# Patient Record
Sex: FEMALE | NOT HISPANIC OR LATINO | Employment: OTHER | URBAN - NONMETROPOLITAN AREA
[De-identification: names, ages, dates, MRNs, and addresses within clinical notes are randomized per-mention and may not be internally consistent; named-entity substitution may affect disease eponyms.]

---

## 2018-01-03 ENCOUNTER — APPOINTMENT (OUTPATIENT)
Dept: GENERAL RADIOLOGY | Facility: HOSPITAL | Age: 78
End: 2018-01-03

## 2018-01-03 ENCOUNTER — APPOINTMENT (OUTPATIENT)
Dept: CARDIOLOGY | Facility: HOSPITAL | Age: 78
End: 2018-01-03
Attending: FAMILY MEDICINE

## 2018-01-03 ENCOUNTER — HOSPITAL ENCOUNTER (INPATIENT)
Facility: HOSPITAL | Age: 78
LOS: 24 days | Discharge: NURSING, REHAB OR LONG-TERM CARE | End: 2018-01-27
Attending: INTERNAL MEDICINE | Admitting: FAMILY MEDICINE

## 2018-01-03 ENCOUNTER — APPOINTMENT (OUTPATIENT)
Dept: NUCLEAR MEDICINE | Facility: HOSPITAL | Age: 78
End: 2018-01-03

## 2018-01-03 DIAGNOSIS — Z74.09 IMPAIRED MOBILITY: ICD-10-CM

## 2018-01-03 DIAGNOSIS — Z78.9 IMPAIRED MOBILITY AND ADLS: ICD-10-CM

## 2018-01-03 DIAGNOSIS — Z74.09 IMPAIRED MOBILITY AND ADLS: ICD-10-CM

## 2018-01-03 PROBLEM — N17.9 AKI (ACUTE KIDNEY INJURY) (HCC): Status: ACTIVE | Noted: 2018-01-03

## 2018-01-03 LAB
ALBUMIN SERPL-MCNC: 3.4 G/DL (ref 3.5–5)
ALBUMIN/GLOB SERPL: 1.1 G/DL (ref 1.1–2.5)
ALP SERPL-CCNC: 92 U/L (ref 24–120)
ALT SERPL W P-5'-P-CCNC: 29 U/L (ref 0–54)
ANION GAP SERPL CALCULATED.3IONS-SCNC: 14 MMOL/L (ref 4–13)
ANISOCYTOSIS BLD QL: ABNORMAL
APTT PPP: 46.7 SECONDS (ref 24.1–34.8)
ARTERIAL PATENCY WRIST A: POSITIVE
AST SERPL-CCNC: 19 U/L (ref 7–45)
ATMOSPHERIC PRESS: 756 MMHG
BASE EXCESS BLDA CALC-SCNC: -10.5 MMOL/L (ref 0–2)
BDY SITE: ABNORMAL
BILIRUB SERPL-MCNC: 0.5 MG/DL (ref 0.1–1)
BODY TEMPERATURE: 37 C
BUN BLD-MCNC: 63 MG/DL (ref 5–21)
BUN/CREAT SERPL: 13.2 (ref 7–25)
BURR CELLS BLD QL SMEAR: ABNORMAL
CALCIUM SPEC-SCNC: 10.8 MG/DL (ref 8.4–10.4)
CHLORIDE SERPL-SCNC: 106 MMOL/L (ref 98–110)
CO2 SERPL-SCNC: 17 MMOL/L (ref 24–31)
CREAT BLD-MCNC: 4.76 MG/DL (ref 0.5–1.4)
DEPRECATED RDW RBC AUTO: 49.8 FL (ref 40–54)
ERYTHROCYTE [DISTWIDTH] IN BLOOD BY AUTOMATED COUNT: 15.9 % (ref 12–15)
GFR SERPL CREATININE-BSD FRML MDRD: 11 ML/MIN/1.73
GFR SERPL CREATININE-BSD FRML MDRD: 9 ML/MIN/1.73
GLOBULIN UR ELPH-MCNC: 3.2 GM/DL
GLUCOSE BLD-MCNC: 118 MG/DL (ref 70–100)
GLUCOSE BLDC GLUCOMTR-MCNC: 126 MG/DL (ref 70–130)
HCO3 BLDA-SCNC: 14.5 MMOL/L (ref 20–26)
HCT VFR BLD AUTO: 33.1 % (ref 37–47)
HCT VFR BLD AUTO: 33.2 % (ref 37–47)
HGB BLD-MCNC: 10.8 G/DL (ref 12–16)
HGB BLD-MCNC: 10.9 G/DL (ref 12–16)
HYPOCHROMIA BLD QL: ABNORMAL
INR PPP: 1.32 (ref 0.91–1.09)
LARGE PLATELETS: ABNORMAL
LYMPHOCYTES # BLD MANUAL: 0.73 10*3/MM3 (ref 0.72–4.86)
LYMPHOCYTES NFR BLD MANUAL: 3 % (ref 4–12)
LYMPHOCYTES NFR BLD MANUAL: 5 % (ref 15–45)
Lab: ABNORMAL
MCH RBC QN AUTO: 28.3 PG (ref 28–32)
MCHC RBC AUTO-ENTMCNC: 32.8 G/DL (ref 33–36)
MCV RBC AUTO: 86.2 FL (ref 82–98)
METAMYELOCYTES NFR BLD MANUAL: 1 % (ref 0–0)
MODALITY: ABNORMAL
MONOCYTES # BLD AUTO: 0.44 10*3/MM3 (ref 0.19–1.3)
NEUTROPHILS # BLD AUTO: 13.37 10*3/MM3 (ref 1.87–8.4)
NEUTROPHILS NFR BLD MANUAL: 88 % (ref 39–78)
NEUTS BAND NFR BLD MANUAL: 3 % (ref 0–10)
NEUTS VAC BLD QL SMEAR: ABNORMAL
PCO2 BLDA: 28.9 MM HG (ref 35–45)
PH BLDA: 7.31 PH UNITS (ref 7.35–7.45)
PLATELET # BLD AUTO: 132 10*3/MM3 (ref 130–400)
PMV BLD AUTO: 11.3 FL (ref 6–12)
PO2 BLDA: 67.9 MM HG (ref 83–108)
POIKILOCYTOSIS BLD QL SMEAR: ABNORMAL
POLYCHROMASIA BLD QL SMEAR: ABNORMAL
POTASSIUM BLD-SCNC: 5.7 MMOL/L (ref 3.5–5.3)
PROT SERPL-MCNC: 6.6 G/DL (ref 6.3–8.7)
PROTHROMBIN TIME: 16.8 SECONDS (ref 11.9–14.6)
RBC # BLD AUTO: 3.85 10*6/MM3 (ref 4.2–5.4)
SAO2 % BLDCOA: 92.7 % (ref 94–99)
SCAN SLIDE: NORMAL
SODIUM BLD-SCNC: 137 MMOL/L (ref 135–145)
URATE SERPL-MCNC: 8.4 MG/DL (ref 2.7–7.5)
VENTILATOR MODE: ABNORMAL
WBC NRBC COR # BLD: 14.69 10*3/MM3 (ref 4.8–10.8)

## 2018-01-03 PROCEDURE — 71045 X-RAY EXAM CHEST 1 VIEW: CPT

## 2018-01-03 PROCEDURE — 25010000002 ENOXAPARIN PER 10 MG: Performed by: FAMILY MEDICINE

## 2018-01-03 PROCEDURE — 85014 HEMATOCRIT: CPT | Performed by: FAMILY MEDICINE

## 2018-01-03 PROCEDURE — 36600 WITHDRAWAL OF ARTERIAL BLOOD: CPT

## 2018-01-03 PROCEDURE — 25010000002 PERFLUTREN 6.52 MG/ML SUSPENSION: Performed by: FAMILY MEDICINE

## 2018-01-03 PROCEDURE — 84550 ASSAY OF BLOOD/URIC ACID: CPT | Performed by: FAMILY MEDICINE

## 2018-01-03 PROCEDURE — 85025 COMPLETE CBC W/AUTO DIFF WBC: CPT | Performed by: FAMILY MEDICINE

## 2018-01-03 PROCEDURE — 85018 HEMOGLOBIN: CPT | Performed by: FAMILY MEDICINE

## 2018-01-03 PROCEDURE — 93306 TTE W/DOPPLER COMPLETE: CPT

## 2018-01-03 PROCEDURE — 25010000002 VANCOMYCIN 10 G RECONSTITUTED SOLUTION: Performed by: FAMILY MEDICINE

## 2018-01-03 PROCEDURE — 25010000002 CEFEPIME PER 500 MG: Performed by: FAMILY MEDICINE

## 2018-01-03 PROCEDURE — 0 XENON XE 133: Performed by: FAMILY MEDICINE

## 2018-01-03 PROCEDURE — 94640 AIRWAY INHALATION TREATMENT: CPT

## 2018-01-03 PROCEDURE — 82803 BLOOD GASES ANY COMBINATION: CPT

## 2018-01-03 PROCEDURE — 0 TECHNETIUM ALBUMIN AGGREGATED: Performed by: FAMILY MEDICINE

## 2018-01-03 PROCEDURE — 82962 GLUCOSE BLOOD TEST: CPT

## 2018-01-03 PROCEDURE — 87150 DNA/RNA AMPLIFIED PROBE: CPT | Performed by: FAMILY MEDICINE

## 2018-01-03 PROCEDURE — 93005 ELECTROCARDIOGRAM TRACING: CPT | Performed by: FAMILY MEDICINE

## 2018-01-03 PROCEDURE — 93306 TTE W/DOPPLER COMPLETE: CPT | Performed by: INTERNAL MEDICINE

## 2018-01-03 PROCEDURE — 94799 UNLISTED PULMONARY SVC/PX: CPT

## 2018-01-03 PROCEDURE — A9558 XE133 XENON 10MCI: HCPCS | Performed by: FAMILY MEDICINE

## 2018-01-03 PROCEDURE — 85730 THROMBOPLASTIN TIME PARTIAL: CPT | Performed by: FAMILY MEDICINE

## 2018-01-03 PROCEDURE — 87040 BLOOD CULTURE FOR BACTERIA: CPT | Performed by: FAMILY MEDICINE

## 2018-01-03 PROCEDURE — 85610 PROTHROMBIN TIME: CPT | Performed by: FAMILY MEDICINE

## 2018-01-03 PROCEDURE — 78582 LUNG VENTILAT&PERFUS IMAGING: CPT

## 2018-01-03 PROCEDURE — A9540 TC99M MAA: HCPCS | Performed by: FAMILY MEDICINE

## 2018-01-03 PROCEDURE — 93010 ELECTROCARDIOGRAM REPORT: CPT | Performed by: INTERNAL MEDICINE

## 2018-01-03 PROCEDURE — 87205 SMEAR GRAM STAIN: CPT | Performed by: FAMILY MEDICINE

## 2018-01-03 PROCEDURE — 80053 COMPREHEN METABOLIC PANEL: CPT | Performed by: FAMILY MEDICINE

## 2018-01-03 PROCEDURE — 85007 BL SMEAR W/DIFF WBC COUNT: CPT | Performed by: FAMILY MEDICINE

## 2018-01-03 RX ORDER — PANTOPRAZOLE SODIUM 40 MG/10ML
80 INJECTION, POWDER, LYOPHILIZED, FOR SOLUTION INTRAVENOUS ONCE
Status: COMPLETED | OUTPATIENT
Start: 2018-01-03 | End: 2018-01-03

## 2018-01-03 RX ORDER — ACETAMINOPHEN 325 MG/1
650 TABLET ORAL EVERY 4 HOURS PRN
Status: DISCONTINUED | OUTPATIENT
Start: 2018-01-03 | End: 2018-01-27 | Stop reason: HOSPADM

## 2018-01-03 RX ORDER — SODIUM CHLORIDE 9 MG/ML
50 INJECTION, SOLUTION INTRAVENOUS CONTINUOUS
Status: DISCONTINUED | OUTPATIENT
Start: 2018-01-03 | End: 2018-01-08

## 2018-01-03 RX ORDER — CEFEPIME HYDROCHLORIDE 1 G/1
1 INJECTION, POWDER, FOR SOLUTION INTRAMUSCULAR; INTRAVENOUS EVERY 12 HOURS
Status: DISCONTINUED | OUTPATIENT
Start: 2018-01-03 | End: 2018-01-03 | Stop reason: SDUPTHER

## 2018-01-03 RX ORDER — SODIUM CHLORIDE 0.9 % (FLUSH) 0.9 %
1-10 SYRINGE (ML) INJECTION AS NEEDED
Status: DISCONTINUED | OUTPATIENT
Start: 2018-01-03 | End: 2018-01-27 | Stop reason: HOSPADM

## 2018-01-03 RX ORDER — ACETAMINOPHEN 650 MG/1
650 SUPPOSITORY RECTAL EVERY 4 HOURS PRN
Status: DISCONTINUED | OUTPATIENT
Start: 2018-01-03 | End: 2018-01-27 | Stop reason: HOSPADM

## 2018-01-03 RX ORDER — ONDANSETRON 4 MG/1
4 TABLET, FILM COATED ORAL EVERY 6 HOURS PRN
Status: DISCONTINUED | OUTPATIENT
Start: 2018-01-03 | End: 2018-01-27 | Stop reason: HOSPADM

## 2018-01-03 RX ORDER — NICOTINE POLACRILEX 4 MG
15 LOZENGE BUCCAL
Status: DISCONTINUED | OUTPATIENT
Start: 2018-01-03 | End: 2018-01-27 | Stop reason: HOSPADM

## 2018-01-03 RX ORDER — PANTOPRAZOLE SODIUM 40 MG/10ML
40 INJECTION, POWDER, LYOPHILIZED, FOR SOLUTION INTRAVENOUS
Status: DISCONTINUED | OUTPATIENT
Start: 2018-01-03 | End: 2018-01-03 | Stop reason: SDUPTHER

## 2018-01-03 RX ORDER — IPRATROPIUM BROMIDE AND ALBUTEROL SULFATE 2.5; .5 MG/3ML; MG/3ML
3 SOLUTION RESPIRATORY (INHALATION)
Status: DISCONTINUED | OUTPATIENT
Start: 2018-01-03 | End: 2018-01-22 | Stop reason: SDUPTHER

## 2018-01-03 RX ORDER — ONDANSETRON 4 MG/1
4 TABLET, ORALLY DISINTEGRATING ORAL EVERY 6 HOURS PRN
Status: DISCONTINUED | OUTPATIENT
Start: 2018-01-03 | End: 2018-01-27 | Stop reason: HOSPADM

## 2018-01-03 RX ORDER — HYDROCODONE BITARTRATE AND ACETAMINOPHEN 7.5; 325 MG/1; MG/1
1 TABLET ORAL EVERY 4 HOURS PRN
Status: DISPENSED | OUTPATIENT
Start: 2018-01-03 | End: 2018-01-13

## 2018-01-03 RX ORDER — DEXTROSE MONOHYDRATE 25 G/50ML
25 INJECTION, SOLUTION INTRAVENOUS
Status: DISCONTINUED | OUTPATIENT
Start: 2018-01-03 | End: 2018-01-27 | Stop reason: HOSPADM

## 2018-01-03 RX ORDER — ONDANSETRON 2 MG/ML
4 INJECTION INTRAMUSCULAR; INTRAVENOUS EVERY 6 HOURS PRN
Status: DISCONTINUED | OUTPATIENT
Start: 2018-01-03 | End: 2018-01-27 | Stop reason: HOSPADM

## 2018-01-03 RX ADMIN — CEFEPIME HYDROCHLORIDE 2 G: 2 INJECTION, POWDER, FOR SOLUTION INTRAVENOUS at 20:34

## 2018-01-03 RX ADMIN — ENOXAPARIN SODIUM 140 MG: 150 INJECTION SUBCUTANEOUS at 20:44

## 2018-01-03 RX ADMIN — PERFLUTREN 8.48 MG: 6.52 INJECTION, SUSPENSION INTRAVENOUS at 22:11

## 2018-01-03 RX ADMIN — PANTOPRAZOLE SODIUM 80 MG: 40 INJECTION, POWDER, FOR SOLUTION INTRAVENOUS at 20:38

## 2018-01-03 RX ADMIN — Medication 1 DOSE: at 21:45

## 2018-01-03 RX ADMIN — SODIUM CHLORIDE 100 ML/HR: 9 INJECTION, SOLUTION INTRAVENOUS at 20:28

## 2018-01-03 RX ADMIN — SODIUM CHLORIDE 8 MG/HR: 900 INJECTION INTRAVENOUS at 20:31

## 2018-01-03 RX ADMIN — VANCOMYCIN HYDROCHLORIDE 1500 MG: 100 INJECTION, POWDER, LYOPHILIZED, FOR SOLUTION INTRAVENOUS at 22:30

## 2018-01-03 RX ADMIN — IPRATROPIUM BROMIDE AND ALBUTEROL SULFATE 3 ML: 2.5; .5 SOLUTION RESPIRATORY (INHALATION) at 19:23

## 2018-01-03 RX ADMIN — XENON XE-133 10.3 MILLICURIE: 10 GAS RESPIRATORY (INHALATION) at 21:45

## 2018-01-03 NOTE — H&P
[facility] Medicine Services  HISTORY AND PHYSICAL    Date of Admission: 1/3/2018  Primary Care Physician: Ed Hanson MD    Subjective     Chief Complaint: Worsening renal function    History of Present Illness   The patient is a pleasant 77 year old lady with a history of hypertension, morbid obesity and recent right shoulder surgery.  She had surgery on her Right shoulder at the end of November.  She presented to [facility] on January 1st complaining of nausea, vomiting and diarrhea.  At that time she had mentioned blood in her stool.  She was admitted with IV Hydration.  She eventually became hypotensive and was sent to their ICU and put on Levophed.  She states she was told that she was Septic.  On the records, she appears to have been on IV Levaquin there.      She had decreased pulses in her toes and purple color, so duplex scans were obtained.  A completely occluding DVT was seen within the left common femoral, superficial femoral and popliteal veins.  She has since been anticoagulated with Lovenox.      A V/Q scan was also obtained and was read as high probability for PE.      Her most recent WBC count on 12/31 was 19.5        Review of Systems   Constitutional: Positive for fatigue. Negative for fever.   HENT: Negative for congestion and ear pain.    Eyes: Negative for pain and visual disturbance.   Respiratory: Negative for cough, shortness of breath and wheezing.    Cardiovascular: Negative for chest pain and palpitations.   Gastrointestinal: Negative for diarrhea, nausea and vomiting.   Endocrine: Negative for heat intolerance.   Genitourinary: Positive for decreased urine volume and difficulty urinating. Negative for dysuria and frequency.   Musculoskeletal: Positive for arthralgias. Negative for back pain.   Skin: Negative for rash and wound.   Neurological: Positive for weakness. Negative for dizziness and light-headedness.  "  Psychiatric/Behavioral: Negative for confusion. The patient is not nervous/anxious.    All other systems reviewed and are negative.       Otherwise complete ROS reviewed and negative except as mentioned in the HPI.    Past Medical History:   Past Medical History:   Diagnosis Date   • Anemia    • Arthritis    • Bladder spasms    • GERD (gastroesophageal reflux disease)    • Hypertension      Past Surgical History:  Past Surgical History:   Procedure Laterality Date   • ANKLE SURGERY     • KNEE SURGERY     • SHOULDER SURGERY       Social History:  reports that she has never smoked. She has never used smokeless tobacco. She reports that she does not drink alcohol or use illicit drugs.    Family History: family history is negative for Heart disease and Cancer.       Allergies:  No Known Allergies  Medications:  Prior to Admission medications    Not on File     Objective     Vital Signs: /68  Pulse 78  Temp 97 °F (36.1 °C) (Temporal Artery )   Resp 25  Ht 160 cm (63\")  Wt (!) 140 kg (307 lb 9.6 oz)  SpO2 95%  BMI 54.49 kg/m2  Physical Exam   Constitutional: She is oriented to person, place, and time. She appears well-developed and well-nourished. Nasal cannula in place.   HENT:   Head: Normocephalic and atraumatic.   Right Ear: External ear normal.   Left Ear: External ear normal.   Nose: Nose normal.   Mouth/Throat: Oropharynx is clear and moist.   Eyes: Conjunctivae and EOM are normal.   Neck: Normal range of motion. Neck supple.   Cardiovascular: Normal rate, regular rhythm and normal heart sounds.    Pulmonary/Chest: Effort normal and breath sounds normal. Tachypnea noted.   Abdominal: Soft. Bowel sounds are normal. She exhibits no distension. There is no tenderness.   Genitourinary: Rectal exam shows guaiac positive stool. Rectal exam shows no mass.   Genitourinary Comments: Dried blood around anus    Dark red stools   Musculoskeletal: Normal range of motion.   Neurological: She is alert and oriented " to person, place, and time.   Skin: Skin is warm and dry.   Psychiatric: She has a normal mood and affect. Her speech is normal and behavior is normal. Cognition and memory are normal.         Results Reviewed:  A completely occluding DVT was seen within the left common femoral, superficial femoral and popliteal veins.  She has since been anticoagulated with Lovenox.      A V/Q scan was also obtained and was read as high probability for PE.      Her most recent WBC count on 12/31     Assessment / Plan     1.  PE--Intermediate Risk with PESI Score 105  -She has an unfortunate combination of renal failure and thrombocytopenia as well as reported bloody stools  -Will Anticoagulate with Lovenox, renally dosed per pharmacy  -Will seek GI consult to do all that we can to prevent her from bleeding  -Will seek Vascular input for occlusive DVT and PE   -Stat Echo  -Recheck V/Q  -Consider Cardiology consult in AM if indicated  -EKG  -Cardiac Monitoring    2.  Occlusive Left-lower extremity DVT  -She has an unfortunate combination of renal failure and thrombocytopenia as well as reported bloody stools  -Will Anticoagulate with Lovenox, renally dosed per pharmacy  -Will seek GI consult to do all that we can to prevent her from bleeding  -Will seek Vascular input for occlusive DVT and PE   -Recheck Duplex scans    3:  GI Bleed  -Stools were dark red with a positive hemoccult  -Monitor H&H  -Protonix drip  -GI consult given anticoagulation  -Telemetry  -Transfuse as indicated    4.  ?Sepsis  -Most recent WBC count from [facility] is 19.5   -Patient is tachypneic to 24 here on the cardiac monitor.    -Has been hypotensive and on Levophed  -Hypotension could be related to PE  -Unfortunately, in the manner in which I am obtaining her care, I feel that the safest route is to restart a full Sepsis workup and cover with broad spectrum antibiotics for now.    -Cardiac Monitoring  -Pan cultures    5.  Acute Renal Failure  -Nephrology  Consult  -Renal U/S  -Renal Labs  -Boggs placed    6.  Lactic Acidosis  -2.5 on most recent labs from [facility]  -Recheck    7.  Intractible Nausea and vomiting  -Zofran  -IVF    8.  Dehydration  -IVF  -Monitor UOP  -Boggs placed    9.  GERD  -Protonix drip    10.  HLD  -Statin  -Lipid panel    11.  Bladder Spasms  -Ditropan    12.  HTN  -Hold BP meds given low BP, RUBY  -Monitor BP    13.  Anemia  -Monitor H&H  -Protonix  -GI Consulted    14:  Morbid Obesity  -BMI:  54.4  -Dietician consult    15.  Hyperglycemia  -A1C  -Monitor FSBS  -SSI      Code Status: DNR     I discussed the patients findings and my recommendations with the patient, patient's daughter, RN    Estimated length of stay critically ill    Wilfred Pedersen MD   01/03/18   7:05 PM

## 2018-01-04 ENCOUNTER — APPOINTMENT (OUTPATIENT)
Dept: ULTRASOUND IMAGING | Facility: HOSPITAL | Age: 78
End: 2018-01-04

## 2018-01-04 LAB
ALBUMIN SERPL-MCNC: 3.2 G/DL (ref 3.5–5)
ALBUMIN/GLOB SERPL: 1.1 G/DL (ref 1.1–2.5)
ALP SERPL-CCNC: 87 U/L (ref 24–120)
ALT SERPL W P-5'-P-CCNC: 28 U/L (ref 0–54)
ANION GAP SERPL CALCULATED.3IONS-SCNC: 12 MMOL/L (ref 4–13)
ANION GAP SERPL CALCULATED.3IONS-SCNC: 14 MMOL/L (ref 4–13)
ANISOCYTOSIS BLD QL: ABNORMAL
ARTICHOKE IGE QN: 58 MG/DL (ref 0–99)
AST SERPL-CCNC: 18 U/L (ref 7–45)
BACTERIA UR QL AUTO: ABNORMAL /HPF
BH CV ECHO MEAS - AO MAX PG (FULL): 11.7 MMHG
BH CV ECHO MEAS - AO MAX PG: 20.3 MMHG
BH CV ECHO MEAS - AO MEAN PG (FULL): 11 MMHG
BH CV ECHO MEAS - AO MEAN PG: 16 MMHG
BH CV ECHO MEAS - AO ROOT AREA (BSA CORRECTED): 1.4
BH CV ECHO MEAS - AO ROOT AREA: 8 CM^2
BH CV ECHO MEAS - AO ROOT DIAM: 3.2 CM
BH CV ECHO MEAS - AO V2 MAX: 225 CM/SEC
BH CV ECHO MEAS - AO V2 MEAN: 191 CM/SEC
BH CV ECHO MEAS - AO V2 VTI: 44.8 CM
BH CV ECHO MEAS - AVA(I,A): 2.1 CM^2
BH CV ECHO MEAS - AVA(I,D): 2.1 CM^2
BH CV ECHO MEAS - AVA(V,A): 2.5 CM^2
BH CV ECHO MEAS - AVA(V,D): 2.5 CM^2
BH CV ECHO MEAS - BSA(HAYCOCK): 2.6 M^2
BH CV ECHO MEAS - BSA: 2.3 M^2
BH CV ECHO MEAS - BZI_BMI: 54.4 KILOGRAMS/M^2
BH CV ECHO MEAS - BZI_METRIC_HEIGHT: 160 CM
BH CV ECHO MEAS - BZI_METRIC_WEIGHT: 139.3 KG
BH CV ECHO MEAS - CONTRAST EF 4CH: 68 ML/M^2
BH CV ECHO MEAS - EDV(CUBED): 65.9 ML
BH CV ECHO MEAS - EDV(MOD-SP4): 102 ML
BH CV ECHO MEAS - EDV(TEICH): 71.7 ML
BH CV ECHO MEAS - EF(CUBED): 75.1 %
BH CV ECHO MEAS - EF(MOD-SP4): 68 %
BH CV ECHO MEAS - EF(TEICH): 67.6 %
BH CV ECHO MEAS - ESV(CUBED): 16.4 ML
BH CV ECHO MEAS - ESV(MOD-SP4): 32.6 ML
BH CV ECHO MEAS - ESV(TEICH): 23.2 ML
BH CV ECHO MEAS - FS: 37.1 %
BH CV ECHO MEAS - IVS/LVPW: 1.1
BH CV ECHO MEAS - IVSD: 1.6 CM
BH CV ECHO MEAS - LA DIMENSION: 3.3 CM
BH CV ECHO MEAS - LA/AO: 1
BH CV ECHO MEAS - LV DIASTOLIC VOL/BSA (35-75): 44 ML/M^2
BH CV ECHO MEAS - LV MASS(C)D: 251.1 GRAMS
BH CV ECHO MEAS - LV MASS(C)DI: 108.2 GRAMS/M^2
BH CV ECHO MEAS - LV MAX PG: 8.5 MMHG
BH CV ECHO MEAS - LV MEAN PG: 5 MMHG
BH CV ECHO MEAS - LV SYSTOLIC VOL/BSA (12-30): 14.1 ML/M^2
BH CV ECHO MEAS - LV V1 MAX: 146 CM/SEC
BH CV ECHO MEAS - LV V1 MEAN: 105 CM/SEC
BH CV ECHO MEAS - LV V1 VTI: 25.2 CM
BH CV ECHO MEAS - LVIDD: 4 CM
BH CV ECHO MEAS - LVIDS: 2.5 CM
BH CV ECHO MEAS - LVLD AP4: 7.8 CM
BH CV ECHO MEAS - LVLS AP4: 6 CM
BH CV ECHO MEAS - LVOT AREA (M): 3.8 CM^2
BH CV ECHO MEAS - LVOT AREA: 3.8 CM^2
BH CV ECHO MEAS - LVOT DIAM: 2.2 CM
BH CV ECHO MEAS - LVPWD: 1.5 CM
BH CV ECHO MEAS - MV A MAX VEL: 72.8 CM/SEC
BH CV ECHO MEAS - MV DEC TIME: 0.35 SEC
BH CV ECHO MEAS - MV E MAX VEL: 72.8 CM/SEC
BH CV ECHO MEAS - MV E/A: 1
BH CV ECHO MEAS - RAP SYSTOLE: 10 MMHG
BH CV ECHO MEAS - RVSP: 37.5 MMHG
BH CV ECHO MEAS - SI(AO): 155.3 ML/M^2
BH CV ECHO MEAS - SI(CUBED): 21.4 ML/M^2
BH CV ECHO MEAS - SI(LVOT): 41.3 ML/M^2
BH CV ECHO MEAS - SI(MOD-SP4): 29.9 ML/M^2
BH CV ECHO MEAS - SI(TEICH): 20.9 ML/M^2
BH CV ECHO MEAS - SV(AO): 360.3 ML
BH CV ECHO MEAS - SV(CUBED): 49.6 ML
BH CV ECHO MEAS - SV(LVOT): 95.8 ML
BH CV ECHO MEAS - SV(MOD-SP4): 69.4 ML
BH CV ECHO MEAS - SV(TEICH): 48.5 ML
BH CV ECHO MEAS - TR MAX VEL: 262 CM/SEC
BILIRUB SERPL-MCNC: 0.4 MG/DL (ref 0.1–1)
BILIRUB UR QL STRIP: NEGATIVE
BUN BLD-MCNC: 65 MG/DL (ref 5–21)
BUN BLD-MCNC: 67 MG/DL (ref 5–21)
BUN/CREAT SERPL: 14.4 (ref 7–25)
BUN/CREAT SERPL: 16.1 (ref 7–25)
BURR CELLS BLD QL SMEAR: ABNORMAL
CALCIUM SPEC-SCNC: 10.3 MG/DL (ref 8.4–10.4)
CALCIUM SPEC-SCNC: 10.6 MG/DL (ref 8.4–10.4)
CHLORIDE SERPL-SCNC: 109 MMOL/L (ref 98–110)
CHLORIDE SERPL-SCNC: 109 MMOL/L (ref 98–110)
CHOLEST SERPL-MCNC: 124 MG/DL (ref 130–200)
CLARITY UR: ABNORMAL
CLUMPED PLATELETS: PRESENT
CO2 SERPL-SCNC: 15 MMOL/L (ref 24–31)
CO2 SERPL-SCNC: 19 MMOL/L (ref 24–31)
COARSE GRAN CASTS URNS QL MICRO: ABNORMAL /LPF
COLOR UR: ABNORMAL
CREAT BLD-MCNC: 4.15 MG/DL (ref 0.5–1.4)
CREAT BLD-MCNC: 4.51 MG/DL (ref 0.5–1.4)
CREAT UR-MCNC: 154.1 MG/DL
D-LACTATE SERPL-SCNC: 1.3 MMOL/L (ref 0.5–2)
DEPRECATED RDW RBC AUTO: 49.1 FL (ref 40–54)
E/E' RATIO: 10.5
ERYTHROCYTE [DISTWIDTH] IN BLOOD BY AUTOMATED COUNT: 15.8 % (ref 12–15)
GFR SERPL CREATININE-BSD FRML MDRD: 10 ML/MIN/1.73
GFR SERPL CREATININE-BSD FRML MDRD: 10 ML/MIN/1.73
GFR SERPL CREATININE-BSD FRML MDRD: 12 ML/MIN/1.73
GFR SERPL CREATININE-BSD FRML MDRD: 13 ML/MIN/1.73
GLOBULIN UR ELPH-MCNC: 3 GM/DL
GLUCOSE BLD-MCNC: 127 MG/DL (ref 70–100)
GLUCOSE BLD-MCNC: 145 MG/DL (ref 70–100)
GLUCOSE BLDC GLUCOMTR-MCNC: 118 MG/DL (ref 70–130)
GLUCOSE UR STRIP-MCNC: NEGATIVE MG/DL
HBA1C MFR BLD: 5.8 %
HCT VFR BLD AUTO: 30.6 % (ref 37–47)
HCT VFR BLD AUTO: 32.2 % (ref 37–47)
HCT VFR BLD AUTO: 32.6 % (ref 37–47)
HDLC SERPL-MCNC: 38 MG/DL
HGB BLD-MCNC: 10.4 G/DL (ref 12–16)
HGB BLD-MCNC: 10.6 G/DL (ref 12–16)
HGB BLD-MCNC: 9.9 G/DL (ref 12–16)
HGB UR QL STRIP.AUTO: ABNORMAL
HYALINE CASTS UR QL AUTO: ABNORMAL /LPF
HYPOCHROMIA BLD QL: ABNORMAL
IRON 24H UR-MRATE: 25 MCG/DL (ref 42–180)
IRON SATN MFR SERPL: 14 % (ref 20–45)
KETONES UR QL STRIP: NEGATIVE
LDLC/HDLC SERPL: 1.59 {RATIO}
LEFT ATRIUM VOLUME INDEX: 21.4 ML/M2
LEFT ATRIUM VOLUME: 49.7 CM3
LEUKOCYTE ESTERASE UR QL STRIP.AUTO: ABNORMAL
LV EF 2D ECHO EST: 65 %
LYMPHOCYTES # BLD MANUAL: 0.58 10*3/MM3 (ref 0.72–4.86)
LYMPHOCYTES NFR BLD MANUAL: 2 % (ref 4–12)
LYMPHOCYTES NFR BLD MANUAL: 4 % (ref 15–45)
MAXIMAL PREDICTED HEART RATE: 145 BPM
MCH RBC QN AUTO: 27.8 PG (ref 28–32)
MCHC RBC AUTO-ENTMCNC: 32.5 G/DL (ref 33–36)
MCV RBC AUTO: 85.6 FL (ref 82–98)
METAMYELOCYTES NFR BLD MANUAL: 1 % (ref 0–0)
MONOCYTES # BLD AUTO: 0.29 10*3/MM3 (ref 0.19–1.3)
NEUTROPHILS # BLD AUTO: 13.51 10*3/MM3 (ref 1.87–8.4)
NEUTROPHILS NFR BLD MANUAL: 86 % (ref 39–78)
NEUTS BAND NFR BLD MANUAL: 7 % (ref 0–10)
NEUTS VAC BLD QL SMEAR: ABNORMAL
NITRITE UR QL STRIP: NEGATIVE
OSMOLALITY UR: 524 MOSM/KG (ref 601–850)
PH UR STRIP.AUTO: <=5 [PH] (ref 5–8)
PLATELET # BLD AUTO: 128 10*3/MM3 (ref 130–400)
PMV BLD AUTO: 11.3 FL (ref 6–12)
POIKILOCYTOSIS BLD QL SMEAR: ABNORMAL
POTASSIUM BLD-SCNC: 5 MMOL/L (ref 3.5–5.3)
POTASSIUM BLD-SCNC: 5.6 MMOL/L (ref 3.5–5.3)
PROT SERPL-MCNC: 6.2 G/DL (ref 6.3–8.7)
PROT UR QL STRIP: ABNORMAL
RBC # BLD AUTO: 3.81 10*6/MM3 (ref 4.2–5.4)
RBC # UR: ABNORMAL /HPF
REF LAB TEST METHOD: ABNORMAL
RENAL EPI CELLS #/AREA URNS HPF: ABNORMAL /HPF
SCAN SLIDE: NORMAL
SMALL PLATELETS BLD QL SMEAR: ABNORMAL
SODIUM BLD-SCNC: 138 MMOL/L (ref 135–145)
SODIUM BLD-SCNC: 140 MMOL/L (ref 135–145)
SODIUM UR-SCNC: 23 MMOL/L (ref 30–90)
SP GR UR STRIP: 1.02 (ref 1–1.03)
SQUAMOUS #/AREA URNS HPF: ABNORMAL /HPF
STRESS TARGET HR: 123 BPM
TIBC SERPL-MCNC: 184 MCG/DL (ref 225–420)
TRIGL SERPL-MCNC: 128 MG/DL (ref 0–149)
TSH SERPL DL<=0.05 MIU/L-ACNC: 0.93 MIU/ML (ref 0.47–4.68)
UROBILINOGEN UR QL STRIP: ABNORMAL
UUN 24H UR-MCNC: 578 MG/DL
WBC NRBC COR # BLD: 14.53 10*3/MM3 (ref 4.8–10.8)
WBC UR QL AUTO: ABNORMAL /HPF

## 2018-01-04 PROCEDURE — 85018 HEMOGLOBIN: CPT | Performed by: FAMILY MEDICINE

## 2018-01-04 PROCEDURE — 93970 EXTREMITY STUDY: CPT

## 2018-01-04 PROCEDURE — 25010000002 ENOXAPARIN PER 10 MG: Performed by: FAMILY MEDICINE

## 2018-01-04 PROCEDURE — 83550 IRON BINDING TEST: CPT | Performed by: INTERNAL MEDICINE

## 2018-01-04 PROCEDURE — 83935 ASSAY OF URINE OSMOLALITY: CPT | Performed by: INTERNAL MEDICINE

## 2018-01-04 PROCEDURE — 85014 HEMATOCRIT: CPT | Performed by: FAMILY MEDICINE

## 2018-01-04 PROCEDURE — 76775 US EXAM ABDO BACK WALL LIM: CPT

## 2018-01-04 PROCEDURE — 85007 BL SMEAR W/DIFF WBC COUNT: CPT | Performed by: FAMILY MEDICINE

## 2018-01-04 PROCEDURE — 80053 COMPREHEN METABOLIC PANEL: CPT | Performed by: FAMILY MEDICINE

## 2018-01-04 PROCEDURE — 99222 1ST HOSP IP/OBS MODERATE 55: CPT | Performed by: INTERNAL MEDICINE

## 2018-01-04 PROCEDURE — 25010000002 CEFEPIME PER 500 MG: Performed by: FAMILY MEDICINE

## 2018-01-04 PROCEDURE — 94799 UNLISTED PULMONARY SVC/PX: CPT

## 2018-01-04 PROCEDURE — 84300 ASSAY OF URINE SODIUM: CPT | Performed by: FAMILY MEDICINE

## 2018-01-04 PROCEDURE — 83540 ASSAY OF IRON: CPT | Performed by: INTERNAL MEDICINE

## 2018-01-04 PROCEDURE — 63710000001 INSULIN REGULAR HUMAN PER 5 UNITS: Performed by: FAMILY MEDICINE

## 2018-01-04 PROCEDURE — 84540 ASSAY OF URINE/UREA-N: CPT | Performed by: NURSE PRACTITIONER

## 2018-01-04 PROCEDURE — 99222 1ST HOSP IP/OBS MODERATE 55: CPT | Performed by: NURSE PRACTITIONER

## 2018-01-04 PROCEDURE — 80061 LIPID PANEL: CPT | Performed by: NURSE PRACTITIONER

## 2018-01-04 PROCEDURE — 82962 GLUCOSE BLOOD TEST: CPT

## 2018-01-04 PROCEDURE — 93970 EXTREMITY STUDY: CPT | Performed by: SURGERY

## 2018-01-04 PROCEDURE — 85025 COMPLETE CBC W/AUTO DIFF WBC: CPT | Performed by: FAMILY MEDICINE

## 2018-01-04 PROCEDURE — 81001 URINALYSIS AUTO W/SCOPE: CPT | Performed by: FAMILY MEDICINE

## 2018-01-04 PROCEDURE — 87086 URINE CULTURE/COLONY COUNT: CPT | Performed by: FAMILY MEDICINE

## 2018-01-04 PROCEDURE — 83036 HEMOGLOBIN GLYCOSYLATED A1C: CPT | Performed by: NURSE PRACTITIONER

## 2018-01-04 PROCEDURE — 84443 ASSAY THYROID STIM HORMONE: CPT | Performed by: NURSE PRACTITIONER

## 2018-01-04 PROCEDURE — 83605 ASSAY OF LACTIC ACID: CPT | Performed by: FAMILY MEDICINE

## 2018-01-04 PROCEDURE — 82570 ASSAY OF URINE CREATININE: CPT | Performed by: FAMILY MEDICINE

## 2018-01-04 RX ORDER — DEXTROSE MONOHYDRATE 25 G/50ML
50 INJECTION, SOLUTION INTRAVENOUS ONCE
Status: COMPLETED | OUTPATIENT
Start: 2018-01-04 | End: 2018-01-04

## 2018-01-04 RX ORDER — SODIUM POLYSTYRENE SULFONATE 15 G/60ML
30 SUSPENSION ORAL; RECTAL ONCE
Status: COMPLETED | OUTPATIENT
Start: 2018-01-04 | End: 2018-01-04

## 2018-01-04 RX ORDER — DEXTROSE MONOHYDRATE 25 G/50ML
50 INJECTION, SOLUTION INTRAVENOUS ONCE
Status: DISCONTINUED | OUTPATIENT
Start: 2018-01-04 | End: 2018-01-04

## 2018-01-04 RX ADMIN — IPRATROPIUM BROMIDE AND ALBUTEROL SULFATE 3 ML: 2.5; .5 SOLUTION RESPIRATORY (INHALATION) at 01:23

## 2018-01-04 RX ADMIN — SODIUM CHLORIDE 1000 ML: 9 INJECTION, SOLUTION INTRAVENOUS at 08:59

## 2018-01-04 RX ADMIN — SODIUM BICARBONATE 150 ML/HR: 84 INJECTION, SOLUTION INTRAVENOUS at 09:17

## 2018-01-04 RX ADMIN — SODIUM CHLORIDE 8 MG/HR: 900 INJECTION INTRAVENOUS at 01:33

## 2018-01-04 RX ADMIN — SODIUM BICARBONATE 150 ML/HR: 84 INJECTION, SOLUTION INTRAVENOUS at 17:09

## 2018-01-04 RX ADMIN — DEXTROSE MONOHYDRATE 50 ML: 25 INJECTION, SOLUTION INTRAVENOUS at 09:50

## 2018-01-04 RX ADMIN — SODIUM POLYSTYRENE SULFONATE 30 G: 15 SUSPENSION ORAL; RECTAL at 09:22

## 2018-01-04 RX ADMIN — INSULIN HUMAN 10 UNITS: 100 INJECTION, SOLUTION PARENTERAL at 09:28

## 2018-01-04 RX ADMIN — IPRATROPIUM BROMIDE AND ALBUTEROL SULFATE 3 ML: 2.5; .5 SOLUTION RESPIRATORY (INHALATION) at 06:55

## 2018-01-04 RX ADMIN — SODIUM CHLORIDE 8 MG/HR: 900 INJECTION INTRAVENOUS at 11:18

## 2018-01-04 RX ADMIN — SODIUM CHLORIDE 8 MG/HR: 900 INJECTION INTRAVENOUS at 07:35

## 2018-01-04 RX ADMIN — IPRATROPIUM BROMIDE AND ALBUTEROL SULFATE 3 ML: 2.5; .5 SOLUTION RESPIRATORY (INHALATION) at 19:08

## 2018-01-04 RX ADMIN — IPRATROPIUM BROMIDE AND ALBUTEROL SULFATE 3 ML: 2.5; .5 SOLUTION RESPIRATORY (INHALATION) at 12:25

## 2018-01-04 RX ADMIN — CEFEPIME HYDROCHLORIDE 2 G: 2 INJECTION, POWDER, FOR SOLUTION INTRAVENOUS at 20:48

## 2018-01-04 RX ADMIN — SODIUM CHLORIDE 8 MG/HR: 900 INJECTION INTRAVENOUS at 16:31

## 2018-01-04 RX ADMIN — ENOXAPARIN SODIUM 140 MG: 150 INJECTION SUBCUTANEOUS at 20:55

## 2018-01-04 RX ADMIN — SODIUM CHLORIDE 8 MG/HR: 900 INJECTION INTRAVENOUS at 22:43

## 2018-01-04 NOTE — PROGRESS NOTES
[facility] Medicine Services  INPATIENT PROGRESS NOTE    Length of Stay: 1  Date of Admission: 1/3/2018  Primary Care Physician: Ed Hanson MD    Subjective   Chief Complaint: SOA  HPI   Doing ok.  Still SOA.  Still mildly hyperkalemic.  Renal function slightly better.    V/Q was read as low risk here.    Awaiting renal U/S and Duplex scans.  Awaiting input from GI, Vascular, renal.    WBC's stable.    Afebrile  Hypotensive overnight.          Review of Systems   Constitutional: Positive for activity change, appetite change and fatigue. Negative for fever.   HENT: Negative for congestion and ear pain.    Eyes: Negative for pain and visual disturbance.   Respiratory: Positive for shortness of breath. Negative for cough and wheezing.    Cardiovascular: Negative for chest pain and palpitations.   Gastrointestinal: Positive for anal bleeding and blood in stool. Negative for diarrhea, nausea and vomiting.   Endocrine: Negative for heat intolerance.   Genitourinary: Negative for dysuria and frequency.   Musculoskeletal: Negative for arthralgias and back pain.   Skin: Negative for rash and wound.   Neurological: Positive for weakness. Negative for dizziness and light-headedness.   Psychiatric/Behavioral: Negative for confusion. The patient is not nervous/anxious.    All other systems reviewed and are negative.     All pertinent negatives and positives are as above. All other systems have been reviewed and are negative unless otherwise stated.     Objective    Temp:  [97 °F (36.1 °C)-98.5 °F (36.9 °C)] 98.4 °F (36.9 °C)  Heart Rate:  [69-96] 85  Resp:  [18-25] 18  BP: ()/(52-76) 107/65  Physical Exam   Constitutional: She is oriented to person, place, and time. She appears well-developed and well-nourished.   HENT:   Head: Normocephalic and atraumatic.   Right Ear: External ear normal.   Left Ear: External ear normal.   Nose: Nose normal.   Mouth/Throat: Oropharynx is clear  and moist.   Eyes: Conjunctivae and EOM are normal.   Neck: Normal range of motion. Neck supple.   Cardiovascular: Normal rate, regular rhythm and normal heart sounds.    Pulses:       Carotid pulses are 2+ on the right side, and 2+ on the left side.       Radial pulses are 2+ on the right side, and 2+ on the left side.        Popliteal pulses are 2+ on the right side, and 2+ on the left side.        Dorsalis pedis pulses are 2+ on the right side, and 1+ on the left side.        Posterior tibial pulses are 2+ on the right side, and 1+ on the left side.   BLE edema, Left toes slightly dusky   Pulmonary/Chest: Effort normal and breath sounds normal.   Abdominal: Soft. Bowel sounds are normal. She exhibits no distension. There is no tenderness.   Musculoskeletal: Normal range of motion.   Neurological: She is alert and oriented to person, place, and time.   Skin: Skin is warm and dry.   Psychiatric: She has a normal mood and affect. Her speech is normal and behavior is normal. Cognition and memory are normal.         Results Review:  I have reviewed the labs, radiology results, and diagnostic studies.    Laboratory Data:     Results from last 7 days  Lab Units 01/04/18  0738 01/04/18  0201 01/03/18  2308 01/03/18  1858   WBC 10*3/mm3  --  14.53*  --  14.69*   HEMOGLOBIN g/dL 10.4* 10.6* 10.8* 10.9*   HEMATOCRIT % 32.2* 32.6* 33.1* 33.2*   PLATELETS 10*3/mm3  --  128*  --  132          Results from last 7 days  Lab Units 01/04/18  0201 01/03/18  1858   SODIUM mmol/L 138 137   POTASSIUM mmol/L 5.6* 5.7*   CHLORIDE mmol/L 109 106   CO2 mmol/L 15.0* 17.0*   BUN mg/dL 65* 63*   CREATININE mg/dL 4.51* 4.76*   CALCIUM mg/dL 10.6* 10.8*   BILIRUBIN mg/dL 0.4 0.5   ALK PHOS U/L 87 92   ALT (SGPT) U/L 28 29   AST (SGOT) U/L 18 19   GLUCOSE mg/dL 127* 118*       Culture Data:   Blood Culture   Date Value Ref Range Status   01/03/2018 No growth at less than 24 hours  Preliminary   01/03/2018 No growth at less than 24 hours   Preliminary       Radiology Data:   Imaging Results (last 24 hours)     Procedure Component Value Units Date/Time    XR Chest 1 View [[id number]] Collected:  01/03/18 1945     Updated:  01/03/18 1950    Narrative:       XR CHEST 1 VW- 1/3/2018 7:29 PM CST     HISTORY: Possible Pneumonia, CHF       COMPARISON: None.     FINDINGS:      There is elevation of the right hemidiaphragm with volume loss in the  right lung base which appears chronic. Minimal streaky atelectasis in  the left lung base. No focal lung infiltrate is identified. Overall  normal heart size with normal appearance of the pulmonary vasculature.  Right shoulder arthroplasty. No acute bony abnormality. Advanced  degenerative change at the left shoulder.       Impression:       1. Elevation of the right hemidiaphragm with volume loss in the right  lung base, likely chronic. Minimal streaky atelectasis in the left lung  base.  2. No focal lung infiltrate identified. No evidence of decompensated  heart failure.        This report was finalized on 01/03/2018 19:46 by Dr Alfonso Hercules, .    NM Lung Ventilation Perfusion [[id number]] Collected:  01/03/18 2151     Updated:  01/03/18 2158    Narrative:       NM LUNG VENTILATION PERFUSION- 1/3/2018 9:01 PM CST     HISTORY: Suspicion for PE on recent outside VQ scan       COMPARISON: Chest x-ray dated 01/03/2018      RADIOPHARMACEUTICAL: 10.2 mCi of Xenon-133 for the ventilation study and  5.5 mCi Tc 99m MAA for the perfusion study.      TECHNIQUE: Following inhalation of the aerosolized radiopharmaceutical,  the ventilation study was performed with images of the thorax being  obtained in posterior projection. Thereafter, the perfusion study was  performed following the injection of radiolabeled MAA particles with  images of the thorax obtained in 6 projections.      FINDINGS:       There is elevation of the right hemidiaphragm on chest x-ray which  accounts for the area of third opinion on ventilation and  perfusion  images in the right lung base. Otherwise homogeneous distribution of  radiotracer on the perfusion scan.       Impression:       1. Findings are most commonly associated with low probability for acute  pulmonary embolism. Elevation of the hemidiaphragm accounts for the  relative perfusion deficit in the right lung base. Otherwise, perfusion  is homogeneous.     This report was finalized on 01/03/2018 21:54 by Dr Alfonso Hercules, .          I have reviewed the patient current medications.     Assessment/Plan      1.  Occlusive Left-lower extremity DVT  -She has an unfortunate combination of renal failure and thrombocytopenia as well as reported bloody stools  -Will Anticoagulate with Lovenox, renally dosed per pharmacy  -Will seek GI consult to do all that we can to prevent her from bleeding  -Will seek Vascular input for occlusive DVT and PE   -Recheck Duplex scans     2:  GI Bleed  -Stools were dark red with a positive hemoccult  -Monitor H&H  -Protonix drip  -GI consult given anticoagulation  -Telemetry  -Transfuse as indicated     3.  ?Sepsis  -Most recent WBC count from [facility] is 19.5   -Patient is tachypneic to 24 here on the cardiac monitor.    -Has been hypotensive and on Levophed  -Hypotension could be related to PE  -Unfortunately, in the manner in which I am obtaining her care, I feel that the safest route is to restart a full Sepsis workup and cover with broad spectrum antibiotics for now.    -Cardiac Monitoring  -Pan cultures     3.  Acute Renal Failure  -Nephrology Consult  -Renal U/S  -Renal Labs  -Boggs placed     4.  Lactic Acidosis  -2.5 on most recent labs from [facility]  -Recheck     5.  Intractible Nausea and vomiting  -Zofran  -IVF     6.  Dehydration  -IVF  -Monitor UOP  -Boggs placed     7.  GERD  -Protonix drip     8.  HLD  -Statin  -Lipid panel     9.  Bladder Spasms  -Ditropan     10.  HTN  -Hold BP meds given low BP, RUBY  -Monitor BP     11.  Anemia  -Monitor H&H  -Protonix  -GI  Consulted     12:  Morbid Obesity  -BMI:  54.4  -Dietician consult     13.  Hyperglycemia  -A1C negative  -D/C finger sticks/A1C    14.  Hyperkalemia  -Insulin/Dextrose  -Kayexalate  -Cardiac monitoring  -Renal consulted        Discharge Planning:Continue in critical care.    Wilfred Pedersen MD   01/04/18   8:33 AM

## 2018-01-04 NOTE — PROGRESS NOTES
"Pharmacy Dosing Service  Anticoagulant  Enoxaparin    Assessment/Action/Plan:  Pharmacy to dose enoxaparin for active DVT/PE.  Patient also with RUBY, thrombocytopenia, and possible GI bleed (pt reported bloody stools - on protonix gtt and GI consulted).  Will initiate enoxaparin 140 mg (1mg/kg) subq every 24 hours.  Pharmacy will continue to monitor and adjust dose accordingly.     Subjective:  Tricia Hernandez is a 77 y.o. female on Enoxaparin 140 mg SQ every 24 hours for indication of DVT/PE.    Objective:  [Ht: 160 cm (63\"); Wt: (!) 140 kg (307 lb 9.6 oz); BMI: Body mass index is 54.49 kg/(m^2).]  Estimated Creatinine Clearance: 14.1 mL/min (by C-G formula based on Cr of 4.76). No results found for: INR, PROTIME   Lab Results   Component Value Date    HGB 10.9 (L) 01/03/2018      Lab Results   Component Value Date     01/03/2018       [facility]  01/03/18 8:35 PM     "

## 2018-01-04 NOTE — CONSULTS
Tricia Hernandez  [id number]  [id number]  C001/1  Wilfred Pedersen MD  1/3/2018      HPI: Tricia Hernandez is a 77 y.o. female who has a history of hypertension, morbid obesity and recent right shoulder surgery.  She had surgery on her Right shoulder at the end of November.  She presented to [facility] on [facility] 1[facility] complaining of nausea, vomiting and diarrhea.  At that time she had mentioned blood in her stool.  She was admitted with IV Hydration.  She eventually became hypotensive and was sent to their ICU and put on Levophed.  She states she was told that she was Septic.  On the records, she appears to have been on IV Levaquin there.  She had decreased pulses in her toes and purple color, so duplex scans were obtained.  A completely occluding DVT was seen within the left common femoral, superficial femoral and popliteal veins.  She has since been anticoagulated with Lovenox.  A V/Q scan was also obtained and was read as high probability for PE.  She did have repeat testing her and V/Q scan showed low probability for PE.  She also had repeat venous duplex showing bilateral lower extremity DVT.  She is on Lovenox with renal dosing.  She does have acute kidney injury likely due to ATN and is being followed by nephrology.  She had reports of dark stools and heme-positive stool as well, and is following with GI.       Past Medical History:   Diagnosis Date   • Anemia    • Arthritis    • Bladder spasms    • GERD (gastroesophageal reflux disease)    • Hypertension        Past Surgical History:   Procedure Laterality Date   • ANKLE SURGERY     • KNEE SURGERY     • SHOULDER SURGERY         Family History   Problem Relation Age of Onset   • Heart disease Neg Hx    • Cancer Neg Hx        Social History     Social History   • Marital status:      Spouse name: N/A   • Number of children: N/A   • Years of education: N/A     Occupational History   • Not on file.     Social History Main Topics  "  • Smoking status: Never Smoker   • Smokeless tobacco: Never Used   • Alcohol use No   • Drug use: No   • Sexual activity: Not on file     Other Topics Concern   • Not on file     Social History Narrative    Her daughter is her POA       No Known Allergies    Hospital Medications (active)       Dose Frequency Start End    acetaminophen (TYLENOL) suppository 650 mg 650 mg Every 4 Hours PRN 1/3/2018     Sig - Route: Insert 1 suppository into the rectum Every 4 (Four) Hours As Needed for Fever (temperature greater than 101.5 F or headache). - Rectal    Linked Group 1:  \"Or\" Linked Group Details        acetaminophen (TYLENOL) tablet 650 mg 650 mg Every 4 Hours PRN 1/3/2018     Sig - Route: Take 2 tablets by mouth Every 4 (Four) Hours As Needed for Headache or Fever (fever greater than 101.5 F). - Oral    Linked Group 1:  \"Or\" Linked Group Details        cefepime (MAXIPIME) 2 g/100 mL 0.9% NS (mbp) 2 g Once 1/3/2018 1/3/2018    Sig - Route: Infuse 100 mL into a venous catheter 1 (One) Time. - Intravenous    cefepime (MAXIPIME) 2 g/100 mL 0.9% NS (mbp) 2 g Every 24 Hours 1/4/2018 1/13/2018    Sig - Route: Infuse 100 mL into a venous catheter Daily. - Intravenous    dextrose (D50W) solution 25 g 25 g Every 15 Minutes PRN 1/3/2018     Sig - Route: Infuse 50 mL into a venous catheter Every 15 (Fifteen) Minutes As Needed for Low Blood Sugar (Blood Sugar Less Than 70, Patient Has IV Access - Unresponsive, NPO or Unable To Safely Swallow). - Intravenous    dextrose (D50W) solution 50 mL 50 mL Once 1/4/2018 1/4/2018    Sig - Route: Infuse 50 mL into a venous catheter 1 (One) Time. - Intravenous    dextrose (GLUTOSE) oral gel 15 g 15 g Every 15 Minutes PRN 1/3/2018     Sig - Route: Take 15 g by mouth Every 15 (Fifteen) Minutes As Needed for Low Blood Sugar (Blood Sugar Less Than 70, Patient Alert, Is Not NPO & Can Safely Swallow). - Oral    enoxaparin (LOVENOX) injection 140 mg 1 mg/kg × 140 kg Nightly 1/3/2018     Sig - Route: " "Inject 0.93 mL under the skin Every Night. - Subcutaneous    glucagon (human recombinant) (GLUCAGEN DIAGNOSTIC) injection 1 mg 1 mg Every 15 Minutes PRN 1/3/2018     Sig - Route: Inject 1 mg under the skin Every 15 (Fifteen) Minutes As Needed (Blood Glucose Less Than 70 - Patient Without IV Access - Unresponsive, NPO or Unable To Safely Swallow). - Subcutaneous    HYDROcodone-acetaminophen (NORCO) 7.5-325 MG per tablet 1 tablet 1 tablet Every 4 Hours PRN 1/3/2018 1/13/2018    Sig - Route: Take 1 tablet by mouth Every 4 (Four) Hours As Needed for Moderate Pain . - Oral    insulin regular (humuLIN R,novoLIN R) injection 10 Units 10 Units Once 1/4/2018 1/4/2018    Sig - Route: Infuse 10 Units into a venous catheter 1 (One) Time. - Intravenous    Linked Group 2:  \"And\" Linked Group Details        ipratropium-albuterol (DUO-NEB) nebulizer solution 3 mL 3 mL Every 6 Hours - RT 1/3/2018     Sig - Route: Take 3 mL by nebulization Every 6 (Six) Hours. - Nebulization    ondansetron (ZOFRAN) injection 4 mg 4 mg Every 6 Hours PRN 1/3/2018     Sig - Route: Infuse 2 mL into a venous catheter Every 6 (Six) Hours As Needed for Nausea or Vomiting. - Intravenous    Linked Group 3:  \"Or\" Linked Group Details        ondansetron (ZOFRAN) tablet 4 mg 4 mg Every 6 Hours PRN 1/3/2018     Sig - Route: Take 1 tablet by mouth Every 6 (Six) Hours As Needed for Nausea or Vomiting. - Oral    Linked Group 3:  \"Or\" Linked Group Details        ondansetron ODT (ZOFRAN-ODT) disintegrating tablet 4 mg 4 mg Every 6 Hours PRN 1/3/2018     Sig - Route: Take 1 tablet by mouth Every 6 (Six) Hours As Needed for Nausea or Vomiting. - Oral    Linked Group 3:  \"Or\" Linked Group Details        pantoprazole (PROTONIX) 40 mg/100 mL (0.4 mg/mL) in 0.9% NS IVPB 8 mg/hr Continuous 1/3/2018 1/6/2018    Sig - Route: Infuse 8 mg/hr into a venous catheter Continuous. - Intravenous    Linked Group 4:  \"And\" Linked Group Details        pantoprazole (PROTONIX) injection 80 " "mg 80 mg Once 1/3/2018 1/3/2018    Sig - Route: Infuse 20 mL into a venous catheter 1 (One) Time. - Intravenous    Linked Group 4:  \"And\" Linked Group Details        perflutren (DEFINITY) lipid microspheres injection 8.476 mg 1.3 mL Once 1/3/2018 1/3/2018    Sig - Route: Infuse 1.3 mL into a venous catheter 1 (One) Time. - Intravenous    Pharmacy to Dose enoxaparin (LOVENOX)  Continuous PRN 1/3/2018     Sig - Route: Continuous As Needed for Consult (Acute renal failure, Thrombocytopenia). - Does not apply    sodium bicarbonate 8.4 % 150 mEq in dextrose (D5W) 5 % 1,000 mL infusion (greater than 75 mEq) 150 mL/hr Continuous 1/4/2018     Sig - Route: Infuse 150 mL/hr into a venous catheter Continuous. - Intravenous    sodium chloride 0.9 % bolus 1,000 mL 1,000 mL Once 1/4/2018 1/4/2018    Sig - Route: Infuse 1,000 mL into a venous catheter 1 (One) Time. - Intravenous    sodium chloride 0.9 % flush 1-10 mL 1-10 mL As Needed 1/3/2018     Sig - Route: Infuse 1-10 mL into a venous catheter As Needed for Line Care. - Intravenous    sodium chloride 0.9 % infusion 100 mL/hr Continuous 1/3/2018     Sig - Route: Infuse 100 mL/hr into a venous catheter Continuous. - Intravenous    sodium polystyrene (KAYEXALATE) 15 GM/60ML suspension 30 g 30 g Once 1/4/2018 1/4/2018    Sig - Route: Insert 120 mL into the rectum 1 (One) Time. - Rectal    technetium albumin aggregated (MAA) solution 1 dose 1 dose Once in Imaging 1/3/2018 1/3/2018    Sig - Route: Infuse 1 dose into a venous catheter Once. - Intravenous    vancomycin 1500 mg/500 mL 0.9% NS IVPB ([facility]) 1,500 mg Every 48 Hours 1/3/2018 1/13/2018    Sig - Route: Infuse 500 mL into a venous catheter Every Other Day. - Intravenous    xenon xe 133 gas 10 mCi 10.3 millicurie 10.3 millicurie Once in Imaging 1/3/2018 1/3/2018    Sig - Route: Inhale 10.3 millicuries Once. - Inhalation    cefepime (MAXIPIME) intramuscular injection 1 g (Discontinued) 1 g Every 12 Hours 1/3/2018 1/3/2018    Sig " "- Route: Inject 1 g into the shoulder, thigh, or buttocks Every 12 (Twelve) Hours. - Intramuscular    Reason for Discontinue: Reorder    dextrose (D50W) solution 50 mL (Discontinued) 50 mL Once 1/4/2018 1/4/2018    Sig - Route: Infuse 50 mL into a venous catheter 1 (One) Time. - Intravenous    Linked Group 2:  \"And\" Linked Group Details        enoxaparin (LOVENOX) injection 140 mg (Discontinued) 1 mg/kg × 140 kg Once 1/3/2018 1/3/2018    Sig - Route: Inject 0.93 mL under the skin 1 (One) Time. - Subcutaneous    Reason for Discontinue: Reorder    insulin lispro (humaLOG) injection 2-7 Units (Discontinued) 2-7 Units 4 Times Daily With Meals & Nightly 1/3/2018 1/4/2018    Sig - Route: Inject 2-7 Units under the skin 4 (Four) Times a Day With Meals & at Bedtime. - Subcutaneous    pantoprazole (PROTONIX) injection 40 mg (Discontinued) 40 mg Every Early Morning 1/3/2018 1/3/2018    Sig - Route: Infuse 10 mL into a venous catheter Every Morning. - Intravenous    Reason for Discontinue: Duplicate order    Pharmacy to dose vancomycin (Discontinued)  Continuous PRN 1/3/2018 1/3/2018    Sig - Route: Continuous As Needed for Consult. - Does not apply    Reason for Discontinue: Reorder          Review of Systems   Constitutional: Positive for activity change, appetite change and fatigue.   HENT: Negative.    Eyes: Negative.    Respiratory: Positive for shortness of breath.    Cardiovascular: Positive for leg swelling (bilateral).   Gastrointestinal: Negative.    Endocrine: Negative.    Genitourinary: Positive for decreased urine volume.   Musculoskeletal: Negative.    Skin: Negative.    Allergic/Immunologic: Negative.    Neurological: Positive for weakness.   Hematological: Negative.    Psychiatric/Behavioral: Negative.      Vitals:    01/04/18 1605   BP: 104/60   Pulse: 70   Resp:    Temp: 98.2 °F (36.8 °C)   SpO2:      Last 2 weights    01/03/18  1803   Weight: (!) 140 kg (307 lb 9.6 oz)         Physical Exam   Constitutional: " She is oriented to person, place, and time. She appears well-developed and well-nourished. No distress.   Morbidly obese   HENT:   Head: Normocephalic and atraumatic.   Mouth/Throat: Oropharynx is clear and moist.   Eyes: Pupils are equal, round, and reactive to light. No scleral icterus.   Neck: Normal range of motion. Neck supple. No JVD present. Carotid bruit is not present. No thyromegaly present.   Cardiovascular: Normal rate, regular rhythm, S2 normal, normal heart sounds, intact distal pulses and normal pulses.  Exam reveals no gallop and no friction rub.    No murmur heard.  Pulmonary/Chest: Effort normal and breath sounds normal.   Abdominal: Soft. Normal aorta and bowel sounds are normal. There is no hepatosplenomegaly.   Morbidly obese   Musculoskeletal: Normal range of motion.   Neurological: She is alert and oriented to person, place, and time. No cranial nerve deficit.   Skin: Skin is warm and dry. She is not diaphoretic.   Psychiatric: She has a normal mood and affect. Her behavior is normal. Judgment and thought content normal.   Nursing note and vitals reviewed.      Laboratory Data:    Results from last 7 days  Lab Units 01/04/18  1515 01/04/18  0738 01/04/18  0201  01/03/18  1858   WBC 10*3/mm3  --   --  14.53*  --  14.69*   HEMOGLOBIN g/dL 9.9* 10.4* 10.6*  < > 10.9*   HEMATOCRIT % 30.6* 32.2* 32.6*  < > 33.2*   PLATELETS 10*3/mm3  --   --  128*  --  132   < > = values in this interval not displayed.      Results from last 7 days  Lab Units 01/04/18  1149 01/04/18  0201 01/03/18  1858   SODIUM mmol/L 140 138 137   POTASSIUM mmol/L 5.0 5.6* 5.7*   CHLORIDE mmol/L 109 109 106   CO2 mmol/L 19.0* 15.0* 17.0*   BUN mg/dL 67* 65* 63*   CREATININE mg/dL 4.15* 4.51* 4.76*   CALCIUM mg/dL 10.3 10.6* 10.8*   BILIRUBIN mg/dL  --  0.4 0.5   ALK PHOS U/L  --  87 92   ALT (SGPT) U/L  --  28 29   AST (SGOT) U/L  --  18 19   GLUCOSE mg/dL 145* 127* 118*       Results from last 7 days  Lab Units 01/03/18  6466    INR  1.32*   APTT seconds 46.7*         Diagnostic Data:  Imaging Results (last 24 hours)     Procedure Component Value Units Date/Time    XR Chest 1 View [[id number]] Collected:  01/03/18 1945     Updated:  01/03/18 1950    Narrative:       XR CHEST 1 VW- 1/3/2018 7:29 PM CST     HISTORY: Possible Pneumonia, CHF       COMPARISON: None.     FINDINGS:      There is elevation of the right hemidiaphragm with volume loss in the  right lung base which appears chronic. Minimal streaky atelectasis in  the left lung base. No focal lung infiltrate is identified. Overall  normal heart size with normal appearance of the pulmonary vasculature.  Right shoulder arthroplasty. No acute bony abnormality. Advanced  degenerative change at the left shoulder.       Impression:       1. Elevation of the right hemidiaphragm with volume loss in the right  lung base, likely chronic. Minimal streaky atelectasis in the left lung  base.  2. No focal lung infiltrate identified. No evidence of decompensated  heart failure.        This report was finalized on 01/03/2018 19:46 by Dr Alfonso Hercules, .    NM Lung Ventilation Perfusion [[id number]] Collected:  01/03/18 2151     Updated:  01/03/18 2158    Narrative:       NM LUNG VENTILATION PERFUSION- 1/3/2018 9:01 PM CST     HISTORY: Suspicion for PE on recent outside VQ scan       COMPARISON: Chest x-ray dated 01/03/2018      RADIOPHARMACEUTICAL: 10.2 mCi of Xenon-133 for the ventilation study and  5.5 mCi Tc 99m MAA for the perfusion study.      TECHNIQUE: Following inhalation of the aerosolized radiopharmaceutical,  the ventilation study was performed with images of the thorax being  obtained in posterior projection. Thereafter, the perfusion study was  performed following the injection of radiolabeled MAA particles with  images of the thorax obtained in 6 projections.      FINDINGS:       There is elevation of the right hemidiaphragm on chest x-ray which  accounts for the area of third opinion on  ventilation and perfusion  images in the right lung base. Otherwise homogeneous distribution of  radiotracer on the perfusion scan.       Impression:       1. Findings are most commonly associated with low probability for acute  pulmonary embolism. Elevation of the hemidiaphragm accounts for the  relative perfusion deficit in the right lung base. Otherwise, perfusion  is homogeneous.     This report was finalized on 01/03/2018 21:54 by Dr Alfonso Hercules, .    US Renal Bilateral [[id number]] Collected:  01/04/18 1148     Updated:  01/04/18 1152    Narrative:       EXAMINATION: US RENAL BILATERAL-  1/4/2018 12:48 PM EST     HISTORY: Acute kidney injury     COMPARISON:None     TECHNIQUE:Bilateral renal ultrasound was performed.     FINDINGS:     The right kidney measures 10.1 cm in dbcm-oc-ymeg length. The left  kidney measures 10.7 cm in oqma-xq-lttn length.     Normal echogenicity of the renal cortex is observed. There is no  pelvocaliectasis to indicate hydronephrosis or obstruction. There are no  perinephric abnormalities.     A 2.6 cm cyst observed in the upper pole of the right kidney.     Urinary bladder is incompletely distended without bladder wall  abnormality.     Aorta and inferior vena cava are imaged incompletely appear  unremarkable.       Impression:       1. Right nephrogenic cyst.  2. Otherwise negative bilateral renal ultrasound.  This report was finalized on 01/04/2018 11:49 by Dr. Prasanth Goff MD.    US Venous Doppler Lower Extremity Bilateral (duplex) [[id number]] Updated:  01/04/18 1203          Impression:  Active Problems:    RUBY (acute kidney injury)  BLE DVT  GI Bleed  Nausea and vomiting resolved    Plan: After thoroughly evaluating Tricia Hernandez, I believe the best course of action is to remain conservative from a vascular standpoint.  She will need anticoagulation, and is currently on Lovenox with renal dosing.  Nephrology is following for RUBY, creatinine slightly improved today.   Gastroenterology is following regarding dark stools with heme-positive stool.  She is currently on a protonix drip and GI recommends close observation.  If she is unable to tolerate anticoagulation or develops active bleeding, she would need an IVC filter.  This was discussed in full with complete understanding.  Thank you for allowing me to participate in the care of your patient.  Please do not hesitate to call with any questions or concerns.     BRITNEY Estrella

## 2018-01-04 NOTE — PLAN OF CARE
Problem: Patient Care Overview (Adult)  Goal: Plan of Care Review  Outcome: Ongoing (interventions implemented as appropriate)   01/04/18 1442   Coping/Psychosocial Response Interventions   Plan Of Care Reviewed With patient;daughter   Patient Care Overview   Progress improving   Outcome Evaluation   Outcome Summary/Follow up Plan Small improvements made today as far as UOP, average 100 ml/hr now. Bicarb gtt added. Vascular study showed DVT in right Femoral as well. Dr. Mckinney spoke with patient in regards to filter placement. All VSS and no pressure support needed today.

## 2018-01-04 NOTE — PROGRESS NOTES
Continued Stay Note   Girish     Patient Name: Tricia Hernandez  MRN: [id number]  Today's Date: 1/4/2018    Admit Date: 1/3/2018          Discharge Plan       01/04/18 6713    Case Management/Social Work Plan    Plan Have attempted to see patient a few times today.  Patient has been occupied with bedside testing or sleeping.  Will attempt to see patient at another time.              Discharge Codes     None            CATRACHO Anne

## 2018-01-04 NOTE — PLAN OF CARE
Problem: Patient Care Overview (Adult)  Goal: Plan of Care Review  Outcome: Ongoing (interventions implemented as appropriate)   01/04/18 0625   Coping/Psychosocial Response Interventions   Plan Of Care Reviewed With patient   Patient Care Overview   Progress improving   Outcome Evaluation   Outcome Summary/Follow up Plan Pt able to sleep between care. Urine output inadequate.      Goal: Adult Individualization and Mutuality  Outcome: Ongoing (interventions implemented as appropriate)    Goal: Discharge Needs Assessment  Outcome: Ongoing (interventions implemented as appropriate)      Problem: Fall Risk (Adult)  Goal: Identify Related Risk Factors and Signs and Symptoms  Outcome: Ongoing (interventions implemented as appropriate)    Goal: Absence of Falls  Outcome: Ongoing (interventions implemented as appropriate)      Problem: Fluid Volume Excess (Adult,Obstetrics,Pediatric)  Goal: Identify Related Risk Factors and Signs and Symptoms  Outcome: Ongoing (interventions implemented as appropriate)    Goal: Stable Weight  Outcome: Ongoing (interventions implemented as appropriate)    Goal: Balanced Intake/Output  Outcome: Ongoing (interventions implemented as appropriate)      Problem: VTE, DVT and PE (Adult)  Goal: Signs and Symptoms of Listed Potential Problems Will be Absent or Manageable (VTE, DVT and PE)  Outcome: Ongoing (interventions implemented as appropriate)

## 2018-01-04 NOTE — PROGRESS NOTES
Pharmacy Dosing Service  Antimicrobials  Cefepime    Assessment/Action/Plan:  Orders received for Cefepime 1gm IM every 12 hours x 10 days for Sepsis.  Based on indication and renal function (RUBY),  will adjust cefepime to 2 gm IVPB every 24 hours.  Pharmacy will continue to monitor renal function and adjust dose accordingly.      Subjective:  Tricia Hernandez is a 77 y.o. female currently being treated for Sepsis.    Objective:  CrCl cannot be calculated (No order found.). No results found for: CREATININE  No results found for: WBC  Temp Readings from Last 1 Encounters:   01/03/18 97 °F (36.1 °C) (Temporal Artery )       Culture Results:  Microbiology Results (last 10 days)       ** No results found for the last 240 hours. **          Current Antimicrobials:   Anti-Infectives       Ordered     Dose/Rate Route Frequency Start Stop      01/03/18 1933  cefepime (MAXIPIME) 2 g/100 mL 0.9% NS (mbp)     Ordering Provider:  Wilfred Pedersen MD    2 g  over 4 Hours Intravenous Every 24 Hours 01/04/18 2000 01/13/18 1959 01/03/18 1933  cefepime (MAXIPIME) 2 g/100 mL 0.9% NS (mbp)     Ordering Provider:  Wilfred Pedersen MD    2 g  200 mL/hr over 30 Minutes Intravenous Once 01/03/18 2000 01/03/18 1922  Pharmacy to dose vancomycin     Ordering Provider:  Wilfred Pedersen MD     Does not apply Continuous PRN 01/03/18 1921 01/13/18 1920            Ann Isaac, [facility]  01/03/18 7:34 PM

## 2018-01-04 NOTE — CONSULTS
"[facility]  Initial Inpatient Consult Note    Referring Provider: Jim Zuñiga DO    Reason for Consultation: GI bleed,    Subjective     History of present illness:      77-year-old female transferred from [facility], admitted with PE/intermediate risk, occlusive left lower extremity DVT, GI bleed, questionable sepsis, acute renal failure, lactic acidosis, intractable nausea vomiting, dehydration, anemia.  Currently patient is lethargic and I am had as questioned several times to get answers from her.  Evidently she was having nausea vomiting and diarrhea prior to proceeding to call with any hospital on January 1.  She states that this came on suddenly.  Denies sick contacts.  Denies vomiting diarrhea lasted a few days per patient history.  After several episodes of diarrhea and then she started seeing red blood in her stool.  States it was \"a lot of blood\".  Prior she did feel dizzy.  No syncopal episodes.  Last bowel movement was yesterday and states it was formed and brown.  Last episode of bleeding she states was \"a few days ago.  She has no further vomiting.  No sign of active GI bleeding here at [facility].  She does have a completely occluded DVT left common femoral, superficial femoral and popliteal veins.  VQ scan@[facility] showed high probability for PE.  We will recheck VQ scan here low probability for acute PE.  She is currently on Lovenox.  Hemoglobin this morning is 10.4.  Yesterday hemoglobin 10.9.  Admission hemoglobin to [facility] on December 31 was 13.  Currently BUN is 65 and creatinine is 4.5.  Potassium is 5.6 and she is to get Kayexalate.  She denies any abdominal pain.  States that she never had abdominal pain.  No rectal pain.  No report of hematemesis.  She currently is on a Protonix drip.  No history of colonoscopy or upper endoscopy in the past.  No family history of colon cancer colon polyps.    She told me she had a regular brown bowel movement " yesterday.  She normally has no difficulty moving her bowels and goes daily.  She never had bright red blood per rectum previously.  She is not the best historian about the acute events that led her to the hospital she states she passed some red blood denies any black melanotic stools.  But that is resolved she states.  Past Medical History:  Past Medical History:   Diagnosis Date   • Anemia    • Arthritis    • Bladder spasms    • GERD (gastroesophageal reflux disease)    • Hypertension        Past Surgical History:  Past Surgical History:   Procedure Laterality Date   • ANKLE SURGERY     • KNEE SURGERY     • SHOULDER SURGERY          Social History:   Social History   Substance Use Topics   • Smoking status: Never Smoker   • Smokeless tobacco: Never Used   • Alcohol use No        Family History:  Family History   Problem Relation Age of Onset   • Heart disease Neg Hx    • Cancer Neg Hx        Home Meds:  No prescriptions prior to admission.       Current Meds:  Current Facility-Administered Medications   Medication Dose Route Frequency Provider Last Rate Last Dose   • acetaminophen (TYLENOL) tablet 650 mg  650 mg Oral Q4H PRN BRITNEY Schroeder        Or   • acetaminophen (TYLENOL) suppository 650 mg  650 mg Rectal Q4H PRN BRITNEY Schroeder       • cefepime (MAXIPIME) 2 g/100 mL 0.9% NS (mbp)  2 g Intravenous Q24H Wilfred Pedersen MD       • dextrose (D50W) solution 25 g  25 g Intravenous Q15 Min PRN Wilfred Pedersen MD       • dextrose (GLUTOSE) oral gel 15 g  15 g Oral Q15 Min PRN Wilfred Pedersen MD       • enoxaparin (LOVENOX) injection 140 mg  1 mg/kg Subcutaneous Nightly Wilfred Pedersen MD   140 mg at 01/03/18 2044   • glucagon (human recombinant) (GLUCAGEN DIAGNOSTIC) injection 1 mg  1 mg Subcutaneous Q15 Min PRN Wilfred Pedersen MD       • HYDROcodone-acetaminophen (NORCO) 7.5-325 MG per tablet 1 tablet  1 tablet Oral Q4H PRN BRITNEY Schroeder       •  ipratropium-albuterol (DUO-NEB) nebulizer solution 3 mL  3 mL Nebulization Q6H - RT BRITNEY Schroeder   3 mL at 01/04/18 1225   • ondansetron (ZOFRAN) tablet 4 mg  4 mg Oral Q6H PRN BRITNEY Schroeder        Or   • ondansetron ODT (ZOFRAN-ODT) disintegrating tablet 4 mg  4 mg Oral Q6H PRN BRITNEY Schroeder        Or   • ondansetron (ZOFRAN) injection 4 mg  4 mg Intravenous Q6H PRN BRITNEY Schroeder       • pantoprazole (PROTONIX) 40 mg/100 mL (0.4 mg/mL) in 0.9% NS IVPB  8 mg/hr Intravenous Continuous Wilfred Pedersen MD 20 mL/hr at 01/04/18 1118 8 mg/hr at 01/04/18 1118   • Pharmacy to Dose enoxaparin (LOVENOX)   Does not apply Continuous PRN Wilfred Pedersen MD       • sodium bicarbonate 8.4 % 150 mEq in dextrose (D5W) 5 % 1,000 mL infusion (greater than 75 mEq)  150 mL/hr Intravenous Continuous Teddy Santiago  mL/hr at 01/04/18 0917 150 mL/hr at 01/04/18 0917   • sodium chloride 0.9 % flush 1-10 mL  1-10 mL Intravenous PRN BRITNEY Schroeder       • sodium chloride 0.9 % infusion  100 mL/hr Intravenous Continuous BRITNEY Schroeder 100 mL/hr at 01/03/18 2028 100 mL/hr at 01/03/18 2028   • vancomycin 1500 mg/500 mL 0.9% NS IVPB ([facility])  1,500 mg Intravenous Q48H Wilfred Pedersen MD   1,500 mg at 01/03/18 2230       Allergies:  No Known Allergies    Review of Systems   Review of Systems   Constitutional: Positive for fatigue. Negative for appetite change, chills, fever and unexpected weight change.   HENT: Negative for sore throat and trouble swallowing.    Respiratory: Positive for shortness of breath. Negative for cough, chest tightness and wheezing.    Cardiovascular: Negative for chest pain and palpitations.   Gastrointestinal: Positive for blood in stool, diarrhea, nausea and vomiting. Negative for abdominal distention, abdominal pain, constipation and rectal pain.        As mentioned in hpi   Genitourinary: Negative for difficulty urinating and hematuria.    Musculoskeletal: Negative for arthralgias and back pain.   Skin: Negative for color change and rash.   Neurological: Positive for dizziness and weakness. Negative for syncope, light-headedness and headaches.   Psychiatric/Behavioral: Negative for confusion. The patient is not nervous/anxious.         Objective     Vital Signs  Temp:  [97 °F (36.1 °C)-98.7 °F (37.1 °C)] 98.7 °F (37.1 °C)  Heart Rate:  [69-96] 81  Resp:  [18-25] 18  BP: ()/(52-76) 110/62    Physical Exam:   Physical Exam   Constitutional: She is oriented to person, place, and time. She appears well-developed and well-nourished. No distress.   HENT:   Head: Normocephalic and atraumatic.   Eyes: EOM are normal. No scleral icterus.   Neck: Neck supple. No JVD present.   Cardiovascular: Normal rate, regular rhythm and normal heart sounds.    Pulmonary/Chest: Effort normal and breath sounds normal. No stridor.   Abdominal: Soft. Bowel sounds are normal. She exhibits no distension and no mass. There is no tenderness. There is no rebound and no guarding.   Musculoskeletal: She exhibits no edema.   Neurological: She is alert and oriented to person, place, and time.   Skin: Skin is warm and dry. No rash noted.   Psychiatric: She has a normal mood and affect. Her behavior is normal.   Vitals reviewed.      Results Review:   I reviewed the patient's new clinical results.  I reviewed the patient's new imaging results and agree with the interpretation.  I reviewed the patient's other test results and agree with the interpretation    Lab Results (most recent)     Procedure Component Value Units Date/Time    Blood Gas, Arterial [[id number]]  (Abnormal) Collected:  01/03/18 1855    Specimen:  Arterial Blood Updated:  01/03/18 1908     Site Right Radial     Jasper's Test Positive     pH, Arterial 7.310 (L) pH units      pCO2, Arterial 28.9 (L) mm Hg      pO2, Arterial 67.9 (L) mm Hg      HCO3, Arterial 14.5 (L) mmol/L      Base Excess, Arterial -10.5 (L) mmol/L       O2 Saturation, Arterial 92.7 (L) %      Temperature 37.0 C      Barometric Pressure for Blood Gas 756 mmHg      Modality Room Air     Ventilator Mode NA     Collected by [id number]    Comprehensive Metabolic Panel [[id number]]  (Abnormal) Collected:  01/03/18 1858    Specimen:  Blood Updated:  01/03/18 1950     Glucose 118 (H) mg/dL      BUN 63 (H) mg/dL      Creatinine 4.76 (H) mg/dL      Sodium 137 mmol/L      Potassium 5.7 (H) mmol/L      Chloride 106 mmol/L      CO2 17.0 (L) mmol/L      Calcium 10.8 (H) mg/dL      Total Protein 6.6 g/dL      Albumin 3.40 (L) g/dL      ALT (SGPT) 29 U/L      AST (SGOT) 19 U/L      Alkaline Phosphatase 92 U/L      Total Bilirubin 0.5 mg/dL      eGFR Non African Amer 9 (L) mL/min/1.73      eGFR  African Amer 11 (L) mL/min/1.73      Globulin 3.2 gm/dL      A/G Ratio 1.1 g/dL      BUN/Creatinine Ratio 13.2     Anion Gap 14.0 (H) mmol/L     Narrative:       The MDRD GFR formula is only valid for adults with stable renal function between ages 18 and 70.    Uric Acid [[id number]]  (Abnormal) Collected:  01/03/18 1858    Specimen:  Blood Updated:  01/03/18 1954     Uric Acid 8.4 (H) mg/dL     CBC & Differential [[id number]] Collected:  01/03/18 1858    Specimen:  Blood Updated:  01/03/18 2008    Narrative:       The following orders were created for panel order CBC & Differential.  Procedure                               Abnormality         Status                     ---------                               -----------         ------                     Manual Differential[[id number]]          Abnormal            Final result               Scan Slide[[id number]]                                       Final result               CBC Auto Differential[[id number]]        Abnormal            Final result                 Please view results for these tests on the individual orders.    CBC Auto Differential [[id number]]  (Abnormal) Collected:  01/03/18 1858    Specimen:  Blood Updated:  01/03/18 2008      WBC 14.69 (H) 10*3/mm3      RBC 3.85 (L) 10*6/mm3      Hemoglobin 10.9 (L) g/dL      Hematocrit 33.2 (L) %      MCV 86.2 fL      MCH 28.3 pg      MCHC 32.8 (L) g/dL      RDW 15.9 (H) %      RDW-SD 49.8 fl      MPV 11.3 fL      Platelets 132 10*3/mm3     Narrative:       The previously reported component NRBC is no longer being reported.    Scan Slide [[id number]] Collected:  01/03/18 1858    Specimen:  Blood Updated:  01/03/18 2008     Scan Slide --      See Manual Differential Results       Manual Differential [[id number]]  (Abnormal) Collected:  01/03/18 1858    Specimen:  Blood Updated:  01/03/18 2008     Neutrophil % 88.0 (H) %      Lymphocyte % 5.0 (L) %      Monocyte % 3.0 (L) %      Bands %  3.0 %      Metamyelocyte % 1.0 (H) %      Neutrophils Absolute 13.37 (H) 10*3/mm3      Lymphocytes Absolute 0.73 10*3/mm3      Monocytes Absolute 0.44 10*3/mm3      Anisocytosis Slight/1+     Rebecca Cells Slight/1+     Hypochromia Slight/1+     Poikilocytes Slight/1+     Polychromasia Slight/1+     Vacuolated Neutrophils Slight/1+     Large Platelets Slight/1+    POC Glucose Once [[id number]]  (Normal) Collected:  01/03/18 2049    Specimen:  Blood Updated:  01/03/18 2110     Glucose 126 mg/dL       [facility] [id number] [id number]       Hemoglobin & Hematocrit, Blood [[id number]]  (Abnormal) Collected:  01/03/18 2308    Specimen:  Blood Updated:  01/03/18 2349     Hemoglobin 10.8 (L) g/dL      Hematocrit 33.1 (L) %     aPTT [[id number]]  (Abnormal) Collected:  01/03/18 2308    Specimen:  Blood Updated:  01/03/18 2358     PTT 46.7 (H) seconds     Protime-INR [[id number]]  (Abnormal) Collected:  01/03/18 2308    Specimen:  Blood Updated:  01/03/18 2358     Protime 16.8 (H) Seconds      INR 1.32 (H)    Comprehensive Metabolic Panel [[id number]]  (Abnormal) Collected:  01/04/18 0201    Specimen:  Blood Updated:  01/04/18 0304     Glucose 127 (H) mg/dL      BUN 65 (H) mg/dL      Creatinine 4.51 (H) mg/dL       Sodium 138 mmol/L      Potassium 5.6 (H) mmol/L      Chloride 109 mmol/L      CO2 15.0 (L) mmol/L      Calcium 10.6 (H) mg/dL      Total Protein 6.2 (L) g/dL      Albumin 3.20 (L) g/dL      ALT (SGPT) 28 U/L      AST (SGOT) 18 U/L      Alkaline Phosphatase 87 U/L      Total Bilirubin 0.4 mg/dL      eGFR Non African Amer 10 (L) mL/min/1.73      eGFR  African Amer 12 (L) mL/min/1.73      Globulin 3.0 gm/dL      A/G Ratio 1.1 g/dL      BUN/Creatinine Ratio 14.4     Anion Gap 14.0 (H) mmol/L     Narrative:       The MDRD GFR formula is only valid for adults with stable renal function between ages 18 and 70.    Lipid Panel [[id number]]  (Abnormal) Collected:  01/04/18 0201    Specimen:  Blood Updated:  01/04/18 0315     Total Cholesterol 124 (L) mg/dL      Triglycerides 128 mg/dL      HDL Cholesterol 38 (L) mg/dL      LDL Cholesterol  58 mg/dL      LDL/HDL Ratio 1.59    TSH [[id number]]  (Normal) Collected:  01/04/18 0201    Specimen:  Blood Updated:  01/04/18 0334     TSH 0.926 mIU/mL     CBC & Differential [[id number]] Collected:  01/04/18 0201    Specimen:  Blood Updated:  01/04/18 0437    Narrative:       The following orders were created for panel order CBC & Differential.  Procedure                               Abnormality         Status                     ---------                               -----------         ------                     Manual Differential[[id number]]          Abnormal            Final result               Scan Slide[[id number]]                                       Final result               CBC Auto Differential[[id number]]        Abnormal            Final result                 Please view results for these tests on the individual orders.    CBC Auto Differential [[id number]]  (Abnormal) Collected:  01/04/18 0201    Specimen:  Blood Updated:  01/04/18 0437     WBC 14.53 (H) 10*3/mm3      RBC 3.81 (L) 10*6/mm3      Hemoglobin 10.6 (L) g/dL      Hematocrit 32.6 (L) %      MCV 85.6 fL      MCH 27.8  (L) pg      MCHC 32.5 (L) g/dL      RDW 15.8 (H) %      RDW-SD 49.1 fl      MPV 11.3 fL      Platelets 128 (L) 10*3/mm3     Scan Slide [[id number]] Collected:  01/04/18 0201    Specimen:  Blood Updated:  01/04/18 0437     Scan Slide --      See Manual Differential Results       Manual Differential [[id number]]  (Abnormal) Collected:  01/04/18 0201    Specimen:  Blood Updated:  01/04/18 0437     Neutrophil % 86.0 (H) %      Lymphocyte % 4.0 (L) %      Monocyte % 2.0 (L) %      Bands %  7.0 %      Metamyelocyte % 1.0 (H) %      Neutrophils Absolute 13.51 (H) 10*3/mm3      Lymphocytes Absolute 0.58 (L) 10*3/mm3      Monocytes Absolute 0.29 10*3/mm3      Anisocytosis Slight/1+     Rebecca Cells Slight/1+     Hypochromia Slight/1+     Poikilocytes Slight/1+     Vacuolated Neutrophils Slight/1+     Platelet Estimate Decreased     Clumped Platelets Present    Urine Culture - Urine, Urine, Clean Catch [[id number]] Collected:  01/04/18 0715    Specimen:  Urine from Urine, Clean Catch Updated:  01/04/18 0720    Sodium, Urine, Random - Urine, Clean Catch [[id number]] Collected:  01/04/18 0715    Specimen:  Urine from Urine, Clean Catch Updated:  01/04/18 0720    Creatinine, Urine, Random - Urine, Clean Catch [[id number]] Collected:  01/04/18 0715    Specimen:  Urine from Urine, Clean Catch Updated:  01/04/18 0720    Blood Culture With HAILE - Blood, [[id number]]  (Normal) Collected:  01/03/18 1859    Specimen:  Blood from Arm, Right Updated:  01/04/18 0746     Blood Culture No growth at less than 24 hours    Blood Culture With HAILE - Blood, [[id number]]  (Normal) Collected:  01/03/18 1900    Specimen:  Blood from Arm, Left Updated:  01/04/18 0746     Blood Culture No growth at less than 24 hours    Hemoglobin A1c [[id number]] Collected:  01/04/18 0201    Specimen:  Blood Updated:  01/04/18 0756     Hemoglobin A1C 5.8 %     Narrative:       Less than 6.0           Non-Diabetic Range  6.0-7.0                 ADA Therapeutic Target  Greater  than 7.0        Action Suggested    Urinalysis With / Culture If Indicated - Urine, Clean Catch [[id number]]  (Abnormal) Collected:  01/04/18 0715    Specimen:  Urine from Urine, Clean Catch Updated:  01/04/18 0825     Color, UA [place] (A)     Appearance, UA Turbid (A)     pH, UA <=5.0     Specific Gravity, UA 1.022     Glucose, UA Negative     Ketones, UA Negative     Bilirubin, UA Negative     Blood, UA Large (3+) (A)     Protein, UA 30 mg/dL (1+) (A)     Leuk Esterase, UA Small (1+) (A)     Nitrite, UA Negative     Urobilinogen, UA 0.2 E.U./dL    Narrative:       Dipstick results may be inaccurate due to color interference.    Urinalysis, Microscopic Only - Urine, Clean Catch [[id number]]  (Abnormal) Collected:  01/04/18 0715    Specimen:  Urine from Urine, Clean Catch Updated:  01/04/18 0825     RBC, UA Too Numerous to Count (A) /HPF      WBC, UA 3-5 (A) /HPF      Bacteria, UA 1+ (A) /HPF      Squamous Epithelial Cells, UA 0-2 /HPF      Renal Epithelial Cells, UA 0-2 /HPF      Hyaline Casts, UA 0-2 /LPF      Coarse Granular Casts, UA 0-2 /LPF      Methodology Manual Light Microscopy    Hemoglobin & Hematocrit, Blood [[id number]]  (Abnormal) Collected:  01/04/18 0738    Specimen:  Blood Updated:  01/04/18 0832     Hemoglobin 10.4 (L) g/dL      Hematocrit 32.2 (L) %     Lactic Acid, Plasma [[id number]]  (Normal) Collected:  01/04/18 0738    Specimen:  Blood Updated:  01/04/18 0839     Lactate 1.3 mmol/L     Urea Nitrogen, Urine - Urine, Clean Catch [[id number]] Collected:  01/04/18 0715    Specimen:  Urine from Urine, Clean Catch Updated:  01/04/18 0909    Osmolality, Urine - Urine, Clean Catch [[id number]] Collected:  01/04/18 0715    Specimen:  Urine from Urine, Clean Catch Updated:  01/04/18 0909          Radiology:  Imaging Results (last 24 hours)     Procedure Component Value Units Date/Time    XR Chest 1 View [[id number]] Collected:  01/03/18 1945     Updated:  01/03/18 1950    Narrative:       XR CHEST 1 VW-  1/3/2018 7:29 PM CST     HISTORY: Possible Pneumonia, CHF       COMPARISON: None.     FINDINGS:      There is elevation of the right hemidiaphragm with volume loss in the  right lung base which appears chronic. Minimal streaky atelectasis in  the left lung base. No focal lung infiltrate is identified. Overall  normal heart size with normal appearance of the pulmonary vasculature.  Right shoulder arthroplasty. No acute bony abnormality. Advanced  degenerative change at the left shoulder.       Impression:       1. Elevation of the right hemidiaphragm with volume loss in the right  lung base, likely chronic. Minimal streaky atelectasis in the left lung  base.  2. No focal lung infiltrate identified. No evidence of decompensated  heart failure.        This report was finalized on 01/03/2018 19:46 by Dr Alfonso Hercules, .    NM Lung Ventilation Perfusion [[id number]] Collected:  01/03/18 2151     Updated:  01/03/18 2158    Narrative:       NM LUNG VENTILATION PERFUSION- 1/3/2018 9:01 PM CST     HISTORY: Suspicion for PE on recent outside VQ scan       COMPARISON: Chest x-ray dated 01/03/2018      RADIOPHARMACEUTICAL: 10.2 mCi of Xenon-133 for the ventilation study and  5.5 mCi Tc 99m MAA for the perfusion study.      TECHNIQUE: Following inhalation of the aerosolized radiopharmaceutical,  the ventilation study was performed with images of the thorax being  obtained in posterior projection. Thereafter, the perfusion study was  performed following the injection of radiolabeled MAA particles with  images of the thorax obtained in 6 projections.      FINDINGS:       There is elevation of the right hemidiaphragm on chest x-ray which  accounts for the area of third opinion on ventilation and perfusion  images in the right lung base. Otherwise homogeneous distribution of  radiotracer on the perfusion scan.       Impression:       1. Findings are most commonly associated with low probability for acute  pulmonary embolism.  Elevation of the hemidiaphragm accounts for the  relative perfusion deficit in the right lung base. Otherwise, perfusion  is homogeneous.     This report was finalized on 01/03/2018 21:54 by Dr Alfonso Hercules, .    US Renal Bilateral [[id number]] Collected:  01/04/18 1148     Updated:  01/04/18 1152    Narrative:       EXAMINATION: US RENAL BILATERAL-  1/4/2018 12:48 PM EST     HISTORY: Acute kidney injury     COMPARISON:None     TECHNIQUE:Bilateral renal ultrasound was performed.     FINDINGS:     The right kidney measures 10.1 cm in ylju-tk-nbyb length. The left  kidney measures 10.7 cm in iybc-tj-eaqj length.     Normal echogenicity of the renal cortex is observed. There is no  pelvocaliectasis to indicate hydronephrosis or obstruction. There are no  perinephric abnormalities.     A 2.6 cm cyst observed in the upper pole of the right kidney.     Urinary bladder is incompletely distended without bladder wall  abnormality.     Aorta and inferior vena cava are imaged incompletely appear  unremarkable.       Impression:       1. Right nephrogenic cyst.  2. Otherwise negative bilateral renal ultrasound.  This report was finalized on 01/04/2018 11:49 by Dr. Prasanth Goff MD.    US Venous Doppler Lower Extremity Bilateral (duplex) [[id number]] Updated:  01/04/18 1203            Assessment/Plan     Active Problems:    RUBY (acute kidney injury)      1.  GI bleed   2.  Heme-positive stool  3.  Nausea vomiting and diarrhea that has resolved  4.  PE/DVT  5.  Coagulopathy secondary to Lovenox  6.  Acute nausea and vomiting diarrhea    It is difficult to determine how much blood she did pass with her acute nausea vomiting diarrhea.  There are no signs of significant bleeding currently.  She reports normal bowel movements yesterday.  The rectum is consistent with a lower GI source.    Currently, the benefits of colonoscopic evaluation are outweighed by the associated risks especially since she is not having signs of active  bleeding.  Recommend we observe her closely.  Continue treatment for her bilateral DVTs and other underlying medical issues including her renal failure.  We did discuss pursuing a colonoscopy eventually to evaluate the bright red blood per rectum (dif dx discussed).  The timing of this will be determined by her clinical course and medical condition.  We will follow along with you.      I discussed the patients findings and my recommendations with patient          EMR Dragon/transcription disclaimer:  Much of this encounter note is electronic transcription/translation of spoken language to printed text.  The electronic translation of spoken language may be erroneous, or at times, nonsensical words or phrases may be inadvertently transcribed.  Although I have reviewed the note for such errors, some may still exist.    Erin TAN 9:49 AM    Kerwin River MD  01/04/18  1:12 PM

## 2018-01-04 NOTE — CONSULTS
Nephrology ([facility]) Consult Note      Patient:  Tricia Hernandez  YOB: 1940  Date of Service: 1/4/2018  MRN: [id number]   Acct: [id number]   Primary Care Physician: Ed Hanson MD  Advance Directive: Conditional Code  Admit Date: 1/3/2018       Hospital Day: 1  Referring Provider: Jim Zuñiga DO      Patient personally seen and examined.  Complete chart including Consults, Notes, Operative Reports, Labs, Cardiology, and Radiology studies reviewed as able.        Subjective:  Tricia Hernandez is a 77 y.o. female  whom we were consulted for acute kidney injury. No prior known renal history.  History of essential hypertension, morbid obesity, and recent right shoulder surgery.  Admitted as transfer from [facility].  Determined to be septic with [place] so she was transferred to [facility] for higher level of care.  Has been on Levophed drip.  Has an occluding left lower extremity DVT.  Also reportedly had bloody stools.  Urine output has been oliguric.    Allergies:  Review of patient's allergies indicates no known allergies.    Home Meds:  No prescriptions prior to admission.       Medicines:  Current Facility-Administered Medications   Medication Dose Route Frequency Provider Last Rate Last Dose   • acetaminophen (TYLENOL) tablet 650 mg  650 mg Oral Q4H PRN BRITNEY Schroeder        Or   • acetaminophen (TYLENOL) suppository 650 mg  650 mg Rectal Q4H PRN BRITNEY Schroeder       • cefepime (MAXIPIME) 2 g/100 mL 0.9% NS (mbp)  2 g Intravenous Q24H Wilfred Pedersen MD       • dextrose (D50W) solution 25 g  25 g Intravenous Q15 Min PRN Wilfred Pedersen MD       • dextrose (GLUTOSE) oral gel 15 g  15 g Oral Q15 Min PRN Wilfred Pedersen MD       • enoxaparin (LOVENOX) injection 140 mg  1 mg/kg Subcutaneous Nightly Wilfred Pedersen MD   140 mg at 01/03/18 2044   • glucagon (human recombinant) (GLUCAGEN DIAGNOSTIC) injection 1 mg   1 mg Subcutaneous Q15 Min PRN Wilfred Pedersen MD       • HYDROcodone-acetaminophen (NORCO) 7.5-325 MG per tablet 1 tablet  1 tablet Oral Q4H PRN BRITNEY Schroeder       • ipratropium-albuterol (DUO-NEB) nebulizer solution 3 mL  3 mL Nebulization Q6H - RT BRITNEY Schroeder   3 mL at 01/04/18 0655   • ondansetron (ZOFRAN) tablet 4 mg  4 mg Oral Q6H PRN BRITNEY Schroeder        Or   • ondansetron ODT (ZOFRAN-ODT) disintegrating tablet 4 mg  4 mg Oral Q6H PRN BRITNEY Schroeder        Or   • ondansetron (ZOFRAN) injection 4 mg  4 mg Intravenous Q6H PRN BRITNEY Schroeder       • pantoprazole (PROTONIX) 40 mg/100 mL (0.4 mg/mL) in 0.9% NS IVPB  8 mg/hr Intravenous Continuous Wilfred Pedersen MD 20 mL/hr at 01/04/18 0735 8 mg/hr at 01/04/18 0735   • Pharmacy to Dose enoxaparin (LOVENOX)   Does not apply Continuous PRN Wilfred Pedersen MD       • sodium bicarbonate 8.4 % 150 mEq in dextrose (D5W) 5 % 1,000 mL infusion (greater than 75 mEq)  150 mL/hr Intravenous Continuous Teddy Santiago MD       • sodium chloride 0.9 % bolus 1,000 mL  1,000 mL Intravenous Once Teddy Santiago MD 1,000 mL/hr at 01/04/18 0859 1,000 mL at 01/04/18 0859   • sodium chloride 0.9 % flush 1-10 mL  1-10 mL Intravenous PRN BRITNEY Schroeder       • sodium chloride 0.9 % infusion  100 mL/hr Intravenous Continuous BRITNEY Schroeder 100 mL/hr at 01/03/18 2028 100 mL/hr at 01/03/18 2028   • vancomycin 1500 mg/500 mL 0.9% NS IVPB ([facility])  1,500 mg Intravenous Q48H Wilfred Pedersen MD   1,500 mg at 01/03/18 2230       Past Medical History:  Past Medical History:   Diagnosis Date   • Anemia    • Arthritis    • Bladder spasms    • GERD (gastroesophageal reflux disease)    • Hypertension        Past Surgical History:  Past Surgical History:   Procedure Laterality Date   • ANKLE SURGERY     • KNEE SURGERY     • SHOULDER SURGERY         Family History  Family History   Problem Relation Age of Onset   • Heart  "disease Neg Hx    • Cancer Neg Hx        Social History  Social History     Social History   • Marital status:      Spouse name: N/A   • Number of children: N/A   • Years of education: N/A     Occupational History   • Not on file.     Social History Main Topics   • Smoking status: Never Smoker   • Smokeless tobacco: Never Used   • Alcohol use No   • Drug use: No   • Sexual activity: Not on file     Other Topics Concern   • Not on file     Social History Narrative    Her daughter is her POA         Review of Systems:  History obtained from chart review and the patient  General ROS: Patient complains of fatigue and No fever or chills  Respiratory ROS: positive for - shortness of breath  Cardiovascular ROS: positive for - dyspnea on exertion  Gastrointestinal ROS: positive for - blood in stools  Genito-Urinary ROS: No dysuria or hematuria  Neurological ROS: no headache or dizziness  14 point ROS reviewed with the patient and negative except as noted above and in the HPI unless unable to obtain.    Objective:  /65  Pulse 85  Temp 98.4 °F (36.9 °C) (Oral)   Resp 18  Ht 160 cm (63\")  Wt (!) 140 kg (307 lb 9.6 oz)  SpO2 97%  BMI 54.49 kg/m2    Intake/Output Summary (Last 24 hours) at 01/04/18 0901  Last data filed at 01/04/18 0733   Gross per 24 hour   Intake              561 ml   Output              325 ml   Net              236 ml     General: awake/alert    Neck: supple, no JVD  Chest:  clear to auscultation bilaterally without respiratory distress  CVS: regular rate and rhythm  Abdominal: soft, nontender, normal bowel sounds  Extremities: no cyanosis or edema  Skin: left toes dusky, slightly cool   Neuro: no focal motor deficits      Labs:    Results from last 7 days  Lab Units 01/04/18  0738 01/04/18  0201 01/03/18  2308 01/03/18  1858   WBC 10*3/mm3  --  14.53*  --  14.69*   HEMOGLOBIN g/dL 10.4* 10.6* 10.8* 10.9*   HEMATOCRIT % 32.2* 32.6* 33.1* 33.2*   PLATELETS 10*3/mm3  --  128*  --  132 "           Results from last 7 days  Lab Units 01/04/18  0201 01/03/18  1858   SODIUM mmol/L 138 137   POTASSIUM mmol/L 5.6* 5.7*   CHLORIDE mmol/L 109 106   CO2 mmol/L 15.0* 17.0*   BUN mg/dL 65* 63*   CREATININE mg/dL 4.51* 4.76*   CALCIUM mg/dL 10.6* 10.8*   BILIRUBIN mg/dL 0.4 0.5   ALK PHOS U/L 87 92   ALT (SGPT) U/L 28 29   AST (SGOT) U/L 18 19   GLUCOSE mg/dL 127* 118*       Radiology:   Imaging Results (last 72 hours)     Procedure Component Value Units Date/Time    XR Chest 1 View [[id number]] Collected:  01/03/18 1945     Updated:  01/03/18 1950    Narrative:       XR CHEST 1 VW- 1/3/2018 7:29 PM CST     HISTORY: Possible Pneumonia, CHF       COMPARISON: None.     FINDINGS:      There is elevation of the right hemidiaphragm with volume loss in the  right lung base which appears chronic. Minimal streaky atelectasis in  the left lung base. No focal lung infiltrate is identified. Overall  normal heart size with normal appearance of the pulmonary vasculature.  Right shoulder arthroplasty. No acute bony abnormality. Advanced  degenerative change at the left shoulder.       Impression:       1. Elevation of the right hemidiaphragm with volume loss in the right  lung base, likely chronic. Minimal streaky atelectasis in the left lung  base.  2. No focal lung infiltrate identified. No evidence of decompensated  heart failure.        This report was finalized on 01/03/2018 19:46 by Dr Alfonso Hercules, .    NM Lung Ventilation Perfusion [[id number]] Collected:  01/03/18 2151     Updated:  01/03/18 2158    Narrative:       NM LUNG VENTILATION PERFUSION- 1/3/2018 9:01 PM CST     HISTORY: Suspicion for PE on recent outside VQ scan       COMPARISON: Chest x-ray dated 01/03/2018      RADIOPHARMACEUTICAL: 10.2 mCi of Xenon-133 for the ventilation study and  5.5 mCi Tc 99m MAA for the perfusion study.      TECHNIQUE: Following inhalation of the aerosolized radiopharmaceutical,  the ventilation study was performed with images of  the thorax being  obtained in posterior projection. Thereafter, the perfusion study was  performed following the injection of radiolabeled MAA particles with  images of the thorax obtained in 6 projections.      FINDINGS:       There is elevation of the right hemidiaphragm on chest x-ray which  accounts for the area of third opinion on ventilation and perfusion  images in the right lung base. Otherwise homogeneous distribution of  radiotracer on the perfusion scan.       Impression:       1. Findings are most commonly associated with low probability for acute  pulmonary embolism. Elevation of the hemidiaphragm accounts for the  relative perfusion deficit in the right lung base. Otherwise, perfusion  is homogeneous.     This report was finalized on 01/03/2018 21:54 by Dr Alfonso Hercules, .          Culture:  Blood Culture   Date Value Ref Range Status   01/03/2018 No growth at less than 24 hours  Preliminary   01/03/2018 No growth at less than 24 hours  Preliminary         Assessment   1.  Acute kidney injury--likely acute tubular necrosis due to intravascular volume depletion   2.  Hyperkalemia  3.  Anemia  4.  Left lower extremity DVT   5.  GI bleed  6.  Sepsis  7.  Metabolic acidosis    Plan:  1.  Workup ongoing; urine studies and renal ultrasound pending  2.  Continue IV volume replacement   3.  Wean levophed as able  4.  Discussed dialysis with patient; she agrees to proceed with RRT if needed.  5.  Further assessment and plan pending Dr Santiago's evaluation of patient      Thank you for the consult, we appreciate the opportunity to provide care to your patients.  Feel free to contact me if I can be of any further assistance.      Kyle Reyna, APRN  1/4/2018  9:01 AM

## 2018-01-04 NOTE — PROGRESS NOTES
"Pharmacy Dosing Service  Pharmacokinetics  Vancomycin Initial Evaluation    Assessment/Action/Plan:  Initiated Vancomycin 1500 mg IVPB every 48 hours. Vancomycin levels will be evaluated when pharmacokinetically appropriate.  Pharmacy will monitor renal function closely in presence of RUBY and adjust dose accordingly.     Subjective:  Tricia Hernandez is a 77 y.o. female with a Vancomycin \"Pharmacy to Dose\" consult for the treatment of Sepsis .    Objective:  Ht: 160 cm (63\"); Wt: (!) 140 kg (307 lb 9.6 oz)  Estimated Creatinine Clearance: 14.1 mL/min (by C-G formula based on Cr of 4.76).   Lab Results   Component Value Date    CREATININE 4.76 (H) 01/03/2018      Lab Results   Component Value Date    WBC 14.69 (H) 01/03/2018      Baseline culture results:  Microbiology Results (last 10 days)       ** No results found for the last 240 hours. **            Ann Isaac [facility]  01/03/18 8:22 PM    "

## 2018-01-05 LAB
ALBUMIN SERPL-MCNC: 2.8 G/DL (ref 3.5–5)
ALBUMIN/GLOB SERPL: 1 G/DL (ref 1.1–2.5)
ALP SERPL-CCNC: 69 U/L (ref 24–120)
ALT SERPL W P-5'-P-CCNC: 24 U/L (ref 0–54)
ANION GAP SERPL CALCULATED.3IONS-SCNC: 8 MMOL/L (ref 4–13)
AST SERPL-CCNC: 15 U/L (ref 7–45)
BILIRUB SERPL-MCNC: 0.3 MG/DL (ref 0.1–1)
BUN BLD-MCNC: 65 MG/DL (ref 5–21)
BUN/CREAT SERPL: 20.2 (ref 7–25)
CALCIUM SPEC-SCNC: 10.2 MG/DL (ref 8.4–10.4)
CHLORIDE SERPL-SCNC: 106 MMOL/L (ref 98–110)
CO2 SERPL-SCNC: 26 MMOL/L (ref 24–31)
CREAT BLD-MCNC: 3.21 MG/DL (ref 0.5–1.4)
DEPRECATED RDW RBC AUTO: 49.3 FL (ref 40–54)
ERYTHROCYTE [DISTWIDTH] IN BLOOD BY AUTOMATED COUNT: 15.9 % (ref 12–15)
GFR SERPL CREATININE-BSD FRML MDRD: 14 ML/MIN/1.73
GFR SERPL CREATININE-BSD FRML MDRD: 17 ML/MIN/1.73
GLOBULIN UR ELPH-MCNC: 2.8 GM/DL
GLUCOSE BLD-MCNC: 150 MG/DL (ref 70–100)
HCT VFR BLD AUTO: 26.8 % (ref 37–47)
HCT VFR BLD AUTO: 28.2 % (ref 37–47)
HCT VFR BLD AUTO: 28.2 % (ref 37–47)
HCT VFR BLD AUTO: 29.1 % (ref 37–47)
HGB BLD-MCNC: 8.9 G/DL (ref 12–16)
HGB BLD-MCNC: 9.6 G/DL (ref 12–16)
LYMPHOCYTES # BLD MANUAL: 1.03 10*3/MM3 (ref 0.72–4.86)
LYMPHOCYTES NFR BLD MANUAL: 5 % (ref 4–12)
LYMPHOCYTES NFR BLD MANUAL: 9 % (ref 15–45)
MCH RBC QN AUTO: 28.7 PG (ref 28–32)
MCHC RBC AUTO-ENTMCNC: 34 G/DL (ref 33–36)
MCV RBC AUTO: 84.4 FL (ref 82–98)
MONOCYTES # BLD AUTO: 0.57 10*3/MM3 (ref 0.19–1.3)
NEUTROPHILS # BLD AUTO: 9.46 10*3/MM3 (ref 1.87–8.4)
NEUTROPHILS NFR BLD MANUAL: 73 % (ref 39–78)
NEUTS BAND NFR BLD MANUAL: 10 % (ref 0–10)
PLAT MORPH BLD: NORMAL
PLATELET # BLD AUTO: 133 10*3/MM3 (ref 130–400)
PMV BLD AUTO: 11.2 FL (ref 6–12)
POTASSIUM BLD-SCNC: 4.6 MMOL/L (ref 3.5–5.3)
PROT SERPL-MCNC: 5.6 G/DL (ref 6.3–8.7)
RBC # BLD AUTO: 3.34 10*6/MM3 (ref 4.2–5.4)
RBC MORPH BLD: NORMAL
SCAN SLIDE: NORMAL
SODIUM BLD-SCNC: 140 MMOL/L (ref 135–145)
VARIANT LYMPHS NFR BLD MANUAL: 3 % (ref 0–5)
WBC MORPH BLD: NORMAL
WBC NRBC COR # BLD: 11.4 10*3/MM3 (ref 4.8–10.8)

## 2018-01-05 PROCEDURE — 85018 HEMOGLOBIN: CPT | Performed by: FAMILY MEDICINE

## 2018-01-05 PROCEDURE — 85007 BL SMEAR W/DIFF WBC COUNT: CPT | Performed by: FAMILY MEDICINE

## 2018-01-05 PROCEDURE — 94799 UNLISTED PULMONARY SVC/PX: CPT

## 2018-01-05 PROCEDURE — 99232 SBSQ HOSP IP/OBS MODERATE 35: CPT | Performed by: INTERNAL MEDICINE

## 2018-01-05 PROCEDURE — 25010000002 VANCOMYCIN 10 G RECONSTITUTED SOLUTION: Performed by: FAMILY MEDICINE

## 2018-01-05 PROCEDURE — 85014 HEMATOCRIT: CPT | Performed by: FAMILY MEDICINE

## 2018-01-05 PROCEDURE — 25010000002 ENOXAPARIN PER 10 MG: Performed by: FAMILY MEDICINE

## 2018-01-05 PROCEDURE — 80053 COMPREHEN METABOLIC PANEL: CPT | Performed by: FAMILY MEDICINE

## 2018-01-05 PROCEDURE — 25010000002 CEFEPIME PER 500 MG: Performed by: FAMILY MEDICINE

## 2018-01-05 PROCEDURE — 85025 COMPLETE CBC W/AUTO DIFF WBC: CPT | Performed by: FAMILY MEDICINE

## 2018-01-05 RX ADMIN — SODIUM BICARBONATE 150 ML/HR: 84 INJECTION, SOLUTION INTRAVENOUS at 00:55

## 2018-01-05 RX ADMIN — SODIUM BICARBONATE 150 ML/HR: 84 INJECTION, SOLUTION INTRAVENOUS at 09:26

## 2018-01-05 RX ADMIN — IPRATROPIUM BROMIDE AND ALBUTEROL SULFATE 3 ML: 2.5; .5 SOLUTION RESPIRATORY (INHALATION) at 18:37

## 2018-01-05 RX ADMIN — SODIUM CHLORIDE 1650 ML: 9 INJECTION, SOLUTION INTRAVENOUS at 10:46

## 2018-01-05 RX ADMIN — CEFEPIME HYDROCHLORIDE 2 G: 2 INJECTION, POWDER, FOR SOLUTION INTRAVENOUS at 20:36

## 2018-01-05 RX ADMIN — SODIUM CHLORIDE 8 MG/HR: 900 INJECTION INTRAVENOUS at 22:40

## 2018-01-05 RX ADMIN — IPRATROPIUM BROMIDE AND ALBUTEROL SULFATE 3 ML: 2.5; .5 SOLUTION RESPIRATORY (INHALATION) at 01:50

## 2018-01-05 RX ADMIN — SODIUM BICARBONATE 150 ML/HR: 84 INJECTION, SOLUTION INTRAVENOUS at 17:45

## 2018-01-05 RX ADMIN — SODIUM CHLORIDE 8 MG/HR: 900 INJECTION INTRAVENOUS at 17:45

## 2018-01-05 RX ADMIN — ENOXAPARIN SODIUM 140 MG: 150 INJECTION SUBCUTANEOUS at 20:36

## 2018-01-05 RX ADMIN — SODIUM CHLORIDE 8 MG/HR: 900 INJECTION INTRAVENOUS at 13:01

## 2018-01-05 RX ADMIN — VANCOMYCIN HYDROCHLORIDE 750 MG: 100 INJECTION, POWDER, LYOPHILIZED, FOR SOLUTION INTRAVENOUS at 14:18

## 2018-01-05 RX ADMIN — Medication 10 ML: at 20:36

## 2018-01-05 RX ADMIN — IPRATROPIUM BROMIDE AND ALBUTEROL SULFATE 3 ML: 2.5; .5 SOLUTION RESPIRATORY (INHALATION) at 07:59

## 2018-01-05 RX ADMIN — IPRATROPIUM BROMIDE AND ALBUTEROL SULFATE 3 ML: 2.5; .5 SOLUTION RESPIRATORY (INHALATION) at 12:16

## 2018-01-05 NOTE — PLAN OF CARE
Problem: Patient Care Overview (Adult)  Goal: Plan of Care Review  Outcome: Ongoing (interventions implemented as appropriate)   01/05/18 1617   Coping/Psychosocial Response Interventions   Plan Of Care Reviewed With patient   Patient Care Overview   Progress improving   Outcome Evaluation   Outcome Summary/Follow up Plan cardiac,renal diet, oral intake avg 75% of past 3 meals. Encourage oral intake. Cont to follow.

## 2018-01-05 NOTE — PROGRESS NOTES
[facility]  INPATIENT PROGRESS NOTE    Length of Stay: 2  Date of Admission: 1/3/2018  Primary Care Physician: Ed Hanson MD    Subjective   Chief Complaint: SOA  HPI   Doing ok.  Feels better.  Hgb stable.    SOA improving.  Renal function better.  Blood cultures show Gram positive Cocci.    Hypotensive this AM        Review of Systems   Constitutional: Positive for activity change and fatigue. Negative for fever.   HENT: Negative for congestion and ear pain.    Eyes: Negative for pain and visual disturbance.   Respiratory: Positive for cough and shortness of breath. Negative for wheezing.    Cardiovascular: Negative for chest pain and palpitations.   Gastrointestinal: Negative for diarrhea, nausea and vomiting.   Endocrine: Negative for heat intolerance.   Genitourinary: Negative for dysuria and frequency.   Musculoskeletal: Negative for arthralgias and back pain.   Skin: Negative for rash and wound.   Neurological: Positive for weakness. Negative for dizziness and light-headedness.   Psychiatric/Behavioral: Negative for confusion. The patient is not nervous/anxious.    All other systems reviewed and are negative.       All pertinent negatives and positives are as above. All other systems have been reviewed and are negative unless otherwise stated.     Objective    Temp:  [98.2 °F (36.8 °C)-98.7 °F (37.1 °C)] 98.7 °F (37.1 °C)  Heart Rate:  [70-87] 78  Resp:  [18-22] 18  BP: ()/(57-87) 88/57  Physical Exam   Constitutional: She is oriented to person, place, and time. She appears well-developed and well-nourished. Nasal cannula in place.   HENT:   Head: Normocephalic and atraumatic.   Right Ear: External ear normal.   Left Ear: External ear normal.   Nose: Nose normal.   Mouth/Throat: Oropharynx is clear and moist.   Eyes: Conjunctivae and EOM are normal.   Neck: Normal range of motion. Neck supple.   Cardiovascular: Regular rhythm and normal heart  sounds.  Tachycardia present.    Pulses:       Carotid pulses are 2+ on the right side, and 2+ on the left side.       Radial pulses are 2+ on the right side, and 2+ on the left side.        Femoral pulses are 2+ on the left side.       Popliteal pulses are 2+ on the right side, and 2+ on the left side.        Dorsalis pedis pulses are 2+ on the right side, and 2+ on the left side.        Posterior tibial pulses are 2+ on the right side, and 2+ on the left side.   Pulmonary/Chest: Effort normal and breath sounds normal.   Abdominal: Soft. Bowel sounds are normal. She exhibits no distension. There is no tenderness.   Musculoskeletal: Normal range of motion.   Neurological: She is alert and oriented to person, place, and time.   Skin: Skin is warm and dry.   Color better in toes   Psychiatric: She has a normal mood and affect. Her speech is normal and behavior is normal. Cognition and memory are normal.           Results Review:  I have reviewed the labs, radiology results, and diagnostic studies.    Laboratory Data:     Results from last 7 days  Lab Units 01/05/18  0816 01/05/18  0003 01/04/18  1515  01/04/18  0201  01/03/18  1858   WBC 10*3/mm3  --  11.40*  --   --  14.53*  --  14.69*   HEMOGLOBIN g/dL 9.6* 9.6*  9.6* 9.9*  < > 10.6*  < > 10.9*   HEMATOCRIT % 29.1* 28.2*  28.2* 30.6*  < > 32.6*  < > 33.2*   PLATELETS 10*3/mm3  --  133  --   --  128*  --  132   < > = values in this interval not displayed.       Results from last 7 days  Lab Units 01/05/18  0003 01/04/18  1149 01/04/18  0201 01/03/18  1858   SODIUM mmol/L 140 140 138 137   POTASSIUM mmol/L 4.6 5.0 5.6* 5.7*   CHLORIDE mmol/L 106 109 109 106   CO2 mmol/L 26.0 19.0* 15.0* 17.0*   BUN mg/dL 65* 67* 65* 63*   CREATININE mg/dL 3.21* 4.15* 4.51* 4.76*   CALCIUM mg/dL 10.2 10.3 10.6* 10.8*   BILIRUBIN mg/dL 0.3  --  0.4 0.5   ALK PHOS U/L 69  --  87 92   ALT (SGPT) U/L 24  --  28 29   AST (SGOT) U/L 15  --  18 19   GLUCOSE mg/dL 150* 145* 127* 118*        Culture Data:   Blood Culture   Date Value Ref Range Status   01/03/2018 No growth at 24 hours  Preliminary   01/03/2018 Abnormal Stain (A)  Preliminary   01/03/2018 Gram Positive Cocci (A)  Preliminary     Urine Culture   Date Value Ref Range Status   01/04/2018 No growth at 24 hours  Preliminary       Radiology Data:   Imaging Results (last 24 hours)     Procedure Component Value Units Date/Time    US Renal Bilateral [[id number]] Collected:  01/04/18 1148     Updated:  01/04/18 1152    Narrative:       EXAMINATION: US RENAL BILATERAL-  1/4/2018 12:48 PM EST     HISTORY: Acute kidney injury     COMPARISON:None     TECHNIQUE:Bilateral renal ultrasound was performed.     FINDINGS:     The right kidney measures 10.1 cm in xbso-tt-rxvo length. The left  kidney measures 10.7 cm in ysdm-ws-wugx length.     Normal echogenicity of the renal cortex is observed. There is no  pelvocaliectasis to indicate hydronephrosis or obstruction. There are no  perinephric abnormalities.     A 2.6 cm cyst observed in the upper pole of the right kidney.     Urinary bladder is incompletely distended without bladder wall  abnormality.     Aorta and inferior vena cava are imaged incompletely appear  unremarkable.       Impression:       1. Right nephrogenic cyst.  2. Otherwise negative bilateral renal ultrasound.  This report was finalized on 01/04/2018 11:49 by Dr. Prasanth Goff MD.    US Venous Doppler Lower Extremity Bilateral (duplex) [[id number]] Collected:  01/04/18 1715     Updated:  01/04/18 1721    Narrative:       History: Swelling       Impression:       Impression:  1. There is evidence of deep venous thrombosis in both lower  extremities.  2. There is evidence of superficial thrombus in the proximal greater  saphenous vein in the right lower extremity.     Comments: Bilateral lower extremity venous duplex exam was performed  using color Doppler flow, Doppler waveform analysis, and grayscale  imaging, with and without  compression. There is evidence of deep venous  thrombosis in the common femoral and proximal superficial femoral veins  in the right lower extremity. The rest of the lower extremity was not  visualized due to body habitus. There is also evidence of deep venous  thrombosis in the common femoral, proximal superficial femoral, and  popliteal veins in the left lower extremity. The rest of the left lower  extremity was not visualized due to body habitus. There is evidence of  superficial thrombus in the proximal greater saphenous vein in the right  lower extremity.        This report was finalized on 01/04/2018 17:18 by Dr. Reynold Mckinney MD.          I have reviewed the patient current medications.     Assessment/Plan       1.  Occlusive Left-lower extremity DVT  -She has an unfortunate combination of renal failure and thrombocytopenia as well as reported bloody stools  -Will Anticoagulate with Lovenox, renally dosed per pharmacy  -Will seek GI consult to do all that we can to prevent her from bleeding  -Will seek Vascular input for occlusive DVT and PE   -Recheck Duplex scans      2:  GI Bleed  -Stools were dark red with a positive hemoccult  -Monitor H&H, stable  -Protonix drip  -GI consulted, following  -Telemetry  -Transfuse as indicated      3.  SevereSepsis  -30 cc/kg IDBW bolus given  -Leukocytosis improving  -Patient is tachypneic to 24 here on the cardiac monitor.    -Has been hypotensive and on Levophed  -Hypotension could be related to PE  -Unfortunately, in the manner in which I am obtaining her care, I feel that the safest route is to restart a full Sepsis workup and cover with broad spectrum antibiotics for now.    -Cardiac Monitoring  -Blood cultures show staph species, awaiting Identification/Sensitivities      3.  Acute Renal Failure  -Nephrology Consult  -Renal U/S  -Renal Labs  -Boggs placed      4.  Lactic Acidosis  -2.5 on most recent labs from [facility]  -Recheck      5.  Intractible Nausea and  vomiting  -Zofran  -IVF      6.  Dehydration  -IVF  -Monitor UOP      7.  GERD  -Protonix drip      8.  HLD  -Statin  -Lipid panel      9.  Bladder Spasms  -Ditropan      10.  HTN  -Hold BP meds given low BP, RUBY  -Monitor BP      11.  Anemia  -Monitor H&H  -Protonix  -GI Consulted      12:  Morbid Obesity  -BMI:  54.4  -Dietician consult      13.  Hyperglycemia  -A1C negative  -D/C finger sticks/A1C     14.  Hyperkalemia  -Insulin/Dextrose  -Kayexalate  -Cardiac monitoring  -Renal consulted            Discharge Planning: I expect the patient to be discharged to     Wilfred Pedersen MD   01/05/18   10:39 AM

## 2018-01-05 NOTE — PROGRESS NOTES
Nephrology ([facility]) Progress Note      Patient:  Tricia Hernandez  YOB: 1940  Date of Service: 1/5/2018  MRN: [id number]   Acct: [id number]   Primary Care Physician: Ed Hanson MD  Advance Directive: Conditional Code  Admit Date: 1/3/2018       Hospital Day: 2  Referring Provider: Jim Zuñiga DO      Patient personally seen and examined.  Complete chart including Consults, Notes, Operative Reports, Labs, Cardiology, and Radiology studies reviewed as able.        Subjective:  Tricia Hernandez is a 77 y.o. female  whom we were consulted for acute kidney injury. No prior known renal history.  History of essential hypertension, morbid obesity, and recent right shoulder surgery.  Admitted as transfer from [facility].  Determined to be septic and with RUBY so she was transferred to [facility] for higher level of care.  Had been on Levophed drip but now weaned off.  Has bilateral lower extremity DVT with occluding left lower extremity DVT.  Also reportedly had bloody stools.      Today is feeling better Urine output nonoliguric.    Allergies:  Review of patient's allergies indicates no known allergies.    Home Meds:  No prescriptions prior to admission.       Medicines:  Current Facility-Administered Medications   Medication Dose Route Frequency Provider Last Rate Last Dose   • acetaminophen (TYLENOL) tablet 650 mg  650 mg Oral Q4H PRN BRITNEY Schroeder        Or   • acetaminophen (TYLENOL) suppository 650 mg  650 mg Rectal Q4H PRN BRITNEY Schroeder       • cefepime (MAXIPIME) 2 g/100 mL 0.9% NS (mbp)  2 g Intravenous Q24H Wilfred Pedersen MD   Stopped at 01/05/18 0000   • dextrose (D50W) solution 25 g  25 g Intravenous Q15 Min PRN Wilfred Pedersen MD       • dextrose (GLUTOSE) oral gel 15 g  15 g Oral Q15 Min PRN Wilfred Pedersen MD       • enoxaparin (LOVENOX) injection 140 mg  1 mg/kg Subcutaneous Nightly Wilfred Pedersen  MD   140 mg at 01/04/18 2055   • glucagon (human recombinant) (GLUCAGEN DIAGNOSTIC) injection 1 mg  1 mg Subcutaneous Q15 Min PRN Wilfred Pedersen MD       • HYDROcodone-acetaminophen (NORCO) 7.5-325 MG per tablet 1 tablet  1 tablet Oral Q4H PRN BRITNEY Schroeder       • ipratropium-albuterol (DUO-NEB) nebulizer solution 3 mL  3 mL Nebulization Q6H - RT BRITNEY Schroeder   3 mL at 01/05/18 0759   • ondansetron (ZOFRAN) tablet 4 mg  4 mg Oral Q6H PRN BRITNEY Schroeder        Or   • ondansetron ODT (ZOFRAN-ODT) disintegrating tablet 4 mg  4 mg Oral Q6H PRN BRITNEY Schroeder        Or   • ondansetron (ZOFRAN) injection 4 mg  4 mg Intravenous Q6H PRN BRITNEY Schroeder       • pantoprazole (PROTONIX) 40 mg/100 mL (0.4 mg/mL) in 0.9% NS IVPB  8 mg/hr Intravenous Continuous Wilfred Pedersen MD 20 mL/hr at 01/04/18 2243 8 mg/hr at 01/04/18 2243   • Pharmacy to Dose enoxaparin (LOVENOX)   Does not apply Continuous PRN Wilfred Pedersen MD       • sodium bicarbonate 8.4 % 150 mEq in dextrose (D5W) 5 % 1,000 mL infusion (greater than 75 mEq)  150 mL/hr Intravenous Continuous Teddy Santiago  mL/hr at 01/05/18 0926 150 mL/hr at 01/05/18 0926   • sodium chloride 0.9 % flush 1-10 mL  1-10 mL Intravenous PRN BRITNEY Schroeder       • sodium chloride 0.9 % infusion  100 mL/hr Intravenous Continuous BRITNEY Schroeder 100 mL/hr at 01/03/18 2028 100 mL/hr at 01/03/18 2028   • vancomycin 1500 mg/500 mL 0.9% NS IVPB ([facility])  1,500 mg Intravenous Q48H Wilfred Pedersen MD   1,500 mg at 01/03/18 2230       Past Medical History:  Past Medical History:   Diagnosis Date   • Anemia    • Arthritis    • Bladder spasms    • GERD (gastroesophageal reflux disease)    • Hypertension        Past Surgical History:  Past Surgical History:   Procedure Laterality Date   • ANKLE SURGERY     • KNEE SURGERY     • SHOULDER SURGERY         Family History  Family History   Problem Relation Age of Onset   •  "Heart disease Neg Hx    • Cancer Neg Hx        Social History  Social History     Social History   • Marital status:      Spouse name: N/A   • Number of children: N/A   • Years of education: N/A     Occupational History   • Not on file.     Social History Main Topics   • Smoking status: Never Smoker   • Smokeless tobacco: Never Used   • Alcohol use No   • Drug use: No   • Sexual activity: Not on file     Other Topics Concern   • Not on file     Social History Narrative    Her daughter is her POA         Review of Systems:  History obtained from chart review and the patient  General ROS: No fever or chills  Respiratory ROS: positive for - shortness of breath  Cardiovascular ROS: positive for - dyspnea on exertion  Gastrointestinal ROS: positive for - blood in stools  No abdominal pain or melena  Genito-Urinary ROS: No dysuria or hematuria  Neurological ROS: no headache or dizziness    Objective:  BP (!) 88/57  Pulse 78  Temp 98.7 °F (37.1 °C) (Axillary)   Resp 18  Ht 160 cm (63\")  Wt (!) 140 kg (307 lb 9.6 oz)  SpO2 100%  BMI 54.49 kg/m2    Intake/Output Summary (Last 24 hours) at 01/05/18 1017  Last data filed at 01/05/18 0800   Gross per 24 hour   Intake             5187 ml   Output             1765 ml   Net             3422 ml     General: awake/alert    Neck: supple, no JVD  Chest:  clear to auscultation bilaterally without respiratory distress  CVS: regular rate and rhythm  Abdominal: soft, nontender, normal bowel sounds  Extremities: no cyanosis or edema  Skin: warm and dry without rash   Neuro: no focal motor deficits    Labs:    Results from last 7 days  Lab Units 01/05/18  0816 01/05/18  0003 01/04/18  1515  01/04/18  0201  01/03/18  1858   WBC 10*3/mm3  --  11.40*  --   --  14.53*  --  14.69*   HEMOGLOBIN g/dL 9.6* 9.6*  9.6* 9.9*  < > 10.6*  < > 10.9*   HEMATOCRIT % 29.1* 28.2*  28.2* 30.6*  < > 32.6*  < > 33.2*   PLATELETS 10*3/mm3  --  133  --   --  128*  --  132   < > = values in this " interval not displayed.        Results from last 7 days  Lab Units 01/05/18  0003 01/04/18  1149 01/04/18  0201 01/03/18  1858   SODIUM mmol/L 140 140 138 137   POTASSIUM mmol/L 4.6 5.0 5.6* 5.7*   CHLORIDE mmol/L 106 109 109 106   CO2 mmol/L 26.0 19.0* 15.0* 17.0*   BUN mg/dL 65* 67* 65* 63*   CREATININE mg/dL 3.21* 4.15* 4.51* 4.76*   CALCIUM mg/dL 10.2 10.3 10.6* 10.8*   BILIRUBIN mg/dL 0.3  --  0.4 0.5   ALK PHOS U/L 69  --  87 92   ALT (SGPT) U/L 24  --  28 29   AST (SGOT) U/L 15  --  18 19   GLUCOSE mg/dL 150* 145* 127* 118*       Radiology:   Imaging Results (last 72 hours)     Procedure Component Value Units Date/Time    XR Chest 1 View [[id number]] Collected:  01/03/18 1945     Updated:  01/03/18 1950    Narrative:       XR CHEST 1 VW- 1/3/2018 7:29 PM CST     HISTORY: Possible Pneumonia, CHF       COMPARISON: None.     FINDINGS:      There is elevation of the right hemidiaphragm with volume loss in the  right lung base which appears chronic. Minimal streaky atelectasis in  the left lung base. No focal lung infiltrate is identified. Overall  normal heart size with normal appearance of the pulmonary vasculature.  Right shoulder arthroplasty. No acute bony abnormality. Advanced  degenerative change at the left shoulder.       Impression:       1. Elevation of the right hemidiaphragm with volume loss in the right  lung base, likely chronic. Minimal streaky atelectasis in the left lung  base.  2. No focal lung infiltrate identified. No evidence of decompensated  heart failure.        This report was finalized on 01/03/2018 19:46 by Dr Alfonso Hercules, .    NM Lung Ventilation Perfusion [[id number]] Collected:  01/03/18 2151     Updated:  01/03/18 2158    Narrative:       NM LUNG VENTILATION PERFUSION- 1/3/2018 9:01 PM CST     HISTORY: Suspicion for PE on recent outside VQ scan       COMPARISON: Chest x-ray dated 01/03/2018      RADIOPHARMACEUTICAL: 10.2 mCi of Xenon-133 for the ventilation study and  5.5 mCi Tc 99m  MAA for the perfusion study.      TECHNIQUE: Following inhalation of the aerosolized radiopharmaceutical,  the ventilation study was performed with images of the thorax being  obtained in posterior projection. Thereafter, the perfusion study was  performed following the injection of radiolabeled MAA particles with  images of the thorax obtained in 6 projections.      FINDINGS:       There is elevation of the right hemidiaphragm on chest x-ray which  accounts for the area of third opinion on ventilation and perfusion  images in the right lung base. Otherwise homogeneous distribution of  radiotracer on the perfusion scan.       Impression:       1. Findings are most commonly associated with low probability for acute  pulmonary embolism. Elevation of the hemidiaphragm accounts for the  relative perfusion deficit in the right lung base. Otherwise, perfusion  is homogeneous.     This report was finalized on 01/03/2018 21:54 by Dr Alfonso Hercules, .    US Renal Bilateral [[id number]] Collected:  01/04/18 1148     Updated:  01/04/18 1152    Narrative:       EXAMINATION: US RENAL BILATERAL-  1/4/2018 12:48 PM EST     HISTORY: Acute kidney injury     COMPARISON:None     TECHNIQUE:Bilateral renal ultrasound was performed.     FINDINGS:     The right kidney measures 10.1 cm in thal-zv-qxrx length. The left  kidney measures 10.7 cm in khyq-nw-yeda length.     Normal echogenicity of the renal cortex is observed. There is no  pelvocaliectasis to indicate hydronephrosis or obstruction. There are no  perinephric abnormalities.     A 2.6 cm cyst observed in the upper pole of the right kidney.     Urinary bladder is incompletely distended without bladder wall  abnormality.     Aorta and inferior vena cava are imaged incompletely appear  unremarkable.       Impression:       1. Right nephrogenic cyst.  2. Otherwise negative bilateral renal ultrasound.  This report was finalized on 01/04/2018 11:49 by Dr. Prasanth Goff MD.    US Venous  Doppler Lower Extremity Bilateral (duplex) [[id number]] Collected:  01/04/18 1715     Updated:  01/04/18 1721    Narrative:       History: Swelling       Impression:       Impression:  1. There is evidence of deep venous thrombosis in both lower  extremities.  2. There is evidence of superficial thrombus in the proximal greater  saphenous vein in the right lower extremity.     Comments: Bilateral lower extremity venous duplex exam was performed  using color Doppler flow, Doppler waveform analysis, and grayscale  imaging, with and without compression. There is evidence of deep venous  thrombosis in the common femoral and proximal superficial femoral veins  in the right lower extremity. The rest of the lower extremity was not  visualized due to body habitus. There is also evidence of deep venous  thrombosis in the common femoral, proximal superficial femoral, and  popliteal veins in the left lower extremity. The rest of the left lower  extremity was not visualized due to body habitus. There is evidence of  superficial thrombus in the proximal greater saphenous vein in the right  lower extremity.        This report was finalized on 01/04/2018 17:18 by Dr. Reynold Mckinney MD.          Culture:  Blood Culture   Date Value Ref Range Status   01/03/2018 No growth at 24 hours  Preliminary   01/03/2018 Abnormal Stain (A)  Preliminary   01/03/2018 Gram Positive Cocci (A)  Preliminary     Urine Culture   Date Value Ref Range Status   01/04/2018 No growth at 24 hours  Preliminary         Assessment   1.  Acute kidney injury secondary to acute tubular necrosis--improving  2.  Metabolic acidosis  3.  Sepsis/hypotensive--improving  4.  Bilateral lower extremity DVT  5.  Anemia     Plan:  1.  Continue IV fluids  2.  Monitor labs for continuing renal recovery      Kyle Reyna, APRN  1/5/2018  10:17 AM

## 2018-01-05 NOTE — PROGRESS NOTES
Discharge Planning Assessment  [facility]     Patient Name: Tricia Hernandez  MRN: [id number]  Today's Date: 1/5/2018    Admit Date: 1/3/2018          Discharge Needs Assessment       01/05/18 1401    Living Environment    Lives With child(juan miguel), adult    Living Arrangements house    Transportation Available car;family or friend will provide    Living Environment    Provides Primary Care For no one    Quality Of Family Relationships supportive    Able to Return to Prior Living Arrangements yes    Discharge Needs Assessment    Concerns To Be Addressed care coordination/care conferences;discharge planning concerns    Readmission Within The Last 30 Days no previous admission in last 30 days    Outpatient/Agency/Support Group Needs homecare agency (specify level of care)    Community Agency Name(S) --   [facility]  [phone]    Equipment Currently Used at Home walker, gigi;walker, rolling;walker, standard;wheelchair    Current Discharge Risk chronically ill    Discharge Planning Comments Spoke to patient regarding discharge plan/needs.  Patient states she resides at home with her daughter.  Patient also states she receives HH but couldn't remember which company.  KRYSTIAN contacted patient's daughter who advised patient's HH is with [facility]  [phone].  SW contacted [facility] and informed them of patient's admission.  They will need to be notified of patient's discharge.  Patient will need to be ambulating to return home at discharge.  Will follow to determine plan/needs.            Discharge Plan     None        Discharge Placement     No information found                Demographic Summary     None            Functional Status     None            Psychosocial     None            Abuse/Neglect     None            Legal     None            Substance Abuse     None            Patient Forms     None          CATRACHO Anne

## 2018-01-05 NOTE — PROGRESS NOTES
"Pharmacy Dosing Service  Pharmacokinetics  Vancomycin Follow-up Evaluation    Assessment/Action/Plan:  SCr = 3.21 (down from 4.15).  Vancomycin adjusted to 750mg iv q24h. Vancomycin trough ordered before dose due on 1/6 at 1430.  Pharmacy will continue to monitor renal function and adjust dose accordingly.     Subjective:  Tricia Hernandez is a 77 y.o. female currently on Vancomycin for the treatment of sepsis, day 3 of therapy.    Objective:  Ht: 160 cm (63\"); Wt: (!) 140 kg (307 lb 9.6 oz)  Estimated Creatinine Clearance: 20.9 mL/min (by C-G formula based on Cr of 3.21).   Lab Results   Component Value Date    CREATININE 3.21 (H) 01/05/2018    CREATININE 4.15 (H) 01/04/2018    CREATININE 4.51 (H) 01/04/2018      Lab Results   Component Value Date    WBC 11.40 (H) 01/05/2018    WBC 14.53 (H) 01/04/2018    WBC 14.69 (H) 01/03/2018       No results found for: VANCOPEAK, VANCOTROUGH, VANCORANDOM    Culture Results:  Microbiology Results (last 10 days)       Procedure Component Value - Date/Time      Urine Culture - Urine, Urine, Clean Catch [[id number]]  (Normal) Collected:  01/04/18 0715    Lab Status:  Preliminary result Specimen:  Urine from Urine, Clean Catch Updated:  01/05/18 0611     Urine Culture No growth at 24 hours    Blood Culture With HAIEL - Blood, [[id number]]  (Normal) Collected:  01/03/18 1900    Lab Status:  Preliminary result Specimen:  Blood from Arm, Left Updated:  01/04/18 1946     Blood Culture No growth at 24 hours    Blood Culture With HAILE - Blood, [[id number]]  (Abnormal) Collected:  01/03/18 1859    Lab Status:  Preliminary result Specimen:  Blood from Arm, Right Updated:  01/05/18 0133     Blood Culture Abnormal Stain (A)      Gram Positive Cocci (A)     Isolated from Anaerobic Bottle     Gram Stain Result Gram positive cocci    Blood Culture ID, PCR - Blood, [[id number]]  (Abnormal) Collected:  01/03/18 1859    Lab Status:  Final result Specimen:  Blood from Arm, Right Updated:  01/05/18 0348 "     BCID, PCR Staphylococcus species, not aureus. mecA (methicillin resistance gene) detected. Identification by BCID PCR. (C)            Delvin Livingston, [facility]   01/05/18 1:50 PM

## 2018-01-05 NOTE — PROGRESS NOTES
[facility]  Inpatient Progress Note  Today's date:  01/05/18    Tricia Hernandez  1940       Reason for Follow Up:  Heme positive stool and anemia    Subjective:   She denies any nausea vomiting or abdominal pain.  She has not been told there is been any further bleeding in the stools.  Her appetite is good.  She is on a regular diet.  She is not short of breath at the present time however she has largely been isolated to the bed since admission.    No Known Allergies    Current Facility-Administered Medications:   •  acetaminophen (TYLENOL) tablet 650 mg, 650 mg, Oral, Q4H PRN **OR** acetaminophen (TYLENOL) suppository 650 mg, 650 mg, Rectal, Q4H PRN, BRITNEY Schroeder  •  cefepime (MAXIPIME) 2 g/100 mL 0.9% NS (mbp), 2 g, Intravenous, Q24H, Wilfred Pedersen MD, Stopped at 01/05/18 0000  •  dextrose (D50W) solution 25 g, 25 g, Intravenous, Q15 Min PRN, Wilfred Pedersen MD  •  dextrose (GLUTOSE) oral gel 15 g, 15 g, Oral, Q15 Min PRN, Wilfred Pedersen MD  •  enoxaparin (LOVENOX) injection 140 mg, 1 mg/kg, Subcutaneous, Nightly, Wilfred Pedersen MD, 140 mg at 01/04/18 2055  •  glucagon (human recombinant) (GLUCAGEN DIAGNOSTIC) injection 1 mg, 1 mg, Subcutaneous, Q15 Min PRN, Wilfred Pedersen MD  •  HYDROcodone-acetaminophen (NORCO) 7.5-325 MG per tablet 1 tablet, 1 tablet, Oral, Q4H PRN, BRITNEY Schroeder  •  ipratropium-albuterol (DUO-NEB) nebulizer solution 3 mL, 3 mL, Nebulization, Q6H - RT, BRITNEY Schroeder, 3 mL at 01/05/18 1216  •  ondansetron (ZOFRAN) tablet 4 mg, 4 mg, Oral, Q6H PRN **OR** ondansetron ODT (ZOFRAN-ODT) disintegrating tablet 4 mg, 4 mg, Oral, Q6H PRN **OR** ondansetron (ZOFRAN) injection 4 mg, 4 mg, Intravenous, Q6H PRN, Loyda Fried, BRITNEY  •  [COMPLETED] pantoprazole (PROTONIX) injection 80 mg, 80 mg, Intravenous, Once, 80 mg at 01/03/18 2038 **AND** pantoprazole (PROTONIX) 40 mg/100 mL (0.4 mg/mL) in 0.9% NS IVPB, 8  mg/hr, Intravenous, Continuous, Wilfred Pedersen MD, Last Rate: 20 mL/hr at 01/05/18 1301, 8 mg/hr at 01/05/18 1301  •  Pharmacy to Dose enoxaparin (LOVENOX), , Does not apply, Continuous PRN, Wilfred Pedersen MD  •  sodium bicarbonate 8.4 % 150 mEq in dextrose (D5W) 5 % 1,000 mL infusion (greater than 75 mEq), 150 mL/hr, Intravenous, Continuous, Teddy Santiago MD, Last Rate: 150 mL/hr at 01/05/18 0926, 150 mL/hr at 01/05/18 0926  •  sodium chloride 0.9 % flush 1-10 mL, 1-10 mL, Intravenous, PRN, BRITNEY Schroeder  •  sodium chloride 0.9 % infusion, 100 mL/hr, Intravenous, Continuous, BRITNEY Schroeder, Last Rate: 100 mL/hr at 01/03/18 2028, 100 mL/hr at 01/03/18 2028  •  vancomycin 750 mg/250 mL 0.9% NS IVPB ([facility]), 750 mg, Intravenous, Q24H, Wilfred Pedersen MD, 750 mg at 01/05/18 1418    Review of Systems:   Review of Systems   Constitutional: Negative for fever.   Respiratory: Negative for cough and shortness of breath.    Cardiovascular: Negative for chest pain.   Genitourinary: Negative for dysuria.   Skin: Negative for rash.   Neurological: Negative for seizures.        Vital Signs:  Temp:  [98.2 °F (36.8 °C)-98.8 °F (37.1 °C)] 98.6 °F (37 °C)  Heart Rate:  [70-90] 80  Resp:  [16-22] 16  BP: ()/(48-98) 98/51  Body mass index is 54.49 kg/(m^2).     Intake/Output Summary (Last 24 hours) at 01/05/18 1453  Last data filed at 01/05/18 1301   Gross per 24 hour   Intake             6743 ml   Output             1625 ml   Net             5118 ml     I/O this shift:  In: 3387 [P.O.:600; I.V.:1000; IV Piggyback:1787]  Out: 550 [Urine:550]  Physical Exam:  Physical Exam   Constitutional: She is oriented to person, place, and time. She appears well-developed.   HENT:   O2 per nasal cannula   Eyes: No scleral icterus.   Cardiovascular: Normal rate and regular rhythm.    Pulmonary/Chest: Breath sounds normal.   Abdominal: Soft. Bowel sounds are normal. She exhibits no distension and no mass.  There is no tenderness. There is no rebound and no guarding.   Musculoskeletal: Normal range of motion.   Neurological: She is alert and oriented to person, place, and time.   Skin: No rash noted.   Psychiatric: She has a normal mood and affect. Her behavior is normal.   Vitals reviewed.       Results Review:   I have reviewed all of the patient's current test results      Results from last 7 days  Lab Units 01/05/18  0816 01/05/18  0003 01/04/18  1515  01/04/18  0201  01/03/18  1858   WBC 10*3/mm3  --  11.40*  --   --  14.53*  --  14.69*   HEMOGLOBIN g/dL 9.6* 9.6*  9.6* 9.9*  < > 10.6*  < > 10.9*   HEMATOCRIT % 29.1* 28.2*  28.2* 30.6*  < > 32.6*  < > 33.2*   PLATELETS 10*3/mm3  --  133  --   --  128*  --  132   < > = values in this interval not displayed.      Results from last 7 days  Lab Units 01/05/18  0003 01/04/18  1149 01/04/18  0201 01/03/18  1858   SODIUM mmol/L 140 140 138 137   POTASSIUM mmol/L 4.6 5.0 5.6* 5.7*   CHLORIDE mmol/L 106 109 109 106   CO2 mmol/L 26.0 19.0* 15.0* 17.0*   BUN mg/dL 65* 67* 65* 63*   CREATININE mg/dL 3.21* 4.15* 4.51* 4.76*   CALCIUM mg/dL 10.2 10.3 10.6* 10.8*   BILIRUBIN mg/dL 0.3  --  0.4 0.5   ALK PHOS U/L 69  --  87 92   ALT (SGPT) U/L 24  --  28 29   AST (SGOT) U/L 15  --  18 19   GLUCOSE mg/dL 150* 145* 127* 118*         Results from last 7 days  Lab Units 01/03/18  2308   INR  1.32*       No results found for: LIPASE    Radiology Review:  Imaging Results (last 24 hours)     Procedure Component Value Units Date/Time    US Venous Doppler Lower Extremity Bilateral (duplex) [[id number]] Collected:  01/04/18 1715     Updated:  01/04/18 1721    Narrative:       History: Swelling       Impression:       Impression:  1. There is evidence of deep venous thrombosis in both lower  extremities.  2. There is evidence of superficial thrombus in the proximal greater  saphenous vein in the right lower extremity.     Comments: Bilateral lower extremity venous duplex exam was  performed  using color Doppler flow, Doppler waveform analysis, and grayscale  imaging, with and without compression. There is evidence of deep venous  thrombosis in the common femoral and proximal superficial femoral veins  in the right lower extremity. The rest of the lower extremity was not  visualized due to body habitus. There is also evidence of deep venous  thrombosis in the common femoral, proximal superficial femoral, and  popliteal veins in the left lower extremity. The rest of the left lower  extremity was not visualized due to body habitus. There is evidence of  superficial thrombus in the proximal greater saphenous vein in the right  lower extremity.        This report was finalized on 01/04/2018 17:18 by Dr. Reynold Mckinney MD.          Impression/Plan:  Patient Active Problem List   Diagnosis Code   • RUBY (acute kidney injury) N17.9     Heme positive stool  She has had no prior GI evaluation.  Currently, the benefits of colonoscopic evaluation are outweighed by the associated risks especially since she is not having signs of active bleeding.  She will need to have GI eval at some point.  Timing will be determined by her clinical progress.  Clearly we prefer for this to be done off of anticoagulation and she has fresh DVT which complicates issues.  This could perhaps be done as an outpatient down the road when the coagulation can be more safely held.  Continue Protonix drip in the interim.    Anticoagulated state  Patient is now on Lovenox.  She has recent DVT.        Vanessa Britton MD  [facility]  01/05/18  2:53 PM    Much of this encounter note is an electronic transcription/translation of spoken language to printed text. The electronic translation of spoken language may permit erroneous, or at times, nonsensical words or phrases to be inadvertently transcribed; although I have reviewed the note for such errors, some may still exist.

## 2018-01-05 NOTE — PLAN OF CARE
Problem: Fall Risk (Adult)  Goal: Identify Related Risk Factors and Signs and Symptoms  Outcome: Ongoing (interventions implemented as appropriate)    Goal: Absence of Falls  Outcome: Ongoing (interventions implemented as appropriate)      Problem: Fluid Volume Excess (Adult,Obstetrics,Pediatric)  Goal: Identify Related Risk Factors and Signs and Symptoms  Outcome: Ongoing (interventions implemented as appropriate)    Goal: Stable Weight  Outcome: Ongoing (interventions implemented as appropriate)    Goal: Balanced Intake/Output  Outcome: Ongoing (interventions implemented as appropriate)      Problem: VTE, DVT and PE (Adult)  Goal: Signs and Symptoms of Listed Potential Problems Will be Absent or Manageable (VTE, DVT and PE)  Outcome: Ongoing (interventions implemented as appropriate)

## 2018-01-06 ENCOUNTER — APPOINTMENT (OUTPATIENT)
Dept: GENERAL RADIOLOGY | Facility: HOSPITAL | Age: 78
End: 2018-01-06

## 2018-01-06 LAB
ALBUMIN SERPL-MCNC: 2.6 G/DL (ref 3.5–5)
ALBUMIN/GLOB SERPL: 1 G/DL (ref 1.1–2.5)
ALP SERPL-CCNC: 61 U/L (ref 24–120)
ALT SERPL W P-5'-P-CCNC: 27 U/L (ref 0–54)
ANION GAP SERPL CALCULATED.3IONS-SCNC: 5 MMOL/L (ref 4–13)
ANISOCYTOSIS BLD QL: NORMAL
AST SERPL-CCNC: 20 U/L (ref 7–45)
BACTERIA BLD CULT: ABNORMAL
BACTERIA SPEC AEROBE CULT: NORMAL
BASOPHILS # BLD AUTO: 0.02 10*3/MM3 (ref 0–0.2)
BASOPHILS NFR BLD AUTO: 0.2 % (ref 0–2)
BILIRUB SERPL-MCNC: 0.4 MG/DL (ref 0.1–1)
BUN BLD-MCNC: 51 MG/DL (ref 5–21)
BUN/CREAT SERPL: 29.5 (ref 7–25)
CALCIUM SPEC-SCNC: 9.4 MG/DL (ref 8.4–10.4)
CHLORIDE SERPL-SCNC: 100 MMOL/L (ref 98–110)
CO2 SERPL-SCNC: 35 MMOL/L (ref 24–31)
CREAT BLD-MCNC: 1.73 MG/DL (ref 0.5–1.4)
DEPRECATED RDW RBC AUTO: 49.7 FL (ref 40–54)
EOSINOPHIL # BLD AUTO: 0.16 10*3/MM3 (ref 0–0.7)
EOSINOPHIL NFR BLD AUTO: 1.8 % (ref 0–4)
ERYTHROCYTE [DISTWIDTH] IN BLOOD BY AUTOMATED COUNT: 15.9 % (ref 12–15)
GFR SERPL CREATININE-BSD FRML MDRD: 29 ML/MIN/1.73
GFR SERPL CREATININE-BSD FRML MDRD: 35 ML/MIN/1.73
GLOBULIN UR ELPH-MCNC: 2.6 GM/DL
GLUCOSE BLD-MCNC: 112 MG/DL (ref 70–100)
HCT VFR BLD AUTO: 26.6 % (ref 37–47)
HCT VFR BLD AUTO: 27 % (ref 37–47)
HCT VFR BLD AUTO: 28.2 % (ref 37–47)
HGB BLD-MCNC: 8.5 G/DL (ref 12–16)
HGB BLD-MCNC: 8.8 G/DL (ref 12–16)
HGB BLD-MCNC: 8.9 G/DL (ref 12–16)
HGB BLD-MCNC: 8.9 G/DL (ref 12–16)
HGB BLD-MCNC: 9 G/DL (ref 12–16)
IMM GRANULOCYTES # BLD: 0.31 10*3/MM3 (ref 0–0.03)
IMM GRANULOCYTES NFR BLD: 3.4 % (ref 0–5)
LYMPHOCYTES # BLD AUTO: 1.1 10*3/MM3 (ref 0.72–4.86)
LYMPHOCYTES NFR BLD AUTO: 12.2 % (ref 15–45)
MCH RBC QN AUTO: 28.5 PG (ref 28–32)
MCHC RBC AUTO-ENTMCNC: 33.1 G/DL (ref 33–36)
MCV RBC AUTO: 86.1 FL (ref 82–98)
MONOCYTES # BLD AUTO: 1.09 10*3/MM3 (ref 0.19–1.3)
MONOCYTES NFR BLD AUTO: 12.1 % (ref 4–12)
NEUTROPHILS # BLD AUTO: 6.31 10*3/MM3 (ref 1.87–8.4)
NEUTROPHILS NFR BLD AUTO: 70.3 % (ref 39–78)
NRBC BLD MANUAL-RTO: 0.2 /100 WBC (ref 0–0)
PLAT MORPH BLD: NORMAL
PLATELET # BLD AUTO: 118 10*3/MM3 (ref 130–400)
PMV BLD AUTO: 11.5 FL (ref 6–12)
POTASSIUM BLD-SCNC: 4 MMOL/L (ref 3.5–5.3)
PROT SERPL-MCNC: 5.2 G/DL (ref 6.3–8.7)
RBC # BLD AUTO: 3.09 10*6/MM3 (ref 4.2–5.4)
SODIUM BLD-SCNC: 140 MMOL/L (ref 135–145)
VANCOMYCIN TROUGH SERPL-MCNC: 8.53 MCG/ML (ref 10–20)
WBC MORPH BLD: NORMAL
WBC NRBC COR # BLD: 8.99 10*3/MM3 (ref 4.8–10.8)

## 2018-01-06 PROCEDURE — 25010000002 VANCOMYCIN PER 500 MG: Performed by: FAMILY MEDICINE

## 2018-01-06 PROCEDURE — 94799 UNLISTED PULMONARY SVC/PX: CPT

## 2018-01-06 PROCEDURE — 71045 X-RAY EXAM CHEST 1 VIEW: CPT

## 2018-01-06 PROCEDURE — 25010000002 CEFEPIME PER 500 MG: Performed by: FAMILY MEDICINE

## 2018-01-06 PROCEDURE — 25010000002 IRON SUCROSE PER 1 MG: Performed by: INTERNAL MEDICINE

## 2018-01-06 PROCEDURE — 99232 SBSQ HOSP IP/OBS MODERATE 35: CPT | Performed by: INTERNAL MEDICINE

## 2018-01-06 PROCEDURE — 85025 COMPLETE CBC W/AUTO DIFF WBC: CPT | Performed by: FAMILY MEDICINE

## 2018-01-06 PROCEDURE — 80202 ASSAY OF VANCOMYCIN: CPT | Performed by: FAMILY MEDICINE

## 2018-01-06 PROCEDURE — C1751 CATH, INF, PER/CENT/MIDLINE: HCPCS

## 2018-01-06 PROCEDURE — 85018 HEMOGLOBIN: CPT | Performed by: FAMILY MEDICINE

## 2018-01-06 PROCEDURE — 25010000002 ENOXAPARIN PER 10 MG: Performed by: FAMILY MEDICINE

## 2018-01-06 PROCEDURE — 80053 COMPREHEN METABOLIC PANEL: CPT | Performed by: FAMILY MEDICINE

## 2018-01-06 PROCEDURE — 85007 BL SMEAR W/DIFF WBC COUNT: CPT | Performed by: FAMILY MEDICINE

## 2018-01-06 PROCEDURE — 85014 HEMATOCRIT: CPT | Performed by: FAMILY MEDICINE

## 2018-01-06 RX ORDER — LIDOCAINE HYDROCHLORIDE 10 MG/ML
1 INJECTION, SOLUTION INFILTRATION; PERINEURAL ONCE
Status: COMPLETED | OUTPATIENT
Start: 2018-01-06 | End: 2018-01-06

## 2018-01-06 RX ORDER — PANTOPRAZOLE SODIUM 40 MG/10ML
40 INJECTION, POWDER, LYOPHILIZED, FOR SOLUTION INTRAVENOUS EVERY 12 HOURS SCHEDULED
Status: DISCONTINUED | OUTPATIENT
Start: 2018-01-06 | End: 2018-01-06

## 2018-01-06 RX ORDER — PANTOPRAZOLE SODIUM 40 MG/1
40 TABLET, DELAYED RELEASE ORAL
Status: DISCONTINUED | OUTPATIENT
Start: 2018-01-07 | End: 2018-01-27 | Stop reason: HOSPADM

## 2018-01-06 RX ADMIN — CEFEPIME HYDROCHLORIDE 2 G: 2 INJECTION, POWDER, FOR SOLUTION INTRAVENOUS at 23:25

## 2018-01-06 RX ADMIN — LIDOCAINE HYDROCHLORIDE 1 ML: 10 INJECTION, SOLUTION EPIDURAL; INFILTRATION; INTRACAUDAL; PERINEURAL at 10:01

## 2018-01-06 RX ADMIN — IPRATROPIUM BROMIDE AND ALBUTEROL SULFATE 3 ML: 2.5; .5 SOLUTION RESPIRATORY (INHALATION) at 12:04

## 2018-01-06 RX ADMIN — VANCOMYCIN HYDROCHLORIDE 1750 MG: 1 INJECTION, POWDER, LYOPHILIZED, FOR SOLUTION INTRAVENOUS at 15:32

## 2018-01-06 RX ADMIN — SODIUM BICARBONATE 150 ML/HR: 84 INJECTION, SOLUTION INTRAVENOUS at 07:26

## 2018-01-06 RX ADMIN — IRON SUCROSE 200 MG: 20 INJECTION, SOLUTION INTRAVENOUS at 14:28

## 2018-01-06 RX ADMIN — IPRATROPIUM BROMIDE AND ALBUTEROL SULFATE 3 ML: 2.5; .5 SOLUTION RESPIRATORY (INHALATION) at 19:17

## 2018-01-06 RX ADMIN — SODIUM CHLORIDE 100 ML/HR: 9 INJECTION, SOLUTION INTRAVENOUS at 14:40

## 2018-01-06 RX ADMIN — IPRATROPIUM BROMIDE AND ALBUTEROL SULFATE 3 ML: 2.5; .5 SOLUTION RESPIRATORY (INHALATION) at 06:09

## 2018-01-06 RX ADMIN — SODIUM BICARBONATE 150 ML/HR: 84 INJECTION, SOLUTION INTRAVENOUS at 00:37

## 2018-01-06 RX ADMIN — CEFEPIME HYDROCHLORIDE 2 G: 2 INJECTION, POWDER, FOR SOLUTION INTRAVENOUS at 14:28

## 2018-01-06 RX ADMIN — SODIUM CHLORIDE 8 MG/HR: 900 INJECTION INTRAVENOUS at 04:04

## 2018-01-06 RX ADMIN — SODIUM CHLORIDE 8 MG/HR: 9 INJECTION, SOLUTION INTRAVENOUS at 09:04

## 2018-01-06 RX ADMIN — IPRATROPIUM BROMIDE AND ALBUTEROL SULFATE 3 ML: 2.5; .5 SOLUTION RESPIRATORY (INHALATION) at 00:55

## 2018-01-06 RX ADMIN — ENOXAPARIN SODIUM 130 MG: 150 INJECTION SUBCUTANEOUS at 20:59

## 2018-01-06 NOTE — PROGRESS NOTES
[facility]  INPATIENT PROGRESS NOTE    Length of Stay: 3  Date of Admission: 1/3/2018  Primary Care Physician: Ed Hanson MD    Subjective   Chief Complaint: Weakness  HPI   Doing good.  Still on 2L NC.  No chest pain.  V/Q read as low risk for PE.  Will recheck CXR and eval for fluid overload/pna.    Hgb down about 2 points over the last 2 days, however she is on IVF.  She has not had any large bleeding episodes.  GI recommends continuing PPI drip.  No CP, SOA, Light-headed or dizziness.      BP better, WBC's trending down.  Cultures show Gram positive Cocci.  Only growing in 1/4 bottles, not Aureus.  Most likely contaminant.  Possibly Staph Epi from collection time.    Given this, will hold Vanc today, continue Cefepime for now, will continue changing to something else in AM        Review of Systems   All pertinent negatives and positives are as above. All other systems have been reviewed and are negative unless otherwise stated.     Objective    Temp:  [98 °F (36.7 °C)-98.6 °F (37 °C)] 98 °F (36.7 °C)  Heart Rate:  [72-90] 77  Resp:  [15-21] 18  BP: ()/(43-98) 94/55  Physical Exam        Results Review:  I have reviewed the labs, radiology results, and diagnostic studies.    Laboratory Data:     Results from last 7 days  Lab Units 01/06/18  0447 01/05/18  2342 01/05/18  1544  01/05/18  0003  01/04/18  0201   WBC 10*3/mm3 8.99  --   --   --  11.40*  --  14.53*   HEMOGLOBIN g/dL 8.8* 8.9* 8.9*  < > 9.6*  9.6*  < > 10.6*   HEMATOCRIT % 26.6* 26.6* 26.8*  < > 28.2*  28.2*  < > 32.6*   PLATELETS 10*3/mm3 118*  --   --   --  133  --  128*   < > = values in this interval not displayed.       Results from last 7 days  Lab Units 01/06/18  0604 01/05/18  0003 01/04/18  1149 01/04/18  0201   SODIUM mmol/L 140 140 140 138   POTASSIUM mmol/L 4.0 4.6 5.0 5.6*   CHLORIDE mmol/L 100 106 109 109   CO2 mmol/L 35.0* 26.0 19.0* 15.0*   BUN mg/dL 51* 65* 67* 65*    CREATININE mg/dL 1.73* 3.21* 4.15* 4.51*   CALCIUM mg/dL 9.4 10.2 10.3 10.6*   BILIRUBIN mg/dL 0.4 0.3  --  0.4   ALK PHOS U/L 61 69  --  87   ALT (SGPT) U/L 27 24  --  28   AST (SGOT) U/L 20 15  --  18   GLUCOSE mg/dL 112* 150* 145* 127*       Culture Data:   Blood Culture   Date Value Ref Range Status   01/03/2018 No growth at 2 days  Preliminary   01/03/2018 Abnormal Stain (A)  Preliminary   01/03/2018 Gram Positive Cocci (A)  Preliminary     Urine Culture   Date Value Ref Range Status   01/04/2018 No growth at 2 days  Final       Radiology Data:   Imaging Results (last 24 hours)     ** No results found for the last 24 hours. **          I have reviewed the patient current medications.     Assessment/Plan   1.  Occlusive Left-lower extremity DVT  -She has an unfortunate combination of renal failure and thrombocytopenia as well as reported bloody stools  -Will Anticoagulate with Lovenox, renally dosed per pharmacy  -Appreciate GI input  -Appreciate Vascular input   -Hgb stable      2:  GI Bleed  -Stools were dark red with a positive hemoccult  -Monitor H&H, stable  -Protonix drip  -GI consulted, following  -Telemetry  -Transfuse as indicated      3.  Sepsis  -30 cc/kg IDBW bolus given  -Leukocytosis improving  -respiratory status better overall    -off pressors  -Cardiac Monitoring  -Cultures show coag negative staph in 1/4 bottles, likely contaminant, hold Vanc      3.  Acute Renal Failure  -Nephrology Consulted, following  -Renal U/S no major abnormalities  -Boggs D/C'd  -Creatinine much better      4.  Lactic Acidosis  -Resolved  -Monitor      5.  Intractible Nausea and vomiting  -Zofran  -IVF      6.  Dehydration  -IVF  -Monitor UOP      7.  GERD  -Protonix drip      8.  HLD  -Statin  -Lipid panel      9.  Bladder Spasms  -Ditropan      10.  HTN  -Hold BP meds given low BP, RUBY  -Monitor BP      11.  Anemia  -Monitor H&H  -Protonix  -GI Consulted      12:  Morbid Obesity  -BMI:  54.4  -Dietician  consult      13.  Hyperglycemia  -A1C negative  -D/C finger sticks/A1C      14.  Hyperkalemia  -Insulin/Dextrose  -Kayexalate  -Cardiac monitoring  -Renal consulted    15.  Superficial right sided thrombus  -Anticoagulated  -Vascular following         Discharge Planning:   Transition to floor  Start PT/OT    Wilfred Pedersen MD   01/06/18   8:25 AM

## 2018-01-06 NOTE — PROGRESS NOTES
Nephrology ([facility]) Progress Note      Patient:  Tricia Hernandez  YOB: 1940  Date of Service: 1/6/2018  MRN: [id number]   Acct: [id number]   Primary Care Physician: Ed Hanson MD  Advance Directive: Conditional Code  Admit Date: 1/3/2018       Hospital Day: 3  Referring Provider: Jim Zuñiga DO      Patient personally seen and examined.  Complete chart including Consults, Notes, Operative Reports, Labs, Cardiology, and Radiology studies reviewed as able.        Subjective:  Tricia Hernandez is a 77 y.o. female  whom we were consulted for acute kidney injury.  Patient has no previous history of chronic disease.  She was accepted as a transfer from [facility] as she presented there with dizziness and hypotension.  She was found to have acute kidney injury.  Hospital course remarkable for admitted to [facility] CCU.  She was treated with IV fluid boluses followed by maintenance IV fluid and has significant improvement of renal function.    Today she is feeling much better and has excellent urine output.    Allergies:  Review of patient's allergies indicates no known allergies.    Home Meds:  No prescriptions prior to admission.       Medicines:  Current Facility-Administered Medications   Medication Dose Route Frequency Provider Last Rate Last Dose   • acetaminophen (TYLENOL) tablet 650 mg  650 mg Oral Q4H PRN BRITNEY Schroeder        Or   • acetaminophen (TYLENOL) suppository 650 mg  650 mg Rectal Q4H PRN BRITNEY Schroeder       • cefepime (MAXIPIME) 2 g/100 mL 0.9% NS (mbp)  2 g Intravenous Q24H Wilfred Pedersen MD   Stopped at 01/06/18 0038   • dextrose (D50W) solution 25 g  25 g Intravenous Q15 Min PRN Wilfred Pedersen MD       • dextrose (GLUTOSE) oral gel 15 g  15 g Oral Q15 Min PRN Wilfred Pedersen MD       • enoxaparin (LOVENOX) injection 140 mg  1 mg/kg Subcutaneous Nightly Wilfred Pedersen MD   140 mg at  01/05/18 2036   • glucagon (human recombinant) (GLUCAGEN DIAGNOSTIC) injection 1 mg  1 mg Subcutaneous Q15 Min PRN Wilfred Pedersen MD       • HYDROcodone-acetaminophen (NORCO) 7.5-325 MG per tablet 1 tablet  1 tablet Oral Q4H PRN BRITNEY Schroeder       • ipratropium-albuterol (DUO-NEB) nebulizer solution 3 mL  3 mL Nebulization Q6H - RT BRITNEY Schroeder   3 mL at 01/06/18 0609   • ondansetron (ZOFRAN) tablet 4 mg  4 mg Oral Q6H PRN BRITNEY Schroeder        Or   • ondansetron ODT (ZOFRAN-ODT) disintegrating tablet 4 mg  4 mg Oral Q6H PRN BRITNEY Schroeder        Or   • ondansetron (ZOFRAN) injection 4 mg  4 mg Intravenous Q6H PRN BRITNEY Schroeder       • pantoprazole (PROTONIX) 40 mg in sodium chloride 0.9 % 100 mL (0.4 mg/mL) infusion  8 mg/hr Intravenous Continuous Wilfred Pedersen MD 20 mL/hr at 01/06/18 0904 8 mg/hr at 01/06/18 0904   • Pharmacy to Dose enoxaparin (LOVENOX)   Does not apply Continuous PRN Wilfred Pedersen MD       • sodium bicarbonate 8.4 % 150 mEq in dextrose (D5W) 5 % 1,000 mL infusion (greater than 75 mEq)  150 mL/hr Intravenous Continuous Teddy Santaigo  mL/hr at 01/06/18 0726 150 mL/hr at 01/06/18 0726   • sodium chloride 0.9 % flush 1-10 mL  1-10 mL Intravenous PRN BRITNEY Schroeder   10 mL at 01/05/18 2036   • sodium chloride 0.9 % infusion  100 mL/hr Intravenous Continuous BRITNEY Schroeder 100 mL/hr at 01/03/18 2028 100 mL/hr at 01/03/18 2028   • vancomycin 750 mg/250 mL 0.9% NS IVPB ([facility])  750 mg Intravenous Q24H Wilfred Pedersen MD   Stopped at 01/05/18 1500       Past Medical History:  Past Medical History:   Diagnosis Date   • Anemia    • Arthritis    • Bladder spasms    • GERD (gastroesophageal reflux disease)    • Hypertension        Past Surgical History:  Past Surgical History:   Procedure Laterality Date   • ANKLE SURGERY     • KNEE SURGERY     • SHOULDER SURGERY         Family History  Family History   Problem  "Relation Age of Onset   • Heart disease Neg Hx    • Cancer Neg Hx        Social History  Social History     Social History   • Marital status:      Spouse name: N/A   • Number of children: N/A   • Years of education: N/A     Occupational History   • Not on file.     Social History Main Topics   • Smoking status: Never Smoker   • Smokeless tobacco: Never Used   • Alcohol use No   • Drug use: No   • Sexual activity: Not on file     Other Topics Concern   • Not on file     Social History Narrative    Her daughter is her POA         Review of Systems:  History obtained from chart review and the patient  General ROS: No fever or chills  Respiratory ROS: positive for - shortness of breath  Cardiovascular ROS: positive for - dyspnea on exertion  Gastrointestinal ROS: positive for - blood in stools  No abdominal pain or melena  Genito-Urinary ROS: No dysuria or hematuria  Neurological ROS: no headache or dizziness    Objective:  /56  Pulse 74  Temp 98.7 °F (37.1 °C) (Temporal Artery )   Resp 16  Ht 160 cm (63\")  Wt 125 kg (274 lb 9.6 oz)  SpO2 92%  BMI 48.64 kg/m2    Intake/Output Summary (Last 24 hours) at 01/06/18 1121  Last data filed at 01/06/18 0800   Gross per 24 hour   Intake             6224 ml   Output             1575 ml   Net             4649 ml     General: awake/alert    Neck: supple, no JVD  Chest:  clear to auscultation bilaterally without respiratory distress  CVS: regular rate and rhythm  Abdominal: soft, nontender, normal bowel sounds  Extremities: no cyanosis or edema  Skin: warm and dry without rash   Neuro: no focal motor deficits    Labs:    Results from last 7 days  Lab Units 01/06/18  0721 01/06/18  0447 01/05/18  2342  01/05/18  0003  01/04/18  0201   WBC 10*3/mm3  --  8.99  --   --  11.40*  --  14.53*   HEMOGLOBIN g/dL 8.9* 8.8* 8.9*  < > 9.6*  9.6*  < > 10.6*   HEMATOCRIT % 27.0* 26.6* 26.6*  < > 28.2*  28.2*  < > 32.6*   PLATELETS 10*3/mm3  --  118*  --   --  133  --  128* "   < > = values in this interval not displayed.        Results from last 7 days  Lab Units 01/06/18  0604 01/05/18  0003 01/04/18  1149 01/04/18  0201   SODIUM mmol/L 140 140 140 138   POTASSIUM mmol/L 4.0 4.6 5.0 5.6*   CHLORIDE mmol/L 100 106 109 109   CO2 mmol/L 35.0* 26.0 19.0* 15.0*   BUN mg/dL 51* 65* 67* 65*   CREATININE mg/dL 1.73* 3.21* 4.15* 4.51*   CALCIUM mg/dL 9.4 10.2 10.3 10.6*   BILIRUBIN mg/dL 0.4 0.3  --  0.4   ALK PHOS U/L 61 69  --  87   ALT (SGPT) U/L 27 24  --  28   AST (SGOT) U/L 20 15  --  18   GLUCOSE mg/dL 112* 150* 145* 127*       Radiology:   Imaging Results (last 72 hours)     Procedure Component Value Units Date/Time    XR Chest 1 View [[id number]] Collected:  01/03/18 1945     Updated:  01/03/18 1950    Narrative:       XR CHEST 1 VW- 1/3/2018 7:29 PM CST     HISTORY: Possible Pneumonia, CHF       COMPARISON: None.     FINDINGS:      There is elevation of the right hemidiaphragm with volume loss in the  right lung base which appears chronic. Minimal streaky atelectasis in  the left lung base. No focal lung infiltrate is identified. Overall  normal heart size with normal appearance of the pulmonary vasculature.  Right shoulder arthroplasty. No acute bony abnormality. Advanced  degenerative change at the left shoulder.       Impression:       1. Elevation of the right hemidiaphragm with volume loss in the right  lung base, likely chronic. Minimal streaky atelectasis in the left lung  base.  2. No focal lung infiltrate identified. No evidence of decompensated  heart failure.        This report was finalized on 01/03/2018 19:46 by Dr Alfonso Hercules, .    NM Lung Ventilation Perfusion [[id number]] Collected:  01/03/18 2151     Updated:  01/03/18 2158    Narrative:       NM LUNG VENTILATION PERFUSION- 1/3/2018 9:01 PM CST     HISTORY: Suspicion for PE on recent outside VQ scan       COMPARISON: Chest x-ray dated 01/03/2018      RADIOPHARMACEUTICAL: 10.2 mCi of Xenon-133 for the ventilation study  and  5.5 mCi Tc 99m MAA for the perfusion study.      TECHNIQUE: Following inhalation of the aerosolized radiopharmaceutical,  the ventilation study was performed with images of the thorax being  obtained in posterior projection. Thereafter, the perfusion study was  performed following the injection of radiolabeled MAA particles with  images of the thorax obtained in 6 projections.      FINDINGS:       There is elevation of the right hemidiaphragm on chest x-ray which  accounts for the area of third opinion on ventilation and perfusion  images in the right lung base. Otherwise homogeneous distribution of  radiotracer on the perfusion scan.       Impression:       1. Findings are most commonly associated with low probability for acute  pulmonary embolism. Elevation of the hemidiaphragm accounts for the  relative perfusion deficit in the right lung base. Otherwise, perfusion  is homogeneous.     This report was finalized on 01/03/2018 21:54 by Dr Alfonso Hercules, .    US Renal Bilateral [[id number]] Collected:  01/04/18 1148     Updated:  01/04/18 1152    Narrative:       EXAMINATION: US RENAL BILATERAL-  1/4/2018 12:48 PM EST     HISTORY: Acute kidney injury     COMPARISON:None     TECHNIQUE:Bilateral renal ultrasound was performed.     FINDINGS:     The right kidney measures 10.1 cm in [id number] length. The left  kidney measures 10.7 cm in rafj-qh-[id number] length.     Normal echogenicity of the renal cortex is observed. There is no  pelvocaliectasis to indicate hydronephrosis or obstruction. There are no  perinephric abnormalities.     A 2.6 cm cyst observed in the upper pole of the right kidney.     Urinary bladder is incompletely distended without bladder wall  abnormality.     Aorta and inferior vena cava are imaged incompletely appear  unremarkable.       Impression:       1. Right nephrogenic cyst.  2. Otherwise negative bilateral renal ultrasound.  This report was finalized on 01/04/2018 11:49 by Dr. Prasanth Goff,  MD.    US Venous Doppler Lower Extremity Bilateral (duplex) [[id number]] Collected:  01/04/18 1715     Updated:  01/04/18 1721    Narrative:       History: Swelling       Impression:       Impression:  1. There is evidence of deep venous thrombosis in both lower  extremities.  2. There is evidence of superficial thrombus in the proximal greater  saphenous vein in the right lower extremity.     Comments: Bilateral lower extremity venous duplex exam was performed  using color Doppler flow, Doppler waveform analysis, and grayscale  imaging, with and without compression. There is evidence of deep venous  thrombosis in the common femoral and proximal superficial femoral veins  in the right lower extremity. The rest of the lower extremity was not  visualized due to body habitus. There is also evidence of deep venous  thrombosis in the common femoral, proximal superficial femoral, and  popliteal veins in the left lower extremity. The rest of the left lower  extremity was not visualized due to body habitus. There is evidence of  superficial thrombus in the proximal greater saphenous vein in the right  lower extremity.        This report was finalized on 01/04/2018 17:18 by Dr. Reynold Mckinney MD.          Culture:  Blood Culture   Date Value Ref Range Status   01/03/2018 No growth at 24 hours  Preliminary   01/03/2018 Abnormal Stain (A)  Preliminary   01/03/2018 Gram Positive Cocci (A)  Preliminary     Urine Culture   Date Value Ref Range Status   01/04/2018 No growth at 24 hours  Preliminary         Assessment   1.  Acute kidney injury secondary to acute tubular necrosis--improving  2.  Metabolic acidosis  3.  Sepsis/hypotensive--improving  4.  Bilateral lower extremity DVT  5.  Anemia   6.  Morbid abdominal obesity.    Plan:  1.  Change IV fluid.  2.  Patient is okay to transfer to regular floor.  3.  Baseline iron studies.      Teddy Santiago MD  1/6/2018  11:21 AM

## 2018-01-06 NOTE — PROGRESS NOTES
"Pharmacy Dosing Service  Automatic Renal Adjustment  Cefepime    Assessment/Action/Plan:  Based on prescribing information provided by the drug , Cefepime 2 gm IV every 24 hours, has been changed to Cefepime 2 gm IV every 12 hours. Pharmacy will continue to monitor daily and make further adjustment(s) accordingly.     Subjective:  Tricia Hernandez is a 77 y.o. female     Additional Factors Considered:  • Patient disposition per documentation  • Disease state or condition being treated    Objective:  Ht: 160 cm (63\"); Wt: 125 kg (274 lb 9.6 oz)  Estimated Creatinine Clearance: 36.1 mL/min (by C-G formula based on Cr of 1.73).   Lab Results   Component Value Date    CREATININE 1.73 (H) 01/06/2018    CREATININE 3.21 (H) 01/05/2018    CREATININE 4.15 (H) 01/04/2018       YADIEL Kelsey, Pharm.D.    01/06/18 11:36 AM    "

## 2018-01-06 NOTE — PROGRESS NOTES
[facility] Gastroenterology  Inpatient Progress Note  Today's date:  01/06/18    Tricia Hernandez  1940       Reason for Follow Up:  heme positive stool, anemia    Subjective:   There is been no melena or hematochezia.  She is on a regular diet.  She denies any nausea or vomiting.  There is no abdominal pain.  She is on Lovenox for bilateral DVT.  There are plans to transfer to the regular nursing floor.    No Known Allergies    Current Facility-Administered Medications:   •  acetaminophen (TYLENOL) tablet 650 mg, 650 mg, Oral, Q4H PRN **OR** acetaminophen (TYLENOL) suppository 650 mg, 650 mg, Rectal, Q4H PRN, BRITNEY Schroeder  •  cefepime (MAXIPIME) 2 g/100 mL 0.9% NS (mbp), 2 g, Intravenous, Q12H, Wilfred Pedersen MD  •  dextrose (D50W) solution 25 g, 25 g, Intravenous, Q15 Min PRN, Wilfred Pedersen MD  •  dextrose (GLUTOSE) oral gel 15 g, 15 g, Oral, Q15 Min PRN, Wilfred Pedersen MD  •  enoxaparin (LOVENOX) injection 130 mg, 1 mg/kg, Subcutaneous, Nightly, Wilfred Pedersen MD  •  glucagon (human recombinant) (GLUCAGEN DIAGNOSTIC) injection 1 mg, 1 mg, Subcutaneous, Q15 Min PRN, Wilfred Pedersen MD  •  HYDROcodone-acetaminophen (NORCO) 7.5-325 MG per tablet 1 tablet, 1 tablet, Oral, Q4H PRN, BRITNEY Schroeder  •  ipratropium-albuterol (DUO-NEB) nebulizer solution 3 mL, 3 mL, Nebulization, Q6H - RT, BRITNEY Schroeder, 3 mL at 01/06/18 1204  •  iron sucrose (VENOFER) 200 mg in sodium chloride 0.9 % 100 mL IVPB, 200 mg, Intravenous, Q24H, Teddy Santiago MD  •  ondansetron (ZOFRAN) tablet 4 mg, 4 mg, Oral, Q6H PRN **OR** ondansetron ODT (ZOFRAN-ODT) disintegrating tablet 4 mg, 4 mg, Oral, Q6H PRN **OR** ondansetron (ZOFRAN) injection 4 mg, 4 mg, Intravenous, Q6H PRN, Loyda Fried, APRN  •  [START ON 1/7/2018] pantoprazole (PROTONIX) EC tablet 40 mg, 40 mg, Oral, Q AM, Vanessa Britton MD  •  Pharmacy to Dose enoxaparin (LOVENOX), , Does not apply, Continuous PRN,  Wilfred Pedersen MD  •  sodium chloride 0.9 % flush 1-10 mL, 1-10 mL, Intravenous, PRN, BRITNEY Schroeder, 10 mL at 01/05/18 2036  •  sodium chloride 0.9 % infusion, 100 mL/hr, Intravenous, Continuous, BRITNEY Schroeder, Last Rate: 100 mL/hr at 01/03/18 2028, 100 mL/hr at 01/03/18 2028  •  vancomycin (VANCOCIN) 1,750 mg in sodium chloride 0.9 % 500 mL IVPB, 1,750 mg, Intravenous, Q24H, Wilfred Pedersen MD    Review of Systems:   Review of Systems   Constitutional: Negative for fever.   Respiratory: Negative for cough and shortness of breath.    Cardiovascular: Negative for chest pain.   Genitourinary: Negative for dysuria.   Skin: Negative for rash.   Neurological: Negative for seizures.        Vital Signs:  Temp:  [98 °F (36.7 °C)-98.7 °F (37.1 °C)] 98.7 °F (37.1 °C)  Heart Rate:  [71-90] 73  Resp:  [15-20] 18  BP: ()/(43-79) 105/56  Body mass index is 48.64 kg/(m^2).     Intake/Output Summary (Last 24 hours) at 01/06/18 1242  Last data filed at 01/06/18 0800   Gross per 24 hour   Intake             6224 ml   Output             1575 ml   Net             4649 ml     I/O this shift:  In: 250 [P.O.:250]  Out: 250 [Urine:250]  Physical Exam:  Physical Exam   Constitutional: She is oriented to person, place, and time. She appears well-developed.   Eyes: No scleral icterus.   Cardiovascular: Normal rate and regular rhythm.    Pulmonary/Chest: Breath sounds normal.   Abdominal: Soft. Bowel sounds are normal. She exhibits no distension and no mass. There is no tenderness. There is no rebound and no guarding.   Musculoskeletal: Normal range of motion.   Neurological: She is alert and oriented to person, place, and time.   Skin: No rash noted.   Psychiatric: She has a normal mood and affect. Her behavior is normal.   Vitals reviewed.       Results Review:   I have reviewed all of the patient's current test results      Results from last 7 days  Lab Units 01/06/18  0721 01/06/18  0447 01/05/18  2967   01/05/18  0003  01/04/18  0201   WBC 10*3/mm3  --  8.99  --   --  11.40*  --  14.53*   HEMOGLOBIN g/dL 8.9* 8.8* 8.9*  < > 9.6*  9.6*  < > 10.6*   HEMATOCRIT % 27.0* 26.6* 26.6*  < > 28.2*  28.2*  < > 32.6*   PLATELETS 10*3/mm3  --  118*  --   --  133  --  128*   < > = values in this interval not displayed.      Results from last 7 days  Lab Units 01/06/18  0604 01/05/18  0003 01/04/18  1149 01/04/18  0201   SODIUM mmol/L 140 140 140 138   POTASSIUM mmol/L 4.0 4.6 5.0 5.6*   CHLORIDE mmol/L 100 106 109 109   CO2 mmol/L 35.0* 26.0 19.0* 15.0*   BUN mg/dL 51* 65* 67* 65*   CREATININE mg/dL 1.73* 3.21* 4.15* 4.51*   CALCIUM mg/dL 9.4 10.2 10.3 10.6*   BILIRUBIN mg/dL 0.4 0.3  --  0.4   ALK PHOS U/L 61 69  --  87   ALT (SGPT) U/L 27 24  --  28   AST (SGOT) U/L 20 15  --  18   GLUCOSE mg/dL 112* 150* 145* 127*         Results from last 7 days  Lab Units 01/03/18  2308   INR  1.32*       No results found for: LIPASE    Radiology Review:  Imaging Results (last 24 hours)     Procedure Component Value Units Date/Time    XR Chest 1 View [[id number]] Collected:  01/06/18 1031     Updated:  01/06/18 1035    Narrative:       EXAMINATION: XR CHEST 1 VW- 1/6/2018 11:31 AM EST     HISTORY: Shortness of breath     COMPARISON: 01/03/2018     FINDINGS:  Low lung volumes result in vascular crowding and interstitial  prominence. There is elevation of the right hemidiaphragm. Atelectasis  is seen at the left lung base. The aorta is tortuous with  atherosclerotic calcifications. The heart appears normal in size.       Impression:       Stable exam, with left basilar atelectasis.  This report was finalized on 01/06/2018 10:32 by Dr. Jose Solis MD.          Impression/Plan:  Patient Active Problem List   Diagnosis Code   • RUBY (acute kidney injury) N17.9     Heme positive stool  She has had no prior GI evaluation.  Currently, the benefits of colonoscopic evaluation are outweighed by the associated risks especially since she is not  having signs of active bleeding.  She will need to have GI eval at some point.  Timing will be determined by her clinical progress.  Clearly we prefer for this to be done off of anticoagulation and she has fresh DVT which complicates issues.  This could perhaps be done as an outpatient down the road when the coagulation can be more safely held.  Her hemoglobin is stable.  Will discontinue Protonix drip and change to oral Protonix.  She is on a regular diet.  Agree with transfer to [facility].  GI service is signing off.  She will need to be referred back to GI as an outpatient when able to proceed with diagnostic testing i.e., when able to hold anticoagulants safely.     Anticoagulated state  Patient is now on Lovenox.  She has recent DVT.       Vanessa Britton MD  [facility]  01/06/18  12:42 PM    Much of this encounter note is an electronic transcription/translation of spoken language to printed text. The electronic translation of spoken language may permit erroneous, or at times, nonsensical words or phrases to be inadvertently transcribed; although I have reviewed the note for such errors, some may still exist.

## 2018-01-06 NOTE — PLAN OF CARE
Problem: Patient Care Overview (Adult)  Goal: Plan of Care Review  Outcome: Ongoing (interventions implemented as appropriate)   01/06/18 0531   Coping/Psychosocial Response Interventions   Plan Of Care Reviewed With patient   Patient Care Overview   Progress improving   Outcome Evaluation   Outcome Summary/Follow up Plan taking po fluids well, no BM since 1/3/18, Weight increased from yesterday significantly, waiting on am lab results, BP slightly low at times when asleep       Problem: Fall Risk (Adult)  Goal: Identify Related Risk Factors and Signs and Symptoms  Outcome: Ongoing (interventions implemented as appropriate)    Goal: Absence of Falls  Outcome: Ongoing (interventions implemented as appropriate)      Problem: Fluid Volume Excess (Adult,Obstetrics,Pediatric)  Goal: Identify Related Risk Factors and Signs and Symptoms  Outcome: Ongoing (interventions implemented as appropriate)    Goal: Stable Weight  Outcome: Ongoing (interventions implemented as appropriate)    Goal: Balanced Intake/Output  Outcome: Ongoing (interventions implemented as appropriate)      Problem: VTE, DVT and PE (Adult)  Goal: Signs and Symptoms of Listed Potential Problems Will be Absent or Manageable (VTE, DVT and PE)  Outcome: Ongoing (interventions implemented as appropriate)

## 2018-01-06 NOTE — CONSULTS
14 cm double lumen power Midline catheter placed in pt right basilic vein. Pt tolerated procedure well. Line flushes and draws well. Sterile dressing applied.

## 2018-01-06 NOTE — PROGRESS NOTES
"Pharmacy Dosing Service  Pharmacokinetics  Vancomycin Follow-up Evaluation    Assessment/Action/Plan:  Renal function continues to improve. Vancomycin dose adjusted to 1750mg IV q24 based on population pharmacokinetic parameters.  Pharmacy will continue to monitor renal function and adjust dose accordingly.     Subjective:  Tricia Hernandez is a 77 y.o. female currently on Vancomycin 750 mg IV every 24 hours for the treatment of sepsis, day 4 of therapy.    Objective:  Ht: 160 cm (63\"); Wt: 125 kg (274 lb 9.6 oz)  Estimated Creatinine Clearance: 36.1 mL/min (by C-G formula based on Cr of 1.73).   Lab Results   Component Value Date    CREATININE 1.73 (H) 01/06/2018    CREATININE 3.21 (H) 01/05/2018    CREATININE 4.15 (H) 01/04/2018      Lab Results   Component Value Date    WBC 8.99 01/06/2018    WBC 11.40 (H) 01/05/2018    WBC 14.53 (H) 01/04/2018          Culture Results:  Microbiology Results (last 10 days)       Procedure Component Value - Date/Time      Urine Culture - Urine, Urine, Clean Catch [[id number]]  (Normal) Collected:  01/04/18 0715    Lab Status:  Final result Specimen:  Urine from Urine, Clean Catch Updated:  01/06/18 0618     Urine Culture No growth at 2 days    Blood Culture With HAILE - Blood, [[id number]]  (Normal) Collected:  01/03/18 1900    Lab Status:  Preliminary result Specimen:  Blood from Arm, Left Updated:  01/05/18 1946     Blood Culture No growth at 2 days    Blood Culture With HAILE - Blood, [[id number]]  (Abnormal) Collected:  01/03/18 1859    Lab Status:  Preliminary result Specimen:  Blood from Arm, Right Updated:  01/06/18 1011     Blood Culture Abnormal Stain (A)      Staphylococcus, coagulase negative (A)      Probable contaminant          Isolated from Anaerobic Bottle     Gram Stain Result Gram positive cocci    Narrative:       No growth aerobic bottle.    Blood Culture ID, PCR - Blood, [[id number]]  (Abnormal) Collected:  01/03/18 1859    Lab Status:  Edited Result - FINAL " Specimen:  Blood from Arm, Right Updated:  01/06/18 0838     BCID, PCR Staphylococcus spp, not aureus. Identification by BCID PCR. (C)      Corrected result. Previous result was Staphylococcus species, not aureus. mecA (methicillin resistance gene) detected. Identification by BCID PCR. on 1/5/2018 at 0348 CST             YADIEL Kelsey Pharm.D.    01/06/18 11:41 AM

## 2018-01-07 LAB
ALBUMIN SERPL-MCNC: 2.7 G/DL (ref 3.5–5)
ALBUMIN/GLOB SERPL: 1 G/DL (ref 1.1–2.5)
ALP SERPL-CCNC: 65 U/L (ref 24–120)
ALT SERPL W P-5'-P-CCNC: 33 U/L (ref 0–54)
ANION GAP SERPL CALCULATED.3IONS-SCNC: 4 MMOL/L (ref 4–13)
AST SERPL-CCNC: 27 U/L (ref 7–45)
BASOPHILS # BLD AUTO: 0.03 10*3/MM3 (ref 0–0.2)
BASOPHILS NFR BLD AUTO: 0.4 % (ref 0–2)
BILIRUB SERPL-MCNC: 0.3 MG/DL (ref 0.1–1)
BUN BLD-MCNC: 42 MG/DL (ref 5–21)
BUN/CREAT SERPL: 35.3 (ref 7–25)
CALCIUM SPEC-SCNC: 9.6 MG/DL (ref 8.4–10.4)
CHLORIDE SERPL-SCNC: 100 MMOL/L (ref 98–110)
CO2 SERPL-SCNC: 36 MMOL/L (ref 24–31)
CREAT BLD-MCNC: 1.19 MG/DL (ref 0.5–1.4)
DEPRECATED RDW RBC AUTO: 49.8 FL (ref 40–54)
EOSINOPHIL # BLD AUTO: 0.36 10*3/MM3 (ref 0–0.7)
EOSINOPHIL NFR BLD AUTO: 4.5 % (ref 0–4)
ERYTHROCYTE [DISTWIDTH] IN BLOOD BY AUTOMATED COUNT: 15.7 % (ref 12–15)
GFR SERPL CREATININE-BSD FRML MDRD: 44 ML/MIN/1.73
GFR SERPL CREATININE-BSD FRML MDRD: 54 ML/MIN/1.73
GLOBULIN UR ELPH-MCNC: 2.7 GM/DL
GLUCOSE BLD-MCNC: 99 MG/DL (ref 70–100)
HCT VFR BLD AUTO: 28.1 % (ref 37–47)
HCT VFR BLD AUTO: 28.3 % (ref 37–47)
HCT VFR BLD AUTO: 29 % (ref 37–47)
HGB BLD-MCNC: 8.7 G/DL (ref 12–16)
HGB BLD-MCNC: 9 G/DL (ref 12–16)
HGB BLD-MCNC: 9.2 G/DL (ref 12–16)
HYPOCHROMIA BLD QL: NORMAL
IMM GRANULOCYTES # BLD: 0.7 10*3/MM3 (ref 0–0.03)
IMM GRANULOCYTES NFR BLD: 8.8 % (ref 0–5)
LYMPHOCYTES # BLD AUTO: 1.22 10*3/MM3 (ref 0.72–4.86)
LYMPHOCYTES NFR BLD AUTO: 15.4 % (ref 15–45)
MCH RBC QN AUTO: 27 PG (ref 28–32)
MCHC RBC AUTO-ENTMCNC: 31 G/DL (ref 33–36)
MCV RBC AUTO: 87.3 FL (ref 82–98)
MONOCYTES # BLD AUTO: 0.98 10*3/MM3 (ref 0.19–1.3)
MONOCYTES NFR BLD AUTO: 12.4 % (ref 4–12)
NEUTROPHILS # BLD AUTO: 4.64 10*3/MM3 (ref 1.87–8.4)
NEUTROPHILS NFR BLD AUTO: 58.5 % (ref 39–78)
NRBC BLD MANUAL-RTO: 0 /100 WBC (ref 0–0)
PLAT MORPH BLD: NORMAL
PLATELET # BLD AUTO: 147 10*3/MM3 (ref 130–400)
PMV BLD AUTO: 9.9 FL (ref 6–12)
POTASSIUM BLD-SCNC: 3.9 MMOL/L (ref 3.5–5.3)
PROT SERPL-MCNC: 5.4 G/DL (ref 6.3–8.7)
RBC # BLD AUTO: 3.22 10*6/MM3 (ref 4.2–5.4)
SODIUM BLD-SCNC: 140 MMOL/L (ref 135–145)
WBC MORPH BLD: NORMAL
WBC NRBC COR # BLD: 7.93 10*3/MM3 (ref 4.8–10.8)

## 2018-01-07 PROCEDURE — 25010000002 ENOXAPARIN PER 10 MG: Performed by: FAMILY MEDICINE

## 2018-01-07 PROCEDURE — 80053 COMPREHEN METABOLIC PANEL: CPT | Performed by: FAMILY MEDICINE

## 2018-01-07 PROCEDURE — 85025 COMPLETE CBC W/AUTO DIFF WBC: CPT | Performed by: FAMILY MEDICINE

## 2018-01-07 PROCEDURE — 25010000002 VANCOMYCIN PER 500 MG: Performed by: FAMILY MEDICINE

## 2018-01-07 PROCEDURE — 25010000002 IRON SUCROSE PER 1 MG: Performed by: INTERNAL MEDICINE

## 2018-01-07 PROCEDURE — 94760 N-INVAS EAR/PLS OXIMETRY 1: CPT

## 2018-01-07 PROCEDURE — 85014 HEMATOCRIT: CPT | Performed by: FAMILY MEDICINE

## 2018-01-07 PROCEDURE — 85018 HEMOGLOBIN: CPT | Performed by: FAMILY MEDICINE

## 2018-01-07 PROCEDURE — 85007 BL SMEAR W/DIFF WBC COUNT: CPT | Performed by: FAMILY MEDICINE

## 2018-01-07 PROCEDURE — 94799 UNLISTED PULMONARY SVC/PX: CPT

## 2018-01-07 RX ORDER — SIMVASTATIN 40 MG
40 TABLET ORAL NIGHTLY
Status: ON HOLD | COMMUNITY
End: 2018-01-11

## 2018-01-07 RX ORDER — OXYBUTYNIN CHLORIDE 5 MG/1
5 TABLET ORAL 2 TIMES DAILY
COMMUNITY
End: 2018-01-27 | Stop reason: HOSPADM

## 2018-01-07 RX ORDER — PANTOPRAZOLE SODIUM 40 MG/1
40 TABLET, DELAYED RELEASE ORAL DAILY
COMMUNITY

## 2018-01-07 RX ORDER — CITALOPRAM 20 MG/1
20 TABLET ORAL DAILY
COMMUNITY

## 2018-01-07 RX ORDER — LISINOPRIL AND HYDROCHLOROTHIAZIDE 25; 20 MG/1; MG/1
1 TABLET ORAL DAILY
COMMUNITY
End: 2018-01-27 | Stop reason: HOSPADM

## 2018-01-07 RX ORDER — CEFDINIR 300 MG/1
300 CAPSULE ORAL EVERY 12 HOURS SCHEDULED
Status: COMPLETED | OUTPATIENT
Start: 2018-01-07 | End: 2018-01-11

## 2018-01-07 RX ADMIN — IRON SUCROSE 200 MG: 20 INJECTION, SOLUTION INTRAVENOUS at 11:18

## 2018-01-07 RX ADMIN — SODIUM CHLORIDE 100 ML/HR: 9 INJECTION, SOLUTION INTRAVENOUS at 13:59

## 2018-01-07 RX ADMIN — SODIUM CHLORIDE 100 ML/HR: 9 INJECTION, SOLUTION INTRAVENOUS at 23:46

## 2018-01-07 RX ADMIN — CEFDINIR 300 MG: 300 CAPSULE ORAL at 20:14

## 2018-01-07 RX ADMIN — SODIUM CHLORIDE 100 ML/HR: 9 INJECTION, SOLUTION INTRAVENOUS at 03:37

## 2018-01-07 RX ADMIN — CEFDINIR 300 MG: 300 CAPSULE ORAL at 13:05

## 2018-01-07 RX ADMIN — IPRATROPIUM BROMIDE AND ALBUTEROL SULFATE 3 ML: 2.5; .5 SOLUTION RESPIRATORY (INHALATION) at 07:11

## 2018-01-07 RX ADMIN — VANCOMYCIN HYDROCHLORIDE 1750 MG: 1 INJECTION, POWDER, LYOPHILIZED, FOR SOLUTION INTRAVENOUS at 13:05

## 2018-01-07 RX ADMIN — IPRATROPIUM BROMIDE AND ALBUTEROL SULFATE 3 ML: 2.5; .5 SOLUTION RESPIRATORY (INHALATION) at 13:20

## 2018-01-07 RX ADMIN — IPRATROPIUM BROMIDE AND ALBUTEROL SULFATE 3 ML: 2.5; .5 SOLUTION RESPIRATORY (INHALATION) at 01:03

## 2018-01-07 RX ADMIN — PANTOPRAZOLE SODIUM 40 MG: 40 TABLET, DELAYED RELEASE ORAL at 05:40

## 2018-01-07 RX ADMIN — ENOXAPARIN SODIUM 130 MG: 150 INJECTION SUBCUTANEOUS at 16:53

## 2018-01-07 RX ADMIN — IPRATROPIUM BROMIDE AND ALBUTEROL SULFATE 3 ML: 2.5; .5 SOLUTION RESPIRATORY (INHALATION) at 23:53

## 2018-01-07 RX ADMIN — IPRATROPIUM BROMIDE AND ALBUTEROL SULFATE 3 ML: 2.5; .5 SOLUTION RESPIRATORY (INHALATION) at 18:50

## 2018-01-07 NOTE — PLAN OF CARE
Problem: Patient Care Overview (Adult)  Goal: Plan of Care Review  Outcome: Ongoing (interventions implemented as appropriate)   01/07/18 0123   Coping/Psychosocial Response Interventions   Plan Of Care Reviewed With patient   Patient Care Overview   Progress no change   Outcome Evaluation   Outcome Summary/Follow up Plan incontinent of large urine. purewik cath placed to monitor strict i and o. pt confused to time, place, situation.      Goal: Adult Individualization and Mutuality  Outcome: Ongoing (interventions implemented as appropriate)   01/07/18 0123   Individualization   Patient Specific Preferences pt confused   Patient Specific Goals pt confused   Patient Specific Interventions pt confused   Mutuality/Individual Preferences   What Anxieties, Fears or Concerns Do You Have About Your Health or Care? pt confused   What Questions Do You Have About Your Health or Care? pt confused   What Information Would Help Us Give You More Personalized Care? pt confused     Goal: Discharge Needs Assessment  Outcome: Ongoing (interventions implemented as appropriate)   01/05/18 1401   Discharge Needs Assessment   Concerns To Be Addressed care coordination/care conferences;discharge planning concerns   Readmission Within The Last 30 Days no previous admission in last 30 days   Community Agency Name(S) ([facility]  [phone])   Current Discharge Risk chronically ill   Discharge Planning Comments Spoke to patient regarding discharge plan/needs. Patient states she resides at home with her daughter. Patient also states she receives HH but couldn't remember which company. KRYSTIAN contacted patient's daughter who advised patient's HH is with [facility]  [phone]. KRYSTIAN contacted [facility]  and informed them of patient's admission. They will need to be notified of patient's discharge. Patient will need to be ambulating to return home at discharge. Will follow to determine plan/needs.   Current Health   Outpatient/Agency/Support  Group Needs homecare agency (specify level of care)   Living Environment   Transportation Available car;family or friend will provide   Self-Care   Equipment Currently Used at Home walker, gigi;walker, rolling;walker, standard;wheelchair       Problem: Fall Risk (Adult)  Goal: Identify Related Risk Factors and Signs and Symptoms  Outcome: Outcome(s) achieved Date Met: 01/07/18 01/06/18 1756   Fall Risk   Fall Risk: Related Risk Factors age-related changes;gait/mobility problems   Fall Risk: Signs and Symptoms presence of risk factors     Goal: Absence of Falls  Outcome: Ongoing (interventions implemented as appropriate)   01/07/18 0123   Fall Risk (Adult)   Absence of Falls making progress toward outcome       Problem: Fluid Volume Excess (Adult,Obstetrics,Pediatric)  Goal: Identify Related Risk Factors and Signs and Symptoms  Outcome: Ongoing (interventions implemented as appropriate)   01/06/18 1756   Fluid Volume Excess   Fluid Volume Excess: Related Risk Factors disease process;medication effects;venous return impaired   Signs and Symptoms (Fluid Volume Excess) edema     Goal: Stable Weight  Outcome: Ongoing (interventions implemented as appropriate)   01/07/18 0123   Fluid Volume Excess (Adult,Obstetrics,Pediatric)   Stable Weight making progress toward outcome     Goal: Balanced Intake/Output  Outcome: Ongoing (interventions implemented as appropriate)   01/07/18 0123   Fluid Volume Excess (Adult,Obstetrics,Pediatric)   Balanced Intake/Output making progress toward outcome       Problem: VTE, DVT and PE (Adult)  Goal: Signs and Symptoms of Listed Potential Problems Will be Absent or Manageable (VTE, DVT and PE)  Outcome: Ongoing (interventions implemented as appropriate)   01/07/18 0123   VTE, DVT and PE   Problems Assessed (VTE, DVT, PE) all   Problems Present (VTE, DVT, PE) deep vein thrombosis

## 2018-01-07 NOTE — PROGRESS NOTES
"Pharmacy Dosing Service  Anticoagulant  Enoxaparin    Assessment/Action/Plan:  Renal function improved since initial dosing. Lovenox dose adjusted to 1mg/kg (130mg) sc q12. H/H low but stable with no active bleeding.      Subjective:  Tricia Hernandez is a 77 y.o. female on Enoxaparin 1mg/kg  130mg SQ every 24 hours for indication of DVT.  Objective:  [Ht: 160 cm (63\"); Wt: 125 kg (274 lb 9.6 oz); BMI: Body mass index is 48.64 kg/(m^2).]  Estimated Creatinine Clearance: 52.5 mL/min (by C-G formula based on Cr of 1.19).   Lab Results   Component Value Date    INR 1.32 (H) 01/03/2018    PROTIME 16.8 (H) 01/03/2018      Lab Results   Component Value Date    HGB 8.7 (L) 01/07/2018    HGB 8.5 (L) 01/06/2018    HGB 9.0 (L) 01/06/2018      Lab Results   Component Value Date     01/07/2018     (L) 01/06/2018     01/05/2018       Thanks for the consult,  YADIEL Kelsey, Pharm.D.    01/07/18 3:47 PM     "

## 2018-01-07 NOTE — PLAN OF CARE
Problem: Patient Care Overview (Adult)  Goal: Plan of Care Review  Outcome: Ongoing (interventions implemented as appropriate)   01/07/18 1530   Coping/Psychosocial Response Interventions   Plan Of Care Reviewed With patient   Patient Care Overview   Progress no change   Outcome Evaluation   Outcome Summary/Follow up Plan denies pain this shift. reposlitioned every 2 hours. purewik in place, still voids around.      Goal: Adult Individualization and Mutuality   01/07/18 0123   Individualization   Patient Specific Preferences pt confused   Patient Specific Goals pt confused   Patient Specific Interventions pt confused   Mutuality/Individual Preferences   What Anxieties, Fears or Concerns Do You Have About Your Health or Care? pt confused   What Questions Do You Have About Your Health or Care? pt confused   What Information Would Help Us Give You More Personalized Care? pt confused     Goal: Discharge Needs Assessment  Outcome: Ongoing (interventions implemented as appropriate)   01/07/18 1530   Discharge Needs Assessment   Discharge Disposition still a patient       Problem: Fall Risk (Adult)  Goal: Absence of Falls  Outcome: Ongoing (interventions implemented as appropriate)   01/07/18 1530   Fall Risk (Adult)   Absence of Falls making progress toward outcome       Problem: Fluid Volume Excess (Adult,Obstetrics,Pediatric)  Goal: Identify Related Risk Factors and Signs and Symptoms  Outcome: Ongoing (interventions implemented as appropriate)   01/07/18 1530   Fluid Volume Excess   Fluid Volume Excess: Related Risk Factors medication effects;venous return impaired   Signs and Symptoms (Fluid Volume Excess) edema     Goal: Stable Weight  Outcome: Ongoing (interventions implemented as appropriate)   01/07/18 1530   Fluid Volume Excess (Adult,Obstetrics,Pediatric)   Stable Weight making progress toward outcome     Goal: Balanced Intake/Output  Outcome: Ongoing (interventions implemented as appropriate)   01/07/18 1530    Fluid Volume Excess (Adult,Obstetrics,Pediatric)   Balanced Intake/Output making progress toward outcome       Problem: VTE, DVT and PE (Adult)  Goal: Signs and Symptoms of Listed Potential Problems Will be Absent or Manageable (VTE, DVT and PE)  Outcome: Ongoing (interventions implemented as appropriate)   01/07/18 1530   VTE, DVT and PE   Problems Assessed (VTE, DVT, PE) all   Problems Present (VTE, DVT, PE) deep vein thrombosis       Problem: Skin Integrity Impairment, Risk/Actual (Adult)  Goal: Identify Related Risk Factors and Signs and Symptoms  Outcome: Ongoing (interventions implemented as appropriate)    Goal: Skin Integrity/Wound Healing  Outcome: Ongoing (interventions implemented as appropriate)   01/07/18 1530   Skin Integrity Impairment, Risk/Actual (Adult)   Skin Integrity/Wound Healing making progress toward outcome

## 2018-01-07 NOTE — PLAN OF CARE
Problem: Patient Care Overview (Adult)  Goal: Plan of Care Review  Outcome: Ongoing (interventions implemented as appropriate)    Goal: Adult Individualization and Mutuality  Outcome: Ongoing (interventions implemented as appropriate)    Goal: Discharge Needs Assessment  Outcome: Ongoing (interventions implemented as appropriate)      Problem: Fall Risk (Adult)  Goal: Identify Related Risk Factors and Signs and Symptoms  Outcome: Ongoing (interventions implemented as appropriate)  Patient very weak continue to monitor  Goal: Absence of Falls  Outcome: Ongoing (interventions implemented as appropriate)      Problem: Fluid Volume Excess (Adult,Obstetrics,Pediatric)  Goal: Identify Related Risk Factors and Signs and Symptoms  Outcome: Ongoing (interventions implemented as appropriate)  Monitor edema and output   01/06/18 1756   Fluid Volume Excess   Fluid Volume Excess: Related Risk Factors disease process;medication effects;venous return impaired   Signs and Symptoms (Fluid Volume Excess) edema     Goal: Stable Weight  Outcome: Ongoing (interventions implemented as appropriate)    Goal: Balanced Intake/Output  Outcome: Ongoing (interventions implemented as appropriate)      Problem: VTE, DVT and PE (Adult)  Goal: Signs and Symptoms of Listed Potential Problems Will be Absent or Manageable (VTE, DVT and PE)  Outcome: Ongoing (interventions implemented as appropriate)  Continue to monitor edema ans pulses   01/06/18 1756   VTE, DVT and PE   Problems Assessed (VTE, DVT, PE) all   Problems Present (VTE, DVT, PE) deep vein thrombosis

## 2018-01-07 NOTE — PROGRESS NOTES
[facility]  INPATIENT PROGRESS NOTE    Length of Stay: 4  Date of Admission: 1/3/2018  Primary Care Physician: Ed Hanson MD    Subjective   Chief Complaint: Weakness  HPI   Doing well.      Now tolerating room air.  CXR didn't show much pathology or reason for her to be SOA.  Does have some atelectasis.      Hgb stable, no active bleeding.    Pulses and toe color good.  Will start PT/OT.    Creatinine now WNL.          Review of Systems   Constitutional: Positive for fatigue. Negative for fever.   HENT: Negative for congestion and ear pain.    Eyes: Negative for pain and visual disturbance.   Respiratory: Negative for cough, shortness of breath and wheezing.    Cardiovascular: Negative for chest pain and palpitations.   Gastrointestinal: Negative for diarrhea, nausea and vomiting.   Endocrine: Negative for heat intolerance.   Genitourinary: Negative for dysuria and frequency.   Musculoskeletal: Negative for arthralgias and back pain.   Skin: Negative for rash and wound.   Neurological: Positive for weakness. Negative for dizziness and light-headedness.   Psychiatric/Behavioral: Negative for confusion. The patient is not nervous/anxious.    All other systems reviewed and are negative.     All pertinent negatives and positives are as above. All other systems have been reviewed and are negative unless otherwise stated.     Objective    Temp:  [98.1 °F (36.7 °C)-98.4 °F (36.9 °C)] 98.2 °F (36.8 °C)  Heart Rate:  [64-93] 64  Resp:  [18-20] 18  BP: (103-126)/(45-60) 126/55  Physical Exam   Constitutional: She is oriented to person, place, and time. She appears well-developed and well-nourished.   HENT:   Head: Normocephalic and atraumatic.   Right Ear: External ear normal.   Left Ear: External ear normal.   Nose: Nose normal.   Mouth/Throat: Oropharynx is clear and moist.   Eyes: Conjunctivae and EOM are normal.   Neck: Normal range of motion. Neck supple.    Cardiovascular: Normal rate, regular rhythm and normal heart sounds.    Pulmonary/Chest: Effort normal. She has decreased breath sounds.   Abdominal: Soft. Bowel sounds are normal. She exhibits no distension. There is no tenderness.   Musculoskeletal: Normal range of motion.   Neurological: She is alert and oriented to person, place, and time.   Skin: Skin is warm and dry.   Psychiatric: She has a normal mood and affect. Her speech is normal and behavior is normal. Cognition and memory are normal.           Results Review:  I have reviewed the labs, radiology results, and diagnostic studies.    Laboratory Data:     Results from last 7 days  Lab Units 01/07/18  0658 01/06/18  2259 01/06/18  1511  01/06/18  0447  01/05/18  0003   WBC 10*3/mm3 7.93  --   --   --  8.99  --  11.40*   HEMOGLOBIN g/dL 8.7* 8.5* 9.0*  < > 8.8*  < > 9.6*  9.6*   HEMATOCRIT % 28.1* 26.6* 28.2*  < > 26.6*  < > 28.2*  28.2*   PLATELETS 10*3/mm3 147  --   --   --  118*  --  133   < > = values in this interval not displayed.       Results from last 7 days  Lab Units 01/07/18  0658 01/06/18  0604 01/05/18  0003   SODIUM mmol/L 140 140 140   POTASSIUM mmol/L 3.9 4.0 4.6   CHLORIDE mmol/L 100 100 106   CO2 mmol/L 36.0* 35.0* 26.0   BUN mg/dL 42* 51* 65*   CREATININE mg/dL 1.19 1.73* 3.21*   CALCIUM mg/dL 9.6 9.4 10.2   BILIRUBIN mg/dL 0.3 0.4 0.3   ALK PHOS U/L 65 61 69   ALT (SGPT) U/L 33 27 24   AST (SGOT) U/L 27 20 15   GLUCOSE mg/dL 99 112* 150*       Culture Data:   Blood Culture   Date Value Ref Range Status   01/03/2018 No growth at 3 days  Preliminary   01/03/2018 Abnormal Stain (A)  Preliminary   01/03/2018 Staphylococcus, coagulase negative (A)  Preliminary     Comment:     Probable contaminant       Urine Culture   Date Value Ref Range Status   01/04/2018 No growth at 2 days  Final       Radiology Data:   Imaging Results (last 24 hours)     ** No results found for the last 24 hours. **          I have reviewed the patient current  medications.     Assessment/Plan     Assessment/Plan   1.  Occlusive Left-lower extremity DVT  -She has an unfortunate combination of renal failure and thrombocytopenia as well as reported bloody stools  -Will Anticoagulate with Lovenox, renally dosed per pharmacy  -Appreciate GI input  -Appreciate Vascular input   -Hgb stable      2:  GI Bleed  -Stools were dark red with a positive hemoccult  -Monitor H&H, stable  -Protonix drip  -GI consulted, following, appreciate their input  -Telemetry  -Transfuse as indicated      3.  Sepsis  -Cultures negative.  Not sure urinary source vs PNA.  Will change to PO Omnicef to complete therapy.     -off pressors  -Cardiac Monitoring  -Cultures show coag negative staph in 1/4 bottles, likely contaminant, hold Vanc  -Change Cefepime to Omnicef      3.  Acute Renal Failure  -Nephrology Consulted, following.  Appreciate their input.  -Renal U/S no major abnormalities  -Boggs D/C'd  -Creatinine  Now WNL.        4.  Lactic Acidosis  -Resolved  -Monitor      5.  Intractible Nausea and vomiting  -Zofran  -IVF      6.  Dehydration  -IVF  -Monitor UOP      7.  GERD  -Protonix drip      8.  HLD  -Statin  -Lipid panel      9.  Bladder Spasms  -Ditropan      10.  HTN  -Hold BP meds given low BP, RUBY  -Monitor BP      11.  Anemia  -Monitor H&H  -Protonix  -GI Consulted      12:  Morbid Obesity  -BMI:  54.4  -Dietician consult      13.  Hyperglycemia  -A1C negative  -D/C finger sticks/A1C      14.  Hyperkalemia  -Insulin/Dextrose  -Kayexalate  -Cardiac monitoring  -Renal consulted     15.  Superficial right sided thrombus  -Anticoagulated  -Vascular following        Discharge Planning: I expect the patient to be discharged to home vs rehab  Start PT/OT  Case mgmt consult  Wilfred Pedersen MD   01/07/18   11:27 AM

## 2018-01-07 NOTE — PROGRESS NOTES
Nephrology ([facility]) Progress Note      Patient:  Tricia Hernandez  YOB: 1940  Date of Service: 1/7/2018  MRN: [id number]   Acct: [id number]   Primary Care Physician: Ed Hanson MD  Advance Directive: Conditional Code  Admit Date: 1/3/2018       Hospital Day: 4  Referring Provider: Jim Zuñiga DO      Patient personally seen and examined.  Complete chart including Consults, Notes, Operative Reports, Labs, Cardiology, and Radiology studies reviewed as able.        Subjective:  Tricia Hernandez is a 77 y.o. female  whom we were consulted for acute kidney injury.  Patient has no previous history of chronic disease.  She was accepted as a transfer from [facility] as she presented there with dizziness and hypotension.  She was found to have acute kidney injury.  Hospital course remarkable for admitted to [facility] CCU.  She was treated with IV fluid boluses followed by maintenance IV fluid and has significant improvement of renal function.  Patient was moved from CCU to regular room.    Today she is feeling much better and has excellent urine output.    Allergies:  Review of patient's allergies indicates no known allergies.    Home Meds:  No prescriptions prior to admission.       Medicines:  Current Facility-Administered Medications   Medication Dose Route Frequency Provider Last Rate Last Dose   • acetaminophen (TYLENOL) tablet 650 mg  650 mg Oral Q4H PRN BRITNEY Schroeder        Or   • acetaminophen (TYLENOL) suppository 650 mg  650 mg Rectal Q4H PRN BRITNEY Schroeder       • cefdinir (OMNICEF) capsule 300 mg  300 mg Oral Q12H Wilfred Pedersen MD       • dextrose (D50W) solution 25 g  25 g Intravenous Q15 Min PRN Wilfred Pedersen MD       • dextrose (GLUTOSE) oral gel 15 g  15 g Oral Q15 Min PRN Wilfred Pedersen MD       • enoxaparin (LOVENOX) injection 130 mg  1 mg/kg Subcutaneous Nightly Wilfred Pedersen MD   130 mg  at 01/06/18 2059   • glucagon (human recombinant) (GLUCAGEN DIAGNOSTIC) injection 1 mg  1 mg Subcutaneous Q15 Min PRN Wilfred Pedersen MD       • HYDROcodone-acetaminophen (NORCO) 7.5-325 MG per tablet 1 tablet  1 tablet Oral Q4H PRN BRITNEY Schroeder       • ipratropium-albuterol (DUO-NEB) nebulizer solution 3 mL  3 mL Nebulization Q6H - RT BRITNEY Schroeder   3 mL at 01/07/18 0711   • iron sucrose (VENOFER) 200 mg in sodium chloride 0.9 % 100 mL IVPB  200 mg Intravenous Q24H Teddy Santiago MD   Stopped at 01/07/18 1213   • ondansetron (ZOFRAN) tablet 4 mg  4 mg Oral Q6H PRN BRITNEY Schroeder        Or   • ondansetron ODT (ZOFRAN-ODT) disintegrating tablet 4 mg  4 mg Oral Q6H PRN BRITNEY Schroeder        Or   • ondansetron (ZOFRAN) injection 4 mg  4 mg Intravenous Q6H PRN BRITNEY Schroeder       • pantoprazole (PROTONIX) EC tablet 40 mg  40 mg Oral Q AM Vanessa Britton MD   40 mg at 01/07/18 0540   • Pharmacy to Dose enoxaparin (LOVENOX)   Does not apply Continuous PRN Wilfred Pedersen MD       • sodium chloride 0.9 % flush 1-10 mL  1-10 mL Intravenous PRN BRITNEY Schroeder   10 mL at 01/05/18 2036   • sodium chloride 0.9 % infusion  100 mL/hr Intravenous Continuous BRITNEY Schroeder 100 mL/hr at 01/07/18 0337 100 mL/hr at 01/07/18 0337   • vancomycin (VANCOCIN) 1,750 mg in sodium chloride 0.9 % 500 mL IVPB  1,750 mg Intravenous Q24H Wilfred Pedersen MD   1,750 mg at 01/06/18 1532       Past Medical History:  Past Medical History:   Diagnosis Date   • Anemia    • Arthritis    • Bladder spasms    • GERD (gastroesophageal reflux disease)    • Hypertension        Past Surgical History:  Past Surgical History:   Procedure Laterality Date   • ANKLE SURGERY     • KNEE SURGERY     • SHOULDER SURGERY         Family History  Family History   Problem Relation Age of Onset   • Heart disease Neg Hx    • Cancer Neg Hx        Social History  Social History     Social History   •  "Marital status:      Spouse name: N/A   • Number of children: N/A   • Years of education: N/A     Occupational History   • Not on file.     Social History Main Topics   • Smoking status: Never Smoker   • Smokeless tobacco: Never Used   • Alcohol use No   • Drug use: No   • Sexual activity: Not on file     Other Topics Concern   • Not on file     Social History Narrative    Her daughter is her POA         Review of Systems:  History obtained from chart review and the patient  General ROS: No fever or chills  Respiratory ROS: positive for - shortness of breath  Cardiovascular ROS: positive for - dyspnea on exertion  Gastrointestinal ROS: No dominant pain, nausea and vomiting  Genito-Urinary ROS: No dysuria or hematuria  Neurological ROS: no headache or dizziness    Objective:  /84 (BP Location: Left arm, Patient Position: Lying)  Pulse 74  Temp 98.1 °F (36.7 °C) (Oral)   Resp 18  Ht 160 cm (63\")  Wt 125 kg (274 lb 9.6 oz)  SpO2 95%  BMI 48.64 kg/m2    Intake/Output Summary (Last 24 hours) at 01/07/18 1225  Last data filed at 01/07/18 1130   Gross per 24 hour   Intake             1739 ml   Output              200 ml   Net             1539 ml     General: awake/alert    Neck: supple, no JVD  Chest:  clear to auscultation bilaterally without respiratory distress  CVS: regular rate and rhythm  Abdominal: soft, nontender, normal bowel sounds  Extremities: no cyanosis or edema  Skin: warm and dry without rash   Neuro: no focal motor deficits    Labs:    Results from last 7 days  Lab Units 01/07/18  0658 01/06/18  2259 01/06/18  1511  01/06/18  0447  01/05/18  0003   WBC 10*3/mm3 7.93  --   --   --  8.99  --  11.40*   HEMOGLOBIN g/dL 8.7* 8.5* 9.0*  < > 8.8*  < > 9.6*  9.6*   HEMATOCRIT % 28.1* 26.6* 28.2*  < > 26.6*  < > 28.2*  28.2*   PLATELETS 10*3/mm3 147  --   --   --  118*  --  133   < > = values in this interval not displayed.        Results from last 7 days  Lab Units 01/07/18  0658 " 01/06/18  0604 01/05/18  0003   SODIUM mmol/L 140 140 140   POTASSIUM mmol/L 3.9 4.0 4.6   CHLORIDE mmol/L 100 100 106   CO2 mmol/L 36.0* 35.0* 26.0   BUN mg/dL 42* 51* 65*   CREATININE mg/dL 1.19 1.73* 3.21*   CALCIUM mg/dL 9.6 9.4 10.2   BILIRUBIN mg/dL 0.3 0.4 0.3   ALK PHOS U/L 65 61 69   ALT (SGPT) U/L 33 27 24   AST (SGOT) U/L 27 20 15   GLUCOSE mg/dL 99 112* 150*       Radiology:   Imaging Results (last 72 hours)     Procedure Component Value Units Date/Time    XR Chest 1 View [[id number]] Collected:  01/03/18 1945     Updated:  01/03/18 1950    Narrative:       XR CHEST 1 VW- 1/3/2018 7:29 PM CST     HISTORY: Possible Pneumonia, CHF       COMPARISON: None.     FINDINGS:      There is elevation of the right hemidiaphragm with volume loss in the  right lung base which appears chronic. Minimal streaky atelectasis in  the left lung base. No focal lung infiltrate is identified. Overall  normal heart size with normal appearance of the pulmonary vasculature.  Right shoulder arthroplasty. No acute bony abnormality. Advanced  degenerative change at the left shoulder.       Impression:       1. Elevation of the right hemidiaphragm with volume loss in the right  lung base, likely chronic. Minimal streaky atelectasis in the left lung  base.  2. No focal lung infiltrate identified. No evidence of decompensated  heart failure.        This report was finalized on 01/03/2018 19:46 by Dr Alfonso Hercules, .    NM Lung Ventilation Perfusion [[id number]] Collected:  01/03/18 2151     Updated:  01/03/18 2158    Narrative:       NM LUNG VENTILATION PERFUSION- 1/3/2018 9:01 PM CST     HISTORY: Suspicion for PE on recent outside VQ scan       COMPARISON: Chest x-ray dated 01/03/2018      RADIOPHARMACEUTICAL: 10.2 mCi of Xenon-133 for the ventilation study and  5.5 mCi Tc 99m MAA for the perfusion study.      TECHNIQUE: Following inhalation of the aerosolized radiopharmaceutical,  the ventilation study was performed with images of the  thorax being  obtained in posterior projection. Thereafter, the perfusion study was  performed following the injection of radiolabeled MAA particles with  images of the thorax obtained in 6 projections.      FINDINGS:       There is elevation of the right hemidiaphragm on chest x-ray which  accounts for the area of third opinion on ventilation and perfusion  images in the right lung base. Otherwise homogeneous distribution of  radiotracer on the perfusion scan.       Impression:       1. Findings are most commonly associated with low probability for acute  pulmonary embolism. Elevation of the hemidiaphragm accounts for the  relative perfusion deficit in the right lung base. Otherwise, perfusion  is homogeneous.     This report was finalized on 01/03/2018 21:54 by Dr Alfonso Hercules, .    US Renal Bilateral [[id number]] Collected:  01/04/18 1148     Updated:  01/04/18 1152    Narrative:       EXAMINATION: US RENAL BILATERAL-  1/4/2018 12:48 PM EST     HISTORY: Acute kidney injury     COMPARISON:None     TECHNIQUE:Bilateral renal ultrasound was performed.     FINDINGS:     The right kidney measures 10.1 cm in rfrn-ah-fmuk length. The left  kidney measures 10.7 cm in twwi-qd-vsky length.     Normal echogenicity of the renal cortex is observed. There is no  pelvocaliectasis to indicate hydronephrosis or obstruction. There are no  perinephric abnormalities.     A 2.6 cm cyst observed in the upper pole of the right kidney.     Urinary bladder is incompletely distended without bladder wall  abnormality.     Aorta and inferior vena cava are imaged incompletely appear  unremarkable.       Impression:       1. Right nephrogenic cyst.  2. Otherwise negative bilateral renal ultrasound.  This report was finalized on 01/04/2018 11:49 by Dr. Prasanth Goff MD.    US Venous Doppler Lower Extremity Bilateral (duplex) [[id number]] Collected:  01/04/18 1715     Updated:  01/04/18 1721    Narrative:       History: Swelling       Impression:        Impression:  1. There is evidence of deep venous thrombosis in both lower  extremities.  2. There is evidence of superficial thrombus in the proximal greater  saphenous vein in the right lower extremity.     Comments: Bilateral lower extremity venous duplex exam was performed  using color Doppler flow, Doppler waveform analysis, and grayscale  imaging, with and without compression. There is evidence of deep venous  thrombosis in the common femoral and proximal superficial femoral veins  in the right lower extremity. The rest of the lower extremity was not  visualized due to body habitus. There is also evidence of deep venous  thrombosis in the common femoral, proximal superficial femoral, and  popliteal veins in the left lower extremity. The rest of the left lower  extremity was not visualized due to body habitus. There is evidence of  superficial thrombus in the proximal greater saphenous vein in the right  lower extremity.        This report was finalized on 01/04/2018 17:18 by Dr. Reynold Mckinney MD.          Culture:  Blood Culture   Date Value Ref Range Status   01/03/2018 No growth at 24 hours  Preliminary   01/03/2018 Abnormal Stain (A)  Preliminary   01/03/2018 Gram Positive Cocci (A)  Preliminary     Urine Culture   Date Value Ref Range Status   01/04/2018 No growth at 24 hours  Preliminary         Assessment   1.  Acute kidney injury secondary to acute tubular necrosis--improving  2.  Metabolic acidosis  3.  Sepsis/hypotensive--improving  4.  Bilateral lower extremity DVT  5.  Anemia   6.  Morbid abdominal obesity.    Plan:  1.  Decrease IV fluid.  2.  Intravenous Venofer for correction of severe iron deficiency.        Teddy Santiago MD  1/7/2018  12:25 PM

## 2018-01-08 LAB
ALBUMIN SERPL-MCNC: 2.7 G/DL (ref 3.5–5)
ALBUMIN/GLOB SERPL: 1 G/DL (ref 1.1–2.5)
ALP SERPL-CCNC: 67 U/L (ref 24–120)
ALT SERPL W P-5'-P-CCNC: 33 U/L (ref 0–54)
ANION GAP SERPL CALCULATED.3IONS-SCNC: 7 MMOL/L (ref 4–13)
AST SERPL-CCNC: 26 U/L (ref 7–45)
BACTERIA SPEC AEROBE CULT: NORMAL
BILIRUB SERPL-MCNC: 0.4 MG/DL (ref 0.1–1)
BUN BLD-MCNC: 31 MG/DL (ref 5–21)
BUN/CREAT SERPL: 35.6 (ref 7–25)
CALCIUM SPEC-SCNC: 9.9 MG/DL (ref 8.4–10.4)
CHLORIDE SERPL-SCNC: 102 MMOL/L (ref 98–110)
CO2 SERPL-SCNC: 31 MMOL/L (ref 24–31)
CREAT BLD-MCNC: 0.87 MG/DL (ref 0.5–1.4)
DEPRECATED RDW RBC AUTO: 51 FL (ref 40–54)
EOSINOPHIL # BLD MANUAL: 0.31 10*3/MM3 (ref 0–0.7)
EOSINOPHIL NFR BLD MANUAL: 3 % (ref 0–4)
ERYTHROCYTE [DISTWIDTH] IN BLOOD BY AUTOMATED COUNT: 15.8 % (ref 12–15)
GFR SERPL CREATININE-BSD FRML MDRD: 63 ML/MIN/1.73
GFR SERPL CREATININE-BSD FRML MDRD: 77 ML/MIN/1.73
GLOBULIN UR ELPH-MCNC: 2.8 GM/DL
GLUCOSE BLD-MCNC: 110 MG/DL (ref 70–100)
HCT VFR BLD AUTO: 29.3 % (ref 37–47)
HCT VFR BLD AUTO: 29.7 % (ref 37–47)
HGB BLD-MCNC: 9.2 G/DL (ref 12–16)
HGB BLD-MCNC: 9.5 G/DL (ref 12–16)
LYMPHOCYTES # BLD MANUAL: 0.84 10*3/MM3 (ref 0.72–4.86)
LYMPHOCYTES NFR BLD MANUAL: 18 % (ref 4–12)
LYMPHOCYTES NFR BLD MANUAL: 8 % (ref 15–45)
MCH RBC QN AUTO: 27.9 PG (ref 28–32)
MCHC RBC AUTO-ENTMCNC: 31.4 G/DL (ref 33–36)
MCV RBC AUTO: 88.8 FL (ref 82–98)
MONOCYTES # BLD AUTO: 1.89 10*3/MM3 (ref 0.19–1.3)
MYELOCYTES NFR BLD MANUAL: 1 % (ref 0–0)
NEUTROPHILS # BLD AUTO: 7.24 10*3/MM3 (ref 1.87–8.4)
NEUTROPHILS NFR BLD MANUAL: 53 % (ref 39–78)
NEUTS BAND NFR BLD MANUAL: 16 % (ref 0–10)
PLATELET # BLD AUTO: 187 10*3/MM3 (ref 130–400)
PMV BLD AUTO: 9.8 FL (ref 6–12)
POTASSIUM BLD-SCNC: 3.9 MMOL/L (ref 3.5–5.3)
PROT SERPL-MCNC: 5.5 G/DL (ref 6.3–8.7)
RBC # BLD AUTO: 3.3 10*6/MM3 (ref 4.2–5.4)
RBC MORPH BLD: NORMAL
SCAN SLIDE: NORMAL
SMALL PLATELETS BLD QL SMEAR: ADEQUATE
SODIUM BLD-SCNC: 140 MMOL/L (ref 135–145)
VARIANT LYMPHS NFR BLD MANUAL: 1 % (ref 0–5)
WBC MORPH BLD: NORMAL
WBC NRBC COR # BLD: 10.49 10*3/MM3 (ref 4.8–10.8)

## 2018-01-08 PROCEDURE — 25010000002 IRON SUCROSE PER 1 MG: Performed by: INTERNAL MEDICINE

## 2018-01-08 PROCEDURE — 80053 COMPREHEN METABOLIC PANEL: CPT | Performed by: FAMILY MEDICINE

## 2018-01-08 PROCEDURE — G8987 SELF CARE CURRENT STATUS: HCPCS | Performed by: OCCUPATIONAL THERAPIST

## 2018-01-08 PROCEDURE — 94799 UNLISTED PULMONARY SVC/PX: CPT

## 2018-01-08 PROCEDURE — 25010000002 ENOXAPARIN PER 10 MG: Performed by: FAMILY MEDICINE

## 2018-01-08 PROCEDURE — 85025 COMPLETE CBC W/AUTO DIFF WBC: CPT | Performed by: FAMILY MEDICINE

## 2018-01-08 PROCEDURE — 85018 HEMOGLOBIN: CPT | Performed by: FAMILY MEDICINE

## 2018-01-08 PROCEDURE — 97535 SELF CARE MNGMENT TRAINING: CPT | Performed by: OCCUPATIONAL THERAPIST

## 2018-01-08 PROCEDURE — 85014 HEMATOCRIT: CPT | Performed by: FAMILY MEDICINE

## 2018-01-08 PROCEDURE — 25010000002 FUROSEMIDE PER 20 MG: Performed by: INTERNAL MEDICINE

## 2018-01-08 PROCEDURE — 97166 OT EVAL MOD COMPLEX 45 MIN: CPT | Performed by: OCCUPATIONAL THERAPIST

## 2018-01-08 PROCEDURE — 85007 BL SMEAR W/DIFF WBC COUNT: CPT | Performed by: FAMILY MEDICINE

## 2018-01-08 PROCEDURE — G8988 SELF CARE GOAL STATUS: HCPCS | Performed by: OCCUPATIONAL THERAPIST

## 2018-01-08 RX ORDER — FUROSEMIDE 10 MG/ML
40 INJECTION INTRAMUSCULAR; INTRAVENOUS ONCE
Status: COMPLETED | OUTPATIENT
Start: 2018-01-08 | End: 2018-01-08

## 2018-01-08 RX ADMIN — CEFDINIR 300 MG: 300 CAPSULE ORAL at 21:27

## 2018-01-08 RX ADMIN — CEFDINIR 300 MG: 300 CAPSULE ORAL at 08:53

## 2018-01-08 RX ADMIN — IPRATROPIUM BROMIDE AND ALBUTEROL SULFATE 3 ML: 2.5; .5 SOLUTION RESPIRATORY (INHALATION) at 11:11

## 2018-01-08 RX ADMIN — SODIUM CHLORIDE 100 ML/HR: 9 INJECTION, SOLUTION INTRAVENOUS at 09:23

## 2018-01-08 RX ADMIN — FUROSEMIDE 40 MG: 10 INJECTION, SOLUTION INTRAMUSCULAR; INTRAVENOUS at 17:03

## 2018-01-08 RX ADMIN — IRON SUCROSE 200 MG: 20 INJECTION, SOLUTION INTRAVENOUS at 11:55

## 2018-01-08 RX ADMIN — ENOXAPARIN SODIUM 150 MG: 150 INJECTION SUBCUTANEOUS at 17:03

## 2018-01-08 RX ADMIN — ENOXAPARIN SODIUM 130 MG: 150 INJECTION SUBCUTANEOUS at 05:16

## 2018-01-08 RX ADMIN — PANTOPRAZOLE SODIUM 40 MG: 40 TABLET, DELAYED RELEASE ORAL at 05:16

## 2018-01-08 RX ADMIN — IPRATROPIUM BROMIDE AND ALBUTEROL SULFATE 3 ML: 2.5; .5 SOLUTION RESPIRATORY (INHALATION) at 06:49

## 2018-01-08 RX ADMIN — HYDROCODONE BITARTRATE AND ACETAMINOPHEN 1 TABLET: 7.5; 325 TABLET ORAL at 23:50

## 2018-01-08 RX ADMIN — IPRATROPIUM BROMIDE AND ALBUTEROL SULFATE 3 ML: 2.5; .5 SOLUTION RESPIRATORY (INHALATION) at 20:23

## 2018-01-08 NOTE — THERAPY EVALUATION
Acute Care - Occupational Therapy Initial Evaluation  [facility]     Patient Name: Tricia Hernandez  : 1940  MRN: [id number]  Today's Date: 2018  Onset of Illness/Injury or Date of Surgery Date: 18  Date of Referral to OT: 18  Referring Physician: Dr. Pedersen    Admit Date: 1/3/2018       ICD-10-CM ICD-9-CM   1. Impaired mobility and ADLs Z74.09 799.89     Patient Active Problem List   Diagnosis   • RUBY (acute kidney injury)     Past Medical History:   Diagnosis Date   • Anemia    • Arthritis    • Bladder spasms    • GERD (gastroesophageal reflux disease)    • Hypertension      Past Surgical History:   Procedure Laterality Date   • ANKLE SURGERY     • KNEE SURGERY     • SHOULDER SURGERY            OT ASSESSMENT FLOWSHEET (last 72 hours)      OT Evaluation       18 0948 18 0800 18 1401          Rehab Evaluation    Document Type evaluation   see MAR  -MM        Subjective Information agree to therapy;complains of;weakness;fatigue;pain  -MM        Symptoms Noted Comment Spoke with RN and pt about noted recent shoulder sx. Per pt, R total shoulder in November with HH therapy currently.  -MM        General Information    Patient Profile Review yes  -MM        Onset of Illness/Injury or Date of Surgery Date 18  -MM        Referring Physician Dr. Pedersen  -MM        General Observations fowlers, finishing breakfast  -MM        Pertinent History Of Current Problem Pt admitted with nausea and vomitting. Recent R shoulder sx end of November. DVT LLE 1/3. Dx: PE, DVT, GI bleed, sepsis, acute renal failure.  -MM        Precautions/Limitations fall precautions  -MM        Prior Level of Function independent:;all household mobility;community mobility;transfer;bed mobility;ADL's   per pt  -MM        Equipment Currently Used at Home walker, gigi;walker, rolling;walker, standard;wheelchair  -MM  walker, gigi;walker, rolling;walker, standard;wheelchair  -CE      Plans/Goals Discussed With  patient;agreed upon  -MM        Risks Reviewed patient:;LOB;increased discomfort;nausea/vomiting;dizziness;change in vital signs;increased drainage;lines disloged  -MM        Benefits Reviewed patient:;improve function;increase independence;increase strength;increase balance;decrease risk of DVT;decrease pain;improve skin integrity;increase knowledge  -MM        Barriers to Rehab medically complex;previous functional deficit;physical barrier;cognitive status  -MM        Living Environment    Lives With child(juan miguel), adult  -MM  child(juan miguel), adult  -CE      Living Arrangements house  -MM  house  -CE      Home Accessibility bed and bath on same level;tub/shower is not walk in  -MM        Transportation Available car;family or friend will provide  -MM  car;family or friend will provide  -CE      Clinical Impression    Date of Referral to OT 01/07/18  -MM        OT Diagnosis impaired mobility and adls  -MM        Impairments Found (describe specific impairments) arousal, attention, and cognition;ergonomics and body mechanics;gait, locomotion, and balance  -MM        Patient/Family Goals Statement return home  -MM        Criteria for Skilled Therapeutic Interventions Met yes;treatment indicated  -        Rehab Potential good, to achieve stated therapy goals  -MM        Therapy Frequency 3-5 times/wk  -MM        Predicted Duration of Therapy Intervention (days/wks) until d/c  -MM        Anticipated Equipment Needs At Discharge bathing equipment;dressing equipment  -MM        Anticipated Discharge Disposition skilled nursing facility  -MM        Functional Level Prior    Ambulation  3-->assistive equipment and person  -CF       Transferring  3-->assistive equipment and person  -CF       Toileting  3-->assistive equipment and person  -CF       Bathing  2-->assistive person  -CF       Dressing  2-->assistive person  -CF       Eating  0-->independent  -CF       Communication  0-->understands/communicates without difficulty   -CF       Swallowing  0-->swallows foods/liquids without difficulty  -CF       Prior Functional Level Comment  na  -CF       Pain Assessment    Pain Assessment Rodriguez-Akhtar FACES  -MM        Rodriguez-Akhtar FACES Pain Rating 4  -MM        Pain Score --  -MM        Pain Type Chronic pain  -MM        Pain Location Back  -MM        Pain Descriptors Aching  -MM        Pain Frequency Constant/continuous  -MM        Pain Intervention(s) Repositioned  -MM        Response to Interventions tolerated  -MM        Vision Assessment/Intervention    Visual Impairment WFL  -MM        Cognitive Assessment/Intervention    Current Cognitive/Communication Assessment functional  -MM        Orientation Status oriented to;person;place  -MM        Follows Commands/Answers Questions 100% of the time;able to follow multi-step instructions  -MM        Personal Safety decreased awareness, need for assist;decreased awareness, need for safety;decreased insight to deficits  -MM        Personal Safety Interventions fall prevention program maintained;gait belt;muscle strengthening facilitated;nonskid shoes/slippers when out of bed;supervised activity  -MM        ROM (Range of Motion)    General ROM Detail BUE AROM WFL, BLE AROM 50% impaired  -MM        MMT (Manual Muscle Testing)    General MMT Assessment Detail BUE 3+/5, BLE 2+/5  -MM        Bed Mobility, Assessment/Treatment    Bed Mobility, Assistive Device draw sheet;bed rails  -MM        Bed Mobility, Roll Left, [place] moderate assist (50% patient effort);verbal cues required;2 person assist required  -MM        Bed Mobility, Roll Right, [place] moderate assist (50% patient effort);verbal cues required;2 person assist required  -MM        Bed Mobility, Scoot/Bridge, [place] maximum assist (25% patient effort);2 person assist required;verbal cues required  -MM        Bed Mobility, Safety Issues decreased use of arms for pushing/pulling;decreased use of legs for bridging/pushing  -MM         Bed Mobility, Impairments ROM decreased;strength decreased;impaired balance  -MM        Bed Mobility, Comment attempted to sit EOB max x1 HOB elevated, unable to complete tasks  -MM        Transfer Assessment/Treatment    Transfer, Comment deferred at this time  -MM        Lower Body Dressing Assessment/Training    LB Dressing Assess/Train, Clothing Type donning:;slipper socks  -MM        LB Dressing Assess/Train, Position sitting;edge of bed  -MM        LB Dressing Assess/Train, [place] maximum assist (25% patient effort);verbal cues required  -MM        LB Dressing Assess/Train, Impairments impaired balance;strength decreased;ROM decreased;decreased flexibility  -MM        Toileting Assessment/Training    Toileting Assess/Train, Position sitting;edge of bed  -MM        Toileting Assess/Train, Indepen Level maximum assist (25% patient effort);dependent (less than 25% patient effort);verbal cues required  -MM        Toileting Assess/Train, Impairments impaired balance;strength decreased;ROM decreased;decreased flexibility  -MM        General Therapy Interventions    Planned Therapy Interventions activity intolerance;adaptive equipment training;ADL retraining;IADL retraining;balance training;bed mobility training;energy conservation;home exercise program;strengthening;transfer training  -MM        Positioning and Restraints    Pre-Treatment Position in bed  -MM        Post Treatment Position bed  -MM        In Bed notified nsg;fowlers;call light within reach;encouraged to call for assist;with family/caregiver;side rails up x2   pure wic catheter  -MM          User Key  (r) = Recorded By, (t) = Taken By, (c) = Cosigned By    Initials Name Effective Dates    CF Kathleen Isaac RN 08/02/16 -     CATRACHO Thomas 09/15/16 -     MM Tom Tovar OTR/LALY 10/21/16 -            Occupational Therapy Education     Title: PT OT SLP Therapies (Active)     Topic: Occupational Therapy (Active)     Point: ADL  training (Active)    Description: Instruct learner(s) on proper safety adaptation and remediation techniques during self care or transfers.   Instruct in proper use of assistive devices.    Learning Progress Summary    Learner Readiness Method Response Comment Documented by Status   Patient Acceptance E NR OT role, benefits and POC  01/08/18 1205 Active               Point: Precautions (Active)    Description: Instruct learner(s) on prescribed precautions during self-care and functional transfers.    Learning Progress Summary    Learner Readiness Method Response Comment Documented by Status   Patient Acceptance E NR OT role, benefits and POC MM 01/08/18 1205 Active                      User Key     Initials Effective Dates Name Provider Type Discipline     10/21/16 -  Tom Tovar, OTR/L Occupational Therapist OT                  OT Recommendation and Plan  Anticipated Equipment Needs At Discharge: bathing equipment, dressing equipment  Anticipated Discharge Disposition: skilled nursing facility  Planned Therapy Interventions: activity intolerance, adaptive equipment training, ADL retraining, IADL retraining, balance training, bed mobility training, energy conservation, home exercise program, strengthening, transfer training  Therapy Frequency: 3-5 times/wk  Plan of Care Review  Plan Of Care Reviewed With: patient  Progress: progress toward functional goals as expected  Outcome Summary/Follow up Plan: OT eval completed. Pt reports chronic back pain, otto ca 4/10. Pt was mod x2 for rolling R<>L. Pt was max to armand slippers socks. Pt was max/dependent to completed toilet hygiene. pt attempted to sit EOB with MAx x1 and HOB elevated but unable to complete tasks d/t weaknes. Skilled OT recommended to address adls, functional mobility, education and activity tolerance. Recommended d/c SNF.          OT Goals       01/08/18 1206          Bed Mobility OT LTG    Bed Mobility OT LTG, Date Established 01/08/18  -MM       Bed Mobility OT LTG, Time to Achieve by discharge  -MM      Bed Mobility OT LTG, Activity Type all bed mobility  -MM      Bed Mobility OT LTG, [place] Level conditional independence  -MM      Bed Mobility OT LTG, Assist Device bed rails  -MM      Static Sitting Balance OT LTG    Static Sitting Balance OT LTG, Date Established 01/08/18  -MM      Static Sitting Balance OT LTG, Time to Achieve by discharge  -MM      Static Sitting Balance OT LTG, [place] Level conditional independence  -MM      Static Sitting Balance OT LTG, Assist Device UE Support  -MM      Grooming OT LTG    Grooming Goal OT LTG, Date Established 01/08/18  -MM      Grooming Goal OT LTG, Time to Achieve by discharge  -MM      Grooming Goal OT LTG, [place] Level conditional independence;set up required  -MM      Grooming Goal OT LTG, Position sitting, edge of bed  -MM        User Key  (r) = Recorded By, (t) = Taken By, (c) = Cosigned By    Initials Name Provider Type    REJI Tovar OTR/L Occupational Therapist                Outcome Measures       01/08/18 1200          How much help from another is currently needed...    Putting on and taking off regular lower body clothing? 2  -MM      Bathing (including washing, rinsing, and drying) 2  -MM      Toileting (which includes using toilet bed pan or urinal) 1  -MM      Putting on and taking off regular upper body clothing 2  -MM      Taking care of personal grooming (such as brushing teeth) 3  -MM      Eating meals 3  -MM      Score 13  -MM      Functional Assessment    Outcome Measure Options AM-PAC 6 Clicks Daily Activity (OT)  -MM        User Key  (r) = Recorded By, (t) = Taken By, (c) = Cosigned By    Initials Name Provider Type    REJI Tovar OTR/L Occupational Therapist          Time Calculation:   OT Start Time: 0948  OT Stop Time: 1057  OT Time Calculation (min): 69 min    Therapy Charges for Today     Code Description Service Date Service Provider Modifiers  Qty    91626002280 HC OT SELFCARE CURRENT 1/8/2018 Tom Tovar OTR/L GO, CL 1    74381752517 HC OT SELFCARE PROJECTED 1/8/2018 Tom Tovar OTR/L GO, CK 1    79166641672 HC OT EVAL MOD COMPLEXITY 4 1/8/2018 Tom Tovar OTR/L GO 1    77479461614 HC OT SELF CARE/MGMT/TRAIN EA 15 MIN 1/8/2018 Tom Tovar OTR/L GO 1          OT G-codes  OT Professional Judgement Used?: Yes  OT Functional Scales Options: AM-PAC 6 Clicks Daily Activity (OT)  Score: 13  Functional Limitation: Self care  Self Care Current Status (): At least 60 percent but less than 80 percent impaired, limited or restricted  Self Care Goal Status (): At least 40 percent but less than 60 percent impaired, limited or restricted    Tom Tovar OTR/LALY  1/8/2018

## 2018-01-08 NOTE — PLAN OF CARE
Problem: Patient Care Overview (Adult)  Goal: Plan of Care Review  Outcome: Ongoing (interventions implemented as appropriate)   01/07/18 1530 01/08/18 0320   Coping/Psychosocial Response Interventions   Plan Of Care Reviewed With --  patient   Patient Care Overview   Progress --  no change   Outcome Evaluation   Outcome Summary/Follow up Plan denies pain this shift. reposlitioned every 2 hours. purewik in place, still voids around.  --      Goal: Adult Individualization and Mutuality  Outcome: Ongoing (interventions implemented as appropriate)   01/07/18 0123   Individualization   Patient Specific Preferences pt confused   Patient Specific Goals pt confused   Patient Specific Interventions pt confused   Mutuality/Individual Preferences   What Anxieties, Fears or Concerns Do You Have About Your Health or Care? pt confused   What Questions Do You Have About Your Health or Care? pt confused   What Information Would Help Us Give You More Personalized Care? pt confused     Goal: Discharge Needs Assessment  Outcome: Ongoing (interventions implemented as appropriate)   01/05/18 1401 01/07/18 1530   Discharge Needs Assessment   Concerns To Be Addressed care coordination/care conferences;discharge planning concerns --    Readmission Within The Last 30 Days no previous admission in last 30 days --    Community Agency Name(S) ([facility]  [phone]) --    Current Discharge Risk chronically ill --    Discharge Disposition --  still a patient   Discharge Planning Comments Spoke to patient regarding discharge plan/needs. Patient states she resides at home with her daughter. Patient also states she receives  but couldn't remember which company. KRYSTIAN contacted patient's daughter who advised patient's HH is with [facility] [phone]. KRYSTIAN contacted [employer]  and informed them of patient's admission. They will need to be notified of patient's discharge. Patient will need to be ambulating to return home at discharge. Will  follow to determine plan/needs. --    Current Health   Outpatient/Agency/Support Group Needs homecare agency (specify level of care) --    Living Environment   Transportation Available car;family or friend will provide --    Self-Care   Equipment Currently Used at Home walker, gigi;walker, rolling;walker, standard;wheelchair --        Problem: Fall Risk (Adult)  Goal: Absence of Falls  Outcome: Ongoing (interventions implemented as appropriate)   01/08/18 0320   Fall Risk (Adult)   Absence of Falls making progress toward outcome       Problem: Fluid Volume Excess (Adult,Obstetrics,Pediatric)  Goal: Identify Related Risk Factors and Signs and Symptoms  Outcome: Ongoing (interventions implemented as appropriate)   01/07/18 1530   Fluid Volume Excess   Fluid Volume Excess: Related Risk Factors medication effects;venous return impaired   Signs and Symptoms (Fluid Volume Excess) edema     Goal: Stable Weight  Outcome: Ongoing (interventions implemented as appropriate)   01/08/18 0320   Fluid Volume Excess (Adult,Obstetrics,Pediatric)   Stable Weight making progress toward outcome     Goal: Balanced Intake/Output  Outcome: Ongoing (interventions implemented as appropriate)   01/08/18 0320   Fluid Volume Excess (Adult,Obstetrics,Pediatric)   Balanced Intake/Output making progress toward outcome       Problem: VTE, DVT and PE (Adult)  Goal: Signs and Symptoms of Listed Potential Problems Will be Absent or Manageable (VTE, DVT and PE)  Outcome: Ongoing (interventions implemented as appropriate)   01/07/18 1530   VTE, DVT and PE   Problems Assessed (VTE, DVT, PE) all   Problems Present (VTE, DVT, PE) deep vein thrombosis       Problem: Skin Integrity Impairment, Risk/Actual (Adult)  Goal: Identify Related Risk Factors and Signs and Symptoms  Outcome: Ongoing (interventions implemented as appropriate)   01/07/18 1530   Skin Integrity Impairment, Risk/Actual   Skin Integrity Impairment, Risk/Actual: Related Risk Factors age  extremes;edema;immobility   Signs and Symptoms (Skin Integrity Impairment) edema

## 2018-01-08 NOTE — PROGRESS NOTES
Nephrology ([facility]) Progress Note      Patient:  Tricia Hernandez  YOB: 1940  Date of Service: 1/8/2018  MRN: [id number]   Acct: [id number]   Primary Care Physician: Ed Hanson MD  Advance Directive: Conditional Code  Admit Date: 1/3/2018       Hospital Day: 5  Referring Provider: Jim Zuñiga DO      Patient personally seen and examined.  Complete chart including Consults, Notes, Operative Reports, Labs, Cardiology, and Radiology studies reviewed as able.        Subjective:  Tircia Hernandez is a 77 y.o. female  whom we were consulted for acute kidney injury. No prior known renal history.  History of essential hypertension, morbid obesity, and recent right shoulder surgery.  Admitted as transfer from [facility].  Determined to be septic and with RUBY so she was transferred to [facility] for higher level of care.  Has bilateral lower extremity DVT with occluding left lower extremity DVT.  Renal function has improved following IV volume replacement.  Has been moved to medical floor.    Today is feeling better overall.  Urine output nonoliguric.    Allergies:  Review of patient's allergies indicates no known allergies.    Home Meds:  Prescriptions Prior to Admission   Medication Sig Dispense Refill Last Dose   • citalopram (CeleXA) 20 MG tablet Take 20 mg by mouth Daily.   Past Week at Unknown time   • lisinopril-hydrochlorothiazide (PRINZIDE,ZESTORETIC) 20-25 MG per tablet Take 1 tablet by mouth Daily.      • oxybutynin (DITROPAN) 5 MG tablet Take 5 mg by mouth 2 (Two) Times a Day.   Past Week at Unknown time   • pantoprazole (PROTONIX) 40 MG EC tablet Take 40 mg by mouth Daily.   Past Week at Unknown time   • simvastatin (ZOCOR) 40 MG tablet Take 40 mg by mouth Every Night.   Past Week at Unknown time       Medicines:  Current Facility-Administered Medications   Medication Dose Route Frequency Provider Last Rate Last Dose   • acetaminophen  (TYLENOL) tablet 650 mg  650 mg Oral Q4H PRN BRITNEY Schroeder        Or   • acetaminophen (TYLENOL) suppository 650 mg  650 mg Rectal Q4H PRN BRITNEY Schroeedr       • cefdinir (OMNICEF) capsule 300 mg  300 mg Oral Q12H Wilfred Pedersen MD   300 mg at 01/08/18 0853   • dextrose (D50W) solution 25 g  25 g Intravenous Q15 Min PRN Wilfred Pedersen MD       • dextrose (GLUTOSE) oral gel 15 g  15 g Oral Q15 Min PRN Wilfred Pedersen MD       • enoxaparin (LOVENOX) injection 130 mg  1 mg/kg Subcutaneous Q12H Wilfred Pedersen MD   130 mg at 01/08/18 0516   • glucagon (human recombinant) (GLUCAGEN DIAGNOSTIC) injection 1 mg  1 mg Subcutaneous Q15 Min PRN Wilfred Pedersen MD       • HYDROcodone-acetaminophen (NORCO) 7.5-325 MG per tablet 1 tablet  1 tablet Oral Q4H PRN BRITNEY Schroeder       • ipratropium-albuterol (DUO-NEB) nebulizer solution 3 mL  3 mL Nebulization Q6H - RT BRITNEY Schroeder   3 mL at 01/08/18 1111   • iron sucrose (VENOFER) 200 mg in sodium chloride 0.9 % 100 mL IVPB  200 mg Intravenous Q24H Teddy Santiago MD 0 mL/hr at 01/07/18 1213 200 mg at 01/08/18 1155   • ondansetron (ZOFRAN) tablet 4 mg  4 mg Oral Q6H PRN BRITNEY Schroeder        Or   • ondansetron ODT (ZOFRAN-ODT) disintegrating tablet 4 mg  4 mg Oral Q6H PRN BRITNEY Schroeder        Or   • ondansetron (ZOFRAN) injection 4 mg  4 mg Intravenous Q6H PRN BRITNEY Schroeder       • pantoprazole (PROTONIX) EC tablet 40 mg  40 mg Oral Q AM Vanessa Britton MD   40 mg at 01/08/18 0516   • Pharmacy to Dose enoxaparin (LOVENOX)   Does not apply Continuous PRN Wilfred Pedersen MD       • sodium chloride 0.9 % flush 1-10 mL  1-10 mL Intravenous PRN BRITNEY Shcroeder   10 mL at 01/05/18 2036   • sodium chloride 0.9 % infusion  100 mL/hr Intravenous Continuous BRITNEY Schroeder 100 mL/hr at 01/08/18 0923 100 mL/hr at 01/08/18 0923       Past Medical History:  Past Medical History:  "  Diagnosis Date   • Anemia    • Arthritis    • Bladder spasms    • GERD (gastroesophageal reflux disease)    • Hypertension        Past Surgical History:  Past Surgical History:   Procedure Laterality Date   • ANKLE SURGERY     • KNEE SURGERY     • SHOULDER SURGERY         Family History  Family History   Problem Relation Age of Onset   • Heart disease Neg Hx    • Cancer Neg Hx        Social History  Social History     Social History   • Marital status:      Spouse name: N/A   • Number of children: N/A   • Years of education: N/A     Occupational History   • Not on file.     Social History Main Topics   • Smoking status: Never Smoker   • Smokeless tobacco: Never Used   • Alcohol use No   • Drug use: No   • Sexual activity: Not on file     Other Topics Concern   • Not on file     Social History Narrative    Her daughter is her POA         Review of Systems:  History obtained from chart review and the patient  General ROS: No fever or chills  Respiratory ROS: positive for - shortness of breath  Cardiovascular ROS: positive for - dyspnea on exertion  Gastrointestinal ROS: positive for - blood in stools  No abdominal pain or melena  Genito-Urinary ROS: No dysuria or hematuria  Neurological ROS: no headache or dizziness    Objective:  BP (!) 165/129 (BP Location: Right arm, Patient Position: Lying) Comment: right arm first, left arm was 115/47 Comment (BP Location): forearm first time  Pulse 76  Temp 97.1 °F (36.2 °C) (Oral)   Resp 18  Ht 160 cm (63\")  Wt (!) 154 kg (339 lb)  SpO2 100%  BMI 60.05 kg/m2    Intake/Output Summary (Last 24 hours) at 01/08/18 1201  Last data filed at 01/08/18 0923   Gross per 24 hour   Intake             3363 ml   Output              350 ml   Net             3013 ml     General: awake/alert    Neck: supple, no JVD  Chest:  clear to auscultation bilaterally without respiratory distress  CVS: regular rate and rhythm  Abdominal: soft, nontender, normal bowel sounds  Extremities: " no cyanosis or edema  Skin: warm and dry without rash   Neuro: no focal motor deficits    Labs:    Results from last 7 days  Lab Units 01/08/18  0652 01/07/18  2311 01/07/18  1625 01/07/18  0658  01/06/18  0447   WBC 10*3/mm3 10.49  --   --  7.93  --  8.99   HEMOGLOBIN g/dL 9.2* 9.2* 9.0* 8.7*  < > 8.8*   HEMATOCRIT % 29.3* 29.0* 28.3* 28.1*  < > 26.6*   PLATELETS 10*3/mm3 187  --   --  147  --  118*   < > = values in this interval not displayed.        Results from last 7 days  Lab Units 01/08/18  0652 01/07/18  0658 01/06/18  0604   SODIUM mmol/L 140 140 140   POTASSIUM mmol/L 3.9 3.9 4.0   CHLORIDE mmol/L 102 100 100   CO2 mmol/L 31.0 36.0* 35.0*   BUN mg/dL 31* 42* 51*   CREATININE mg/dL 0.87 1.19 1.73*   CALCIUM mg/dL 9.9 9.6 9.4   BILIRUBIN mg/dL 0.4 0.3 0.4   ALK PHOS U/L 67 65 61   ALT (SGPT) U/L 33 33 27   AST (SGOT) U/L 26 27 20   GLUCOSE mg/dL 110* 99 112*       Radiology:   Imaging Results (last 72 hours)     Procedure Component Value Units Date/Time    XR Chest 1 View [[id number]] Collected:  01/03/18 1945     Updated:  01/03/18 1950    Narrative:       XR CHEST 1 VW- 1/3/2018 7:29 PM CST     HISTORY: Possible Pneumonia, CHF       COMPARISON: None.     FINDINGS:      There is elevation of the right hemidiaphragm with volume loss in the  right lung base which appears chronic. Minimal streaky atelectasis in  the left lung base. No focal lung infiltrate is identified. Overall  normal heart size with normal appearance of the pulmonary vasculature.  Right shoulder arthroplasty. No acute bony abnormality. Advanced  degenerative change at the left shoulder.       Impression:       1. Elevation of the right hemidiaphragm with volume loss in the right  lung base, likely chronic. Minimal streaky atelectasis in the left lung  base.  2. No focal lung infiltrate identified. No evidence of decompensated  heart failure.        This report was finalized on 01/03/2018 19:46 by Dr Alfonso Hercules, .    NM Lung Ventilation  Perfusion [[id number]] Collected:  01/03/18 2151     Updated:  01/03/18 2158    Narrative:       NM LUNG VENTILATION PERFUSION- 1/3/2018 9:01 PM CST     HISTORY: Suspicion for PE on recent outside VQ scan       COMPARISON: Chest x-ray dated 01/03/2018      RADIOPHARMACEUTICAL: 10.2 mCi of Xenon-133 for the ventilation study and  5.5 mCi Tc 99m MAA for the perfusion study.      TECHNIQUE: Following inhalation of the aerosolized radiopharmaceutical,  the ventilation study was performed with images of the thorax being  obtained in posterior projection. Thereafter, the perfusion study was  performed following the injection of radiolabeled MAA particles with  images of the thorax obtained in 6 projections.      FINDINGS:       There is elevation of the right hemidiaphragm on chest x-ray which  accounts for the area of third opinion on ventilation and perfusion  images in the right lung base. Otherwise homogeneous distribution of  radiotracer on the perfusion scan.       Impression:       1. Findings are most commonly associated with low probability for acute  pulmonary embolism. Elevation of the hemidiaphragm accounts for the  relative perfusion deficit in the right lung base. Otherwise, perfusion  is homogeneous.     This report was finalized on 01/03/2018 21:54 by Dr Alfonso Hercules, .    US Renal Bilateral [[id number]] Collected:  01/04/18 1148     Updated:  01/04/18 1152    Narrative:       EXAMINATION: US RENAL BILATERAL-  1/4/2018 12:48 PM EST     HISTORY: Acute kidney injury     COMPARISON:None     TECHNIQUE:Bilateral renal ultrasound was performed.     FINDINGS:     The right kidney measures 10.1 cm in lnro-ug-hsga length. The left  kidney measures 10.7 cm in tqga-ec-jlfl length.     Normal echogenicity of the renal cortex is observed. There is no  pelvocaliectasis to indicate hydronephrosis or obstruction. There are no  perinephric abnormalities.     A 2.6 cm cyst observed in the upper pole of the right kidney.      Urinary bladder is incompletely distended without bladder wall  abnormality.     Aorta and inferior vena cava are imaged incompletely appear  unremarkable.       Impression:       1. Right nephrogenic cyst.  2. Otherwise negative bilateral renal ultrasound.  This report was finalized on 01/04/2018 11:49 by Dr. Prasanth Goff MD.    US Venous Doppler Lower Extremity Bilateral (duplex) [[id number]] Collected:  01/04/18 1715     Updated:  01/04/18 1721    Narrative:       History: Swelling       Impression:       Impression:  1. There is evidence of deep venous thrombosis in both lower  extremities.  2. There is evidence of superficial thrombus in the proximal greater  saphenous vein in the right lower extremity.     Comments: Bilateral lower extremity venous duplex exam was performed  using color Doppler flow, Doppler waveform analysis, and grayscale  imaging, with and without compression. There is evidence of deep venous  thrombosis in the common femoral and proximal superficial femoral veins  in the right lower extremity. The rest of the lower extremity was not  visualized due to body habitus. There is also evidence of deep venous  thrombosis in the common femoral, proximal superficial femoral, and  popliteal veins in the left lower extremity. The rest of the left lower  extremity was not visualized due to body habitus. There is evidence of  superficial thrombus in the proximal greater saphenous vein in the right  lower extremity.        This report was finalized on 01/04/2018 17:18 by Dr. Reynold Mckinney MD.          Culture:  Blood Culture   Date Value Ref Range Status   01/03/2018 No growth at 24 hours  Preliminary   01/03/2018 Abnormal Stain (A)  Preliminary   01/03/2018 Gram Positive Cocci (A)  Preliminary     Urine Culture   Date Value Ref Range Status   01/04/2018 No growth at 24 hours  Preliminary         Assessment   1.  Acute kidney injury secondary to acute tubular necrosis--resolving  2.  Metabolic  acidosis--improving  3.  Sepsis/hypotensive--resolved  4.  Bilateral lower extremity DVT  5.  Anemia     Plan:  1.  Decrease rate of IV fluids  2.  OK for discharge from renal standpoint; follow up in office in 1-2 weeks.  3.  Monitor labs for continuing renal recovery      Kyle Reyna, APRN  1/8/2018  12:01 PM

## 2018-01-08 NOTE — PROGRESS NOTES
Continued Stay Note  [facility] Stout     Patient Name: Tricia Hernandez  MRN: [id number]  Today's Date: 1/8/2018    Admit Date: 1/3/2018          Discharge Plan       01/08/18 1543    Case Management/Social Work Plan    Plan Home vs Rehab    Patient/Family In Agreement With Plan yes    Additional Comments Pt will likely need rehab at d/c. Following to see how pt does with PT because she will need to be ambulating to return home. Will follow.               Discharge Codes     None            SILVANA Griffith

## 2018-01-08 NOTE — PLAN OF CARE
Problem: Patient Care Overview (Adult)  Goal: Plan of Care Review  Outcome: Ongoing (interventions implemented as appropriate)   01/08/18 1206   Coping/Psychosocial Response Interventions   Plan Of Care Reviewed With patient   Patient Care Overview   Progress progress toward functional goals as expected   Outcome Evaluation   Outcome Summary/Follow up Plan OT fouzia completed. Pt reports chronic back pain, otto ca 4/10. Pt was mod x2 for rolling R<>L. Pt was max to armand slippers socks. Pt was max/dependent to completed toilet hygiene. pt attempted to sit EOB with MAx x1 and HOB elevated but unable to complete tasks d/t weaknes. Skilled OT recommended to address adls, functional mobility, education and activity tolerance. Recommended d/c SNF.       Problem: Inpatient Occupational Therapy  Goal: Bed Mobility Goal LTG- OT  Outcome: Ongoing (interventions implemented as appropriate)   01/08/18 1206   Bed Mobility OT LTG   Bed Mobility OT LTG, Date Established 01/08/18   Bed Mobility OT LTG, Time to Achieve by discharge   Bed Mobility OT LTG, Activity Type all bed mobility   Bed Mobility OT LTG, Butler Level conditional independence   Bed Mobility OT LTG, Assist Device bed rails     Goal: Static Sitting Balance Goal LTG- OT  Outcome: Ongoing (interventions implemented as appropriate)   01/08/18 1206   Static Sitting Balance OT LTG   Static Sitting Balance OT LTG, Date Established 01/08/18   Static Sitting Balance OT LTG, Time to Achieve by discharge   Static Sitting Balance OT LTG, Butler Level conditional independence   Static Sitting Balance OT LTG, Assist Device UE Support     Goal: Grooming Goal LTG- OT  Outcome: Ongoing (interventions implemented as appropriate)   01/08/18 1206   Grooming OT LTG   Grooming Goal OT LTG, Date Established 01/08/18   Grooming Goal OT LTG, Time to Achieve by discharge   Grooming Goal OT LTG, Butler Level conditional independence;set up required   Grooming Goal OT LTG,  Position sitting, edge of bed

## 2018-01-09 ENCOUNTER — APPOINTMENT (OUTPATIENT)
Dept: GENERAL RADIOLOGY | Facility: HOSPITAL | Age: 78
End: 2018-01-09

## 2018-01-09 LAB
ALBUMIN SERPL-MCNC: 2.7 G/DL (ref 3.5–5)
ALBUMIN/GLOB SERPL: 1 G/DL (ref 1.1–2.5)
ALP SERPL-CCNC: 73 U/L (ref 24–120)
ALT SERPL W P-5'-P-CCNC: 34 U/L (ref 0–54)
ANION GAP SERPL CALCULATED.3IONS-SCNC: 5 MMOL/L (ref 4–13)
AST SERPL-CCNC: 32 U/L (ref 7–45)
BACTERIA SPEC AEROBE CULT: ABNORMAL
BACTERIA SPEC AEROBE CULT: ABNORMAL
BILIRUB SERPL-MCNC: 0.4 MG/DL (ref 0.1–1)
BUN BLD-MCNC: 24 MG/DL (ref 5–21)
BUN/CREAT SERPL: 25.3 (ref 7–25)
CALCIUM SPEC-SCNC: 9.7 MG/DL (ref 8.4–10.4)
CHLORIDE SERPL-SCNC: 101 MMOL/L (ref 98–110)
CO2 SERPL-SCNC: 33 MMOL/L (ref 24–31)
CREAT BLD-MCNC: 0.95 MG/DL (ref 0.5–1.4)
DEPRECATED RDW RBC AUTO: 49.1 FL (ref 40–54)
EOSINOPHIL # BLD MANUAL: 0.23 10*3/MM3 (ref 0–0.7)
EOSINOPHIL NFR BLD MANUAL: 2 % (ref 0–4)
ERYTHROCYTE [DISTWIDTH] IN BLOOD BY AUTOMATED COUNT: 15.8 % (ref 12–15)
GFR SERPL CREATININE-BSD FRML MDRD: 57 ML/MIN/1.73
GFR SERPL CREATININE-BSD FRML MDRD: 70 ML/MIN/1.73
GLOBULIN UR ELPH-MCNC: 2.8 GM/DL
GLUCOSE BLD-MCNC: 104 MG/DL (ref 70–100)
GRAM STN SPEC: ABNORMAL
HCT VFR BLD AUTO: 26.9 % (ref 37–47)
HGB BLD-MCNC: 8.7 G/DL (ref 12–16)
ISOLATED FROM: ABNORMAL
LYMPHOCYTES # BLD MANUAL: 1.5 10*3/MM3 (ref 0.72–4.86)
LYMPHOCYTES NFR BLD MANUAL: 13 % (ref 15–45)
LYMPHOCYTES NFR BLD MANUAL: 2 % (ref 4–12)
MCH RBC QN AUTO: 28.1 PG (ref 28–32)
MCHC RBC AUTO-ENTMCNC: 32.3 G/DL (ref 33–36)
MCV RBC AUTO: 86.8 FL (ref 82–98)
METAMYELOCYTES NFR BLD MANUAL: 2 % (ref 0–0)
MONOCYTES # BLD AUTO: 0.23 10*3/MM3 (ref 0.19–1.3)
NEUTROPHILS # BLD AUTO: 9.12 10*3/MM3 (ref 1.87–8.4)
NEUTROPHILS NFR BLD MANUAL: 61 % (ref 39–78)
NEUTS BAND NFR BLD MANUAL: 18 % (ref 0–10)
PLAT MORPH BLD: NORMAL
PLATELET # BLD AUTO: 227 10*3/MM3 (ref 130–400)
PMV BLD AUTO: 10 FL (ref 6–12)
POTASSIUM BLD-SCNC: 3.8 MMOL/L (ref 3.5–5.3)
PROT SERPL-MCNC: 5.5 G/DL (ref 6.3–8.7)
RBC # BLD AUTO: 3.1 10*6/MM3 (ref 4.2–5.4)
RBC MORPH BLD: NORMAL
SCAN SLIDE: NORMAL
SODIUM BLD-SCNC: 139 MMOL/L (ref 135–145)
VARIANT LYMPHS NFR BLD MANUAL: 2 % (ref 0–5)
WBC MORPH BLD: NORMAL
WBC NRBC COR # BLD: 11.55 10*3/MM3 (ref 4.8–10.8)

## 2018-01-09 PROCEDURE — 80053 COMPREHEN METABOLIC PANEL: CPT | Performed by: FAMILY MEDICINE

## 2018-01-09 PROCEDURE — 71045 X-RAY EXAM CHEST 1 VIEW: CPT

## 2018-01-09 PROCEDURE — 85025 COMPLETE CBC W/AUTO DIFF WBC: CPT | Performed by: FAMILY MEDICINE

## 2018-01-09 PROCEDURE — 25010000002 IRON SUCROSE PER 1 MG: Performed by: INTERNAL MEDICINE

## 2018-01-09 PROCEDURE — 94799 UNLISTED PULMONARY SVC/PX: CPT

## 2018-01-09 PROCEDURE — 25010000002 ENOXAPARIN PER 10 MG: Performed by: FAMILY MEDICINE

## 2018-01-09 PROCEDURE — 85007 BL SMEAR W/DIFF WBC COUNT: CPT | Performed by: FAMILY MEDICINE

## 2018-01-09 RX ADMIN — ENOXAPARIN SODIUM 150 MG: 150 INJECTION SUBCUTANEOUS at 06:34

## 2018-01-09 RX ADMIN — IPRATROPIUM BROMIDE AND ALBUTEROL SULFATE 3 ML: 2.5; .5 SOLUTION RESPIRATORY (INHALATION) at 00:02

## 2018-01-09 RX ADMIN — CEFDINIR 300 MG: 300 CAPSULE ORAL at 10:14

## 2018-01-09 RX ADMIN — ENOXAPARIN SODIUM 150 MG: 150 INJECTION SUBCUTANEOUS at 17:23

## 2018-01-09 RX ADMIN — IPRATROPIUM BROMIDE AND ALBUTEROL SULFATE 3 ML: 2.5; .5 SOLUTION RESPIRATORY (INHALATION) at 06:59

## 2018-01-09 RX ADMIN — CEFDINIR 300 MG: 300 CAPSULE ORAL at 21:10

## 2018-01-09 RX ADMIN — IPRATROPIUM BROMIDE AND ALBUTEROL SULFATE 3 ML: 2.5; .5 SOLUTION RESPIRATORY (INHALATION) at 11:39

## 2018-01-09 RX ADMIN — IRON SUCROSE 200 MG: 20 INJECTION, SOLUTION INTRAVENOUS at 13:58

## 2018-01-09 RX ADMIN — IPRATROPIUM BROMIDE AND ALBUTEROL SULFATE 3 ML: 2.5; .5 SOLUTION RESPIRATORY (INHALATION) at 19:59

## 2018-01-09 RX ADMIN — PANTOPRAZOLE SODIUM 40 MG: 40 TABLET, DELAYED RELEASE ORAL at 06:34

## 2018-01-09 NOTE — PROGRESS NOTES
[facility] Medicine Services  INPATIENT PROGRESS NOTE    Length of Stay: 5  Date of Admission: 1/3/2018  Primary Care Physician: Ed Hanson MD    Subjective   Chief Complaint: Weakness    HPI   Patient denies any chest pain or shortness of breath.  Patient still remained very weak.  Patient's able to stand with physical therapy.  Kidney function continued to improve.    Review of Systems   Constitutional: Positive for fatigue. Negative for activity change, appetite change, chills and fever.   HENT: Negative for hearing loss, nosebleeds, tinnitus and trouble swallowing.    Eyes: Negative for visual disturbance.   Respiratory: Negative for cough, chest tightness, shortness of breath and wheezing.    Cardiovascular: Negative for chest pain, palpitations and leg swelling.   Gastrointestinal: Negative for abdominal distention, abdominal pain, blood in stool, constipation, diarrhea, nausea and vomiting.   Endocrine: Negative for cold intolerance, heat intolerance, polydipsia, polyphagia and polyuria.   Genitourinary: Negative for decreased urine volume, difficulty urinating, dysuria, flank pain, frequency and hematuria.   Musculoskeletal: Positive for back pain, gait problem and myalgias. Negative for arthralgias and joint swelling.   Skin: Negative for rash.   Allergic/Immunologic: Negative for immunocompromised state.   Neurological: Positive for weakness. Negative for dizziness, syncope, light-headedness and headaches.   Hematological: Negative for adenopathy. Does not bruise/bleed easily.   Psychiatric/Behavioral: Negative for confusion and sleep disturbance. The patient is not nervous/anxious.       **    All pertinent negatives and positives are as above. All other systems have been reviewed and are negative unless otherwise stated.     Objective    Temp:  [97.1 °F (36.2 °C)-99.1 °F (37.3 °C)] 98.7 °F (37.1 °C)  Heart Rate:  [66-76] 75  Resp:  [16-20] 18  BP:  (115-165)/() 128/50    Intake/Output Summary (Last 24 hours) at 01/08/18 1808  Last data filed at 01/08/18 1743   Gross per 24 hour   Intake             2719 ml   Output              550 ml   Net             2169 ml     Physical Exam   Constitutional: She is oriented to person, place, and time. She appears well-developed and well-nourished.   HENT:   Head: Normocephalic and atraumatic.   Eyes: Conjunctivae and EOM are normal. Pupils are equal, round, and reactive to light.   Neck: Neck supple. No JVD present. No thyromegaly present.   Cardiovascular: Normal rate, regular rhythm, normal heart sounds and intact distal pulses.  Exam reveals no gallop and no friction rub.    No murmur heard.  Pulmonary/Chest: Effort normal and breath sounds normal. No respiratory distress. She has no wheezes. She has no rales. She exhibits no tenderness.   Shallow breathing   Abdominal: Soft. Bowel sounds are normal. She exhibits no distension. There is no tenderness. There is no rebound and no guarding.   Morbidly obese   Musculoskeletal: Normal range of motion. She exhibits no edema, tenderness or deformity.   Lymphadenopathy:     She has no cervical adenopathy.   Neurological: She is alert and oriented to person, place, and time. She displays normal reflexes. No cranial nerve deficit. She exhibits abnormal muscle tone. Coordination abnormal.   Skin: Skin is warm and dry. No rash noted.   Psychiatric: She has a normal mood and affect. Her behavior is normal. Judgment and thought content normal.       Results Review:  Lab Results (last 24 hours)     Procedure Component Value Units Date/Time    Blood Culture With HAILE - Blood, [[id number]]  (Normal) Collected:  01/03/18 1900    Specimen:  Blood from Arm, Left Updated:  01/07/18 1946     Blood Culture No growth at 4 days    Hemoglobin & Hematocrit, Blood [[id number]]  (Abnormal) Collected:  01/07/18 2311    Specimen:  Blood Updated:  01/07/18 2346     Hemoglobin 9.2 (L) g/dL       Hematocrit 29.0 (L) %     Comprehensive Metabolic Panel [[id number]]  (Abnormal) Collected:  01/08/18 0652    Specimen:  Blood Updated:  01/08/18 0725     Glucose 110 (H) mg/dL      BUN 31 (H) mg/dL      Creatinine 0.87 mg/dL      Sodium 140 mmol/L      Potassium 3.9 mmol/L      Chloride 102 mmol/L      CO2 31.0 mmol/L      Calcium 9.9 mg/dL      Total Protein 5.5 (L) g/dL      Albumin 2.70 (L) g/dL      ALT (SGPT) 33 U/L      AST (SGOT) 26 U/L      Alkaline Phosphatase 67 U/L      Total Bilirubin 0.4 mg/dL      eGFR Non African Amer 63 mL/min/1.73      eGFR  African Amer 77 mL/min/1.73      Globulin 2.8 gm/dL      A/G Ratio 1.0 (L) g/dL      BUN/Creatinine Ratio 35.6 (H)     Anion Gap 7.0 mmol/L     Narrative:       The MDRD GFR formula is only valid for adults with stable renal function between ages 18 and 70.    CBC & Differential [[id number]] Collected:  01/08/18 0652    Specimen:  Blood Updated:  01/08/18 0758    Narrative:       The following orders were created for panel order CBC & Differential.  Procedure                               Abnormality         Status                     ---------                               -----------         ------                     Manual Differential[[id number]]          Abnormal            Final result               Scan Slide[[id number]]                                       Final result               CBC Auto Differential[[id number]]        Abnormal            Final result                 Please view results for these tests on the individual orders.    CBC Auto Differential [[id number]]  (Abnormal) Collected:  01/08/18 0652    Specimen:  Blood Updated:  01/08/18 0758     WBC 10.49 10*3/mm3      RBC 3.30 (L) 10*6/mm3      Hemoglobin 9.2 (L) g/dL      Hematocrit 29.3 (L) %      MCV 88.8 fL      MCH 27.9 (L) pg      MCHC 31.4 (L) g/dL      RDW 15.8 (H) %      RDW-SD 51.0 fl      MPV 9.8 fL      Platelets 187 10*3/mm3     Scan Slide [[id number]] Collected:  01/08/18 0652     Specimen:  Blood Updated:  01/08/18 0758     Scan Slide --      See Manual Differential Results       Manual Differential [[id number]]  (Abnormal) Collected:  01/08/18 0652    Specimen:  Blood Updated:  01/08/18 0758     Neutrophil % 53.0 %      Lymphocyte % 8.0 (L) %      Monocyte % 18.0 (H) %      Eosinophil % 3.0 %      Bands %  16.0 (H) %      Myelocyte % 1.0 (H) %      Atypical Lymphocyte % 1.0 %      Neutrophils Absolute 7.24 10*3/mm3      Lymphocytes Absolute 0.84 10*3/mm3      Monocytes Absolute 1.89 (H) 10*3/mm3      Eosinophils Absolute 0.31 10*3/mm3      RBC Morphology Normal     WBC Morphology Normal     Platelet Estimate Adequate    Hemoglobin & Hematocrit, Blood [[id number]]  (Abnormal) Collected:  01/08/18 1558    Specimen:  Blood Updated:  01/08/18 4054     Hemoglobin 9.5 (L) g/dL      Hematocrit 29.7 (L) %            Cultures:  Blood Culture   Date Value Ref Range Status   01/03/2018 No growth at 4 days  Preliminary   01/03/2018 Abnormal Stain (A)  Preliminary   01/03/2018 Staphylococcus, coagulase negative (A)  Preliminary     Comment:     Probable contaminant       Urine Culture   Date Value Ref Range Status   01/04/2018 No growth at 2 days  Final       Radiology Data:    Imaging Results (last 24 hours)     ** No results found for the last 24 hours. **          No Known Allergies    Scheduled meds:     cefdinir 300 mg Oral Q12H   enoxaparin 1 mg/kg Subcutaneous Q12H   ipratropium-albuterol 3 mL Nebulization Q6H - RT   iron sucrose (VENOFER) IVPB 200 mg Intravenous Q24H   pantoprazole 40 mg Oral Q AM       PRN meds:  •  acetaminophen **OR** acetaminophen  •  dextrose  •  dextrose  •  glucagon (human recombinant)  •  HYDROcodone-acetaminophen  •  ondansetron **OR** ondansetron ODT **OR** ondansetron  •  Pharmacy to Dose enoxaparin (LOVENOX)  •  sodium chloride    Assessment/Plan     Active Problems:    RUBY (acute kidney injury)      Plan:  Occlusive left lower extremity DVT/superficial right-sided  thrombus-Lovenox prophylaxis    GI bleed.  Hemoglobin has been stable.  Continue Protonix.  GI consult.    Sepsis, status post bolus.  Leukocytosis, improving.    Acute renal failure-improving.  Nephrology consult.    Hyperlipidemia    Hyperglycemia-hemoglobin A1c 5.8.    Morbidly obese    Hyperkalemia.-Resolve    Deconditioning-PT and OT consult    Blood culture regular is negative staph 1 out of 4 bottles, contamination.    Discharge Planning:  Consult  for rehabilitation placement.    Alec Granados MD   01/08/18   6:08 PM

## 2018-01-09 NOTE — PLAN OF CARE
Problem: Patient Care Overview (Adult)  Goal: Plan of Care Review  Outcome: Ongoing (interventions implemented as appropriate)   01/08/18 1831 01/09/18 0240   Coping/Psychosocial Response Interventions   Plan Of Care Reviewed With --  patient   Patient Care Overview   Progress --  no change   Outcome Evaluation   Outcome Summary/Follow up Plan Purewick cath in place. Multiple bowel movements today. Turned q 2 hours. No c/o pain. Patient still seems confused. Patient's daughter says this is not her baseline. IVF hep locked. Lasix x 1 with good urine output following.  --      Goal: Adult Individualization and Mutuality  Outcome: Ongoing (interventions implemented as appropriate)    Goal: Discharge Needs Assessment  Outcome: Ongoing (interventions implemented as appropriate)      Problem: Fall Risk (Adult)  Goal: Absence of Falls  Outcome: Ongoing (interventions implemented as appropriate)      Problem: Fluid Volume Excess (Adult,Obstetrics,Pediatric)  Goal: Stable Weight  Outcome: Ongoing (interventions implemented as appropriate)    Goal: Balanced Intake/Output  Outcome: Ongoing (interventions implemented as appropriate)      Problem: VTE, DVT and PE (Adult)  Goal: Signs and Symptoms of Listed Potential Problems Will be Absent or Manageable (VTE, DVT and PE)  Outcome: Ongoing (interventions implemented as appropriate)      Problem: Skin Integrity Impairment, Risk/Actual (Adult)  Goal: Skin Integrity/Wound Healing  Outcome: Ongoing (interventions implemented as appropriate)

## 2018-01-09 NOTE — PROGRESS NOTES
[facility] Medicine Services  INPATIENT PROGRESS NOTE    Length of Stay: 6  Date of Admission: 1/3/2018  Primary Care Physician: Ed Hanson MD    Subjective   Chief Complaint:  Weakness    HPI   Patient denies any chest pain.  She was breath.  Patient still remained very weak.  Kidney function continued to improve.  Hemoglobin has slightly dropped.  Patient denies any black tarry or bloody stool.    Review of Systems   Constitutional: Positive for fatigue. Negative for activity change, appetite change, chills and fever.   HENT: Negative for hearing loss, nosebleeds, tinnitus and trouble swallowing.    Eyes: Negative for visual disturbance.   Respiratory: Negative for cough, chest tightness, shortness of breath and wheezing.    Cardiovascular: Negative for chest pain, palpitations and leg swelling.   Gastrointestinal: Negative for abdominal distention, abdominal pain, blood in stool, constipation, diarrhea, nausea and vomiting.   Endocrine: Negative for cold intolerance, heat intolerance, polydipsia, polyphagia and polyuria.   Genitourinary: Negative for decreased urine volume, difficulty urinating, dysuria, flank pain, frequency and hematuria.   Musculoskeletal: Positive for back pain, gait problem and myalgias. Negative for arthralgias and joint swelling.   Skin: Negative for rash.   Allergic/Immunologic: Negative for immunocompromised state.   Neurological: Positive for weakness. Negative for dizziness, syncope, light-headedness and headaches.   Hematological: Negative for adenopathy. Does not bruise/bleed easily.   Psychiatric/Behavioral: Negative for confusion and sleep disturbance. The patient is not nervous/anxious.      All pertinent negatives and positives are as above. All other systems have been reviewed and are negative unless otherwise stated.     Objective    Temp:  [97.2 °F (36.2 °C)-99.4 °F (37.4 °C)] 97.5 °F (36.4 °C)  Heart Rate:  [64-85] 76  Resp:  [16-22]  16  BP: (104-128)/(48-83) 104/69    Intake/Output Summary (Last 24 hours) at 01/09/18 1551  Last data filed at 01/09/18 1550   Gross per 24 hour   Intake              861 ml   Output             1550 ml   Net             -689 ml     Physical Exam  Constitutional: She is oriented to person, place, and time. She appears well-developed and well-nourished.   HENT:   Head: Normocephalic and atraumatic.   Eyes: Conjunctivae and EOM are normal. Pupils are equal, round, and reactive to light.   Neck: Neck supple. No JVD present. No thyromegaly present.   Cardiovascular: Normal rate, regular rhythm, normal heart sounds and intact distal pulses.  Exam reveals no gallop and no friction rub.    No murmur heard.  Pulmonary/Chest: Effort normal and breath sounds normal. No respiratory distress. She has no wheezes. She has no rales. She exhibits no tenderness.   Shallow breathing   Abdominal: Soft. Bowel sounds are normal. She exhibits no distension. There is no tenderness. There is no rebound and no guarding.   Morbidly obese   Musculoskeletal: Normal range of motion. She exhibits no edema, tenderness or deformity.   Lymphadenopathy:     She has no cervical adenopathy.   Neurological: She is alert and oriented to person, place, and time. She displays normal reflexes. No cranial nerve deficit. She exhibits abnormal muscle tone. Coordination abnormal.   Skin: Skin is warm and dry. No rash noted.   Psychiatric: She has a normal mood and affect. Her behavior is normal. Judgment and thought content normal.         Results Review:  Results Review:  Lab Results (last 24 hours)     Procedure Component Value Units Date/Time    Hemoglobin & Hematocrit, Blood [[id number]]  (Abnormal) Collected:  01/08/18 1558    Specimen:  Blood Updated:  01/08/18 1755     Hemoglobin 9.5 (L) g/dL      Hematocrit 29.7 (L) %     Blood Culture With HAILE - Blood, [[id number]]  (Normal) Collected:  01/03/18 1900    Specimen:  Blood from Arm, Left Updated:  01/08/18  1946     Blood Culture No growth at 5 days    Comprehensive Metabolic Panel [[id number]]  (Abnormal) Collected:  01/09/18 0442    Specimen:  Blood Updated:  01/09/18 0545     Glucose 104 (H) mg/dL      BUN 24 (H) mg/dL      Creatinine 0.95 mg/dL      Sodium 139 mmol/L      Potassium 3.8 mmol/L      Chloride 101 mmol/L      CO2 33.0 (H) mmol/L      Calcium 9.7 mg/dL      Total Protein 5.5 (L) g/dL      Albumin 2.70 (L) g/dL      ALT (SGPT) 34 U/L      AST (SGOT) 32 U/L      Alkaline Phosphatase 73 U/L      Total Bilirubin 0.4 mg/dL      eGFR Non African Amer 57 (L) mL/min/1.73      eGFR  African Amer 70 mL/min/1.73      Globulin 2.8 gm/dL      A/G Ratio 1.0 (L) g/dL      BUN/Creatinine Ratio 25.3 (H)     Anion Gap 5.0 mmol/L     Narrative:       The MDRD GFR formula is only valid for adults with stable renal function between ages 18 and 70.    CBC & Differential [[id number]] Collected:  01/09/18 0442    Specimen:  Blood Updated:  01/09/18 0555    Narrative:       The following orders were created for panel order CBC & Differential.  Procedure                               Abnormality         Status                     ---------                               -----------         ------                     Manual Differential[[id number]]          Abnormal            Final result               Scan Slide[[id number]]                                       Final result               CBC Auto Differential[[id number]]        Abnormal            Final result                 Please view results for these tests on the individual orders.    CBC Auto Differential [[id number]]  (Abnormal) Collected:  01/09/18 0442    Specimen:  Blood Updated:  01/09/18 0555     WBC 11.55 (H) 10*3/mm3      RBC 3.10 (L) 10*6/mm3      Hemoglobin 8.7 (L) g/dL      Hematocrit 26.9 (L) %      MCV 86.8 fL      MCH 28.1 pg      MCHC 32.3 (L) g/dL      RDW 15.8 (H) %      RDW-SD 49.1 fl      MPV 10.0 fL      Platelets 227 10*3/mm3     Scan Slide [[id number]]  Collected:  01/09/18 0442    Specimen:  Blood Updated:  01/09/18 0555     Scan Slide --      See Manual Differential Results       Manual Differential [[id number]]  (Abnormal) Collected:  01/09/18 0442    Specimen:  Blood Updated:  01/09/18 0555     Neutrophil % 61.0 %      Lymphocyte % 13.0 (L) %      Monocyte % 2.0 (L) %      Eosinophil % 2.0 %      Bands %  18.0 (H) %      Metamyelocyte % 2.0 (H) %      Atypical Lymphocyte % 2.0 %      Neutrophils Absolute 9.12 (H) 10*3/mm3      Lymphocytes Absolute 1.50 10*3/mm3      Monocytes Absolute 0.23 10*3/mm3      Eosinophils Absolute 0.23 10*3/mm3      RBC Morphology Normal     WBC Morphology Normal     Platelet Morphology Normal    Blood Culture With HAILE - Blood, [[id number]]  (Abnormal) Collected:  01/03/18 1859    Specimen:  Blood from Arm, Right Updated:  01/09/18 0759     Blood Culture Abnormal Stain (A)      Staphylococcus, coagulase negative (A)      Probable contaminant          Isolated from Anaerobic Bottle     Gram Stain Result Gram positive cocci    Narrative:       No growth aerobic bottle.           Cultures:  Blood Culture   Date Value Ref Range Status   01/03/2018 No growth at 5 days  Final   01/03/2018 Abnormal Stain (A)  Final   01/03/2018 Staphylococcus, coagulase negative (A)  Final     Comment:     Probable contaminant       Urine Culture   Date Value Ref Range Status   01/04/2018 No growth at 2 days  Final       Radiology Data:    Imaging Results (last 24 hours)     ** No results found for the last 24 hours. **          No Known Allergies    Scheduled meds:     cefdinir 300 mg Oral Q12H   enoxaparin 1 mg/kg Subcutaneous Q12H   ipratropium-albuterol 3 mL Nebulization Q6H - RT   pantoprazole 40 mg Oral Q AM       PRN meds:  •  acetaminophen **OR** acetaminophen  •  dextrose  •  dextrose  •  glucagon (human recombinant)  •  HYDROcodone-acetaminophen  •  ondansetron **OR** ondansetron ODT **OR** ondansetron  •  Pharmacy to Dose enoxaparin (LOVENOX)  •   sodium chloride    Assessment/Plan     Active Problems:    RUBY (acute kidney injury)      Plan:  Occlusive left lower extremity DVT/superficial right-sided thrombus-Lovenox prophylaxis     GI bleed. Slight decrease in hemoglobin.  She denies any bloody or black tarry stool.   Continue Protonix.  . GI sign off.    Iron deficiency anemia-Venofer ×4 days 1/6/18.    Shortness of breath upon ambulating.-Chest x-ray     Sepsis, status post bolus.  Leukocytosis slightly increasing.     Acute renal failure-improving.  Nephrology consult.     Hyperlipidemia     Hyperglycemia-hemoglobin A1c 5.8.     Morbidly obese     Hyperkalemia.-Resolve     Deconditioning-PT and OT consult     Blood culture regular is negative staph 1 out of 4 bottles, contamination.     Discharge Planning:  Consult  for rehabilitation placement.    Alec Granados MD   01/09/18   3:51 PM

## 2018-01-09 NOTE — PLAN OF CARE
Problem: Patient Care Overview (Adult)  Goal: Plan of Care Review  Outcome: Ongoing (interventions implemented as appropriate)   01/09/18 1521   Coping/Psychosocial Response Interventions   Plan Of Care Reviewed With patient   Patient Care Overview   Progress no change   Outcome Evaluation   Outcome Summary/Follow up Plan Purewick catheter in place. Urine dark lien. Incontinent of stool. Turn q 2 hours. No c/o pain this shift. IV venofer per order.      Goal: Adult Individualization and Mutuality  Outcome: Ongoing (interventions implemented as appropriate)   01/09/18 1521   Individualization   Patient Specific Preferences none   Patient Specific Goals go home   Patient Specific Interventions turn q 2   Mutuality/Individual Preferences   What Anxieties, Fears or Concerns Do You Have About Your Health or Care? none   What Questions Do You Have About Your Health or Care? none   What Information Would Help Us Give You More Personalized Care? none     Goal: Discharge Needs Assessment  Outcome: Ongoing (interventions implemented as appropriate)   01/05/18 1401 01/07/18 1530 01/08/18 0948   Discharge Needs Assessment   Concerns To Be Addressed care coordination/care conferences;discharge planning concerns --  --    Readmission Within The Last 30 Days no previous admission in last 30 days --  --    Community Agency Name(S) ([facility] [phone]) --  --    Equipment Needed After Discharge --  --  --    Current Discharge Risk chronically ill --  --    Discharge Disposition --  still a patient --    Current Health   Outpatient/Agency/Support Group Needs homecare agency (specify level of care) --  --    Living Environment   Transportation Available --  --  car;family or friend will provide   Self-Care   Equipment Currently Used at Home --  --  walker, gigi;walker, rolling;walker, standard;wheelchair    01/08/18 1831   Discharge Needs Assessment   Concerns To Be Addressed --    Readmission Within The Last 30 Days --     Community Agency Name(S) --    Equipment Needed After Discharge none   Current Discharge Risk --    Discharge Disposition --    Current Health   Outpatient/Agency/Support Group Needs --    Living Environment   Transportation Available --    Self-Care   Equipment Currently Used at Home --        Problem: Fall Risk (Adult)  Goal: Absence of Falls  Outcome: Ongoing (interventions implemented as appropriate)   01/09/18 1521   Fall Risk (Adult)   Absence of Falls making progress toward outcome       Problem: Fluid Volume Excess (Adult,Obstetrics,Pediatric)  Goal: Stable Weight  Outcome: Ongoing (interventions implemented as appropriate)   01/09/18 1521   Fluid Volume Excess (Adult,Obstetrics,Pediatric)   Stable Weight making progress toward outcome     Goal: Balanced Intake/Output  Outcome: Ongoing (interventions implemented as appropriate)   01/09/18 1521   Fluid Volume Excess (Adult,Obstetrics,Pediatric)   Balanced Intake/Output making progress toward outcome       Problem: VTE, DVT and PE (Adult)  Goal: Signs and Symptoms of Listed Potential Problems Will be Absent or Manageable (VTE, DVT and PE)  Outcome: Ongoing (interventions implemented as appropriate)   01/09/18 1521   VTE, DVT and PE   Problems Assessed (VTE, DVT, PE) all   Problems Present (VTE, DVT, PE) deep vein thrombosis       Problem: Skin Integrity Impairment, Risk/Actual (Adult)  Goal: Skin Integrity/Wound Healing  Outcome: Ongoing (interventions implemented as appropriate)   01/09/18 1521   Skin Integrity Impairment, Risk/Actual (Adult)   Skin Integrity/Wound Healing making progress toward outcome

## 2018-01-09 NOTE — PROGRESS NOTES
Continued Stay Note   [place]     Patient Name: Tricia Hernandez  MRN: [id number]  Today's Date: 1/9/2018    Admit Date: 1/3/2018          Discharge Plan       01/09/18 1518    Case Management/Social Work Plan    Plan SNF IN [place], [place]; PENDING EVAL'S AND BED OFFERS.    Additional Comments PT. WANTS TO BE LISTED FOR NHP IN [place], [place].  I FAXED REFERRALS TO [facility] AND [facility].  WILL FOLLOW FOR EVAL'S AND BED OFFERS.  PT. STATES [facility] WOULD BE HER FIRST CHOICE.  WILL FOLLOW.               Discharge Codes     None            SILVANA Hinojosa

## 2018-01-09 NOTE — PLAN OF CARE
Problem: Patient Care Overview (Adult)  Goal: Plan of Care Review  Outcome: Ongoing (interventions implemented as appropriate)   01/08/18 1831   Coping/Psychosocial Response Interventions   Plan Of Care Reviewed With patient;daughter   Patient Care Overview   Progress no change   Outcome Evaluation   Outcome Summary/Follow up Plan Purewick cath in place. Multiple bowel movements today. Turned q 2 hours. No c/o pain. Patient still seems confused. Patient's daughter says this is not her baseline. IVF hep locked. Lasix x 1 with good urine output following.      Goal: Adult Individualization and Mutuality  Outcome: Ongoing (interventions implemented as appropriate)   01/08/18 1831   Individualization   Patient Specific Preferences none   Patient Specific Goals decrease edema, increase activity   Patient Specific Interventions one dose of lasix with good urine output following   Mutuality/Individual Preferences   What Anxieties, Fears or Concerns Do You Have About Your Health or Care? none   What Questions Do You Have About Your Health or Care? daughter has concerns about patient's mental status/confusion   What Information Would Help Us Give You More Personalized Care? none     Goal: Discharge Needs Assessment  Outcome: Ongoing (interventions implemented as appropriate)   01/05/18 1401 01/07/18 1530 01/08/18 0948   Discharge Needs Assessment   Concerns To Be Addressed care coordination/care conferences;discharge planning concerns --  --    Readmission Within The Last 30 Days no previous admission in last 30 days --  --    Equipment Needed After Discharge --  --  --    Current Discharge Risk chronically ill --  --    Discharge Disposition --  still a patient --    Current Health   Outpatient/Agency/Support Group Needs homecare agency (specify level of care) --  --    Living Environment   Transportation Available --  --  car;family or friend will provide   Self-Care   Equipment Currently Used at Home --  --  walker,  gigi;walker, rolling;walker, standard;wheelchair    01/08/18 1831   Discharge Needs Assessment   Concerns To Be Addressed --    Readmission Within The Last 30 Days --    Equipment Needed After Discharge none   Current Discharge Risk --    Discharge Disposition --    Current Health   Outpatient/Agency/Support Group Needs --    Living Environment   Transportation Available --    Self-Care   Equipment Currently Used at Home --        Problem: Fall Risk (Adult)  Goal: Absence of Falls  Outcome: Ongoing (interventions implemented as appropriate)   01/08/18 1831   Fall Risk (Adult)   Absence of Falls achieves outcome       Problem: Fluid Volume Excess (Adult,Obstetrics,Pediatric)  Goal: Identify Related Risk Factors and Signs and Symptoms  Outcome: Outcome(s) achieved Date Met: 01/08/18 01/07/18 1530   Fluid Volume Excess   Fluid Volume Excess: Related Risk Factors medication effects;venous return impaired   Signs and Symptoms (Fluid Volume Excess) edema     Goal: Stable Weight  Outcome: Ongoing (interventions implemented as appropriate)   01/08/18 1831   Fluid Volume Excess (Adult,Obstetrics,Pediatric)   Stable Weight making progress toward outcome     Goal: Balanced Intake/Output  Outcome: Ongoing (interventions implemented as appropriate)   01/08/18 1831   Fluid Volume Excess (Adult,Obstetrics,Pediatric)   Balanced Intake/Output making progress toward outcome       Problem: VTE, DVT and PE (Adult)  Goal: Signs and Symptoms of Listed Potential Problems Will be Absent or Manageable (VTE, DVT and PE)  Outcome: Ongoing (interventions implemented as appropriate)   01/08/18 1831   VTE, DVT and PE   Problems Assessed (VTE, DVT, PE) all   Problems Present (VTE, DVT, PE) deep vein thrombosis       Problem: Skin Integrity Impairment, Risk/Actual (Adult)  Goal: Identify Related Risk Factors and Signs and Symptoms  Outcome: Outcome(s) achieved Date Met: 01/08/18 01/08/18 1831   Skin Integrity Impairment, Risk/Actual   Skin  Integrity Impairment, Risk/Actual: Related Risk Factors age extremes;edema;fluid/nutrition status;immobility   Signs and Symptoms (Skin Integrity Impairment) edema     Goal: Skin Integrity/Wound Healing  Outcome: Ongoing (interventions implemented as appropriate)   01/08/18 5513   Skin Integrity Impairment, Risk/Actual (Adult)   Skin Integrity/Wound Healing making progress toward outcome

## 2018-01-10 LAB
ALBUMIN SERPL-MCNC: 2.8 G/DL (ref 3.5–5)
ALBUMIN/GLOB SERPL: 1 G/DL (ref 1.1–2.5)
ALP SERPL-CCNC: 79 U/L (ref 24–120)
ALT SERPL W P-5'-P-CCNC: 35 U/L (ref 0–54)
ANION GAP SERPL CALCULATED.3IONS-SCNC: 5 MMOL/L (ref 4–13)
AST SERPL-CCNC: 37 U/L (ref 7–45)
BILIRUB SERPL-MCNC: 0.4 MG/DL (ref 0.1–1)
BUN BLD-MCNC: 21 MG/DL (ref 5–21)
BUN/CREAT SERPL: 22.8 (ref 7–25)
CALCIUM SPEC-SCNC: 9.8 MG/DL (ref 8.4–10.4)
CHLORIDE SERPL-SCNC: 99 MMOL/L (ref 98–110)
CO2 SERPL-SCNC: 33 MMOL/L (ref 24–31)
CREAT BLD-MCNC: 0.92 MG/DL (ref 0.5–1.4)
DEPRECATED RDW RBC AUTO: 49.9 FL (ref 40–54)
ERYTHROCYTE [DISTWIDTH] IN BLOOD BY AUTOMATED COUNT: 16 % (ref 12–15)
GFR SERPL CREATININE-BSD FRML MDRD: 60 ML/MIN/1.73
GFR SERPL CREATININE-BSD FRML MDRD: 72 ML/MIN/1.73
GLOBULIN UR ELPH-MCNC: 2.9 GM/DL
GLUCOSE BLD-MCNC: 111 MG/DL (ref 70–100)
HCT VFR BLD AUTO: 27 % (ref 37–47)
HGB BLD-MCNC: 8.8 G/DL (ref 12–16)
LYMPHOCYTES # BLD MANUAL: 1.68 10*3/MM3 (ref 0.72–4.86)
LYMPHOCYTES NFR BLD MANUAL: 14 % (ref 15–45)
LYMPHOCYTES NFR BLD MANUAL: 2 % (ref 4–12)
MCH RBC QN AUTO: 28.2 PG (ref 28–32)
MCHC RBC AUTO-ENTMCNC: 32.6 G/DL (ref 33–36)
MCV RBC AUTO: 86.5 FL (ref 82–98)
MONOCYTES # BLD AUTO: 0.24 10*3/MM3 (ref 0.19–1.3)
NEUTROPHILS # BLD AUTO: 10.05 10*3/MM3 (ref 1.87–8.4)
NEUTROPHILS NFR BLD MANUAL: 81 % (ref 39–78)
NEUTS BAND NFR BLD MANUAL: 3 % (ref 0–10)
PLAT MORPH BLD: NORMAL
PLATELET # BLD AUTO: 269 10*3/MM3 (ref 130–400)
PMV BLD AUTO: 9.9 FL (ref 6–12)
POTASSIUM BLD-SCNC: 4 MMOL/L (ref 3.5–5.3)
PROT SERPL-MCNC: 5.7 G/DL (ref 6.3–8.7)
RBC # BLD AUTO: 3.12 10*6/MM3 (ref 4.2–5.4)
RBC MORPH BLD: NORMAL
SCAN SLIDE: NORMAL
SODIUM BLD-SCNC: 137 MMOL/L (ref 135–145)
TOXIC GRANULATION: ABNORMAL
WBC MORPH BLD: NORMAL
WBC NRBC COR # BLD: 11.97 10*3/MM3 (ref 4.8–10.8)

## 2018-01-10 PROCEDURE — 85007 BL SMEAR W/DIFF WBC COUNT: CPT | Performed by: FAMILY MEDICINE

## 2018-01-10 PROCEDURE — 80053 COMPREHEN METABOLIC PANEL: CPT | Performed by: FAMILY MEDICINE

## 2018-01-10 PROCEDURE — 97110 THERAPEUTIC EXERCISES: CPT

## 2018-01-10 PROCEDURE — G8981 BODY POS CURRENT STATUS: HCPCS

## 2018-01-10 PROCEDURE — 97162 PT EVAL MOD COMPLEX 30 MIN: CPT

## 2018-01-10 PROCEDURE — G8982 BODY POS GOAL STATUS: HCPCS

## 2018-01-10 PROCEDURE — 85025 COMPLETE CBC W/AUTO DIFF WBC: CPT | Performed by: FAMILY MEDICINE

## 2018-01-10 PROCEDURE — 94799 UNLISTED PULMONARY SVC/PX: CPT

## 2018-01-10 PROCEDURE — 25010000002 ENOXAPARIN PER 10 MG: Performed by: FAMILY MEDICINE

## 2018-01-10 RX ADMIN — ENOXAPARIN SODIUM 150 MG: 150 INJECTION SUBCUTANEOUS at 17:58

## 2018-01-10 RX ADMIN — IPRATROPIUM BROMIDE AND ALBUTEROL SULFATE 3 ML: 2.5; .5 SOLUTION RESPIRATORY (INHALATION) at 01:33

## 2018-01-10 RX ADMIN — IPRATROPIUM BROMIDE AND ALBUTEROL SULFATE 3 ML: 2.5; .5 SOLUTION RESPIRATORY (INHALATION) at 20:34

## 2018-01-10 RX ADMIN — IPRATROPIUM BROMIDE AND ALBUTEROL SULFATE 3 ML: 2.5; .5 SOLUTION RESPIRATORY (INHALATION) at 14:06

## 2018-01-10 RX ADMIN — CEFDINIR 300 MG: 300 CAPSULE ORAL at 21:56

## 2018-01-10 RX ADMIN — IPRATROPIUM BROMIDE AND ALBUTEROL SULFATE 3 ML: 2.5; .5 SOLUTION RESPIRATORY (INHALATION) at 07:47

## 2018-01-10 RX ADMIN — CEFDINIR 300 MG: 300 CAPSULE ORAL at 08:01

## 2018-01-10 RX ADMIN — ENOXAPARIN SODIUM 150 MG: 150 INJECTION SUBCUTANEOUS at 06:07

## 2018-01-10 RX ADMIN — PANTOPRAZOLE SODIUM 40 MG: 40 TABLET, DELAYED RELEASE ORAL at 06:07

## 2018-01-10 NOTE — PLAN OF CARE
Problem: Patient Care Overview (Adult)  Goal: Plan of Care Review  Outcome: Ongoing (interventions implemented as appropriate)   01/10/18 0316   Coping/Psychosocial Response Interventions   Plan Of Care Reviewed With patient   Patient Care Overview   Progress no change   Outcome Evaluation   Outcome Summary/Follow up Plan No c/o pain. Turn q2. Incont BM x 1 Purewick changed. Urine dk lien. Continue to monitor      Goal: Adult Individualization and Mutuality  Outcome: Ongoing (interventions implemented as appropriate)   01/09/18 1521   Individualization   Patient Specific Preferences none   Patient Specific Goals go home   Patient Specific Interventions turn q 2   Mutuality/Individual Preferences   What Anxieties, Fears or Concerns Do You Have About Your Health or Care? none   What Questions Do You Have About Your Health or Care? none   What Information Would Help Us Give You More Personalized Care? none     Goal: Discharge Needs Assessment  Outcome: Ongoing (interventions implemented as appropriate)      Problem: Fall Risk (Adult)  Goal: Absence of Falls  Outcome: Ongoing (interventions implemented as appropriate)   01/10/18 0316   Fall Risk (Adult)   Absence of Falls making progress toward outcome       Problem: Fluid Volume Excess (Adult,Obstetrics,Pediatric)  Goal: Stable Weight  Outcome: Ongoing (interventions implemented as appropriate)   01/10/18 0316   Fluid Volume Excess (Adult,Obstetrics,Pediatric)   Stable Weight making progress toward outcome     Goal: Balanced Intake/Output  Outcome: Ongoing (interventions implemented as appropriate)   01/10/18 0316   Fluid Volume Excess (Adult,Obstetrics,Pediatric)   Balanced Intake/Output making progress toward outcome       Problem: VTE, DVT and PE (Adult)  Goal: Signs and Symptoms of Listed Potential Problems Will be Absent or Manageable (VTE, DVT and PE)  Outcome: Ongoing (interventions implemented as appropriate)   01/10/18 0316   VTE, DVT and PE   Problems  Assessed (VTE, DVT, PE) all   Problems Present (VTE, DVT, PE) deep vein thrombosis       Problem: Skin Integrity Impairment, Risk/Actual (Adult)  Goal: Skin Integrity/Wound Healing  Outcome: Ongoing (interventions implemented as appropriate)   01/10/18 0316   Skin Integrity Impairment, Risk/Actual (Adult)   Skin Integrity/Wound Healing making progress toward outcome

## 2018-01-10 NOTE — PROGRESS NOTES
Continued Stay Note   [place]     Patient Name: Tricia Hernandez  MRN: [id number]  Today's Date: 1/10/2018    Admit Date: 1/3/2018          Discharge Plan       01/10/18 1452    Case Management/Social Work Plan    Plan FAXED UPDATED INFO TO ALL 3 NH'S ([facility]). AWAIT ANSWERS.               Discharge Codes     None            MILY Mcleod

## 2018-01-10 NOTE — PROGRESS NOTES
[facility] Medicine Services  INPATIENT PROGRESS NOTE    Length of Stay: 7  Date of Admission: 1/3/2018  Primary Care Physician: Ed Hanson MD    Subjective   Chief Complaint: Weakness    HPI   Patient denies any chest pain or shortness of breath.  Patient still remained weak.    Acute renal failure, resolved.  Hemoglobin stable today.  Patient denies any black tarry or bloody stool.    Review of Systems   Constitutional: Positive for fatigue. Negative for activity change, appetite change, chills and fever.   HENT: Negative for hearing loss, nosebleeds, tinnitus and trouble swallowing.    Eyes: Negative for visual disturbance.   Respiratory: Negative for cough, chest tightness, shortness of breath and wheezing.    Cardiovascular: Negative for chest pain, palpitations and leg swelling.   Gastrointestinal: Negative for abdominal distention, abdominal pain, blood in stool, constipation, diarrhea, nausea and vomiting.   Endocrine: Negative for cold intolerance, heat intolerance, polydipsia, polyphagia and polyuria.   Genitourinary: Negative for decreased urine volume, difficulty urinating, dysuria, flank pain, frequency and hematuria.   Musculoskeletal: Positive for back pain, gait problem and myalgias. Negative for arthralgias and joint swelling.   Skin: Negative for rash.   Allergic/Immunologic: Negative for immunocompromised state.   Neurological: Positive for weakness. Negative for dizziness, syncope, light-headedness and headaches.   Hematological: Negative for adenopathy. Does not bruise/bleed easily.   Psychiatric/Behavioral: Negative for confusion and sleep disturbance. The patient is not nervous/anxious.         All pertinent negatives and positives are as above. All other systems have been reviewed and are negative unless otherwise stated.     Objective    Temp:  [98.1 °F (36.7 °C)-99.2 °F (37.3 °C)] 99.2 °F (37.3 °C)  Heart Rate:  [75-83] 75  Resp:  [16-20] 16  BP:  ()/(44-92) 147/92    Intake/Output Summary (Last 24 hours) at 01/10/18 1656  Last data filed at 01/10/18 1227   Gross per 24 hour   Intake              600 ml   Output              650 ml   Net              -50 ml     Physical Exam  Constitutional: She is oriented to person, place, and time. She appears well-developed and well-nourished.   HENT:   Head: Normocephalic and atraumatic.   Eyes: Conjunctivae and EOM are normal. Pupils are equal, round, and reactive to light.   Neck: Neck supple. No JVD present. No thyromegaly present.   Cardiovascular: Normal rate, regular rhythm, normal heart sounds and intact distal pulses.  Exam reveals no gallop and no friction rub.    No murmur heard.  Pulmonary/Chest: Effort normal and breath sounds normal. No respiratory distress. She has no wheezes. She has no rales. She exhibits no tenderness.   Shallow breathing   Abdominal: Soft. Bowel sounds are normal. She exhibits no distension. There is no tenderness. There is no rebound and no guarding.   Morbidly obese   Musculoskeletal: Normal range of motion. She exhibits no edema, tenderness or deformity.   Lymphadenopathy:     She has no cervical adenopathy.   Neurological: She is alert and oriented to person, place, and time. She displays normal reflexes. No cranial nerve deficit. She exhibits abnormal muscle tone. Coordination abnormal.   Skin: Skin is warm and dry. No rash noted.   Psychiatric: She has a normal mood and affect. Her behavior is normal. Judgment and thought content normal.       Results Review:  Lab Results (last 24 hours)     Procedure Component Value Units Date/Time    Comprehensive Metabolic Panel [[id number]]  (Abnormal) Collected:  01/10/18 0440    Specimen:  Blood Updated:  01/10/18 0528     Glucose 111 (H) mg/dL      BUN 21 mg/dL      Creatinine 0.92 mg/dL      Sodium 137 mmol/L      Potassium 4.0 mmol/L      Chloride 99 mmol/L      CO2 33.0 (H) mmol/L      Calcium 9.8 mg/dL      Total Protein 5.7 (L)  g/dL      Albumin 2.80 (L) g/dL      ALT (SGPT) 35 U/L      AST (SGOT) 37 U/L      Alkaline Phosphatase 79 U/L      Total Bilirubin 0.4 mg/dL      eGFR Non African Amer 60 (L) mL/min/1.73      eGFR  African Amer 72 mL/min/1.73      Globulin 2.9 gm/dL      A/G Ratio 1.0 (L) g/dL      BUN/Creatinine Ratio 22.8     Anion Gap 5.0 mmol/L     Narrative:       The MDRD GFR formula is only valid for adults with stable renal function between ages 18 and 70.    CBC & Differential [[id number]] Collected:  01/10/18 0440    Specimen:  Blood Updated:  01/10/18 0538    Narrative:       The following orders were created for panel order CBC & Differential.  Procedure                               Abnormality         Status                     ---------                               -----------         ------                     Manual Differential[[id number]]          Abnormal            Final result               Scan Slide[[id number]]                                       Final result               CBC Auto Differential[[id number]]        Abnormal            Final result                 Please view results for these tests on the individual orders.    CBC Auto Differential [[id number]]  (Abnormal) Collected:  01/10/18 0440    Specimen:  Blood Updated:  01/10/18 0538     WBC 11.97 (H) 10*3/mm3      RBC 3.12 (L) 10*6/mm3      Hemoglobin 8.8 (L) g/dL      Hematocrit 27.0 (L) %      MCV 86.5 fL      MCH 28.2 pg      MCHC 32.6 (L) g/dL      RDW 16.0 (H) %      RDW-SD 49.9 fl      MPV 9.9 fL      Platelets 269 10*3/mm3     Narrative:       The previously reported component NRBC is no longer being reported.    Scan Slide [[id number]] Collected:  01/10/18 0440    Specimen:  Blood Updated:  01/10/18 0538     Scan Slide --      See Manual Differential Results       Manual Differential [[id number]]  (Abnormal) Collected:  01/10/18 0440    Specimen:  Blood Updated:  01/10/18 0538     Neutrophil % 81.0 (H) %      Lymphocyte % 14.0 (L) %      Monocyte  % 2.0 (L) %      Bands %  3.0 %      Neutrophils Absolute 10.05 (H) 10*3/mm3      Lymphocytes Absolute 1.68 10*3/mm3      Monocytes Absolute 0.24 10*3/mm3      RBC Morphology Normal     WBC Morphology Normal     Toxic Granulation --      Rare toxic granulation        Platelet Morphology Normal           Cultures:  Blood Culture   Date Value Ref Range Status   01/03/2018 No growth at 5 days  Final   01/03/2018 Abnormal Stain (A)  Final   01/03/2018 Staphylococcus, coagulase negative (A)  Final     Comment:     Probable contaminant       Urine Culture   Date Value Ref Range Status   01/04/2018 No growth at 2 days  Final       Radiology Data:    Imaging Results (last 24 hours)     Procedure Component Value Units Date/Time    XR Chest 1 View [[id number]] Collected:  01/09/18 1823     Updated:  01/09/18 1826    Narrative:       XR CHEST 1 VW- 1/9/2018 6:20 PM CST     HISTORY: SOA; Z74.09-Other reduced mobility       COMPARISON: 01/06/2018.     FINDINGS:   Elevation of the RIGHT hemidiaphragm is again noted. The lungs are  essentially clear. The cardiomediastinal silhouette and pulmonary  vascularity are unchanged.      Multiple loose bodies are seen in the LEFT shoulder joint. Moderate to  marked degenerative changes are seen.       Impression:       1. No acute interval change since 01/06/2018.        This report was finalized on 01/09/2018 18:23 by Dr. Shubham Lopez MD.          No Known Allergies    Scheduled meds:     cefdinir 300 mg Oral Q12H   enoxaparin 1 mg/kg Subcutaneous Q12H   ipratropium-albuterol 3 mL Nebulization Q6H - RT   pantoprazole 40 mg Oral Q AM       PRN meds:  •  acetaminophen **OR** acetaminophen  •  dextrose  •  dextrose  •  glucagon (human recombinant)  •  HYDROcodone-acetaminophen  •  ondansetron **OR** ondansetron ODT **OR** ondansetron  •  Pharmacy to Dose enoxaparin (LOVENOX)  •  sodium chloride    Assessment/Plan     Active Problems:    RUBY (acute kidney injury)      Plan:  Occlusive left  lower extremity DVT/superficial right-sided thrombus-Lovenox prophylaxis      GI bleed. Slight decrease in hemoglobin.  She denies any bloody or black tarry stool.   Continue Protonix.  . GI sign off.     Iron deficiency anemia-Venofer ×4 days 1/6/18. Hemoglobin is stable.     Shortness of breath upon ambulating.-Chest x-ray      Sepsis, status post bolus.  Leukocytosis- stable.  On Omnicef.      Acute renal failure-improving.  Nephrology consult.       Hyperglycemia-hemoglobin A1c 5.8.      Morbidly obese      Hyperkalemia.-Resolve      Deconditioning-PT and OT consult      Blood culture regular is negative staph 1 out of 4 bottles, contamination.      Discharge Planning:  Consult  for rehabilitation placement.    Alec Granados MD   01/10/18   4:56 PM

## 2018-01-10 NOTE — THERAPY EVALUATION
Acute Care - Physical Therapy Initial Evaluation  [facility]     Patient Name: Tricia Hernandez  : 1940  MRN: [id number]  Today's Date: 1/10/2018   Onset of Illness/Injury or Date of Surgery Date: 18  Date of Referral to PT: 18  Referring Physician: Dr. Granados      Admit Date: 1/3/2018     Visit Dx:    ICD-10-CM ICD-9-CM   1. Impaired mobility and ADLs Z74.09 799.89     Patient Active Problem List   Diagnosis   • RUBY (acute kidney injury)     Past Medical History:   Diagnosis Date   • Anemia    • Arthritis    • Bladder spasms    • GERD (gastroesophageal reflux disease)    • Hypertension      Past Surgical History:   Procedure Laterality Date   • ANKLE SURGERY     • KNEE SURGERY     • SHOULDER SURGERY            PT ASSESSMENT (last 72 hours)      PT Evaluation       01/10/18 0840 18 0948    Rehab Evaluation    Document Type evaluation   see SIMONA HUANG, SPT  -PB (r) SIMONA (t) PB (c) evaluation   see MAR  -MM    Subjective Information agree to therapy;complains of;weakness;fatigue;dyspnea;numbness;swelling  -PB (r) SIMONA (t) PB (c) agree to therapy;complains of;weakness;fatigue;pain  -MM    Symptoms Noted Comment  Spoke with RN and pt about noted recent shoulder sx. Per pt, R total shoulder in November with HH therapy currently.  -MM    General Information    Patient Profile Review yes  -PB yes  -MM    Onset of Illness/Injury or Date of Surgery Date 18  -PB (r) SIMONA (t) PB (c) 18  -MM    Referring Physician Dr. Granados  -PB (r) SIMONA (t) PB (c) Dr. Pedersen  -MM    General Observations Pt supine in bed   -PB (r) SIMONA (t) PB (c) fowlers, finishing breakfast  -MM    Pertinent History Of Current Problem Pt presented to [facility] on 18 with complaints of N&V, diarrhea, and hypotesnsion. Dx of RUBY, as well as DVT in B LE. She3 was transferred to [facility] on 1/3/2018 and diagnosed with GI bleed. H&H low and being montiroed daily due to bleed.   -PB (r) SIMONA (t) PB (c) Pt admitted with nausea and  vomitting. Recent R shoulder sx end of November. DVT LLE 1/3. Dx: PE, DVT, GI bleed, sepsis, acute renal failure.  -MM    Precautions/Limitations fall precautions  -PB (r) JM (t) PB (c) fall precautions  -MM    Prior Level of Function independent:;all household mobility;min assist:;community mobility;bathing  -PB (r) JM (t) PB (c) independent:;all household mobility;community mobility;transfer;bed mobility;ADL's   per pt  -MM    Equipment Currently Used at Home walker, gigi   Using gigi-walker since reverse TSA in november.  -PB (r) JM (t) PB (c) walker, gigi;walker, rolling;walker, standard;wheelchair  -MM    Plans/Goals Discussed With patient;agreed upon  -PB (r) JM (t) PB (c) patient;agreed upon  -MM    Risks Reviewed patient:;LOB;other:  -PB (r) JM (t) PB (c) patient:;LOB;increased discomfort;nausea/vomiting;dizziness;change in vital signs;increased drainage;lines disloged  -MM    Benefits Reviewed patient:;improve function;increase independence;increase knowledge  -PB (r) JM (t) PB (c) patient:;improve function;increase independence;increase strength;increase balance;decrease risk of DVT;decrease pain;improve skin integrity;increase knowledge  -MM    Barriers to Rehab physical barrier;medically complex;previous functional deficit   Pt reports limited flexion, ER, resisted activity to R shldr  -PB (r) JM (t) PB (c) medically complex;previous functional deficit;physical barrier;cognitive status  -MM    Living Environment    Lives With child(juan miguel), adult  -PB (r) JM (t) PB (c) child(juan miguel), adult  -MM    Living Arrangements house  -PB (r) JM (t) PB (c) house  -MM    Home Accessibility stairs to enter home;ramps present at home  -PB (r) JM (t) PB (c) bed and bath on same level;tub/shower is not walk in  -MM    Number of Stairs to Enter Home 2  -PB (r) JM (t) PB (c)     Stair Railings at Home none  -PB (r) JM (t) PB (c)     Transportation Available  car;family or friend will provide  -MM    Clinical Impression    Date  of Referral to PT 01/07/18  -PB (r) JM (t) PB (c)     PT Diagnosis Decreased mobility, decreased strength, decreased activity tolerance  -PB (r) JM (t) PB (c)     Functional Level At Time Of Evaluation impaired  -PB (r) JM (t) PB (c)     Patient/Family Goals Statement improve mobolity and independence, increase activity tolerance.  -PB (r) JM (t) PB (c)     Criteria for Skilled Therapeutic Interventions Met yes;treatment indicated  -PB (r) JM (t) PB (c)     Impairments Found (describe specific impairments) aerobic capacity/endurance;anthropometric characteristics;gait, locomotion, and balance;muscle performance;ROM  -PB (r) JM (t) PB (c)     Functional Limitations in Following Categories (Describe Specific Limitations) self-care  -PB (r) JM (t) PB (c)     Rehab Potential good, to achieve stated therapy goals  -PB (r) JM (t) PB (c)     Predicted Duration of Therapy Intervention (days/wks) until d/c  -PB (r) JM (t) PB (c)     Pain Assessment    Pain Assessment No/denies pain  -PB (r) JM (t) PB (c) Rodriguez-Baker FACES  -MM    Rodriguez-Akhtar FACES Pain Rating  4  -MM    Pain Score 0  -PB (r) JM (t) PB (c) --  -MM    Pain Type  Chronic pain  -MM    Pain Location  Back  -MM    Pain Descriptors  Aching  -MM    Pain Frequency  Constant/continuous  -MM    Pain Intervention(s)  Repositioned  -MM    Response to Interventions  tolerated  -MM    Vision Assessment/Intervention    Visual Impairment WFL  -PB (r) JM (t) PB (c) WFL  -MM    Cognitive Assessment/Intervention    Current Cognitive/Communication Assessment functional  -PB (r) JM (t) PB (c) functional  -MM    Orientation Status oriented x 4  -PB (r) JM (t) PB (c) oriented to;person;place  -MM    Follows Commands/Answers Questions 100% of the time  -PB (r) JM (t) PB (c) 100% of the time;able to follow multi-step instructions  -MM    Personal Safety WNL/WFL  -PB (r) JM (t) PB (c) decreased awareness, need for assist;decreased awareness, need for safety;decreased insight to deficits   -MM    Personal Safety Interventions supervised activity  -PB (r) JM (t) PB (c) fall prevention program maintained;gait belt;muscle strengthening facilitated;nonskid shoes/slippers when out of bed;supervised activity  -MM    Short/Long Term Memory intact short term memory;intact long term memory  -PB (r) JM (t) PB (c)     ROM (Range of Motion)    General ROM Detail R UE AROM decreased 50%, L UE AROM decreased 40%. B LE AROM decreased L>R  -PB (r) JM (t) PB (c) BUE AROM WFL, BLE AROM 50% impaired  -MM    MMT (Manual Muscle Testing)    General MMT Assessment Detail L UE 4/5 R UE not tested. L LE 2+/5 R LE 3+/5  -PB (r) JM (t) PB (c) BUE 3+/5, BLE 2+/5  -MM    Bed Mobility, Assessment/Treatment    Bed Mobility, Assistive Device bed rails;draw sheet  -PB (r) JM (t) PB (c) draw sheet;bed rails  -MM    Bed Mobility, Roll Left, [place] moderate assist (50% patient effort);2 person assist required  -PB (r) JM (t) PB (c) moderate assist (50% patient effort);verbal cues required;2 person assist required  -MM    Bed Mobility, Roll Right, [place] moderate assist (50% patient effort);2 person assist required  -PB (r) JM (t) PB (c) moderate assist (50% patient effort);verbal cues required;2 person assist required  -MM    Bed Mobility, Scoot/Bridge, [place]  maximum assist (25% patient effort);2 person assist required;verbal cues required  -MM    Bed Mob, Supine to Sit, [place] moderate assist (50% patient effort);2 person assist required;verbal cues required  -PB (r) JM (t) PB (c)     Bed Mob, Sit to Supine, [place] moderate assist (50% patient effort);verbal cues required;2 person assist required  -PB (r) JM (t) PB (c)     Bed Mob, Sidelying to Sit, [place] moderate assist (50% patient effort);2 person assist required;verbal cues required  -PB (r) JM (t) PB (c)     Bed Mobility, Safety Issues decreased use of arms for pushing/pulling;decreased use of legs for bridging/pushing  -PB (r) JM (t) PB (c)  decreased use of arms for pushing/pulling;decreased use of legs for bridging/pushing  -MM    Bed Mobility, Impairments ROM decreased;strength decreased  -PB (r) JM (t) PB (c) ROM decreased;strength decreased;impaired balance  -MM    Bed Mobility, Comment able to sit EOB for 4 mins with modAx2 to achieve sitting. Able to sit unsupported with supervision.  -PB (r) JM (t) PB (c) attempted to sit EOB max x1 HOB elevated, unable to complete tasks  -MM    Transfer Assessment/Treatment    Transfer, Comment unable to perform at this time  -PB (r) JM (t) PB (c) deferred at this time  -MM    Motor Skills/Interventions    Additional Documentation Balance Skills Training (Group)  -PB (r) JM (t) PB (c)     Balance Skills Training    Sitting-Level of Assistance Close supervision  -PB (r) JM (t) PB (c)     Sitting-Balance Support Right upper extremity supported;Left upper extremity supported;Feet supported  -PB (r) JM (t) PB (c)     Edema Management    Edema Amount moderate  -PB (r) JM (t) PB (c)     Positioning and Restraints    Pre-Treatment Position in bed  -PB (r) JM (t) PB (c) in bed  -MM    Post Treatment Position bed  -PB (r) JM (t) PB (c) bed  -MM    In Bed call light within reach;encouraged to call for assist;fowlers;side rails up x2;notified nsg  -PB (r) JM (t) PB (c) notified nsg;fowlers;call light within reach;encouraged to call for assist;with family/caregiver;side rails up x2   pure wic catheter  -MM      User Key  (r) = Recorded By, (t) = Taken By, (c) = Cosigned By    Initials Name Provider Type    ALTON Abdi, PT DPT Physical Therapist    REJI Tovar OTR/L Occupational Therapist    SIMONA Crane, PTA PT Student          Physical Therapy Education     Title: PT OT SLP Therapies (Active)     Topic: Physical Therapy (Done)     Point: Mobility training (Done)    Learning Progress Summary    Learner Readiness Method Response Comment Documented by Status   Patient Acceptance E VU Pt was educated  on importance of activity and potential discharge plans.  01/10/18 0959 Done                      User Key     Initials Effective Dates Name Provider Type Discipline     11/17/17 -  Jim Crane, PTA PT Student PT                PT Recommendation and Plan  Anticipated Equipment Needs At Discharge: front wheeled walker  Anticipated Discharge Disposition: skilled nursing facility  Planned Therapy Interventions: bed mobility training, ROM (Range of Motion), patient/family education, strengthening, transfer training  PT Frequency: daily, 2 times/day, per priority policy  Plan of Care Review  Plan Of Care Reviewed With: patient  Outcome Summary/Follow up Plan: PT eval complete. Pt was able complete basic bed mobility (R/L roll, S/L to sit, sit to supine) with modAx2. She was able to sit EOB for  4 mins before returning to supine. Pt would benefit from therapy for bed mobility training, transfer training, sterngth traning, and activity toilerance. Anticipate discharge to SNF for further therapy.          IP PT Goals       01/10/18 1004          Bed Mobility PT LTG    Bed Mobility PT LTG, Date Established 01/10/18  -PB (r) JM (t) PB (c)      Bed Mobility PT LTG, Time to Achieve by discharge  -PB (r) JM (t) PB (c)      Bed Mobility PT LTG, Activity Type all bed mobility  -PB (r) JM (t) PB (c)      Bed Mobility PT LTG, [place] Level minimum assist (75% patient effort)  -PB (r) JM (t) PB (c)      Bed Mobility PT Goal  LTG, Assist Device bed rails  -PB (r) JM (t) PB (c)      Transfer Training PT LTG    Transfer Training PT LTG, Date Established 01/10/18  -PB (r) JM (t) PB (c)      Transfer Training PT LTG, Time to Achieve by discharge  -PB (r) JM (t) PB (c)      Transfer Training PT LTG, Activity Type bed to chair /chair to bed;sit to stand/stand to sit  -PB (r) JM (t) PB (c)      Transfer Training PT LTG, [place] Level minimum assist (75% patient effort)  -PB (r) JM (t) PB (c)      Transfer Training PT LTG,  Assist Device walker, gigi  -PB (r) JM (t) PB (c)      Strength Goal PT LTG    Strength Goal PT LTG, Date Established 01/10/18  -PB (r) JM (t) PB (c)      Strength Goal PT LTG, Time to Achieve by discharge  -PB (r) JM (t) PB (c)      Strength Goal PT LTG, Functional Goal Perform 15 reps of EOB B LE AROM exercises  -PB (r) JM (t) PB (c)        User Key  (r) = Recorded By, (t) = Taken By, (c) = Cosigned By    Initials Name Provider Type    ALTON Abdi, PT DPT Physical Therapist    SIMONA Crane, PTA PT Student                Outcome Measures       01/10/18 0900 01/08/18 1200       How much help from another person do you currently need...    Turning from your back to your side while in flat bed without using bedrails? 2  -PB (r) JM (t) PB (c)      Moving from lying on back to sitting on the side of a flat bed without bedrails? 2  -PB (r) JM (t) PB (c)      Moving to and from a bed to a chair (including a wheelchair)? 1  -PB (r) JM (t) PB (c)      Standing up from a chair using your arms (e.g., wheelchair, bedside chair)? 2  -PB (r) JM (t) PB (c)      Climbing 3-5 steps with a railing? 1  -PB (r) JM (t) PB (c)      To walk in hospital room? 2  -PB (r) JM (t) PB (c)      AM-PAC 6 Clicks Score 10  -PB (r) JM (t)      How much help from another is currently needed...    Putting on and taking off regular lower body clothing?  2  -MM     Bathing (including washing, rinsing, and drying)  2  -MM     Toileting (which includes using toilet bed pan or urinal)  1  -MM     Putting on and taking off regular upper body clothing  2  -MM     Taking care of personal grooming (such as brushing teeth)  3  -MM     Eating meals  3  -MM     Score  13  -MM     Functional Assessment    Outcome Measure Options AM-PAC 6 Clicks Basic Mobility (PT)  -PB (r) JM (t) PB (c) AM-PAC 6 Clicks Daily Activity (OT)  -MM       User Key  (r) = Recorded By, (t) = Taken By, (c) = Cosigned By    Initials Name Provider Type    ALTON ROONEY  Jin, PT DPT Physical Therapist    REJI Tovar, OTR/L Occupational Therapist    SIMONA Crane, PTA PT Student           Time Calculation:         PT Charges       01/10/18 1001          Time Calculation    Start Time 0840  -PB (r) JM (t) PB (c)      Stop Time 0937  -PB (r) JM (t) PB (c)      Time Calculation (min) 57 min  -PB (r) JM (t)      PT Received On 01/10/18  -PB (r) JM (t) PB (c)      PT Goal Re-Cert Due Date 01/20/18  -PB (r) JM (t) PB (c)        User Key  (r) = Recorded By, (t) = Taken By, (c) = Cosigned By    Initials Name Provider Type    ALTON Abdi, PT DPT Physical Therapist    SIMONA Crane, PTA PT Student          Therapy Charges for Today     Code Description Service Date Service Provider Modifiers Qty    [id number] HC PT EVAL MOD COMPLEXITY 4 1/10/2018 Jim Crane PTA GP 1          PT G-Codes  Outcome Measure Options: AM-PAC 6 Clicks Basic Mobility (PT)  Score: 10  Functional Limitation: Changing and maintaining body position  Changing and Maintaining Body Position Current Status (): At least 60 percent but less than 80 percent impaired, limited or restricted  Changing and Maintaining Body Position Goal Status (): At least 40 percent but less than 60 percent impaired, limited or restricted      Jim Crane PTA  1/10/2018

## 2018-01-10 NOTE — PLAN OF CARE
Problem: Patient Care Overview (Adult)  Goal: Plan of Care Review  Outcome: Ongoing (interventions implemented as appropriate)   01/10/18 1004   Coping/Psychosocial Response Interventions   Plan Of Care Reviewed With patient   Outcome Evaluation   Outcome Summary/Follow up Plan PT eval complete. Pt was able complete basic bed mobility (R/L roll, S/L to sit, sit to supine) with modAx2. She was able to sit EOB for 4 mins before returning to supine. Pt would benefit from therapy for bed mobility training, transfer training, sterngth traning, and activity toilerance. Anticipate discharge to SNF for further therapy.       Problem: Inpatient Physical Therapy  Goal: Bed Mobility Goal LTG- PT  Outcome: Ongoing (interventions implemented as appropriate)   01/10/18 1004   Bed Mobility PT LTG   Bed Mobility PT LTG, Date Established 01/10/18   Bed Mobility PT LTG, Time to Achieve by discharge   Bed Mobility PT LTG, Activity Type all bed mobility   Bed Mobility PT LTG, Jennings Level minimum assist (75% patient effort)   Bed Mobility PT Goal LTG, Assist Device bed rails     Goal: Transfer Training Goal 1 LTG- PT  Outcome: Ongoing (interventions implemented as appropriate)   01/10/18 1004   Transfer Training PT LTG   Transfer Training PT LTG, Date Established 01/10/18   Transfer Training PT LTG, Time to Achieve by discharge   Transfer Training PT LTG, Activity Type bed to chair /chair to bed;sit to stand/stand to sit   Transfer Training PT LTG, Jennings Level minimum assist (75% patient effort)   Transfer Training PT LTG, Assist Device walker, gigi     Goal: Strength Goal LTG- PT  Outcome: Ongoing (interventions implemented as appropriate)   01/10/18 1004   Strength Goal PT LTG   Strength Goal PT LTG, Date Established 01/10/18   Strength Goal PT LTG, Time to Achieve by discharge   Strength Goal PT LTG, Functional Goal Perform 15 reps of EOB B LE AROM exercises

## 2018-01-10 NOTE — PROGRESS NOTES
Nutrition Services    Patient Name:  Tricia Hernandez  YOB: 1940  MRN: [id number]  Admit Date:  1/3/2018    Pt is on a cardiac, renal diet r/t RUBY. RUBY is resolving, BUN/Creat WDL, K+ WDL, and GFR is 60. Pt is asking if the renal portion of her diet can be taken off and liberalized to just a cardiac diet. MD, please advise.    Electronically signed by:  Payal Jo RDN, TERRI  01/10/18 12:28 PM

## 2018-01-11 LAB
ALBUMIN SERPL-MCNC: 2.6 G/DL (ref 3.5–5)
ALBUMIN/GLOB SERPL: 0.9 G/DL (ref 1.1–2.5)
ALP SERPL-CCNC: 76 U/L (ref 24–120)
ALT SERPL W P-5'-P-CCNC: 40 U/L (ref 0–54)
ANION GAP SERPL CALCULATED.3IONS-SCNC: 5 MMOL/L (ref 4–13)
AST SERPL-CCNC: 33 U/L (ref 7–45)
BASOPHILS # BLD AUTO: 0.03 10*3/MM3 (ref 0–0.2)
BASOPHILS NFR BLD AUTO: 0.3 % (ref 0–2)
BILIRUB SERPL-MCNC: 0.3 MG/DL (ref 0.1–1)
BUN BLD-MCNC: 20 MG/DL (ref 5–21)
BUN/CREAT SERPL: 21.1 (ref 7–25)
CALCIUM SPEC-SCNC: 9.9 MG/DL (ref 8.4–10.4)
CHLORIDE SERPL-SCNC: 99 MMOL/L (ref 98–110)
CO2 SERPL-SCNC: 33 MMOL/L (ref 24–31)
CREAT BLD-MCNC: 0.95 MG/DL (ref 0.5–1.4)
DEPRECATED RDW RBC AUTO: 51 FL (ref 40–54)
EOSINOPHIL # BLD AUTO: 0.3 10*3/MM3 (ref 0–0.7)
EOSINOPHIL NFR BLD AUTO: 2.8 % (ref 0–4)
ERYTHROCYTE [DISTWIDTH] IN BLOOD BY AUTOMATED COUNT: 15.9 % (ref 12–15)
GFR SERPL CREATININE-BSD FRML MDRD: 57 ML/MIN/1.73
GFR SERPL CREATININE-BSD FRML MDRD: 70 ML/MIN/1.73
GLOBULIN UR ELPH-MCNC: 2.9 GM/DL
GLUCOSE BLD-MCNC: 107 MG/DL (ref 70–100)
HCT VFR BLD AUTO: 28.3 % (ref 37–47)
HGB BLD-MCNC: 8.9 G/DL (ref 12–16)
IMM GRANULOCYTES # BLD: 0.49 10*3/MM3 (ref 0–0.03)
IMM GRANULOCYTES NFR BLD: 4.5 % (ref 0–5)
LYMPHOCYTES # BLD AUTO: 1.11 10*3/MM3 (ref 0.72–4.86)
LYMPHOCYTES NFR BLD AUTO: 10.3 % (ref 15–45)
MCH RBC QN AUTO: 27.7 PG (ref 28–32)
MCHC RBC AUTO-ENTMCNC: 31.4 G/DL (ref 33–36)
MCV RBC AUTO: 88.2 FL (ref 82–98)
MONOCYTES # BLD AUTO: 0.83 10*3/MM3 (ref 0.19–1.3)
MONOCYTES NFR BLD AUTO: 7.7 % (ref 4–12)
NEUTROPHILS # BLD AUTO: 8.02 10*3/MM3 (ref 1.87–8.4)
NEUTROPHILS NFR BLD AUTO: 74.4 % (ref 39–78)
NRBC BLD MANUAL-RTO: 0 /100 WBC (ref 0–0)
PLATELET # BLD AUTO: 282 10*3/MM3 (ref 130–400)
PMV BLD AUTO: 9.9 FL (ref 6–12)
POTASSIUM BLD-SCNC: 4 MMOL/L (ref 3.5–5.3)
PROT SERPL-MCNC: 5.5 G/DL (ref 6.3–8.7)
RBC # BLD AUTO: 3.21 10*6/MM3 (ref 4.2–5.4)
SODIUM BLD-SCNC: 137 MMOL/L (ref 135–145)
WBC NRBC COR # BLD: 10.78 10*3/MM3 (ref 4.8–10.8)

## 2018-01-11 PROCEDURE — 94799 UNLISTED PULMONARY SVC/PX: CPT

## 2018-01-11 PROCEDURE — 80053 COMPREHEN METABOLIC PANEL: CPT | Performed by: FAMILY MEDICINE

## 2018-01-11 PROCEDURE — 85025 COMPLETE CBC W/AUTO DIFF WBC: CPT | Performed by: FAMILY MEDICINE

## 2018-01-11 PROCEDURE — 97530 THERAPEUTIC ACTIVITIES: CPT

## 2018-01-11 PROCEDURE — 25010000002 ENOXAPARIN PER 10 MG: Performed by: FAMILY MEDICINE

## 2018-01-11 RX ORDER — IPRATROPIUM BROMIDE AND ALBUTEROL SULFATE 2.5; .5 MG/3ML; MG/3ML
3 SOLUTION RESPIRATORY (INHALATION) EVERY 6 HOURS PRN
Qty: 360 ML
Start: 2018-01-11

## 2018-01-11 RX ORDER — ONDANSETRON 4 MG/1
4 TABLET, FILM COATED ORAL EVERY 6 HOURS PRN
Start: 2018-01-11

## 2018-01-11 RX ORDER — ACETAMINOPHEN 325 MG/1
650 TABLET ORAL EVERY 4 HOURS PRN
Start: 2018-01-11

## 2018-01-11 RX ORDER — SIMVASTATIN 40 MG
40 TABLET ORAL NIGHTLY
Start: 2018-01-11

## 2018-01-11 RX ADMIN — IPRATROPIUM BROMIDE AND ALBUTEROL SULFATE 3 ML: 2.5; .5 SOLUTION RESPIRATORY (INHALATION) at 01:18

## 2018-01-11 RX ADMIN — CEFDINIR 300 MG: 300 CAPSULE ORAL at 20:47

## 2018-01-11 RX ADMIN — IPRATROPIUM BROMIDE AND ALBUTEROL SULFATE 3 ML: 2.5; .5 SOLUTION RESPIRATORY (INHALATION) at 21:53

## 2018-01-11 RX ADMIN — IPRATROPIUM BROMIDE AND ALBUTEROL SULFATE 3 ML: 2.5; .5 SOLUTION RESPIRATORY (INHALATION) at 06:32

## 2018-01-11 RX ADMIN — PANTOPRAZOLE SODIUM 40 MG: 40 TABLET, DELAYED RELEASE ORAL at 05:07

## 2018-01-11 RX ADMIN — APIXABAN 5 MG: 5 TABLET, FILM COATED ORAL at 20:47

## 2018-01-11 RX ADMIN — ENOXAPARIN SODIUM 150 MG: 150 INJECTION SUBCUTANEOUS at 05:07

## 2018-01-11 RX ADMIN — IPRATROPIUM BROMIDE AND ALBUTEROL SULFATE 3 ML: 2.5; .5 SOLUTION RESPIRATORY (INHALATION) at 12:06

## 2018-01-11 RX ADMIN — CEFDINIR 300 MG: 300 CAPSULE ORAL at 10:32

## 2018-01-11 NOTE — PLAN OF CARE
Problem: Patient Care Overview (Adult)  Goal: Plan of Care Review  Outcome: Ongoing (interventions implemented as appropriate)   01/11/18 0224   Coping/Psychosocial Response Interventions   Plan Of Care Reviewed With patient   Patient Care Overview   Progress improving   Outcome Evaluation   Outcome Summary/Follow up Plan no c/o pain this shift. Turn q2. Incont BM x 1 Purewick catheter changed. Continue to monitor      Goal: Adult Individualization and Mutuality  Outcome: Ongoing (interventions implemented as appropriate)    Goal: Discharge Needs Assessment  Outcome: Ongoing (interventions implemented as appropriate)      Problem: Fall Risk (Adult)  Goal: Absence of Falls  Outcome: Ongoing (interventions implemented as appropriate)   01/11/18 0224   Fall Risk (Adult)   Absence of Falls making progress toward outcome       Problem: Fluid Volume Excess (Adult,Obstetrics,Pediatric)  Goal: Stable Weight  Outcome: Ongoing (interventions implemented as appropriate)   01/11/18 0224   Fluid Volume Excess (Adult,Obstetrics,Pediatric)   Stable Weight making progress toward outcome     Goal: Balanced Intake/Output  Outcome: Ongoing (interventions implemented as appropriate)   01/11/18 0224   Fluid Volume Excess (Adult,Obstetrics,Pediatric)   Balanced Intake/Output making progress toward outcome       Problem: VTE, DVT and PE (Adult)  Goal: Signs and Symptoms of Listed Potential Problems Will be Absent or Manageable (VTE, DVT and PE)  Outcome: Ongoing (interventions implemented as appropriate)   01/11/18 0224   VTE, DVT and PE   Problems Assessed (VTE, DVT, PE) all   Problems Present (VTE, DVT, PE) deep vein thrombosis       Problem: Skin Integrity Impairment, Risk/Actual (Adult)  Goal: Skin Integrity/Wound Healing  Outcome: Ongoing (interventions implemented as appropriate)   01/11/18 0224   Skin Integrity Impairment, Risk/Actual (Adult)   Skin Integrity/Wound Healing making progress toward outcome       Problem: Pressure  Ulcer Risk (Jorge Scale) (Adult,Obstetrics,Pediatric)  Goal: Identify Related Risk Factors and Signs and Symptoms  Outcome: Outcome(s) achieved Date Met: 01/11/18 01/11/18 0224   Pressure Ulcer Risk (Jorge Scale)   Related Risk Factors (Pressure Ulcer Risk (Jorge Scale)) body weight extremes;mobility impaired     Goal: Skin Integrity  Outcome: Ongoing (interventions implemented as appropriate)   01/11/18 0224   Pressure Ulcer Risk (Jorge Scale) (Adult,Obstetrics,Pediatric)   Skin Integrity making progress toward outcome

## 2018-01-11 NOTE — DISCHARGE SUMMARY
[facility] Medicine Services  DISCHARGE SUMMARY       Date of Admission: 1/3/2018  Date of Discharge:  1/11/2018  Primary Care Physician: Ed Hanson MD    Discharge Diagnoses:  Obesity class III  Occlusive left lower extremity DVT/superficial right-sided thrombus    GI bleed.     Iron deficiency anemia-Venofer ×4 days 1/6/18    Sepsis, status post 11 days abx    Acute renal failure- resolved    Hyperglycemia-hemoglobin A1c 5.8    Hyperkalemia    Deconditioning    Procedures Performed: None    Pertinent Test Results:   Lab Results (all)     Procedure Component Value Units Date/Time    Blood Gas, Arterial [[id number]]  (Abnormal) Collected:  01/03/18 1855    Specimen:  Arterial Blood Updated:  01/03/18 1908     Site Right Radial     Jasper's Test Positive     pH, Arterial 7.310 (L) pH units      pCO2, Arterial 28.9 (L) mm Hg      pO2, Arterial 67.9 (L) mm Hg      HCO3, Arterial 14.5 (L) mmol/L      Base Excess, Arterial -10.5 (L) mmol/L      O2 Saturation, Arterial 92.7 (L) %      Temperature 37.0 C      Barometric Pressure for Blood Gas 756 mmHg      Modality Room Air     Ventilator Mode NA     Collected by [id number]    Comprehensive Metabolic Panel [[id number]]  (Abnormal) Collected:  01/03/18 1858    Specimen:  Blood Updated:  01/03/18 1950     Glucose 118 (H) mg/dL      BUN 63 (H) mg/dL      Creatinine 4.76 (H) mg/dL      Sodium 137 mmol/L      Potassium 5.7 (H) mmol/L      Chloride 106 mmol/L      CO2 17.0 (L) mmol/L      Calcium 10.8 (H) mg/dL      Total Protein 6.6 g/dL      Albumin 3.40 (L) g/dL      ALT (SGPT) 29 U/L      AST (SGOT) 19 U/L      Alkaline Phosphatase 92 U/L      Total Bilirubin 0.5 mg/dL      eGFR Non African Amer 9 (L) mL/min/1.73      eGFR  African Amer 11 (L) mL/min/1.73      Globulin 3.2 gm/dL      A/G Ratio 1.1 g/dL      BUN/Creatinine Ratio 13.2     Anion Gap 14.0 (H) mmol/L     Narrative:       The MDRD GFR formula is only valid for adults with stable  renal function between ages 18 and 70.    Uric Acid [[id number]]  (Abnormal) Collected:  01/03/18 1858    Specimen:  Blood Updated:  01/03/18 1954     Uric Acid 8.4 (H) mg/dL     CBC Auto Differential [[id number]]  (Abnormal) Collected:  01/03/18 1858    Specimen:  Blood Updated:  01/03/18 2008     WBC 14.69 (H) 10*3/mm3      RBC 3.85 (L) 10*6/mm3      Hemoglobin 10.9 (L) g/dL      Hematocrit 33.2 (L) %      MCV 86.2 fL      MCH 28.3 pg      MCHC 32.8 (L) g/dL      RDW 15.9 (H) %      RDW-SD 49.8 fl      MPV 11.3 fL      Platelets 132 10*3/mm3     Narrative:       The previously reported component NRBC is no longer being reported.    Scan Slide [[id number]] Collected:  01/03/18 1858    Specimen:  Blood Updated:  01/03/18 2008     Scan Slide --      See Manual Differential Results       Manual Differential [[id number]]  (Abnormal) Collected:  01/03/18 1858    Specimen:  Blood Updated:  01/03/18 2008     Neutrophil % 88.0 (H) %      Lymphocyte % 5.0 (L) %      Monocyte % 3.0 (L) %      Bands %  3.0 %      Metamyelocyte % 1.0 (H) %      Neutrophils Absolute 13.37 (H) 10*3/mm3      Lymphocytes Absolute 0.73 10*3/mm3      Monocytes Absolute 0.44 10*3/mm3      Anisocytosis Slight/1+     Rebecca Cells Slight/1+     Hypochromia Slight/1+     Poikilocytes Slight/1+     Polychromasia Slight/1+     Vacuolated Neutrophils Slight/1+     Large Platelets Slight/1+    POC Glucose Once [[id number]]  (Normal) Collected:  01/03/18 2049    Specimen:  Blood Updated:  01/03/18 2110     Glucose 126 mg/dL       : [id number] [facility] MalloryMeter ID: [id number]       Hemoglobin & Hematocrit, Blood [[id number]]  (Abnormal) Collected:  01/03/18 2308    Specimen:  Blood Updated:  01/03/18 2349     Hemoglobin 10.8 (L) g/dL      Hematocrit 33.1 (L) %     aPTT [[id number]]  (Abnormal) Collected:  01/03/18 2308    Specimen:  Blood Updated:  01/03/18 2358     PTT 46.7 (H) seconds     Protime-INR [[id number]]  (Abnormal) Collected:  01/03/18 2308     Specimen:  Blood Updated:  01/03/18 2358     Protime 16.8 (H) Seconds      INR 1.32 (H)    Comprehensive Metabolic Panel [[id number]]  (Abnormal) Collected:  01/04/18 0201    Specimen:  Blood Updated:  01/04/18 0304     Glucose 127 (H) mg/dL      BUN 65 (H) mg/dL      Creatinine 4.51 (H) mg/dL      Sodium 138 mmol/L      Potassium 5.6 (H) mmol/L      Chloride 109 mmol/L      CO2 15.0 (L) mmol/L      Calcium 10.6 (H) mg/dL      Total Protein 6.2 (L) g/dL      Albumin 3.20 (L) g/dL      ALT (SGPT) 28 U/L      AST (SGOT) 18 U/L      Alkaline Phosphatase 87 U/L      Total Bilirubin 0.4 mg/dL      eGFR Non African Amer 10 (L) mL/min/1.73      eGFR  African Amer 12 (L) mL/min/1.73      Globulin 3.0 gm/dL      A/G Ratio 1.1 g/dL      BUN/Creatinine Ratio 14.4     Anion Gap 14.0 (H) mmol/L     Narrative:       The MDRD GFR formula is only valid for adults with stable renal function between ages 18 and 70.    Lipid Panel [[id number]]  (Abnormal) Collected:  01/04/18 0201    Specimen:  Blood Updated:  01/04/18 0315     Total Cholesterol 124 (L) mg/dL      Triglycerides 128 mg/dL      HDL Cholesterol 38 (L) mg/dL      LDL Cholesterol  58 mg/dL      LDL/HDL Ratio 1.59    TSH [[id number]]  (Normal) Collected:  01/04/18 0201    Specimen:  Blood Updated:  01/04/18 0334     TSH 0.926 mIU/mL     Hemoglobin A1c [[id number]] Collected:  01/04/18 0201    Specimen:  Blood Updated:  01/04/18 0756     Hemoglobin A1C 5.8 %     Narrative:       Less than 6.0           Non-Diabetic Range  6.0-7.0                 ADA Therapeutic Target  Greater than 7.0        Action Suggested    Urinalysis With / Culture If Indicated - Urine, Clean Catch [[id number]]  (Abnormal) Collected:  01/04/18 0715    Specimen:  Urine from Urine, Clean Catch Updated:  01/04/18 0825     Color, UA [place] (A)     Appearance, UA Turbid (A)     pH, UA <=5.0     Specific Gravity, UA 1.022     Glucose, UA Negative     Ketones, UA Negative     Bilirubin, UA Negative     Blood, UA  Large (3+) (A)     Protein, UA 30 mg/dL (1+) (A)     Leuk Esterase, UA Small (1+) (A)     Nitrite, UA Negative     Urobilinogen, UA 0.2 E.U./dL    Narrative:       Dipstick results may be inaccurate due to color interference.    Urinalysis, Microscopic Only - Urine, Clean Catch [[id number]]  (Abnormal) Collected:  01/04/18 0715    Specimen:  Urine from Urine, Clean Catch Updated:  01/04/18 0825     RBC, UA Too Numerous to Count (A) /HPF      WBC, UA 3-5 (A) /HPF      Bacteria, UA 1+ (A) /HPF      Squamous Epithelial Cells, UA 0-2 /HPF      Renal Epithelial Cells, UA 0-2 /HPF      Hyaline Casts, UA 0-2 /LPF      Coarse Granular Casts, UA 0-2 /LPF      Methodology Manual Light Microscopy    Hemoglobin & Hematocrit, Blood [[id number]]  (Abnormal) Collected:  01/04/18 0738    Specimen:  Blood Updated:  01/04/18 0832     Hemoglobin 10.4 (L) g/dL      Hematocrit 32.2 (L) %     Lactic Acid, Plasma [[id number]]  (Normal) Collected:  01/04/18 0738    Specimen:  Blood Updated:  01/04/18 0839     Lactate 1.3 mmol/L     POC Glucose Once [[id number]]  (Normal) Collected:  01/04/18 0948    Specimen:  Blood Updated:  01/04/18 1009     Glucose 118 mg/dL       : [id number] [facility] ID: [id number]       Sodium, Urine, Random - Urine, Clean Catch [[id number]]  (Abnormal) Collected:  01/04/18 0715    Specimen:  Urine from Urine, Clean Catch Updated:  01/04/18 1011     Sodium, Urine 23 (L) mmol/L     Creatinine, Urine, Random - Urine, Clean Catch [[id number]] Collected:  01/04/18 0715    Specimen:  Urine from Urine, Clean Catch Updated:  01/04/18 1011     Creatinine, Urine 154.1 mg/dL     Urea Nitrogen, Urine - Urine, Clean Catch [[id number]] Collected:  01/04/18 0715    Specimen:  Urine from Urine, Clean Catch Updated:  01/04/18 1011     Urea Nitrogen, Urine 578 mg/dL     Osmolality, Urine - Urine, Clean Catch [[id number]]  (Abnormal) Collected:  01/04/18 0715    Specimen:  Urine from Urine, Clean Catch Updated:  01/04/18 1056      Osmolality, Urine 524 (L) mOsm/kg     Iron Profile [[id number]]  (Abnormal) Collected:  01/04/18 1149    Specimen:  Blood Updated:  01/04/18 1221     Iron 25 (L) mcg/dL      TIBC 184 (L) mcg/dL      Iron Saturation 14 (L) %     Urine Culture - Urine, Urine, Clean Catch [[id number]]  (Normal) Collected:  01/04/18 0715    Specimen:  Urine from Urine, Clean Catch Updated:  01/06/18 0618     Urine Culture No growth at 2 days    Blood Culture ID, PCR - Blood, [[id number]]  (Abnormal) Collected:  01/03/18 1859    Specimen:  Blood from Arm, Right Updated:  01/06/18 0838     BCID, PCR Staphylococcus spp, not aureus. Identification by BCID PCR. (C)      Corrected result. Previous result was Staphylococcus species, not aureus. mecA (methicillin resistance gene) detected. Identification by BCID PCR. on 1/5/2018 at 0348 CST       Blood Culture With HAILE - Blood, [[id number]]  (Abnormal) Collected:  01/03/18 1859    Specimen:  Blood from Arm, Right Updated:  01/09/18 0759     Blood Culture Abnormal Stain (A)      Staphylococcus, coagulase negative (A)      Probable contaminant          Isolated from Anaerobic Bottle     Gram Stain Result Gram positive cocci    Narrative:       No growth aerobic bottle.    Comprehensive Metabolic Panel [[id number]]  (Abnormal) Collected:  01/10/18 0440    Specimen:  Blood Updated:  01/10/18 0528     Glucose 111 (H) mg/dL      BUN 21 mg/dL      Creatinine 0.92 mg/dL      Sodium 137 mmol/L      Potassium 4.0 mmol/L      Chloride 99 mmol/L      CO2 33.0 (H) mmol/L      Calcium 9.8 mg/dL      Total Protein 5.7 (L) g/dL      Albumin 2.80 (L) g/dL      ALT (SGPT) 35 U/L      AST (SGOT) 37 U/L      Alkaline Phosphatase 79 U/L      Total Bilirubin 0.4 mg/dL      eGFR Non African Amer 60 (L) mL/min/1.73      eGFR  African Amer 72 mL/min/1.73      Globulin 2.9 gm/dL      A/G Ratio 1.0 (L) g/dL      BUN/Creatinine Ratio 22.8     Anion Gap 5.0 mmol/L     CBC Auto Differential [[id number]]  (Abnormal) Collected:   01/10/18 0440    Specimen:  Blood Updated:  01/10/18 0538     WBC 11.97 (H) 10*3/mm3      RBC 3.12 (L) 10*6/mm3      Hemoglobin 8.8 (L) g/dL      Hematocrit 27.0 (L) %      MCV 86.5 fL      MCH 28.2 pg      MCHC 32.6 (L) g/dL      RDW 16.0 (H) %      RDW-SD 49.9 fl      MPV 9.9 fL      Platelets 269 10*3/mm3      Neutrophil % 81.0 (H) %      Lymphocyte % 14.0 (L) %      Monocyte % 2.0 (L) %      Bands %  3.0 %      Neutrophils Absolute 10.05 (H) 10*3/mm3      Lymphocytes Absolute 1.68 10*3/mm3      Monocytes Absolute 0.24 10*3/mm3      RBC Morphology Normal     WBC Morphology Normal     Toxic Granulation --      Rare toxic granulation        Platelet Morphology Normal    CBC Auto Differential [[id number]]  (Abnormal) Collected:  01/11/18 0510    Specimen:  Blood Updated:  01/11/18 0550     WBC 10.78 10*3/mm3      RBC 3.21 (L) 10*6/mm3      Hemoglobin 8.9 (L) g/dL      Hematocrit 28.3 (L) %      MCV 88.2 fL      MCH 27.7 (L) pg      MCHC 31.4 (L) g/dL      RDW 15.9 (H) %      RDW-SD 51.0 fl      MPV 9.9 fL      Platelets 282 10*3/mm3      Neutrophil % 74.4 %      Lymphocyte % 10.3 (L) %      Monocyte % 7.7 %      Eosinophil % 2.8 %      Basophil % 0.3 %      Immature Grans % 4.5 %      Neutrophils, Absolute 8.02 10*3/mm3      Lymphocytes, Absolute 1.11 10*3/mm3      Monocytes, Absolute 0.83 10*3/mm3      Eosinophils, Absolute 0.30 10*3/mm3      Basophils, Absolute 0.03 10*3/mm3      Immature Grans, Absolute 0.49 (H) 10*3/mm3      nRBC 0.0 /100 WBC     Comprehensive Metabolic Panel [[id number]]  (Abnormal) Collected:  01/11/18 0510    Specimen:  Blood Updated:  01/11/18 0558     Glucose 107 (H) mg/dL      BUN 20 mg/dL      Creatinine 0.95 mg/dL      Sodium 137 mmol/L      Potassium 4.0 mmol/L      Chloride 99 mmol/L      CO2 33.0 (H) mmol/L      Calcium 9.9 mg/dL      Total Protein 5.5 (L) g/dL      Albumin 2.60 (L) g/dL      ALT (SGPT) 40 U/L      AST (SGOT) 33 U/L      Alkaline Phosphatase 76 U/L      Total Bilirubin  0.3 mg/dL      eGFR Non African Amer 57 (L) mL/min/1.73      eGFR  African Amer 70 mL/min/1.73      Globulin 2.9 gm/dL      A/G Ratio 0.9 (L) g/dL      BUN/Creatinine Ratio 21.1     Anion Gap 5.0 mmol/L      Imaging Results (all)     Procedure Component Value Units Date/Time    XR Chest 1 View [[id number]] Collected:  01/03/18 1945     Updated:  01/03/18 1950    Narrative:       XR CHEST 1 VW- 1/3/2018 7:29 PM CST     HISTORY: Possible Pneumonia, CHF       COMPARISON: None.     FINDINGS:      There is elevation of the right hemidiaphragm with volume loss in the  right lung base which appears chronic. Minimal streaky atelectasis in  the left lung base. No focal lung infiltrate is identified. Overall  normal heart size with normal appearance of the pulmonary vasculature.  Right shoulder arthroplasty. No acute bony abnormality. Advanced  degenerative change at the left shoulder.       Impression:       1. Elevation of the right hemidiaphragm with volume loss in the right  lung base, likely chronic. Minimal streaky atelectasis in the left lung  base.  2. No focal lung infiltrate identified. No evidence of decompensated  heart failure.        This report was finalized on 01/03/2018 19:46 by Dr Alfonso Hercules, .    NM Lung Ventilation Perfusion [[id number]] Collected:  01/03/18 2151     Updated:  01/03/18 2158    Narrative:       NM LUNG VENTILATION PERFUSION- 1/3/2018 9:01 PM CST     HISTORY: Suspicion for PE on recent outside VQ scan       COMPARISON: Chest x-ray dated 01/03/2018      RADIOPHARMACEUTICAL: 10.2 mCi of Xenon-133 for the ventilation study and  5.5 mCi Tc 99m MAA for the perfusion study.      TECHNIQUE: Following inhalation of the aerosolized radiopharmaceutical,  the ventilation study was performed with images of the thorax being  obtained in posterior projection. Thereafter, the perfusion study was  performed following the injection of radiolabeled MAA particles with  images of the thorax obtained in 6  projections.      FINDINGS:       There is elevation of the right hemidiaphragm on chest x-ray which  accounts for the area of third opinion on ventilation and perfusion  images in the right lung base. Otherwise homogeneous distribution of  radiotracer on the perfusion scan.       Impression:       1. Findings are most commonly associated with low probability for acute  pulmonary embolism. Elevation of the hemidiaphragm accounts for the  relative perfusion deficit in the right lung base. Otherwise, perfusion  is homogeneous.     This report was finalized on 01/03/2018 21:54 by Dr Alfonso Hercules, .    US Renal Bilateral [[id number]] Collected:  01/04/18 1148     Updated:  01/04/18 1152    Narrative:       EXAMINATION: US RENAL BILATERAL-  1/4/2018 12:48 PM EST     HISTORY: Acute kidney injury     COMPARISON:None     TECHNIQUE:Bilateral renal ultrasound was performed.     FINDINGS:     The right kidney measures 10.1 cm in jocz-ow-wcmd length. The left  kidney measures 10.7 cm in tylq-vg-ohyd length.     Normal echogenicity of the renal cortex is observed. There is no  pelvocaliectasis to indicate hydronephrosis or obstruction. There are no  perinephric abnormalities.     A 2.6 cm cyst observed in the upper pole of the right kidney.     Urinary bladder is incompletely distended without bladder wall  abnormality.     Aorta and inferior vena cava are imaged incompletely appear  unremarkable.       Impression:       1. Right nephrogenic cyst.  2. Otherwise negative bilateral renal ultrasound.  This report was finalized on 01/04/2018 11:49 by Dr. Prasanth Goff MD.    US Venous Doppler Lower Extremity Bilateral (duplex) [[id number]] Collected:  01/04/18 1715     Updated:  01/04/18 1721    Narrative:       History: Swelling       Impression:       Impression:  1. There is evidence of deep venous thrombosis in both lower  extremities.  2. There is evidence of superficial thrombus in the proximal greater  saphenous vein in the  right lower extremity.     Comments: Bilateral lower extremity venous duplex exam was performed  using color Doppler flow, Doppler waveform analysis, and grayscale  imaging, with and without compression. There is evidence of deep venous  thrombosis in the common femoral and proximal superficial femoral veins  in the right lower extremity. The rest of the lower extremity was not  visualized due to body habitus. There is also evidence of deep venous  thrombosis in the common femoral, proximal superficial femoral, and  popliteal veins in the left lower extremity. The rest of the left lower  extremity was not visualized due to body habitus. There is evidence of  superficial thrombus in the proximal greater saphenous vein in the right  lower extremity.        This report was finalized on 01/04/2018 17:18 by Dr. Reynold Mckinney MD.    XR Chest 1 View [[id number]] Collected:  01/06/18 1031     Updated:  01/06/18 1035    Narrative:       EXAMINATION: XR CHEST 1 - 1/6/2018 11:31 AM EST     HISTORY: Shortness of breath     COMPARISON: 01/03/2018     FINDINGS:  Low lung volumes result in vascular crowding and interstitial  prominence. There is elevation of the right hemidiaphragm. Atelectasis  is seen at the left lung base. The aorta is tortuous with  atherosclerotic calcifications. The heart appears normal in size.       Impression:       Stable exam, with left basilar atelectasis.  This report was finalized on 01/06/2018 10:32 by Dr. Jose Solis MD.    XR Chest 1 View [[id number]] Collected:  01/09/18 1823     Updated:  01/09/18 1826    Narrative:       XR CHEST 1 - 1/9/2018 6:20 PM CST     HISTORY: SOA; Z74.09-Other reduced mobility       COMPARISON: 01/06/2018.     FINDINGS:   Elevation of the RIGHT hemidiaphragm is again noted. The lungs are  essentially clear. The cardiomediastinal silhouette and pulmonary  vascularity are unchanged.      Multiple loose bodies are seen in the LEFT shoulder joint. Moderate  to  marked degenerative changes are seen.       Impression:       1. No acute interval change since 01/06/2018.        This report was finalized on 01/09/2018 18:23 by Dr. Shubham Lopez MD.          Consults:   1.  Teddy Santiago M.D. nephrology  2.  Kerwin River M.D. gastroenterology  3.  Reynold Mckinney M.D. vascular surgery    Chief Complaint on Day of Discharge: Patient is awake, she is wanting to go home.  I did discuss this problem with RN Fatou who states patient has dementia (no documentation of this), has had no family to visit.  I indeed tried to call the daughter however there is no answer.  Therefore we will proceed with transfer to nursing home.    Hospital Course  Patient is a 77 y.o. female presented with Tricia Martinezers is a 77-year-old  female transferred from [facility] on 1/3/2018 after Admission There on 1/1/2018 with Nausea Vomiting and Diarrhea.  Apparently Initially Patient Was Admitted for Gastroenteritis However She Developed Hypotension and Was Transferred to the ICU and Placed on Levophed.  Due To Decreased Pulses and Toes and Changing in Color of Her Lower Extremities Venous Duplex Scans Were Obtained.  A Complete Occluding DVT Was Seen in the Left Common Femoral, Superficial Femoral and Popliteal Veins.  Patient Was Anticoagulated with Lovenox.  VQ Scan Was Also Obtained and Revealed High Probability for Pulmonary Emboli.  Subsequently Patient Was Transferred to [facility]. Patient is admitted for treatment of pulmonary emboli, left large family DVT and finally of GI bleed.  There is also question of sepsis, no noted.  Patient has been treated with antibiotics since admission to [facility], 11 days.  Pancultures have been negative.  Patient was noted to have acute renal failure she was followed by nephrology and that has resolved at time of discharge.  She is also seen by GI, no invasive procedures completed and there is no  "further bleeding on Lovenox.  DVT was evaluated by vascular surgery and it was felt that conservative measures were indicated in this case.  Due to profound deconditioned and weakness patient has been working with physical therapy and it is felt that further therapy is needed at time of discharge.  Currently patient is quite stable for discharge via ambulance.    Condition on Discharge: Stable     Physical Exam on Discharge:  /65 (BP Location: Right arm, Patient Position: Lying)  Pulse 74  Temp 98.4 °F (36.9 °C) (Oral)   Resp 18  Ht 160 cm (63\")  Wt (!) 160 kg (352 lb 14.4 oz)  SpO2 97%  BMI 62.51 kg/m2     Physical Exam   Constitutional: She is oriented to person, place, and time. She appears well-developed and well-nourished. No distress.   Morbidly obese   HENT:   Head: Normocephalic and atraumatic.   Eyes: Conjunctivae and EOM are normal. Pupils are equal, round, and reactive to light. No scleral icterus.   Neck: Normal range of motion. Neck supple. No JVD present. No tracheal deviation present.   Cardiovascular: Normal rate, regular rhythm, normal heart sounds and intact distal pulses.  Exam reveals no gallop.    No murmur heard.  Pulmonary/Chest: Effort normal and breath sounds normal. No respiratory distress. She has no wheezes. She has no rales.   Abdominal: Soft. Bowel sounds are normal. She exhibits no distension. There is no tenderness. There is no guarding.   Musculoskeletal: She exhibits edema (Bilateral lower extremities).   Generalized weakness   Neurological: She is alert and oriented to person, place, and time.   Per JOSELIN Lainez, patient has dementia   Skin: Skin is warm and dry. No rash noted. She is not diaphoretic. No erythema. No pallor.   Right lower quadrant ecchymosis   Psychiatric: She has a normal mood and affect. Her behavior is normal.   Vitals reviewed.        Discharge Disposition:  Skilled Nursing Facility (DC - External)    Discharge Medications:   Tricia Hernandez   Home " Medication Instructions [facility]:[id number]    Printed on:01/11/18 7112   Medication Information                      acetaminophen (TYLENOL) 325 MG tablet  Take 2 tablets by mouth Every 4 (Four) Hours As Needed for Headache or Fever (fever greater than 101.5 F).             apixaban (ELIQUIS) 5 MG tablet tablet  Take 1 tablet by mouth Every 12 (Twelve) Hours.             citalopram (CeleXA) 20 MG tablet  Take 20 mg by mouth Daily.             ipratropium-albuterol (DUO-NEB) 0.5-2.5 mg/mL nebulizer  Take 3 mL by nebulization Every 6 (Six) Hours As Needed for Wheezing.             ondansetron (ZOFRAN) 4 MG tablet  Take 1 tablet by mouth Every 6 (Six) Hours As Needed for Nausea or Vomiting.             oxybutynin (DITROPAN) 5 MG tablet  Take 5 mg by mouth 2 (Two) Times a Day.             pantoprazole (PROTONIX) 40 MG EC tablet  Take 40 mg by mouth Daily.             simvastatin (ZOCOR) 40 MG tablet  Take 1 tablet by mouth Every Night.                 Discharge Diet:   Diet Instructions     Diet: Cardiac; Thin       Discharge Diet:  Cardiac   Fluid Consistency:  Thin                 Discharge Care Plan / Instructions: Patient to be transferred to [facility] [place] via ambulance today    Activity at Discharge:   Activity Instructions     Activity as Tolerated       Continue PT - Bed mobility mod x2 using draw sheet. Attempt to stand x2 pt unable. Worked on sitting exs and balance activites.                     Follow-up Appointments:   1.  Follow-up with primary care for discharge from rehabilitation  2.  Follow-up with GI, nephrology, and vascular surgery on an as-needed basis only.    Test Results Pending at Discharge: None     Plan discussed with Dr. Alec Granados.     Time spent in face-to-face evaluation, chart review, planning and education 45 minutes.    BRITNEY Myers  01/11/18  4:12 PM     I personally evaluated and examined the patient in conjunction with BRITNEY Jay and agree with  the assessment, treatment plan, and disposition of the patient as recorded by her. My history, exam, and further recommendations are: I have reviewed and agree with the plans.augustin Granados MD  01/11/18  4:36 PM

## 2018-01-11 NOTE — PLAN OF CARE
Problem: Patient Care Overview (Adult)  Goal: Plan of Care Review  Outcome: Ongoing (interventions implemented as appropriate)   01/11/18 1144   Coping/Psychosocial Response Interventions   Plan Of Care Reviewed With patient   Patient Care Overview   Progress progress toward functional goals is gradual   Outcome Evaluation   Outcome Summary/Follow up Plan Pt doing better today. Bed mobility mod x2 using draw sheet. Attempt to stand x2 pt unable. Worked on sitting exs and balance activites.

## 2018-01-11 NOTE — PLAN OF CARE
Problem: Patient Care Overview (Adult)  Goal: Plan of Care Review  Outcome: Ongoing (interventions implemented as appropriate)    Goal: Adult Individualization and Mutuality  Outcome: Ongoing (interventions implemented as appropriate)    Goal: Discharge Needs Assessment  Outcome: Ongoing (interventions implemented as appropriate)      Problem: Fall Risk (Adult)  Goal: Absence of Falls  Outcome: Ongoing (interventions implemented as appropriate)  Still not getting out of bed    Problem: Fluid Volume Excess (Adult,Obstetrics,Pediatric)  Goal: Stable Weight  Outcome: Ongoing (interventions implemented as appropriate)  Bilateral legs and thigh are less firm to touch today than yesterday   01/11/18 1450   Fluid Volume Excess (Adult,Obstetrics,Pediatric)   Stable Weight making progress toward outcome     Goal: Balanced Intake/Output  Outcome: Ongoing (interventions implemented as appropriate)      Problem: VTE, DVT and PE (Adult)  Goal: Signs and Symptoms of Listed Potential Problems Will be Absent or Manageable (VTE, DVT and PE)  Outcome: Ongoing (interventions implemented as appropriate)      Problem: Skin Integrity Impairment, Risk/Actual (Adult)  Goal: Skin Integrity/Wound Healing  Outcome: Ongoing (interventions implemented as appropriate)      Problem: Pressure Ulcer Risk (Jorge Scale) (Adult,Obstetrics,Pediatric)  Goal: Skin Integrity  Outcome: Ongoing (interventions implemented as appropriate)  Making progress patient moving better in bed with assist.  Incontinent times one

## 2018-01-11 NOTE — PROGRESS NOTES
Continued Stay Note   [facility]     Patient Name: Tricia Hernandez  MRN: [id number]  Today's Date: 1/11/2018    Admit Date: 1/3/2018          Discharge Plan       01/11/18 1422    Case Management/Social Work Plan    Plan SPOKE TO [facility] (JAY JAY) [phone] TO FIND OUT IF THEY CAN TAKE PT TODAY. JAY JAY STATES SHE WILL CALL BACK SHORTLY.               Discharge Codes     None            MILY Mcleod

## 2018-01-11 NOTE — PROGRESS NOTES
Continued Stay Note   [facility]     Patient Name: Tricia Hernandez  MRN: [id number]  Today's Date: 1/11/2018    Admit Date: 1/3/2018          Discharge Plan       01/11/18 1545    Case Management/Social Work Plan    Plan PT IS BEING DCD TO [facility], [facility]IA [email or web address]. CALL REPORT NUMBER [phone]. FAX FOR DC SUMMARY AND SCRIPTS [phone]. [facility] IS [phone].     Patient/Family In Agreement With Plan yes    Final Note    Final Note DC TO [facility], [email or web address]      01/11/18 1422    Case Management/Social Work Plan    Plan SPOKE TO [facility] (JAY JAY) [phone] TO FIND OUT IF THEY CAN TAKE PT TODAY. JAY JAY STATES SHE WILL CALL BACK SHORTLY.               Discharge Codes       01/11/18 1548    Discharge Codes    Discharge Codes 03  Discharged/transferred to skilled nursing facility (SNF) with Medicare certification in anticipation of skilled care            MILY Mcleod

## 2018-01-12 PROBLEM — D64.9 ANEMIA: Status: ACTIVE | Noted: 2018-01-12

## 2018-01-12 PROBLEM — I82.409 DVT (DEEP VENOUS THROMBOSIS) (HCC): Status: ACTIVE | Noted: 2018-01-12

## 2018-01-12 PROBLEM — K92.2 GI BLEED: Status: ACTIVE | Noted: 2018-01-12

## 2018-01-12 LAB
ALBUMIN SERPL-MCNC: 2.7 G/DL (ref 3.5–5)
ALBUMIN/GLOB SERPL: 0.9 G/DL (ref 1.1–2.5)
ALP SERPL-CCNC: 75 U/L (ref 24–120)
ALT SERPL W P-5'-P-CCNC: 35 U/L (ref 0–54)
ANION GAP SERPL CALCULATED.3IONS-SCNC: 4 MMOL/L (ref 4–13)
AST SERPL-CCNC: 28 U/L (ref 7–45)
BASOPHILS # BLD AUTO: 0.02 10*3/MM3 (ref 0–0.2)
BASOPHILS NFR BLD AUTO: 0.2 % (ref 0–2)
BILIRUB SERPL-MCNC: 0.3 MG/DL (ref 0.1–1)
BUN BLD-MCNC: 19 MG/DL (ref 5–21)
BUN/CREAT SERPL: 20 (ref 7–25)
CALCIUM SPEC-SCNC: 10.1 MG/DL (ref 8.4–10.4)
CHLORIDE SERPL-SCNC: 99 MMOL/L (ref 98–110)
CO2 SERPL-SCNC: 32 MMOL/L (ref 24–31)
CREAT BLD-MCNC: 0.95 MG/DL (ref 0.5–1.4)
DEPRECATED RDW RBC AUTO: 49.6 FL (ref 40–54)
EOSINOPHIL # BLD AUTO: 0.23 10*3/MM3 (ref 0–0.7)
EOSINOPHIL NFR BLD AUTO: 2.1 % (ref 0–4)
ERYTHROCYTE [DISTWIDTH] IN BLOOD BY AUTOMATED COUNT: 15.8 % (ref 12–15)
GFR SERPL CREATININE-BSD FRML MDRD: 57 ML/MIN/1.73
GFR SERPL CREATININE-BSD FRML MDRD: 70 ML/MIN/1.73
GLOBULIN UR ELPH-MCNC: 2.9 GM/DL
GLUCOSE BLD-MCNC: 107 MG/DL (ref 70–100)
HCT VFR BLD AUTO: 27.2 % (ref 37–47)
HGB BLD-MCNC: 8.7 G/DL (ref 12–16)
IMM GRANULOCYTES # BLD: 0.22 10*3/MM3 (ref 0–0.03)
IMM GRANULOCYTES NFR BLD: 2 % (ref 0–5)
LYMPHOCYTES # BLD AUTO: 1.03 10*3/MM3 (ref 0.72–4.86)
LYMPHOCYTES NFR BLD AUTO: 9.3 % (ref 15–45)
MCH RBC QN AUTO: 28.2 PG (ref 28–32)
MCHC RBC AUTO-ENTMCNC: 32 G/DL (ref 33–36)
MCV RBC AUTO: 88 FL (ref 82–98)
MONOCYTES # BLD AUTO: 1.07 10*3/MM3 (ref 0.19–1.3)
MONOCYTES NFR BLD AUTO: 9.6 % (ref 4–12)
NEUTROPHILS # BLD AUTO: 8.53 10*3/MM3 (ref 1.87–8.4)
NEUTROPHILS NFR BLD AUTO: 76.8 % (ref 39–78)
NRBC BLD MANUAL-RTO: 0 /100 WBC (ref 0–0)
PLATELET # BLD AUTO: 309 10*3/MM3 (ref 130–400)
PMV BLD AUTO: 9.8 FL (ref 6–12)
POTASSIUM BLD-SCNC: 4.1 MMOL/L (ref 3.5–5.3)
PROT SERPL-MCNC: 5.6 G/DL (ref 6.3–8.7)
RBC # BLD AUTO: 3.09 10*6/MM3 (ref 4.2–5.4)
SODIUM BLD-SCNC: 135 MMOL/L (ref 135–145)
WBC NRBC COR # BLD: 11.1 10*3/MM3 (ref 4.8–10.8)

## 2018-01-12 PROCEDURE — 80053 COMPREHEN METABOLIC PANEL: CPT | Performed by: FAMILY MEDICINE

## 2018-01-12 PROCEDURE — 85025 COMPLETE CBC W/AUTO DIFF WBC: CPT | Performed by: FAMILY MEDICINE

## 2018-01-12 PROCEDURE — 97530 THERAPEUTIC ACTIVITIES: CPT

## 2018-01-12 PROCEDURE — 94799 UNLISTED PULMONARY SVC/PX: CPT

## 2018-01-12 RX ADMIN — APIXABAN 5 MG: 5 TABLET, FILM COATED ORAL at 17:16

## 2018-01-12 RX ADMIN — IPRATROPIUM BROMIDE AND ALBUTEROL SULFATE 3 ML: 2.5; .5 SOLUTION RESPIRATORY (INHALATION) at 06:25

## 2018-01-12 RX ADMIN — PANTOPRAZOLE SODIUM 40 MG: 40 TABLET, DELAYED RELEASE ORAL at 05:21

## 2018-01-12 RX ADMIN — IPRATROPIUM BROMIDE AND ALBUTEROL SULFATE 3 ML: 2.5; .5 SOLUTION RESPIRATORY (INHALATION) at 00:46

## 2018-01-12 RX ADMIN — APIXABAN 5 MG: 5 TABLET, FILM COATED ORAL at 05:21

## 2018-01-12 RX ADMIN — IPRATROPIUM BROMIDE AND ALBUTEROL SULFATE 3 ML: 2.5; .5 SOLUTION RESPIRATORY (INHALATION) at 17:56

## 2018-01-12 NOTE — PLAN OF CARE
Problem: Patient Care Overview (Adult)  Goal: Plan of Care Review  Outcome: Ongoing (interventions implemented as appropriate)   01/12/18 4197   Coping/Psychosocial Response Interventions   Plan Of Care Reviewed With patient   Patient Care Overview   Progress progress towards functional goals is fair   Outcome Evaluation   Outcome Summary/Follow up Plan Pt required min assist of 2 for bed mobility to sit EOB. Pt stood with max assist of 2 with rolling walker and took 2 side steps toward head of bed. Pt will continue to benefit from therapy to increase strength and improve functional mobility.

## 2018-01-12 NOTE — PLAN OF CARE
Problem: Patient Care Overview (Adult)  Goal: Plan of Care Review  Outcome: Ongoing (interventions implemented as appropriate)   01/12/18 0307   Coping/Psychosocial Response Interventions   Plan Of Care Reviewed With patient   Patient Care Overview   Progress progress toward functional goals as expected   Outcome Evaluation   Outcome Summary/Follow up Plan incontinent of brown stool. cont to turn q2hr. pt to be discharged to nursing home today.        Problem: Fall Risk (Adult)  Goal: Absence of Falls  Outcome: Ongoing (interventions implemented as appropriate)   01/12/18 0307   Fall Risk (Adult)   Absence of Falls making progress toward outcome       Problem: Fluid Volume Excess (Adult,Obstetrics,Pediatric)  Goal: Stable Weight  Outcome: Ongoing (interventions implemented as appropriate)   01/12/18 0307   Fluid Volume Excess (Adult,Obstetrics,Pediatric)   Stable Weight making progress toward outcome     Goal: Balanced Intake/Output  Outcome: Ongoing (interventions implemented as appropriate)   01/12/18 0307   Fluid Volume Excess (Adult,Obstetrics,Pediatric)   Balanced Intake/Output making progress toward outcome       Problem: VTE, DVT and PE (Adult)  Goal: Signs and Symptoms of Listed Potential Problems Will be Absent or Manageable (VTE, DVT and PE)  Outcome: Ongoing (interventions implemented as appropriate)   01/12/18 0307   VTE, DVT and PE   Problems Assessed (VTE, DVT, PE) all   Problems Present (VTE, DVT, PE) deep vein thrombosis       Problem: Skin Integrity Impairment, Risk/Actual (Adult)  Goal: Skin Integrity/Wound Healing  Outcome: Ongoing (interventions implemented as appropriate)   01/12/18 0307   Skin Integrity Impairment, Risk/Actual (Adult)   Skin Integrity/Wound Healing making progress toward outcome       Problem: Pressure Ulcer Risk (Jorge Scale) (Adult,Obstetrics,Pediatric)  Goal: Skin Integrity  Outcome: Ongoing (interventions implemented as appropriate)   01/12/18 0307   Pressure Ulcer Risk  (Jorge Scale) (Adult,Obstetrics,Pediatric)   Skin Integrity making progress toward outcome

## 2018-01-12 NOTE — PROGRESS NOTES
Continued Stay Note  [facility]     Patient Name: Tricia Hernandez  MRN: [id number]  Today's Date: 1/12/2018    Admit Date: 1/3/2018          Discharge Plan       01/12/18 1135    Case Management/Social Work Plan    Plan PT DID NOT DC TO NH LAST EVENING DUE TO EMS ISSUES. DUE TO WEATHER CONDITIONS, [facility] CANNOT TRANSPORT PT TODAY TO  [facility]. WILL CHECK WEATHER CONDITIONS TOMORROW.               Discharge Codes     None        Expected Discharge Date and Time     Expected Discharge Date Expected Discharge Time    Jan 11, 2018             MILY Mcleod

## 2018-01-12 NOTE — PLAN OF CARE
Problem: Patient Care Overview (Adult)  Goal: Plan of Care Review  Outcome: Ongoing (interventions implemented as appropriate)   01/12/18 1434   Coping/Psychosocial Response Interventions   Plan Of Care Reviewed With patient   Patient Care Overview   Progress no change   Pt has been discharged to SNF, but due to weather, ambulance is unable to transfer pt. Denies any pain. Up with assist. Turn every 2 hours.   Goal: Adult Individualization and Mutuality  Outcome: Ongoing (interventions implemented as appropriate)    Goal: Discharge Needs Assessment  Outcome: Ongoing (interventions implemented as appropriate)      Problem: Fall Risk (Adult)  Goal: Absence of Falls  Outcome: Ongoing (interventions implemented as appropriate)   01/12/18 1434   Fall Risk (Adult)   Absence of Falls making progress toward outcome       Problem: Fluid Volume Excess (Adult,Obstetrics,Pediatric)  Goal: Stable Weight  Outcome: Ongoing (interventions implemented as appropriate)   01/12/18 1434   Fluid Volume Excess (Adult,Obstetrics,Pediatric)   Stable Weight making progress toward outcome     Goal: Balanced Intake/Output  Outcome: Ongoing (interventions implemented as appropriate)   01/12/18 1434   Fluid Volume Excess (Adult,Obstetrics,Pediatric)   Balanced Intake/Output making progress toward outcome       Problem: VTE, DVT and PE (Adult)  Goal: Signs and Symptoms of Listed Potential Problems Will be Absent or Manageable (VTE, DVT and PE)  Outcome: Ongoing (interventions implemented as appropriate)   01/12/18 1434   VTE, DVT and PE   Problems Assessed (VTE, DVT, PE) all   Problems Present (VTE, DVT, PE) deep vein thrombosis       Problem: Skin Integrity Impairment, Risk/Actual (Adult)  Goal: Skin Integrity/Wound Healing  Outcome: Ongoing (interventions implemented as appropriate)   01/12/18 1434   Skin Integrity Impairment, Risk/Actual (Adult)   Skin Integrity/Wound Healing making progress toward outcome       Problem: Pressure Ulcer Risk  (Jorge Scale) (Adult,Obstetrics,Pediatric)  Goal: Skin Integrity  Outcome: Ongoing (interventions implemented as appropriate)   01/12/18 1434   Pressure Ulcer Risk (Jorge Scale) (Adult,Obstetrics,Pediatric)   Skin Integrity making progress toward outcome

## 2018-01-12 NOTE — PROGRESS NOTES
[facility] Medicine Services  INPATIENT PROGRESS NOTE    Length of Stay: 8  Date of Admission: 1/3/2018  Primary Care Physician: Ed Hanson MD    Subjective   Chief Complaint: Weakness    HPI   Patient denies any chest pain or shortness of breath.  Patient still remained very weak.  Leukocytosis, resolved.  Anemia is stable.    Review of Systems   Constitutional: Positive for fatigue. Negative for activity change, appetite change, chills and fever.   HENT: Negative for hearing loss, nosebleeds, tinnitus and trouble swallowing.    Eyes: Negative for visual disturbance.   Respiratory: Negative for cough, chest tightness, shortness of breath and wheezing.    Cardiovascular: Negative for chest pain, palpitations and leg swelling.   Gastrointestinal: Negative for abdominal distention, abdominal pain, blood in stool, constipation, diarrhea, nausea and vomiting.   Endocrine: Negative for cold intolerance, heat intolerance, polydipsia, polyphagia and polyuria.   Genitourinary: Negative for decreased urine volume, difficulty urinating, dysuria, flank pain, frequency and hematuria.   Musculoskeletal: Positive for back pain, gait problem and myalgias. Negative for arthralgias and joint swelling.   Skin: Negative for rash.   Allergic/Immunologic: Negative for immunocompromised state.   Neurological: Positive for weakness. Negative for dizziness, syncope, light-headedness and headaches.   Hematological: Negative for adenopathy. Does not bruise/bleed easily.   Psychiatric/Behavioral: Negative for confusion and sleep disturbance. The patient is not nervous/anxious.      All pertinent negatives and positives are as above. All other systems have been reviewed and are negative unless otherwise stated.     Objective    Temp:  [98.4 °F (36.9 °C)-98.5 °F (36.9 °C)] 98.4 °F (36.9 °C)  Heart Rate:  [70-80] 74  Resp:  [16-20] 18  BP: (103-114)/(65-67) 113/65    Intake/Output Summary (Last 24  hours) at 01/11/18 1807  Last data filed at 01/11/18 0926   Gross per 24 hour   Intake              360 ml   Output              650 ml   Net             -290 ml     Physical Exam  Constitutional: She is oriented to person, place, and time. She appears well-developed and well-nourished.   HENT:   Head: Normocephalic and atraumatic.   Eyes: Conjunctivae and EOM are normal. Pupils are equal, round, and reactive to light.   Neck: Neck supple. No JVD present. No thyromegaly present.   Cardiovascular: Normal rate, regular rhythm, normal heart sounds and intact distal pulses.  Exam reveals no gallop and no friction rub.    No murmur heard.  Pulmonary/Chest: Effort normal and breath sounds normal. No respiratory distress. She has no wheezes. She has no rales. She exhibits no tenderness.   Shallow breathing   Abdominal: Soft. Bowel sounds are normal. She exhibits no distension. There is no tenderness. There is no rebound and no guarding.   Morbidly obese   Musculoskeletal: Normal range of motion. She exhibits no edema, tenderness or deformity.   Lymphadenopathy:     She has no cervical adenopathy.   Neurological: She is alert and oriented to person, place, and time. She displays normal reflexes. No cranial nerve deficit. She exhibits abnormal muscle tone. Coordination abnormal.   Skin: Skin is warm and dry. No rash noted.   Psychiatric: She has a normal mood and affect. Her behavior is normal. Judgment and thought content normal.   Results Review:  Lab Results (last 24 hours)     Procedure Component Value Units Date/Time    CBC & Differential [[id number]] Collected:  01/11/18 0510    Specimen:  Blood Updated:  01/11/18 0550    Narrative:       The following orders were created for panel order CBC & Differential.  Procedure                               Abnormality         Status                     ---------                               -----------         ------                     CBC Auto Differential[[id number]]         Abnormal            Final result                 Please view results for these tests on the individual orders.    CBC Auto Differential [[id number]]  (Abnormal) Collected:  01/11/18 0510    Specimen:  Blood Updated:  01/11/18 0550     WBC 10.78 10*3/mm3      RBC 3.21 (L) 10*6/mm3      Hemoglobin 8.9 (L) g/dL      Hematocrit 28.3 (L) %      MCV 88.2 fL      MCH 27.7 (L) pg      MCHC 31.4 (L) g/dL      RDW 15.9 (H) %      RDW-SD 51.0 fl      MPV 9.9 fL      Platelets 282 10*3/mm3      Neutrophil % 74.4 %      Lymphocyte % 10.3 (L) %      Monocyte % 7.7 %      Eosinophil % 2.8 %      Basophil % 0.3 %      Immature Grans % 4.5 %      Neutrophils, Absolute 8.02 10*3/mm3      Lymphocytes, Absolute 1.11 10*3/mm3      Monocytes, Absolute 0.83 10*3/mm3      Eosinophils, Absolute 0.30 10*3/mm3      Basophils, Absolute 0.03 10*3/mm3      Immature Grans, Absolute 0.49 (H) 10*3/mm3      nRBC 0.0 /100 WBC     Comprehensive Metabolic Panel [[id number]]  (Abnormal) Collected:  01/11/18 0510    Specimen:  Blood Updated:  01/11/18 0558     Glucose 107 (H) mg/dL      BUN 20 mg/dL      Creatinine 0.95 mg/dL      Sodium 137 mmol/L      Potassium 4.0 mmol/L      Chloride 99 mmol/L      CO2 33.0 (H) mmol/L      Calcium 9.9 mg/dL      Total Protein 5.5 (L) g/dL      Albumin 2.60 (L) g/dL      ALT (SGPT) 40 U/L      AST (SGOT) 33 U/L      Alkaline Phosphatase 76 U/L      Total Bilirubin 0.3 mg/dL      eGFR Non African Amer 57 (L) mL/min/1.73      eGFR  African Amer 70 mL/min/1.73      Globulin 2.9 gm/dL      A/G Ratio 0.9 (L) g/dL      BUN/Creatinine Ratio 21.1     Anion Gap 5.0 mmol/L     Narrative:       The MDRD GFR formula is only valid for adults with stable renal function between ages 18 and 70.           Cultures:       Radiology Data:    Imaging Results (last 24 hours)     ** No results found for the last 24 hours. **          No Known Allergies    Scheduled meds:     apixaban 5 mg Oral Q12H   cefdinir 300 mg Oral Q12H    ipratropium-albuterol 3 mL Nebulization Q6H - RT   pantoprazole 40 mg Oral Q AM       PRN meds:  •  acetaminophen **OR** acetaminophen  •  dextrose  •  dextrose  •  glucagon (human recombinant)  •  HYDROcodone-acetaminophen  •  ondansetron **OR** ondansetron ODT **OR** ondansetron  •  Pharmacy to Dose enoxaparin (LOVENOX)  •  sodium chloride    Assessment/Plan     Active Problems:    RUBY (acute kidney injury)      Plan:  Occlusive left lower extremity DVT/superficial right-sided thrombus-Lovenox prophylaxis.      GI bleed. Slight decrease in hemoglobin.  She denies any bloody or black tarry stool.  Continue Protonix.  . GI sign off.      Iron deficiency anemia-Venofer ×4 days 1/6/18. Hemoglobin is stable.      Shortness of breath upon ambulating.-Chest x-ray      Sepsis, status post bolus.  Leukocytosis- stable.  On Omnicef.      Acute renal failure-improving.  Nephrology consult.       Hyperglycemia-hemoglobin A1c 5.8.      Morbidly obese      Hyperkalemia.-Resolve      Deconditioning-PT and OT consult      Blood culture regular is negative staph 1 out of 4 bottles, contamination.      Discharge Planning:  Consult  for rehabilitation placement.  Unable to transfer due to weather and ambulatory service.  We will plan to transfer tomorrow.    Alec Granados MD   01/11/18   6:07 PM

## 2018-01-12 NOTE — THERAPY TREATMENT NOTE
Acute Care - Physical Therapy Treatment Note  [facility]     Patient Name: Tricia Hernandez  : 1940  MRN: [id number]  Today's Date: 2018  Onset of Illness/Injury or Date of Surgery Date: 18  Date of Referral to PT: 18  Referring Physician: Dr. Granados    Admit Date: 1/3/2018    Visit Dx:    ICD-10-CM ICD-9-CM   1. Impaired mobility and ADLs Z74. 799.89   2. Impaired mobility Z74. 799.89     Patient Active Problem List   Diagnosis   • RUBY (acute kidney injury)   • DVT (deep venous thrombosis)   • Anemia   • GI bleed               Adult Rehabilitation Note       18 1134 18 1106 18 1039    Rehab Assessment/Intervention    Discipline physical therapy assistant  -CW physical therapy assistant  -NW --  -NW    Document Type therapy note (daily note)  -CW  --  -NW    Subjective Information agree to therapy;complains of;weakness  -CW      Patient Effort, Rehab Treatment adequate  -CW      Precautions/Limitations fall precautions  -CW      Recorded by [CW] Rhonda Gutierrez PTA [NW] Blanca Stock PTA [NW] Blanca Stock PTA    Pain Assessment    Pain Assessment No/denies pain  -CW      Recorded by [CW] Rhonda Gutierrez PTA      Bed Mobility, Assessment/Treatment    Bed Mobility, Assistive Device bed rails;draw sheet  -CW      Bed Mobility, Scoot/Bridge, [place] maximum assist (25% patient effort);2 person assist required   bed in trendelenberg  -CW      Bed Mob, Supine to Sit, [place] minimum assist (75% patient effort);verbal cues required  -CW      Bed Mob, Sit to Supine, [place] moderate assist (50% patient effort);2 person assist required;verbal cues required  -CW      Bed Mobility, Safety Issues decreased use of arms for pushing/pulling;decreased use of legs for bridging/pushing  -CW      Bed Mobility, Impairments ROM decreased;strength decreased;decreased flexibility  -CW      Recorded by [CW] Rhonda Gutierrez PTA      Transfer Assessment/Treatment     Transfers, Sit-Stand [place] maximum assist (25% patient effort);2 person assist required;verbal cues required   stood x 2  -CW      Transfers, Stand-Sit [place] moderate assist (50% patient effort);2 person assist required;verbal cues required  -CW      Transfers, Sit-Stand-Sit, Assist Device rolling walker  -CW      Transfer, Impairments strength decreased  -CW      Recorded by [CW] Rhonda Gutierrez PTA      Gait Assessment/Treatment    Gait, [place] Level minimum assist (75% patient effort);2 person assist required;verbal cues required  -CW      Gait, Assistive Device rolling walker  -CW      Gait, Distance (Feet) --   2 side steps at EOB  -CW      Gait, Impairments strength decreased  -CW      Recorded by [CW] Rhonda Gutierrez PTA      Therapy Exercises    Bilateral Lower Extremities PROM:;AAROM:;sitting   Assisted pt with knee flexion as tolerated  -CW      Recorded by [CW] Rhonda Gutierrez PTA      Positioning and Restraints    Pre-Treatment Position in bed  -CW      Post Treatment Position bed  -CW      In Bed fowlers;call light within reach;encouraged to call for assist  -CW      Recorded by [CW] Rhonda Gutierrez PTA        01/11/18 1100 01/10/18 2429       Rehab Assessment/Intervention    Discipline physical therapy assistant  -NW physical therapy assistant  -NW     Document Type therapy note (daily note)  -NW therapy note (daily note)  -NW     Subjective Information agree to therapy;complains of;weakness  -NW --   confused  -NW     Precautions/Limitations fall precautions  -NW fall precautions  -NW     Recorded by [NW] Blanca Stock PTA [NW] Blanca Stock PTA     Pain Assessment    Pain Assessment No/denies pain  -NW No/denies pain  -NW     Recorded by [NW] Blanca Stock PTA [NW] Blanca Stock PTA     Bed Mobility, Assessment/Treatment    Bed Mobility, Assistive Device draw sheet;bed rails  -NW      Bed Mobility, Roll Left, [place] verbal cues  required;moderate assist (50% patient effort);2 person assist required  -NW      Bed Mobility, Roll Right, [place] verbal cues required;moderate assist (50% patient effort);2 person assist required  -NW      Bed Mobility, Scoot/Bridge, [place] verbal cues required;moderate assist (50% patient effort);2 person assist required  -NW      Bed Mob, Supine to Sit, [place] verbal cues required;moderate assist (50% patient effort);2 person assist required  -NW      Bed Mob, Sit to Supine, [place] verbal cues required;moderate assist (50% patient effort);2 person assist required  -NW      Bed Mobility, Safety Issues decreased use of arms for pushing/pulling;decreased use of legs for bridging/pushing  -NW      Bed Mobility, Impairments ROM decreased;strength decreased;sensation decreased  -NW      Recorded by [NW] Blanca Stock PTA      Transfer Assessment/Treatment    Transfers, Sit-Stand [place] --   attempt to stand x2 pt unable  -NW      Transfer, Comment suggest to RN pt to be up w/ Lift Team in pm  -NW      Recorded by [NW] Blanca Stock PTA      Motor Skills/Interventions    Additional Documentation Balance Skills Training (Group)  -NW      Recorded by [NW] Blanca Stock PTA      Balance Skills Training    Sitting-Level of Assistance Close supervision  -NW      Sitting # of Minutes 10  -NW      Recorded by [NW] Blanca Stock PTA      Therapy Exercises    Bilateral Lower Extremities  AAROM:;10 reps;supine   pt fatigue needed constant verbal cues to stay awake  -NW     Recorded by  [NW] Blanca Stock PTA     Positioning and Restraints    Pre-Treatment Position in bed  -NW in bed  -NW     Post Treatment Position bed  -NW bed  -NW     In Bed fowlers;call light within reach;encouraged to call for assist;notified nsg  -NW fowlers;call light within reach;encouraged to call for assist;notified nsg  -NW     Recorded by [NW] Blanca Stock PTA [NW] Blanca Stock PTA       User [place]   (r) = Recorded By, (t) = Taken By, (c) = Cosigned By    Initials Name Effective Dates    CW Rhonda Gutierrez, PTA 06/22/15 -     NW Blanca Stock, PTA 08/02/16 -                 IP PT Goals       01/10/18 1004          Bed Mobility PT LTG    Bed Mobility PT LTG, Date Established 01/10/18  -PB (r) JM (t) PB (c)      Bed Mobility PT LTG, Time to Achieve by discharge  -PB (r) JM (t) PB (c)      Bed Mobility PT LTG, Activity Type all bed mobility  -PB (r) JM (t) PB (c)      Bed Mobility PT LTG, [place] Level minimum assist (75% patient effort)  -PB (r) JM (t) PB (c)      Bed Mobility PT Goal  LTG, Assist Device bed rails  -PB (r) JM (t) PB (c)      Transfer Training PT LTG    Transfer Training PT LTG, Date Established 01/10/18  -PB (r) JM (t) PB (c)      Transfer Training PT LTG, Time to Achieve by discharge  -PB (r) JM (t) PB (c)      Transfer Training PT LTG, Activity Type bed to chair /chair to bed;sit to stand/stand to sit  -PB (r) JM (t) PB (c)      Transfer Training PT LTG, [place] Level minimum assist (75% patient effort)  -PB (r) JM (t) PB (c)      Transfer Training PT LTG, Assist Device walker, gigi  -PB (r) JM (t) PB (c)      Strength Goal PT LTG    Strength Goal PT LTG, Date Established 01/10/18  -PB (r) JM (t) PB (c)      Strength Goal PT LTG, Time to Achieve by discharge  -PB (r) JM (t) PB (c)      Strength Goal PT LTG, Functional Goal Perform 15 reps of EOB B LE AROM exercises  -PB (r) JM (t) PB (c)        User Key  (r) = Recorded By, (t) = Taken By, (c) = Cosigned By    Initials Name Provider Type    PB Prasanth Abdi, PT DPT Physical Therapist    SIMONA rCane, PT Student PT Student          Physical Therapy Education     Title: PT OT SLP Therapies (Active)     Topic: Physical Therapy (Active)     Point: Mobility training (Active)    Learning Progress Summary    Learner Readiness Method Response Comment Documented by Status   Patient Acceptance E NR Bed mobility, transfers, planof  care, benefit of activity  01/12/18 1307 Active    Acceptance E VU progressin of POC  01/11/18 1147 Done    Acceptance E VU Pt was educated on importance of activity and potential discharge plans.  01/10/18 0959 Done                      User Key     Initials Effective Dates Name Provider Type Discipline     06/22/15 -  Rhonda Gutierrez, PTA Physical Therapy Assistant PT     08/02/16 -  Blanca Stock, PTA Physical Therapy Assistant PT     01/10/18 -  Jim Crane, PT Student PT Student PT                    PT Recommendation and Plan  Anticipated Equipment Needs At Discharge: front wheeled walker  Anticipated Discharge Disposition: skilled nursing facility  Planned Therapy Interventions: bed mobility training, ROM (Range of Motion), patient/family education, strengthening, transfer training  PT Frequency: daily, 2 times/day, per priority policy  Plan of Care Review  Plan Of Care Reviewed With: patient  Progress: progress towards functional goals is fair  Outcome Summary/Follow up Plan: Pt required min assist of 2 for bed mobility to sit EOB.  Pt stood with max assist of 2 with rolling walker and took 2 side steps toward head of bed.  Pt will continue to benefit from theapy to increase strength and improve funcctional obility.          Outcome Measures       01/12/18 1300 01/11/18 1100 01/10/18 0900    How much help from another person do you currently need...    Turning from your back to your side while in flat bed without using bedrails? 2  -CW 2  -NW 2  -PB (r) JM (t) PB (c)    Moving from lying on back to sitting on the side of a flat bed without bedrails? 2  -CW 2  -NW 2  -PB (r) JM (t) PB (c)    Moving to and from a bed to a chair (including a wheelchair)? 1  -CW 1  -NW 1  -PB (r) JM (t) PB (c)    Standing up from a chair using your arms (e.g., wheelchair, bedside chair)? 2  -CW 1  -NW 2  -PB (r) JM (t) PB (c)    Climbing 3-5 steps with a railing? 1  -CW 1  -NW 1  -PB (r) JM (t) PB (c)     To walk in hospital room? 1  -CW 1  -NW 2  -PB (r) JM (t) PB (c)    AM-PAC 6 Clicks Score 9  -CW 8  -NW 10  -PB (r) JM (t)    Functional Assessment    Outcome Measure Options AM-PAC 6 Clicks Basic Mobility (PT)  -CW AM-PAC 6 Clicks Basic Mobility (PT)  -NW AM-PAC 6 Clicks Basic Mobility (PT)  -PB (r) JM (t) PB (c)      User Key  (r) = Recorded By, (t) = Taken By, (c) = Cosigned By    Initials Name Provider Type    CW Rhonda Gutierrez PTA Physical Therapy Assistant    NW Blanca Stock, PTA Physical Therapy Assistant    PB Prasanth Abdi, PT DPT Physical Therapist    JM Jim Crane, PT Student PT Student           Time Calculation:         PT Charges       01/12/18 1310          Time Calculation    Start Time 1134  -CW      Stop Time 1157  -CW      Time Calculation (min) 23 min  -CW      PT Received On 01/12/18  -CW      PT Goal Re-Cert Due Date 01/20/18  -CW      Time Calculation- PT    Total Timed Code Minutes- PT 23 minute(s)  -CW        User Key  (r) = Recorded By, (t) = Taken By, (c) = Cosigned By    Initials Name Provider Type     Rhonda Gutierrez PTA Physical Therapy Assistant          Therapy Charges for Today     Code Description Service Date Service Provider Modifiers Qty    [id number]  PT THERAPEUTIC ACT EA 15 MIN 1/12/2018 Rhonda Gutierrez PTA GP 2          PT G-Codes  Outcome Measure Options: AM-PAC 6 Clicks Basic Mobility (PT)  Score: 10  Functional Limitation: Changing and maintaining body position  Changing and Maintaining Body Position Current Status (): At least 60 percent but less than 80 percent impaired, limited or restricted  Changing and Maintaining Body Position Goal Status (): At least 40 percent but less than 60 percent impaired, limited or restricted    Rhonda Gutierrez PTA  1/12/2018

## 2018-01-12 NOTE — PROGRESS NOTES
[facility] Medicine Services  INPATIENT PROGRESS NOTE    Length of Stay: 9  Date of Admission: 1/3/2018  Primary Care Physician: Ed Hanson MD    Subjective   Chief Complaint: Weakness    HPI   Patient states she stood up and took a couple steps.  Patient still very weak.  Patient denies any chest pain or shortness of breath.  Leukocytosis, stable.  Anemia stable.  Chest ray shows no change from previous chest x-ray.    Review of Systems   Constitutional: Positive for fatigue. Negative for activity change, appetite change, chills and fever.   HENT: Negative for hearing loss, nosebleeds, tinnitus and trouble swallowing.    Eyes: Negative for visual disturbance.   Respiratory: Negative for cough, chest tightness, shortness of breath and wheezing.    Cardiovascular: Negative for chest pain, palpitations and leg swelling.   Gastrointestinal: Negative for abdominal distention, abdominal pain, blood in stool, constipation, diarrhea, nausea and vomiting.   Endocrine: Negative for cold intolerance, heat intolerance, polydipsia, polyphagia and polyuria.   Genitourinary: Negative for decreased urine volume, difficulty urinating, dysuria, flank pain, frequency and hematuria.   Musculoskeletal: Positive for back pain, gait problem and myalgias. Negative for arthralgias and joint swelling.   Skin: Negative for rash.   Allergic/Immunologic: Negative for immunocompromised state.   Neurological: Positive for weakness. Negative for dizziness, syncope, light-headedness and headaches.   Hematological: Negative for adenopathy. Does not bruise/bleed easily.   Psychiatric/Behavioral: Negative for confusion and sleep disturbance. The patient is not nervous/anxious.      All pertinent negatives and positives are as above. All other systems have been reviewed and are negative unless otherwise stated.     Objective    Temp:  [99 °F (37.2 °C)-99.3 °F (37.4 °C)] 99 °F (37.2 °C)  Heart Rate:  [74-92]  76  Resp:  [16-18] 16  BP: (108-111)/(60-64) 111/60    Intake/Output Summary (Last 24 hours) at 01/12/18 1155  Last data filed at 01/12/18 0934   Gross per 24 hour   Intake             1080 ml   Output                0 ml   Net             1080 ml     Physical Exam  Constitutional: She is oriented to person, place, and time. She appears well-developed and well-nourished.   HENT:   Head: Normocephalic and atraumatic.   Eyes: Conjunctivae and EOM are normal. Pupils are equal, round, and reactive to light.   Neck: Neck supple. No JVD present. No thyromegaly present.   Cardiovascular: Normal rate, regular rhythm, normal heart sounds and intact distal pulses.  Exam reveals no gallop and no friction rub.    No murmur heard.  Pulmonary/Chest: Effort normal and breath sounds normal. No respiratory distress. She has no wheezes. She has no rales. She exhibits no tenderness.   Shallow breathing   Abdominal: Soft. Bowel sounds are normal. She exhibits no distension. There is no tenderness. There is no rebound and no guarding.   Morbidly obese   Musculoskeletal: Normal range of motion. She exhibits no edema, tenderness or deformity.   Lymphadenopathy:     She has no cervical adenopathy.   Neurological: She is alert and oriented to person, place, and time. She displays normal reflexes. No cranial nerve deficit. She exhibits abnormal muscle tone. Coordination abnormal.   Skin: Skin is warm and dry. No rash noted.   Psychiatric: She has a normal mood and affect. Her behavior is normal. Judgment and thought content normal.   Results Review:  Lab Results (last 24 hours)     Procedure Component Value Units Date/Time    CBC & Differential [[id number]] Collected:  01/12/18 0619    Specimen:  Blood Updated:  01/12/18 0656    Narrative:       The following orders were created for panel order CBC & Differential.  Procedure                               Abnormality         Status                     ---------                                -----------         ------                     CBC Auto Differential[[id number]]        Abnormal            Final result                 Please view results for these tests on the individual orders.    CBC Auto Differential [[id number]]  (Abnormal) Collected:  01/12/18 0619    Specimen:  Blood Updated:  01/12/18 0656     WBC 11.10 (H) 10*3/mm3      RBC 3.09 (L) 10*6/mm3      Hemoglobin 8.7 (L) g/dL      Hematocrit 27.2 (L) %      MCV 88.0 fL      MCH 28.2 pg      MCHC 32.0 (L) g/dL      RDW 15.8 (H) %      RDW-SD 49.6 fl      MPV 9.8 fL      Platelets 309 10*3/mm3      Neutrophil % 76.8 %      Lymphocyte % 9.3 (L) %      Monocyte % 9.6 %      Eosinophil % 2.1 %      Basophil % 0.2 %      Immature Grans % 2.0 %      Neutrophils, Absolute 8.53 (H) 10*3/mm3      Lymphocytes, Absolute 1.03 10*3/mm3      Monocytes, Absolute 1.07 10*3/mm3      Eosinophils, Absolute 0.23 10*3/mm3      Basophils, Absolute 0.02 10*3/mm3      Immature Grans, Absolute 0.22 (H) 10*3/mm3      nRBC 0.0 /100 WBC     Comprehensive Metabolic Panel [[id number]]  (Abnormal) Collected:  01/12/18 0619    Specimen:  Blood Updated:  01/12/18 0710     Glucose 107 (H) mg/dL      BUN 19 mg/dL      Creatinine 0.95 mg/dL      Sodium 135 mmol/L      Potassium 4.1 mmol/L      Chloride 99 mmol/L      CO2 32.0 (H) mmol/L      Calcium 10.1 mg/dL      Total Protein 5.6 (L) g/dL      Albumin 2.70 (L) g/dL      ALT (SGPT) 35 U/L      AST (SGOT) 28 U/L      Alkaline Phosphatase 75 U/L      Total Bilirubin 0.3 mg/dL      eGFR Non African Amer 57 (L) mL/min/1.73      eGFR  African Amer 70 mL/min/1.73      Globulin 2.9 gm/dL      A/G Ratio 0.9 (L) g/dL      BUN/Creatinine Ratio 20.0     Anion Gap 4.0 mmol/L     Narrative:       The MDRD GFR formula is only valid for adults with stable renal function between ages 18 and 70.           Cultures:       Radiology Data:    Imaging Results (last 24 hours)     ** No results found for the last 24 hours. **          No Known  Allergies    Scheduled meds:     apixaban 5 mg Oral Q12H   ipratropium-albuterol 3 mL Nebulization Q6H - RT   pantoprazole 40 mg Oral Q AM       PRN meds:  •  acetaminophen **OR** acetaminophen  •  dextrose  •  dextrose  •  glucagon (human recombinant)  •  HYDROcodone-acetaminophen  •  ondansetron **OR** ondansetron ODT **OR** ondansetron  •  sodium chloride    Assessment/Plan     Active Problems:    RUBY (acute kidney injury)      Plan:  Occlusive left lower extremity DVT/superficial right-sided thrombus-Lovenox prophylaxis.      GI bleed. Slight decrease in hemoglobin.  She denies any bloody or black tarry stool.  Continue Protonix.  . GI sign off.      Iron deficiency anemia-Venofer ×4 days 1/6/18. Hemoglobin is stable.      Shortness of breath upon ambulating.-Chest x-ray      Sepsis, status post bolus.  Leukocytosis- stable.  On Omnicef.      Acute renal failure-improving.        Hyperglycemia-hemoglobin A1c 5.8.      Morbidly obese      Hyperkalemia.-Resolve      Deconditioning-PT and OT consult      Blood culture regular is negative staph 1 out of 4 bottles, contamination.      Discharge Planning:  Consult  for rehabilitation placement.  Unable to transfer due to weather and ambulatory service.  We will plan to transfer when able.    Alec Granados MD   01/12/18   11:55 AM

## 2018-01-13 LAB
ALBUMIN SERPL-MCNC: 2.6 G/DL (ref 3.5–5)
ALBUMIN/GLOB SERPL: 0.9 G/DL (ref 1.1–2.5)
ALP SERPL-CCNC: 64 U/L (ref 24–120)
ALT SERPL W P-5'-P-CCNC: 30 U/L (ref 0–54)
ANION GAP SERPL CALCULATED.3IONS-SCNC: 3 MMOL/L (ref 4–13)
AST SERPL-CCNC: 22 U/L (ref 7–45)
BASOPHILS # BLD AUTO: 0.02 10*3/MM3 (ref 0–0.2)
BASOPHILS NFR BLD AUTO: 0.3 % (ref 0–2)
BILIRUB SERPL-MCNC: 0.3 MG/DL (ref 0.1–1)
BUN BLD-MCNC: 18 MG/DL (ref 5–21)
BUN/CREAT SERPL: 19.4 (ref 7–25)
CALCIUM SPEC-SCNC: 9.7 MG/DL (ref 8.4–10.4)
CHLORIDE SERPL-SCNC: 99 MMOL/L (ref 98–110)
CO2 SERPL-SCNC: 34 MMOL/L (ref 24–31)
CREAT BLD-MCNC: 0.93 MG/DL (ref 0.5–1.4)
DEPRECATED RDW RBC AUTO: 50.6 FL (ref 40–54)
EOSINOPHIL # BLD AUTO: 0.22 10*3/MM3 (ref 0–0.7)
EOSINOPHIL NFR BLD AUTO: 2.9 % (ref 0–4)
ERYTHROCYTE [DISTWIDTH] IN BLOOD BY AUTOMATED COUNT: 16 % (ref 12–15)
GFR SERPL CREATININE-BSD FRML MDRD: 59 ML/MIN/1.73
GFR SERPL CREATININE-BSD FRML MDRD: 71 ML/MIN/1.73
GLOBULIN UR ELPH-MCNC: 2.9 GM/DL
GLUCOSE BLD-MCNC: 100 MG/DL (ref 70–100)
HCT VFR BLD AUTO: 25.3 % (ref 37–47)
HGB BLD-MCNC: 8.2 G/DL (ref 12–16)
IMM GRANULOCYTES # BLD: 0.13 10*3/MM3 (ref 0–0.03)
IMM GRANULOCYTES NFR BLD: 1.7 % (ref 0–5)
LYMPHOCYTES # BLD AUTO: 0.99 10*3/MM3 (ref 0.72–4.86)
LYMPHOCYTES NFR BLD AUTO: 12.8 % (ref 15–45)
MCH RBC QN AUTO: 28.4 PG (ref 28–32)
MCHC RBC AUTO-ENTMCNC: 32.4 G/DL (ref 33–36)
MCV RBC AUTO: 87.5 FL (ref 82–98)
MONOCYTES # BLD AUTO: 0.87 10*3/MM3 (ref 0.19–1.3)
MONOCYTES NFR BLD AUTO: 11.3 % (ref 4–12)
NEUTROPHILS # BLD AUTO: 5.48 10*3/MM3 (ref 1.87–8.4)
NEUTROPHILS NFR BLD AUTO: 71 % (ref 39–78)
NRBC BLD MANUAL-RTO: 0 /100 WBC (ref 0–0)
PLATELET # BLD AUTO: 285 10*3/MM3 (ref 130–400)
PMV BLD AUTO: 10.1 FL (ref 6–12)
POTASSIUM BLD-SCNC: 4 MMOL/L (ref 3.5–5.3)
PROT SERPL-MCNC: 5.5 G/DL (ref 6.3–8.7)
RBC # BLD AUTO: 2.89 10*6/MM3 (ref 4.2–5.4)
SODIUM BLD-SCNC: 136 MMOL/L (ref 135–145)
WBC NRBC COR # BLD: 7.71 10*3/MM3 (ref 4.8–10.8)

## 2018-01-13 PROCEDURE — 80053 COMPREHEN METABOLIC PANEL: CPT | Performed by: FAMILY MEDICINE

## 2018-01-13 PROCEDURE — 94799 UNLISTED PULMONARY SVC/PX: CPT

## 2018-01-13 PROCEDURE — 97530 THERAPEUTIC ACTIVITIES: CPT

## 2018-01-13 PROCEDURE — 85025 COMPLETE CBC W/AUTO DIFF WBC: CPT | Performed by: FAMILY MEDICINE

## 2018-01-13 RX ADMIN — IPRATROPIUM BROMIDE AND ALBUTEROL SULFATE 3 ML: 2.5; .5 SOLUTION RESPIRATORY (INHALATION) at 06:25

## 2018-01-13 RX ADMIN — IPRATROPIUM BROMIDE AND ALBUTEROL SULFATE 3 ML: 2.5; .5 SOLUTION RESPIRATORY (INHALATION) at 13:58

## 2018-01-13 RX ADMIN — PANTOPRAZOLE SODIUM 40 MG: 40 TABLET, DELAYED RELEASE ORAL at 05:07

## 2018-01-13 RX ADMIN — APIXABAN 5 MG: 5 TABLET, FILM COATED ORAL at 18:11

## 2018-01-13 RX ADMIN — IPRATROPIUM BROMIDE AND ALBUTEROL SULFATE 3 ML: 2.5; .5 SOLUTION RESPIRATORY (INHALATION) at 00:07

## 2018-01-13 RX ADMIN — APIXABAN 5 MG: 5 TABLET, FILM COATED ORAL at 05:07

## 2018-01-13 NOTE — PROGRESS NOTES
[facility]  INPATIENT PROGRESS NOTE    Length of Stay: 10  Date of Admission: 1/3/2018  Primary Care Physician: Ed Hanson MD    Subjective   Chief Complaint: Weakness    HPI   Patient still remained very weak.  Physical therapy as able to the patient take more steps today.  Patient denies any chest pain or shortness of breath.  Leukocytosis, resolved.    Review of Systems   Constitutional: Positive for fatigue. Negative for activity change, appetite change, chills and fever.   HENT: Negative for hearing loss, nosebleeds, tinnitus and trouble swallowing.    Eyes: Negative for visual disturbance.   Respiratory: Negative for cough, chest tightness, shortness of breath and wheezing.    Cardiovascular: Negative for chest pain, palpitations and leg swelling.   Gastrointestinal: Negative for abdominal distention, abdominal pain, blood in stool, constipation, diarrhea, nausea and vomiting.   Endocrine: Negative for cold intolerance, heat intolerance, polydipsia, polyphagia and polyuria.   Genitourinary: Negative for decreased urine volume, difficulty urinating, dysuria, flank pain, frequency and hematuria.   Musculoskeletal: Positive for back pain, gait problem and myalgias. Negative for arthralgias and joint swelling.   Skin: Negative for rash.   Allergic/Immunologic: Negative for immunocompromised state.   Neurological: Positive for weakness. Negative for dizziness, syncope, light-headedness and headaches.   Hematological: Negative for adenopathy. Does not bruise/bleed easily.   Psychiatric/Behavioral: Negative for confusion and sleep disturbance. The patient is not nervous/anxious.      All pertinent negatives and positives are as above. All other systems have been reviewed and are negative unless otherwise stated.     Objective    Temp:  [97.8 °F (36.6 °C)-99.4 °F (37.4 °C)] 97.8 °F (36.6 °C)  Heart Rate:  [71-87] 79  Resp:  [16-20] 20  BP: ()/(49-65)  113/65    Intake/Output Summary (Last 24 hours) at 01/13/18 1358  Last data filed at 01/13/18 1300   Gross per 24 hour   Intake              840 ml   Output                0 ml   Net              840 ml     Physical Exam  Constitutional: She is oriented to person, place, and time. She appears well-developed and well-nourished.   HENT:   Head: Normocephalic and atraumatic.   Eyes: Conjunctivae and EOM are normal. Pupils are equal, round, and reactive to light.   Neck: Neck supple. No JVD present. No thyromegaly present.   Cardiovascular: Normal rate, regular rhythm, normal heart sounds and intact distal pulses.  Exam reveals no gallop and no friction rub.    No murmur heard.  Pulmonary/Chest: Effort normal and breath sounds normal. No respiratory distress. She has no wheezes. She has no rales. She exhibits no tenderness.   Shallow breathing   Abdominal: Soft. Bowel sounds are normal. She exhibits no distension. There is no tenderness. There is no rebound and no guarding.   Morbidly obese   Musculoskeletal: Normal range of motion. She exhibits no edema, tenderness or deformity.   Lymphadenopathy:     She has no cervical adenopathy.   Neurological: She is alert and oriented to person, place, and time. She displays normal reflexes. No cranial nerve deficit. She exhibits abnormal muscle tone. Coordination abnormal.   Skin: Skin is warm and dry. No rash noted.   Psychiatric: She has a normal mood and affect. Her behavior is normal. Judgment and thought content normal.   Results Review:  Lab Results (last 24 hours)     Procedure Component Value Units Date/Time    CBC & Differential [[id number]] Collected:  01/13/18 0702    Specimen:  Blood Updated:  01/13/18 0743    Narrative:       The following orders were created for panel order CBC & Differential.  Procedure                               Abnormality         Status                     ---------                               -----------         ------                      CBC Auto Differential[[id number]]        Abnormal            Final result                 Please view results for these tests on the individual orders.    CBC Auto Differential [[id number]]  (Abnormal) Collected:  01/13/18 0702    Specimen:  Blood Updated:  01/13/18 0743     WBC 7.71 10*3/mm3      RBC 2.89 (L) 10*6/mm3      Hemoglobin 8.2 (L) g/dL      Hematocrit 25.3 (L) %      MCV 87.5 fL      MCH 28.4 pg      MCHC 32.4 (L) g/dL      RDW 16.0 (H) %      RDW-SD 50.6 fl      MPV 10.1 fL      Platelets 285 10*3/mm3      Neutrophil % 71.0 %      Lymphocyte % 12.8 (L) %      Monocyte % 11.3 %      Eosinophil % 2.9 %      Basophil % 0.3 %      Immature Grans % 1.7 %      Neutrophils, Absolute 5.48 10*3/mm3      Lymphocytes, Absolute 0.99 10*3/mm3      Monocytes, Absolute 0.87 10*3/mm3      Eosinophils, Absolute 0.22 10*3/mm3      Basophils, Absolute 0.02 10*3/mm3      Immature Grans, Absolute 0.13 (H) 10*3/mm3      nRBC 0.0 /100 WBC     Comprehensive Metabolic Panel [[id number]]  (Abnormal) Collected:  01/13/18 0702    Specimen:  Blood Updated:  01/13/18 0756     Glucose 100 mg/dL      BUN 18 mg/dL      Creatinine 0.93 mg/dL      Sodium 136 mmol/L      Potassium 4.0 mmol/L      Chloride 99 mmol/L      CO2 34.0 (H) mmol/L      Calcium 9.7 mg/dL      Total Protein 5.5 (L) g/dL      Albumin 2.60 (L) g/dL      ALT (SGPT) 30 U/L      AST (SGOT) 22 U/L      Alkaline Phosphatase 64 U/L      Total Bilirubin 0.3 mg/dL      eGFR Non African Amer 59 (L) mL/min/1.73      eGFR  African Amer 71 mL/min/1.73      Globulin 2.9 gm/dL      A/G Ratio 0.9 (L) g/dL      BUN/Creatinine Ratio 19.4     Anion Gap 3.0 (L) mmol/L     Narrative:       The MDRD GFR formula is only valid for adults with stable renal function between ages 18 and 70.           Cultures:       Radiology Data:    Imaging Results (last 24 hours)     ** No results found for the last 24 hours. **          No Known Allergies    Scheduled meds:     apixaban 5 mg Oral Q12H    ipratropium-albuterol 3 mL Nebulization Q6H - RT   pantoprazole 40 mg Oral Q AM       PRN meds:  •  acetaminophen **OR** acetaminophen  •  dextrose  •  dextrose  •  glucagon (human recombinant)  •  HYDROcodone-acetaminophen  •  ondansetron **OR** ondansetron ODT **OR** ondansetron  •  sodium chloride    Assessment/Plan     Active Problems:    RUBY (acute kidney injury)    DVT (deep venous thrombosis)    Anemia    GI bleed      Plan:  Occlusive left lower extremity DVT/superficial right-sided thrombus-Lovenox prophylaxis.      GI bleed. Slight decrease in hemoglobin.  She denies any bloody or black tarry stool.  Continue Protonix.  . GI sign off.      Iron deficiency anemia-Venofer ×4 days 1/6/18. Hemoglobin is stable.      Shortness of breath upon ambulating.-Chest x-ray      Sepsis, status post bolus.  Leukocytosis- stable.  On Omnicef.      Acute renal failure-improving.        Hyperglycemia-hemoglobin A1c 5.8.      Morbidly obese      Hyperkalemia.-Resolve      Deconditioning-PT and OT consult      Blood culture regular is negative staph 1 out of 4 bottles, contamination.      Discharge Planning:  Consult  for rehabilitation placement.  Unable to transfer due to weather and ambulatory service.  We will plan to transfer when able.    Alec Granados MD   01/13/18   1:58 PM

## 2018-01-13 NOTE — THERAPY TREATMENT NOTE
Acute Care - Physical Therapy Treatment Note  [facility]     Patient Name: Tricia Hernandez  : 1940  MRN: [id number]  Today's Date: 2018  Onset of Illness/Injury or Date of Surgery Date: 18  Date of Referral to PT: 18  Referring Physician: Dr. Granados    Admit Date: 1/3/2018    Visit Dx:    ICD-10-CM ICD-9-CM   1. Impaired mobility and ADLs Z74. 799.89   2. Impaired mobility Z74. 799.89     Patient Active Problem List   Diagnosis   • RUBY (acute kidney injury)   • DVT (deep venous thrombosis)   • Anemia   • GI bleed               Adult Rehabilitation Note       18 1130 18 1430 18 1134    Rehab Assessment/Intervention    Discipline physical therapy assistant  -CW occupational therapist  -TR physical therapy assistant  -CW    Document Type therapy note (daily note)  -CW  therapy note (daily note)  -CW    Subjective Information agree to therapy;complains of;pain;weakness  -CW  agree to therapy;complains of;weakness  -CW    Patient Effort, Rehab Treatment adequate  -CW  adequate  -CW    Treatment Not Performed  patient/family declined treatment  -TR     Precautions/Limitations fall precautions  -CW  fall precautions  -CW    Recorded by [CW] Rhonda Gutierrez PTA [TR] Amber Rosales OTR/L [CW] Rhonda Gutierrez PTA    Pain Assessment    Pain Assessment Rodriguez-Akhtar FACES  -CW  No/denies pain  -CW    Rodriguez-Akhtar FACES Pain Rating 6  -CW      Pain Type Chronic pain  -CW      Pain Location Knee  -CW      Pain Orientation Right  -CW      Pain Frequency Intermittent  -CW      Pain Intervention(s) Repositioned  -CW      Recorded by [CW] Rhonda Gutierrez PTA  [CW] Rhonda Gutierrez PTA    Bed Mobility, Assessment/Treatment    Bed Mobility, Assistive Device bed rails;draw sheet  -CW  bed rails;draw sheet  -CW    Bed Mobility, Roll Left, [place] maximum assist (25% patient effort);2 person assist required;verbal cues required   rolled x 6 assist with clean linens & gown   -CW      Bed Mobility, Roll Right, [place] moderate assist (50% patient effort);2 person assist required;verbal cues required  -CW      Bed Mobility, Scoot/Bridge, [place] maximum assist (25% patient effort);2 person assist required   bed in trendelenberg  -CW  maximum assist (25% patient effort);2 person assist required   bed in trendelenberg  -CW    Bed Mob, Supine to Sit, [place] minimum assist (75% patient effort);verbal cues required  -CW  minimum assist (75% patient effort);verbal cues required  -CW    Bed Mob, Sit to Supine, [place] moderate assist (50% patient effort);maximum assist (25% patient effort);2 person assist required  -CW  moderate assist (50% patient effort);2 person assist required;verbal cues required  -CW    Bed Mobility, Safety Issues decreased use of arms for pushing/pulling;decreased use of legs for bridging/pushing  -CW  decreased use of arms for pushing/pulling;decreased use of legs for bridging/pushing  -CW    Bed Mobility, Impairments ROM decreased;strength decreased  -CW  ROM decreased;strength decreased;decreased flexibility  -CW    Recorded by [CW] Rhonda Gutierrez, PTA  [CW] Rhonda Gutierrez PTA    Transfer Assessment/Treatment    Transfers, Sit-Stand [place] maximum assist (25% patient effort);2 person assist required;verbal cues required  -CW  maximum assist (25% patient effort);2 person assist required;verbal cues required   stood x 2  -CW    Transfers, Stand-Sit [place] minimum assist (75% patient effort);2 person assist required  -CW  moderate assist (50% patient effort);2 person assist required;verbal cues required  -CW    Transfers, Sit-Stand-Sit, Assist Device rolling walker;elevated surface  -CW  rolling walker  -CW    Transfer, Impairments strength decreased  -CW  strength decreased  -CW    Recorded by [CW] Rhonda Gutierrez, PTA  [CW] Rhonda Gutierrez PTA    Gait Assessment/Treatment    Gait, [place] Level minimum assist (75%  patient effort);2 person assist required;verbal cues required  -CW  minimum assist (75% patient effort);2 person assist required;verbal cues required  -CW    Gait, Assistive Device rolling walker  -CW  rolling walker  -CW    Gait, Distance (Feet) --   6 small side steps toward HOB  -CW  --   2 side steps at EOB  -CW    Gait, Impairments strength decreased  -CW  strength decreased  -CW    Recorded by [CW] Rhonda Gutierrez PTA  [CW] Rhonda Gutierrez PTA    Balance Skills Training    Sitting-Level of Assistance Distant supervision  -CW      Standing-Level of Assistance Minimum assistance;x2  -CW      Static Standing Balance Support assistive device  -CW      Gait Balance-Level of Assistance Minimum assistance;x2  -CW      Gait Balance Support assistive device  -CW      Gait Balance Activities side-stepping  -CW      Recorded by [CW] Rhonda Gutierrez PTA      Therapy Exercises    Bilateral Lower Extremities   PROM:;AAROM:;sitting   Assisted pt with knee flexion as tolerated  -CW    Recorded by   [CW] Rhonda Gutierrez PTA    Positioning and Restraints    Pre-Treatment Position in bed  -CW  in bed  -CW    Post Treatment Position bed  -CW  bed  -CW    In Bed fowlers;call light within reach;encouraged to call for assist  -CW  fowlers;call light within reach;encouraged to call for assist  -CW    Recorded by [CW] Rhonda Gutierrez PTA  [CW] Rhonda Gutierrez PTA      01/12/18 1106 01/12/18 1039 01/11/18 1100    Rehab Assessment/Intervention    Discipline physical therapy assistant  -NW --  -NW physical therapy assistant  -NW    Document Type  --  -NW therapy note (daily note)  -NW    Subjective Information   agree to therapy;complains of;weakness  -NW    Precautions/Limitations   fall precautions  -NW    Recorded by [NW] Blanca Stock PTA [NW] Blanca Stock PTA [NW] Blanca Stock PTA    Pain Assessment    Pain Assessment   No/denies pain  -NW    Recorded by   [NW] Blanca Stcok PTA    Bed Mobility,  Assessment/Treatment    Bed Mobility, Assistive Device   draw sheet;bed rails  -NW    Bed Mobility, Roll Left, [place]   verbal cues required;moderate assist (50% patient effort);2 person assist required  -NW    Bed Mobility, Roll Right, [place]   verbal cues required;moderate assist (50% patient effort);2 person assist required  -NW    Bed Mobility, Scoot/Bridge, [place]   verbal cues required;moderate assist (50% patient effort);2 person assist required  -NW    Bed Mob, Supine to Sit, [place]   verbal cues required;moderate assist (50% patient effort);2 person assist required  -NW    Bed Mob, Sit to Supine, [place]   verbal cues required;moderate assist (50% patient effort);2 person assist required  -NW    Bed Mobility, Safety Issues   decreased use of arms for pushing/pulling;decreased use of legs for bridging/pushing  -NW    Bed Mobility, Impairments   ROM decreased;strength decreased;sensation decreased  -NW    Recorded by   [NW] Blanca Stock PTA    Transfer Assessment/Treatment    Transfers, Sit-Stand [place]   --   attempt to stand x2 pt unable  -NW    Transfer, Comment   suggest to RN pt to be up w/ Lift Team in pm  -NW    Recorded by   [NW] Blanca Stock PTA    Motor Skills/Interventions    Additional Documentation   Balance Skills Training (Group)  -NW    Recorded by   [NW] Blanca Stock PTA    Balance Skills Training    Sitting-Level of Assistance   Close supervision  -NW    Sitting # of Minutes   10  -NW    Recorded by   [NW] Blnaca Stock PTA    Positioning and Restraints    Pre-Treatment Position   in bed  -NW    Post Treatment Position   bed  -NW    In Bed   fowlers;call light within reach;encouraged to call for assist;notified nsg  -NW    Recorded by   [NW] Blanca Stock PTA      01/10/18 7548          Rehab Assessment/Intervention    Discipline physical therapy assistant  -NW      Document Type therapy note (daily note)  -NW      Subjective Information  --   confused  -NW      Precautions/Limitations fall precautions  -NW      Recorded by [NW] Blanca Stock PTA      Pain Assessment    Pain Assessment No/denies pain  -NW      Recorded by [NW] Blanca Stock PTA      Therapy Exercises    Bilateral Lower Extremities AAROM:;10 reps;supine   pt fatigue needed constant verbal cues to stay awake  -NW      Recorded by [NW] Blanca Stock PTA      Positioning and Restraints    Pre-Treatment Position in bed  -NW      Post Treatment Position bed  -NW      In Bed fowlers;call light within reach;encouraged to call for assist;notified nsg  -NW      Recorded by [NW] Blanca Stock PTA        User Key  (r) = Recorded By, (t) = Taken By, (c) = Cosigned By    Initials Name Effective Dates    CW Rhonda Gutierrez, PTA 06/22/15 -     NW Blanca Stock, PTA 08/02/16 -     TR Amber Rosales, OTR/L 06/22/15 -                 IP PT Goals       01/10/18 1004          Bed Mobility PT LTG    Bed Mobility PT LTG, Date Established 01/10/18  -PB (r) JM (t) PB (c)      Bed Mobility PT LTG, Time to Achieve by discharge  -PB (r) JM (t) PB (c)      Bed Mobility PT LTG, Activity Type all bed mobility  -PB (r) JM (t) PB (c)      Bed Mobility PT LTG, [place] Level minimum assist (75% patient effort)  -PB (r) JM (t) PB (c)      Bed Mobility PT Goal  LTG, Assist Device bed rails  -PB (r) JM (t) PB (c)      Transfer Training PT LTG    Transfer Training PT LTG, Date Established 01/10/18  -PB (r) JM (t) PB (c)      Transfer Training PT LTG, Time to Achieve by discharge  -PB (r) JM (t) PB (c)      Transfer Training PT LTG, Activity Type bed to chair /chair to bed;sit to stand/stand to sit  -PB (r) JM (t) PB (c)      Transfer Training PT LTG, [place] Level minimum assist (75% patient effort)  -PB (r) JM (t) PB (c)      Transfer Training PT LTG, Assist Device walker, gigi  -PB (r) JM (t) PB (c)      Strength Goal PT LTG    Strength Goal PT LTG, Date Established 01/10/18  -ALTON randle) SIMONA carlos)  PB (c)      Strength Goal PT LTG, Time to Achieve by discharge  -PB (r) JM (t) PB (c)      Strength Goal PT LTG, Functional Goal Perform 15 reps of EOB B LE AROM exercises  -PB (r) JM (t) PB (c)        User Key  (r) = Recorded By, (t) = Taken By, (c) = Cosigned By    Initials Name Provider Type    PB Prasanth Abdi, PT DPT Physical Therapist    SIMONA Crane, PT Student PT Student          Physical Therapy Education     Title: PT OT SLP Therapies (Active)     Topic: Physical Therapy (Active)     Point: Mobility training (Active)    Learning Progress Summary    Learner Readiness Method Response Comment Documented by Status   Patient Acceptance E,D NR Bed mobility, transfers, planof care  01/13/18 1319 Active    Acceptance E NR Bed mobility, transfers, planof care, benefit of activity  01/12/18 1307 Active    Acceptance E VU progressin of POC  01/11/18 1147 Done    Acceptance E VU Pt was educated on importance of activity and potential discharge plans.  01/10/18 0959 Done                      User Key     Initials Effective Dates Name Provider Type Discipline     06/22/15 -  Rhonda Gutierrez, PTA Physical Therapy Assistant PT     08/02/16 -  Blanca Stock, PTA Physical Therapy Assistant PT     01/10/18 -  Jim Crane, PT Student PT Student PT                    PT Recommendation and Plan  Anticipated Equipment Needs At Discharge: front wheeled walker  Anticipated Discharge Disposition: skilled nursing facility  Planned Therapy Interventions: bed mobility training, ROM (Range of Motion), patient/family education, strengthening, transfer training  PT Frequency: daily, 2 times/day, per priority policy  Plan of Care Review  Plan Of Care Reviewed With: patient  Progress: progress toward functional goals as expected  Outcome Summary/Follow up Plan: Pt requires max assist for rolling in bed and is min assist of 2 for supine to sit EOB with use of bed rail.  Max assist of 2 required for sit to  stand transfers. Pt took small side steps toward HOB with rolling walker min assist of 2.  Pt will continue to benefit from therapy to increase strength .          Outcome Measures       01/13/18 1300 01/12/18 1300 01/11/18 1100    How much help from another person do you currently need...    Turning from your back to your side while in flat bed without using bedrails? 2  -CW 2  -CW 2  -NW    Moving from lying on back to sitting on the side of a flat bed without bedrails? 2  -CW 2  -CW 2  -NW    Moving to and from a bed to a chair (including a wheelchair)? 1  -CW 1  -CW 1  -NW    Standing up from a chair using your arms (e.g., wheelchair, bedside chair)? 2  -CW 2  -CW 1  -NW    Climbing 3-5 steps with a railing? 1  -CW 1  -CW 1  -NW    To walk in hospital room? 1  -CW 1  -CW 1  -NW    AM-PAC 6 Clicks Score 9  -CW 9  -CW 8  -NW    Functional Assessment    Outcome Measure Options AM-PAC 6 Clicks Basic Mobility (PT)  -CW AM-PAC 6 Clicks Basic Mobility (PT)  -CW AM-PAC 6 Clicks Basic Mobility (PT)  -NW      User Key  (r) = Recorded By, (t) = Taken By, (c) = Cosigned By    Initials Name Provider Type    CW Rhonda Gutierrez PTA Physical Therapy Assistant    NW Blanca Stock PTA Physical Therapy Assistant           Time Calculation:         PT Charges       01/13/18 1325          Time Calculation    Start Time 1130  -CW      Stop Time 1209  -CW      Time Calculation (min) 39 min  -CW      PT Received On 01/13/18  -CW      PT Goal Re-Cert Due Date 01/20/18  -CW      Time Calculation- PT    Total Timed Code Minutes- PT 39 minute(s)  -CW        User Key  (r) = Recorded By, (t) = Taken By, (c) = Cosigned By    Initials Name Provider Type    LISANDRO Gutierrez PTA Physical Therapy Assistant          Therapy Charges for Today     Code Description Service Date Service Provider Modifiers Qty    91300586240 HC PT THERAPEUTIC ACT EA 15 MIN 1/12/2018 Rhonda Gutierrez PTA GP 2    73893898874 HC PT THERAPEUTIC ACT EA 15  MIN 1/13/2018 Rhonda Gutierrez, PTA GP 3          PT G-Codes  Outcome Measure Options: AM-PAC 6 Clicks Basic Mobility (PT)  Score: 10  Functional Limitation: Changing and maintaining body position  Changing and Maintaining Body Position Current Status (): At least 60 percent but less than 80 percent impaired, limited or restricted  Changing and Maintaining Body Position Goal Status (): At least 40 percent but less than 60 percent impaired, limited or restricted    Rhonda Gutierrez PTA  1/13/2018

## 2018-01-13 NOTE — PLAN OF CARE
Problem: Patient Care Overview (Adult)  Goal: Plan of Care Review  Outcome: Ongoing (interventions implemented as appropriate)   01/13/18 0156   Coping/Psychosocial Response Interventions   Plan Of Care Reviewed With patient   Patient Care Overview   Progress no change   Outcome Evaluation   Outcome Summary/Follow up Plan pt is awaiting ambulance transport to be transferred to [facility] in [place], [place]. Cont to turn q2hr and provide incontince care.      Goal: Adult Individualization and Mutuality  Outcome: Ongoing (interventions implemented as appropriate)    Goal: Discharge Needs Assessment  Outcome: Ongoing (interventions implemented as appropriate)   01/05/18 1401 01/07/18 1530 01/08/18 0948   Discharge Needs Assessment   Concerns To Be Addressed care coordination/care conferences;discharge planning concerns --  --    Readmission Within The Last 30 Days no previous admission in last 30 days --  --    Community Agency Name(S) ([facility] [phone]) --  --    Equipment Needed After Discharge --  --  --    Current Discharge Risk chronically ill --  --    Discharge Disposition --  still a patient --    Current Health   Outpatient/Agency/Support Group Needs homecare agency (specify level of care) --  --    Living Environment   Transportation Available --  --  car;family or friend will provide   Self-Care   Equipment Currently Used at Home --  --  --     01/08/18 1831 01/10/18 0840   Discharge Needs Assessment   Concerns To Be Addressed --  --    Readmission Within The Last 30 Days --  --    Community Agency Name(S) --  --    Equipment Needed After Discharge none --    Current Discharge Risk --  --    Discharge Disposition --  --    Current Health   Outpatient/Agency/Support Group Needs --  --    Living Environment   Transportation Available --  --    Self-Care   Equipment Currently Used at Home --  walker, gigi  (Using gigi-walker since reverse TSA in november.)       Problem: Fall Risk (Adult)  Goal:  Absence of Falls  Outcome: Ongoing (interventions implemented as appropriate)   01/13/18 0156   Fall Risk (Adult)   Absence of Falls making progress toward outcome       Problem: VTE, DVT and PE (Adult)  Goal: Signs and Symptoms of Listed Potential Problems Will be Absent or Manageable (VTE, DVT and PE)  Outcome: Ongoing (interventions implemented as appropriate)   01/13/18 0156   VTE, DVT and PE   Problems Assessed (VTE, DVT, PE) all   Problems Present (VTE, DVT, PE) deep vein thrombosis       Problem: Skin Integrity Impairment, Risk/Actual (Adult)  Goal: Skin Integrity/Wound Healing  Outcome: Ongoing (interventions implemented as appropriate)   01/13/18 0156   Skin Integrity Impairment, Risk/Actual (Adult)   Skin Integrity/Wound Healing making progress toward outcome       Problem: Pressure Ulcer Risk (Jorge Scale) (Adult,Obstetrics,Pediatric)  Goal: Skin Integrity  Outcome: Ongoing (interventions implemented as appropriate)   01/13/18 0156   Pressure Ulcer Risk (Jorge Scale) (Adult,Obstetrics,Pediatric)   Skin Integrity making progress toward outcome

## 2018-01-13 NOTE — PLAN OF CARE
Problem: Patient Care Overview (Adult)  Goal: Plan of Care Review  Outcome: Ongoing (interventions implemented as appropriate)   01/13/18 1319   Coping/Psychosocial Response Interventions   Plan Of Care Reviewed With patient   Patient Care Overview   Progress progress toward functional goals as expected   Outcome Evaluation   Outcome Summary/Follow up Plan Pt requires max assist for rolling in bed and is min assist of 2 for supine to sit EOB with use of bed rail. Max assist of 2 required for sit to stand transfers. Pt took small side steps toward HOB with rolling walker min assist of 2. Pt will continue to benefit from therapy to increase strength and functional mobility.

## 2018-01-13 NOTE — PLAN OF CARE
Problem: Patient Care Overview (Adult)  Goal: Plan of Care Review  Outcome: Ongoing (interventions implemented as appropriate)   01/13/18 1330   Coping/Psychosocial Response Interventions   Plan Of Care Reviewed With patient   Patient Care Overview   Progress progress toward functional goals as expected   Outcome Evaluation   Outcome Summary/Follow up Plan denies pain this shift. reposition every 2 hours, bm today. P.T. working with patient.     Goal: Adult Individualization and Mutuality  Outcome: Ongoing (interventions implemented as appropriate)   01/09/18 1521   Individualization   Patient Specific Preferences none   Patient Specific Goals go home   Patient Specific Interventions turn q 2   Mutuality/Individual Preferences   What Anxieties, Fears or Concerns Do You Have About Your Health or Care? none   What Questions Do You Have About Your Health or Care? none   What Information Would Help Us Give You More Personalized Care? none     Goal: Discharge Needs Assessment  Outcome: Outcome(s) achieved Date Met: 01/13/18 01/13/18 1330   Discharge Needs Assessment   Discharge Disposition still a patient       Problem: Fall Risk (Adult)  Goal: Absence of Falls  Outcome: Ongoing (interventions implemented as appropriate)   01/13/18 1330   Fall Risk (Adult)   Absence of Falls making progress toward outcome       Problem: VTE, DVT and PE (Adult)  Goal: Signs and Symptoms of Listed Potential Problems Will be Absent or Manageable (VTE, DVT and PE)  Outcome: Ongoing (interventions implemented as appropriate)   01/13/18 1330   VTE, DVT and PE   Problems Assessed (VTE, DVT, PE) all   Problems Present (VTE, DVT, PE) deep vein thrombosis       Problem: Skin Integrity Impairment, Risk/Actual (Adult)  Goal: Skin Integrity/Wound Healing  Outcome: Ongoing (interventions implemented as appropriate)   01/13/18 1330   Skin Integrity Impairment, Risk/Actual (Adult)   Skin Integrity/Wound Healing making progress toward outcome        Problem: Pressure Ulcer Risk (Jorge Scale) (Adult,Obstetrics,Pediatric)  Goal: Skin Integrity  Outcome: Ongoing (interventions implemented as appropriate)   01/13/18 1330   Pressure Ulcer Risk (Jorge Scale) (Adult,Obstetrics,Pediatric)   Skin Integrity making progress toward outcome

## 2018-01-14 LAB
ABO GROUP BLD: NORMAL
ANION GAP SERPL CALCULATED.3IONS-SCNC: 3 MMOL/L (ref 4–13)
BASOPHILS # BLD AUTO: 0.03 10*3/MM3 (ref 0–0.2)
BASOPHILS NFR BLD AUTO: 0.4 % (ref 0–2)
BLD GP AB SCN SERPL QL: NEGATIVE
BUN BLD-MCNC: 17 MG/DL (ref 5–21)
BUN/CREAT SERPL: 17.5 (ref 7–25)
CALCIUM SPEC-SCNC: 9.8 MG/DL (ref 8.4–10.4)
CHLORIDE SERPL-SCNC: 99 MMOL/L (ref 98–110)
CO2 SERPL-SCNC: 34 MMOL/L (ref 24–31)
CREAT BLD-MCNC: 0.97 MG/DL (ref 0.5–1.4)
DEPRECATED RDW RBC AUTO: 50.5 FL (ref 40–54)
EOSINOPHIL # BLD AUTO: 0.19 10*3/MM3 (ref 0–0.7)
EOSINOPHIL NFR BLD AUTO: 2.4 % (ref 0–4)
ERYTHROCYTE [DISTWIDTH] IN BLOOD BY AUTOMATED COUNT: 15.9 % (ref 12–15)
GFR SERPL CREATININE-BSD FRML MDRD: 56 ML/MIN/1.73
GFR SERPL CREATININE-BSD FRML MDRD: 68 ML/MIN/1.73
GLUCOSE BLD-MCNC: 112 MG/DL (ref 70–100)
HCT VFR BLD AUTO: 22.5 % (ref 37–47)
HCT VFR BLD AUTO: 25.2 % (ref 37–47)
HGB BLD-MCNC: 7.4 G/DL (ref 12–16)
HGB BLD-MCNC: 8 G/DL (ref 12–16)
IMM GRANULOCYTES # BLD: 0.08 10*3/MM3 (ref 0–0.03)
IMM GRANULOCYTES NFR BLD: 1 % (ref 0–5)
LYMPHOCYTES # BLD AUTO: 1.01 10*3/MM3 (ref 0.72–4.86)
LYMPHOCYTES NFR BLD AUTO: 12.9 % (ref 15–45)
MCH RBC QN AUTO: 28.9 PG (ref 28–32)
MCHC RBC AUTO-ENTMCNC: 32.9 G/DL (ref 33–36)
MCV RBC AUTO: 87.9 FL (ref 82–98)
MONOCYTES # BLD AUTO: 1.09 10*3/MM3 (ref 0.19–1.3)
MONOCYTES NFR BLD AUTO: 14 % (ref 4–12)
NEUTROPHILS # BLD AUTO: 5.4 10*3/MM3 (ref 1.87–8.4)
NEUTROPHILS NFR BLD AUTO: 69.3 % (ref 39–78)
NRBC BLD MANUAL-RTO: 0 /100 WBC (ref 0–0)
PLATELET # BLD AUTO: 284 10*3/MM3 (ref 130–400)
PMV BLD AUTO: 9.9 FL (ref 6–12)
POTASSIUM BLD-SCNC: 4.2 MMOL/L (ref 3.5–5.3)
RBC # BLD AUTO: 2.56 10*6/MM3 (ref 4.2–5.4)
RH BLD: POSITIVE
SODIUM BLD-SCNC: 136 MMOL/L (ref 135–145)
WBC NRBC COR # BLD: 7.8 10*3/MM3 (ref 4.8–10.8)

## 2018-01-14 PROCEDURE — 86900 BLOOD TYPING SEROLOGIC ABO: CPT

## 2018-01-14 PROCEDURE — 94799 UNLISTED PULMONARY SVC/PX: CPT

## 2018-01-14 PROCEDURE — 80048 BASIC METABOLIC PNL TOTAL CA: CPT | Performed by: FAMILY MEDICINE

## 2018-01-14 PROCEDURE — 86900 BLOOD TYPING SEROLOGIC ABO: CPT | Performed by: FAMILY MEDICINE

## 2018-01-14 PROCEDURE — 99232 SBSQ HOSP IP/OBS MODERATE 35: CPT | Performed by: INTERNAL MEDICINE

## 2018-01-14 PROCEDURE — 85018 HEMOGLOBIN: CPT | Performed by: FAMILY MEDICINE

## 2018-01-14 PROCEDURE — 86850 RBC ANTIBODY SCREEN: CPT | Performed by: FAMILY MEDICINE

## 2018-01-14 PROCEDURE — P9016 RBC LEUKOCYTES REDUCED: HCPCS

## 2018-01-14 PROCEDURE — 85025 COMPLETE CBC W/AUTO DIFF WBC: CPT | Performed by: FAMILY MEDICINE

## 2018-01-14 PROCEDURE — 86920 COMPATIBILITY TEST SPIN: CPT

## 2018-01-14 PROCEDURE — 94640 AIRWAY INHALATION TREATMENT: CPT

## 2018-01-14 PROCEDURE — 36430 TRANSFUSION BLD/BLD COMPNT: CPT

## 2018-01-14 PROCEDURE — 86901 BLOOD TYPING SEROLOGIC RH(D): CPT | Performed by: FAMILY MEDICINE

## 2018-01-14 PROCEDURE — 94760 N-INVAS EAR/PLS OXIMETRY 1: CPT

## 2018-01-14 PROCEDURE — 85014 HEMATOCRIT: CPT | Performed by: FAMILY MEDICINE

## 2018-01-14 RX ORDER — SODIUM CHLORIDE 9 MG/ML
100 INJECTION, SOLUTION INTRAVENOUS CONTINUOUS
Status: DISCONTINUED | OUTPATIENT
Start: 2018-01-14 | End: 2018-01-17

## 2018-01-14 RX ORDER — FUROSEMIDE 10 MG/ML
20 INJECTION INTRAMUSCULAR; INTRAVENOUS ONCE
Status: DISCONTINUED | OUTPATIENT
Start: 2018-01-14 | End: 2018-01-14

## 2018-01-14 RX ADMIN — ACETAMINOPHEN 650 MG: 325 TABLET, FILM COATED ORAL at 01:02

## 2018-01-14 RX ADMIN — APIXABAN 5 MG: 5 TABLET, FILM COATED ORAL at 05:43

## 2018-01-14 RX ADMIN — IPRATROPIUM BROMIDE AND ALBUTEROL SULFATE 3 ML: 2.5; .5 SOLUTION RESPIRATORY (INHALATION) at 20:22

## 2018-01-14 RX ADMIN — IPRATROPIUM BROMIDE AND ALBUTEROL SULFATE 3 ML: 2.5; .5 SOLUTION RESPIRATORY (INHALATION) at 06:52

## 2018-01-14 RX ADMIN — IPRATROPIUM BROMIDE AND ALBUTEROL SULFATE 3 ML: 2.5; .5 SOLUTION RESPIRATORY (INHALATION) at 14:07

## 2018-01-14 RX ADMIN — PANTOPRAZOLE SODIUM 40 MG: 40 TABLET, DELAYED RELEASE ORAL at 05:37

## 2018-01-14 NOTE — PLAN OF CARE
Problem: Patient Care Overview (Adult)  Goal: Plan of Care Review  Outcome: Ongoing (interventions implemented as appropriate)   01/14/18 0212   Coping/Psychosocial Response Interventions   Plan Of Care Reviewed With patient     Goal: Adult Individualization and Mutuality  Outcome: Ongoing (interventions implemented as appropriate)      Problem: Fall Risk (Adult)  Goal: Absence of Falls  Outcome: Ongoing (interventions implemented as appropriate)      Problem: VTE, DVT and PE (Adult)  Goal: Signs and Symptoms of Listed Potential Problems Will be Absent or Manageable (VTE, DVT and PE)  Outcome: Ongoing (interventions implemented as appropriate)      Problem: Skin Integrity Impairment, Risk/Actual (Adult)  Goal: Skin Integrity/Wound Healing  Outcome: Ongoing (interventions implemented as appropriate)      Problem: Pressure Ulcer Risk (Jorge Scale) (Adult,Obstetrics,Pediatric)  Goal: Skin Integrity  Outcome: Ongoing (interventions implemented as appropriate)

## 2018-01-14 NOTE — PROGRESS NOTES
[facility]  Inpatient Progress Note    Reason for Follow Up:  re consult for active GI Bleed    Subjective     Subjective:   Pt initially evaluated by Dr River/Dr Britton for heme pos stool.  GI signed off due to no signs of active GI bleeding.  Today GI has been asked to re evaluate pt due to multiple episodes of BRBPR.  Pt denies abd pain.  No difficulty with constipation.  No N/V.    Current Facility-Administered Medications:   •  acetaminophen (TYLENOL) tablet 650 mg, 650 mg, Oral, Q4H PRN, 650 mg at 01/14/18 0102 **OR** acetaminophen (TYLENOL) suppository 650 mg, 650 mg, Rectal, Q4H PRN, BRITNEY Schroeder  •  dextrose (D50W) solution 25 g, 25 g, Intravenous, Q15 Min PRN, Wilfred Pedersen MD  •  dextrose (GLUTOSE) oral gel 15 g, 15 g, Oral, Q15 Min PRN, Wilfred Pedersen MD  •  glucagon (human recombinant) (GLUCAGEN DIAGNOSTIC) injection 1 mg, 1 mg, Subcutaneous, Q15 Min PRN, Wilfred Pedersen MD  •  ipratropium-albuterol (DUO-NEB) nebulizer solution 3 mL, 3 mL, Nebulization, Q6H - RT, BRITNEY Schroeder, 3 mL at 01/14/18 0652  •  ondansetron (ZOFRAN) tablet 4 mg, 4 mg, Oral, Q6H PRN **OR** ondansetron ODT (ZOFRAN-ODT) disintegrating tablet 4 mg, 4 mg, Oral, Q6H PRN **OR** ondansetron (ZOFRAN) injection 4 mg, 4 mg, Intravenous, Q6H PRN, BRITNEY Schroeder  •  pantoprazole (PROTONIX) EC tablet 40 mg, 40 mg, Oral, Q AM, Vanessa Britton MD, 40 mg at 01/14/18 0537  •  sodium chloride 0.9 % flush 1-10 mL, 1-10 mL, Intravenous, PRN, BRITNEY Schroeder, 10 mL at 01/05/18 2036    Review of Systems     Constitution:  negative for chills, fatigue and fevers  Eyes:  negativefor blurriness and change of vision  ENT:   negative for sore throat and voice change  Respiratory: negative for  cough and shortness of air  Cardiovascular:  Negative for chest pain or palpitations  Gastrointestinal:  negative for  See HPI  Genitourinary:  negativefor  blood in urine and painful  urination  Integument: negative for  rash and redness  Hematologic / Lymphatic: negative for  excessive bleeding and easy bruising  Musculoskeletal: negative for  joint pain and joint stiffness out of the ordinary  Neurological:  negative for  seizures and speech abnormal  Behavioral/Psych:  negative for  anxiety and depression out of the ordinary  Endocrine: negative for  diabetes:and weight loss, unintended  Allergies / Immunologic:  negative for  anaphylaxis and rash        Objective     Vital Signs  Temp:  [97.8 °F (36.6 °C)-101.6 °F (38.7 °C)] 98.2 °F (36.8 °C)  Heart Rate:  [62-99] 62  Resp:  [16-20] 16  BP: (103-142)/(50-99) 106/50  Body mass index is 62.51 kg/(m^2).    Intake/Output Summary (Last 24 hours) at 01/14/18 1048  Last data filed at 01/14/18 0918   Gross per 24 hour   Intake              840 ml   Output              350 ml   Net              490 ml     I/O this shift:  In: 360 [P.O.:360]  Out: 350 [Urine:350]       Physical Exam     Physical Exam:   General: patient awake, alert and cooperative   Eyes: Normal lids and lashes, no scleral icterus   Neck: supple, normal ROM   Skin: warm and dry, not jaundiced   Cardiovascular: regular rhythm and rate, no murmurs auscultated   Pulm: clear to auscultation bilaterally, regular and unlabored   Abdomen: soft, nontender, nondistended; normal bowel sounds   Rectal: deferred   Extremities: no rash or edema   Psychiatric: Normal mood and behavior; memory intact      Results Review:    I have reviewed all of the patients current test results    Results from last 7 days  Lab Units 01/14/18 0528 01/13/18 0702 01/12/18  0619   WBC 10*3/mm3 7.80 7.71 11.10*   HEMOGLOBIN g/dL 7.4* 8.2* 8.7*   HEMATOCRIT % 22.5* 25.3* 27.2*   PLATELETS 10*3/mm3 284 285 309         Results from last 7 days  Lab Units 01/14/18 0528 01/13/18  0702 01/12/18  0619 01/11/18  0510   SODIUM mmol/L 136 136 135 137   POTASSIUM mmol/L 4.2 4.0 4.1 4.0   CHLORIDE mmol/L 99 99 99 99   CO2  "mmol/L 34.0* 34.0* 32.0* 33.0*   BUN mg/dL 17 18 19 20   CREATININE mg/dL 0.97 0.93 0.95 0.95   CALCIUM mg/dL 9.8 9.7 10.1 9.9   BILIRUBIN mg/dL  --  0.3 0.3 0.3   ALK PHOS U/L  --  64 75 76   ALT (SGPT) U/L  --  30 35 40   AST (SGOT) U/L  --  22 28 33   GLUCOSE mg/dL 112* 100 107* 107*             No results found for: LIPASE    Radiology:    Imaging Results (last 24 hours)     ** No results found for the last 24 hours. **            Assessment/Plan     Patient Active Problem List   Diagnosis Code   • RUBY (acute kidney injury) N17.9   • DVT (deep venous thrombosis) I82.409   • Anemia D64.9   • GI bleed K92.2       Impression/Plan    BRBPR  Anemia  Anticoagulated on Eliquis    Agree with dc of Eliquis.  Will change diet to clear liquid.  Further recommendation per Dr David Rubin, APRN 10:43 AM    Imp/Plan    1. BRBPR  2. Anemia  3. DVT    Difficult situation here due to the fact she has Bilateral DVT's. Suggest Holding all blood thinners and considering a \"filter\" as she will most likely continue to have GI Bleeding going forward. Poor candidate for c-scope eval due to her obesity, debilitated state.   Will follow very closely with you  Clear liquids, IV Fluids, serial h/h's as directed by the primary service   Garfield Hernandez,   01/14/18  10:48 AM      "

## 2018-01-14 NOTE — PLAN OF CARE
Problem: Patient Care Overview (Adult)  Goal: Plan of Care Review  Outcome: Ongoing (interventions implemented as appropriate)  Large amount of bleeding in stools X2 today, Hg currently 8.0, 2 units RBC ordered, voiding, A/O, hypotensive, safety maintained   01/14/18 1608   Coping/Psychosocial Response Interventions   Plan Of Care Reviewed With patient   Patient Care Overview   Progress no change     Goal: Adult Individualization and Mutuality  Outcome: Ongoing (interventions implemented as appropriate)      Problem: Fall Risk (Adult)  Goal: Absence of Falls  Outcome: Ongoing (interventions implemented as appropriate)      Problem: VTE, DVT and PE (Adult)  Goal: Signs and Symptoms of Listed Potential Problems Will be Absent or Manageable (VTE, DVT and PE)  Outcome: Ongoing (interventions implemented as appropriate)      Problem: Skin Integrity Impairment, Risk/Actual (Adult)  Goal: Skin Integrity/Wound Healing  Outcome: Ongoing (interventions implemented as appropriate)      Problem: Pressure Ulcer Risk (Jorge Scale) (Adult,Obstetrics,Pediatric)  Goal: Skin Integrity  Outcome: Ongoing (interventions implemented as appropriate)      Problem: Gastrointestinal Bleeding (Adult)  Goal: Signs and Symptoms of Listed Potential Problems Will be Absent or Manageable (Gastrointestinal Bleeding)  Outcome: Ongoing (interventions implemented as appropriate)

## 2018-01-14 NOTE — CONSULTS
No veins noted to bilateral upper arms big enough for midline placement.  PIV inserted.  Nurse notified.

## 2018-01-14 NOTE — PROGRESS NOTES
[facility] Medicine Services  INPATIENT PROGRESS NOTE    Length of Stay: 11  Date of Admission: 1/3/2018  Primary Care Physician: Ed Hanson MD    Subjective   Chief Complaint: GI bleed    HPI   Patient had a large amount of bright red/tarry stool today.  Patient still remained very weak.  Patient denies any chest pain or shortness of breath.  GI has been consulted.  Plan to check hemoglobin every 6 hours. Plan 2 units of blood transfusion today with Lasix in between.    Review of Systems   Constitutional: Positive for fatigue. Negative for activity change, appetite change, chills and fever.   HENT: Negative for hearing loss, nosebleeds, tinnitus and trouble swallowing.    Eyes: Negative for visual disturbance.   Respiratory: Negative for cough, chest tightness, shortness of breath and wheezing.    Cardiovascular: Negative for chest pain, palpitations and leg swelling.   Gastrointestinal: Negative for abdominal distention, abdominal pain, blood in stool, constipation, diarrhea, nausea and vomiting.   Endocrine: Negative for cold intolerance, heat intolerance, polydipsia, polyphagia and polyuria.   Genitourinary: Negative for decreased urine volume, difficulty urinating, dysuria, flank pain, frequency and hematuria.   Musculoskeletal: Positive for back pain, gait problem and myalgias. Negative for arthralgias and joint swelling.   Skin: Negative for rash.   Allergic/Immunologic: Negative for immunocompromised state.   Neurological: Positive for weakness. Negative for dizziness, syncope, light-headedness and headaches.   Hematological: Negative for adenopathy. Does not bruise/bleed easily.   Psychiatric/Behavioral: Negative for confusion and sleep disturbance. The patient is not nervous/anxious.         All pertinent negatives and positives are as above. All other systems have been reviewed and are negative unless otherwise stated.     Objective    Temp:  [97.8 °F (36.6  °C)-101.6 °F (38.7 °C)] 98.2 °F (36.8 °C)  Heart Rate:  [62-99] 62  Resp:  [16-20] 16  BP: (103-142)/(50-99) 106/50    Intake/Output Summary (Last 24 hours) at 01/14/18 1100  Last data filed at 01/14/18 0918   Gross per 24 hour   Intake              840 ml   Output              350 ml   Net              490 ml     Physical Exam  Constitutional: She is oriented to person, place, and time. She appears well-developed and well-nourished.   HENT:   Head: Normocephalic and atraumatic.   Eyes: Conjunctivae and EOM are normal. Pupils are equal, round, and reactive to light.   Neck: Neck supple. No JVD present. No thyromegaly present.   Cardiovascular: Normal rate, regular rhythm, normal heart sounds and intact distal pulses.  Exam reveals no gallop and no friction rub.    No murmur heard.  Pulmonary/Chest: Effort normal and breath sounds normal. No respiratory distress. She has no wheezes. She has no rales. She exhibits no tenderness.   Shallow breathing   Abdominal: Soft. Bowel sounds are normal. She exhibits no distension. There is no tenderness. There is no rebound and no guarding.  Morbid obesity.  Large amount bright red/tarry stool .  Morbidly obese   Musculoskeletal: Normal range of motion. She exhibits no edema, tenderness or deformity.   Lymphadenopathy:     She has no cervical adenopathy.   Neurological: She is alert and oriented to person, place, and time. She displays normal reflexes. No cranial nerve deficit. She exhibits abnormal muscle tone. Coordination abnormal.   Skin: Skin is warm and dry. No rash noted.   Psychiatric: She has a normal mood and affect. Her behavior is normal. Judgment and thought content normal.   Results Review:  Results Review:  Lab Results (last 24 hours)     Procedure Component Value Units Date/Time    CBC & Differential [[id number]] Collected:  01/14/18 0528    Specimen:  Blood Updated:  01/14/18 0602    Narrative:       The following orders were created for panel order CBC &  Differential.  Procedure                               Abnormality         Status                     ---------                               -----------         ------                     CBC Auto Differential[[id number]]        Abnormal            Final result                 Please view results for these tests on the individual orders.    CBC Auto Differential [[id number]]  (Abnormal) Collected:  01/14/18 0528    Specimen:  Blood Updated:  01/14/18 0602     WBC 7.80 10*3/mm3      RBC 2.56 (L) 10*6/mm3      Hemoglobin 7.4 (L) g/dL      Hematocrit 22.5 (L) %      MCV 87.9 fL      MCH 28.9 pg      MCHC 32.9 (L) g/dL      RDW 15.9 (H) %      RDW-SD 50.5 fl      MPV 9.9 fL      Platelets 284 10*3/mm3      Neutrophil % 69.3 %      Lymphocyte % 12.9 (L) %      Monocyte % 14.0 (H) %      Eosinophil % 2.4 %      Basophil % 0.4 %      Immature Grans % 1.0 %      Neutrophils, Absolute 5.40 10*3/mm3      Lymphocytes, Absolute 1.01 10*3/mm3      Monocytes, Absolute 1.09 10*3/mm3      Eosinophils, Absolute 0.19 10*3/mm3      Basophils, Absolute 0.03 10*3/mm3      Immature Grans, Absolute 0.08 (H) 10*3/mm3      nRBC 0.0 /100 WBC     Basic Metabolic Panel [[id number]]  (Abnormal) Collected:  01/14/18 0528    Specimen:  Blood Updated:  01/14/18 0618     Glucose 112 (H) mg/dL      BUN 17 mg/dL      Creatinine 0.97 mg/dL      Sodium 136 mmol/L      Potassium 4.2 mmol/L      Chloride 99 mmol/L      CO2 34.0 (H) mmol/L      Calcium 9.8 mg/dL      eGFR  African Amer 68 mL/min/1.73      eGFR Non African Amer 56 (L) mL/min/1.73      BUN/Creatinine Ratio 17.5     Anion Gap 3.0 (L) mmol/L     Narrative:       The MDRD GFR formula is only valid for adults with stable renal function between ages 18 and 70.           Cultures:       Radiology Data:    Imaging Results (last 24 hours)     ** No results found for the last 24 hours. **          No Known Allergies    Scheduled meds:     ipratropium-albuterol 3 mL Nebulization Q6H - RT   pantoprazole  40 mg Oral Q AM       PRN meds:  •  acetaminophen **OR** acetaminophen  •  dextrose  •  dextrose  •  glucagon (human recombinant)  •  ondansetron **OR** ondansetron ODT **OR** ondansetron  •  sodium chloride    Assessment/Plan     Active Problems:    RUBY (acute kidney injury)    DVT (deep venous thrombosis)    Anemia    GI bleed      Plan:  Plan:  Occlusive left lower extremity DVT/superficial right-sided thrombus. Hold Eliquis due to GI bleed.  We consult vascular surgery, unable to give anticoagulation due to GI bleed.    GI bleed.   She denies any bloody or black tarry stool.  Continue Protonix.  . Patient started to bleed again.  Bright red blood, moderate amount.  Type and crossmatch 2 units of blood.  20 of Lasix in between transfusion.  Check hemoglobin and hematocrit every 6 hours.  We consult GI.        Iron deficiency anemia-Venofer ×4 days 1/6/18. Hemoglobin is stable.      Shortness of breath upon ambulating.-Chest x-ray      Sepsis, status post bolus.  Leukocytosis- stable.  On Omnicef.      Acute renal failure-improving.        Hyperglycemia-hemoglobin A1c 5.8.      Morbidly obese      Hyperkalemia.-Resolve      Deconditioning-PT and OT consult      Blood culture regular is negative staph 1 out of 4 bottles, contamination.      Discharge Planning:  Consult  for rehabilitation placement. Hold off transfer due to recurrent GI bleed.     Alec Granados MD   01/14/18   11:00 AM

## 2018-01-15 ENCOUNTER — APPOINTMENT (OUTPATIENT)
Dept: GENERAL RADIOLOGY | Facility: HOSPITAL | Age: 78
End: 2018-01-15

## 2018-01-15 LAB
ALBUMIN SERPL-MCNC: 2.4 G/DL (ref 3.5–5)
ALBUMIN/GLOB SERPL: 0.8 G/DL (ref 1.1–2.5)
ALP SERPL-CCNC: 64 U/L (ref 24–120)
ALT SERPL W P-5'-P-CCNC: 32 U/L (ref 0–54)
ANION GAP SERPL CALCULATED.3IONS-SCNC: 5 MMOL/L (ref 4–13)
AST SERPL-CCNC: 21 U/L (ref 7–45)
BASOPHILS # BLD AUTO: 0.03 10*3/MM3 (ref 0–0.2)
BASOPHILS NFR BLD AUTO: 0.6 % (ref 0–2)
BILIRUB SERPL-MCNC: 0.4 MG/DL (ref 0.1–1)
BUN BLD-MCNC: 13 MG/DL (ref 5–21)
BUN/CREAT SERPL: 13.7 (ref 7–25)
CALCIUM SPEC-SCNC: 9.7 MG/DL (ref 8.4–10.4)
CHLORIDE SERPL-SCNC: 101 MMOL/L (ref 98–110)
CO2 SERPL-SCNC: 32 MMOL/L (ref 24–31)
CREAT BLD-MCNC: 0.95 MG/DL (ref 0.5–1.4)
DEPRECATED RDW RBC AUTO: 49.1 FL (ref 40–54)
EOSINOPHIL # BLD AUTO: 0.13 10*3/MM3 (ref 0–0.7)
EOSINOPHIL NFR BLD AUTO: 2.7 % (ref 0–4)
ERYTHROCYTE [DISTWIDTH] IN BLOOD BY AUTOMATED COUNT: 15.8 % (ref 12–15)
GFR SERPL CREATININE-BSD FRML MDRD: 57 ML/MIN/1.73
GFR SERPL CREATININE-BSD FRML MDRD: 70 ML/MIN/1.73
GLOBULIN UR ELPH-MCNC: 3 GM/DL
GLUCOSE BLD-MCNC: 98 MG/DL (ref 70–100)
HCT VFR BLD AUTO: 26.8 % (ref 37–47)
HCT VFR BLD AUTO: 27.6 % (ref 37–47)
HCT VFR BLD AUTO: 27.9 % (ref 37–47)
HCT VFR BLD AUTO: 29.4 % (ref 37–47)
HGB BLD-MCNC: 8.7 G/DL (ref 12–16)
HGB BLD-MCNC: 9 G/DL (ref 12–16)
HGB BLD-MCNC: 9.4 G/DL (ref 12–16)
HGB BLD-MCNC: 9.9 G/DL (ref 12–16)
IMM GRANULOCYTES # BLD: 0.04 10*3/MM3 (ref 0–0.03)
IMM GRANULOCYTES NFR BLD: 0.8 % (ref 0–5)
LYMPHOCYTES # BLD AUTO: 0.83 10*3/MM3 (ref 0.72–4.86)
LYMPHOCYTES NFR BLD AUTO: 17.2 % (ref 15–45)
MCH RBC QN AUTO: 28.2 PG (ref 28–32)
MCHC RBC AUTO-ENTMCNC: 32.5 G/DL (ref 33–36)
MCV RBC AUTO: 86.7 FL (ref 82–98)
MONOCYTES # BLD AUTO: 0.97 10*3/MM3 (ref 0.19–1.3)
MONOCYTES NFR BLD AUTO: 20.1 % (ref 4–12)
NEUTROPHILS # BLD AUTO: 2.82 10*3/MM3 (ref 1.87–8.4)
NEUTROPHILS NFR BLD AUTO: 58.6 % (ref 39–78)
NRBC BLD MANUAL-RTO: 0 /100 WBC (ref 0–0)
PLATELET # BLD AUTO: 266 10*3/MM3 (ref 130–400)
PMV BLD AUTO: 9.8 FL (ref 6–12)
POTASSIUM BLD-SCNC: 4 MMOL/L (ref 3.5–5.3)
PROT SERPL-MCNC: 5.4 G/DL (ref 6.3–8.7)
RBC # BLD AUTO: 3.09 10*6/MM3 (ref 4.2–5.4)
SODIUM BLD-SCNC: 138 MMOL/L (ref 135–145)
WBC NRBC COR # BLD: 4.82 10*3/MM3 (ref 4.8–10.8)

## 2018-01-15 PROCEDURE — 74018 RADEX ABDOMEN 1 VIEW: CPT

## 2018-01-15 PROCEDURE — 85014 HEMATOCRIT: CPT | Performed by: FAMILY MEDICINE

## 2018-01-15 PROCEDURE — 94799 UNLISTED PULMONARY SVC/PX: CPT

## 2018-01-15 PROCEDURE — 85025 COMPLETE CBC W/AUTO DIFF WBC: CPT | Performed by: FAMILY MEDICINE

## 2018-01-15 PROCEDURE — 85018 HEMOGLOBIN: CPT | Performed by: FAMILY MEDICINE

## 2018-01-15 PROCEDURE — 94760 N-INVAS EAR/PLS OXIMETRY 1: CPT

## 2018-01-15 PROCEDURE — 97110 THERAPEUTIC EXERCISES: CPT

## 2018-01-15 PROCEDURE — 99232 SBSQ HOSP IP/OBS MODERATE 35: CPT | Performed by: INTERNAL MEDICINE

## 2018-01-15 PROCEDURE — 80053 COMPREHEN METABOLIC PANEL: CPT | Performed by: FAMILY MEDICINE

## 2018-01-15 PROCEDURE — 99231 SBSQ HOSP IP/OBS SF/LOW 25: CPT | Performed by: NURSE PRACTITIONER

## 2018-01-15 RX ADMIN — IPRATROPIUM BROMIDE AND ALBUTEROL SULFATE 3 ML: 2.5; .5 SOLUTION RESPIRATORY (INHALATION) at 00:39

## 2018-01-15 RX ADMIN — IPRATROPIUM BROMIDE AND ALBUTEROL SULFATE 3 ML: 2.5; .5 SOLUTION RESPIRATORY (INHALATION) at 07:53

## 2018-01-15 RX ADMIN — IPRATROPIUM BROMIDE AND ALBUTEROL SULFATE 3 ML: 2.5; .5 SOLUTION RESPIRATORY (INHALATION) at 19:36

## 2018-01-15 RX ADMIN — PANTOPRAZOLE SODIUM 40 MG: 40 TABLET, DELAYED RELEASE ORAL at 06:38

## 2018-01-15 RX ADMIN — SODIUM CHLORIDE 100 ML/HR: 9 INJECTION, SOLUTION INTRAVENOUS at 10:28

## 2018-01-15 RX ADMIN — IPRATROPIUM BROMIDE AND ALBUTEROL SULFATE 3 ML: 2.5; .5 SOLUTION RESPIRATORY (INHALATION) at 12:10

## 2018-01-15 RX ADMIN — SODIUM CHLORIDE 100 ML/HR: 9 INJECTION, SOLUTION INTRAVENOUS at 20:43

## 2018-01-15 NOTE — PLAN OF CARE
Problem: Patient Care Overview (Adult)  Goal: Plan of Care Review  Outcome: Ongoing (interventions implemented as appropriate)   01/15/18 1504   Coping/Psychosocial Response Interventions   Plan Of Care Reviewed With patient   Patient Care Overview   Progress no change   Pt has had 3 bowel movements with bright red blood. Assist patient with turns every 2 hours. Working with physical therapy.   Goal: Adult Individualization and Mutuality  Outcome: Ongoing (interventions implemented as appropriate)      Problem: Fall Risk (Adult)  Goal: Absence of Falls  Outcome: Ongoing (interventions implemented as appropriate)   01/15/18 1504   Fall Risk (Adult)   Absence of Falls making progress toward outcome       Problem: VTE, DVT and PE (Adult)  Goal: Signs and Symptoms of Listed Potential Problems Will be Absent or Manageable (VTE, DVT and PE)  Outcome: Ongoing (interventions implemented as appropriate)   01/15/18 1504   VTE, DVT and PE   Problems Assessed (VTE, DVT, PE) all   Problems Present (VTE, DVT, PE) deep vein thrombosis       Problem: Skin Integrity Impairment, Risk/Actual (Adult)  Goal: Skin Integrity/Wound Healing  Outcome: Ongoing (interventions implemented as appropriate)   01/15/18 1504   Skin Integrity Impairment, Risk/Actual (Adult)   Skin Integrity/Wound Healing making progress toward outcome       Problem: Pressure Ulcer Risk (Jorge Scale) (Adult,Obstetrics,Pediatric)  Goal: Skin Integrity  Outcome: Ongoing (interventions implemented as appropriate)   01/15/18 1504   Pressure Ulcer Risk (Jorge Scale) (Adult,Obstetrics,Pediatric)   Skin Integrity making progress toward outcome       Problem: Gastrointestinal Bleeding (Adult)  Goal: Signs and Symptoms of Listed Potential Problems Will be Absent or Manageable (Gastrointestinal Bleeding)  Outcome: Ongoing (interventions implemented as appropriate)   01/15/18 1504   Gastrointestinal Bleeding   Problems Assessed (GI Bleeding) all   Problems Present (GI  Bleeding) other (see comments)  (BRBPR)

## 2018-01-15 NOTE — PROGRESS NOTES
[facility], [place], operated by [facility]  Inpatient Progress Note    Reason for Follow Up:  GI bleed    Subjective     Subjective:   Patient lying in bed this morning.  She denies dominant O pain.  Nursing reports several bright red bloody bowel movements through the night.  She denies nausea vomiting.    Current Facility-Administered Medications:   •  acetaminophen (TYLENOL) tablet 650 mg, 650 mg, Oral, Q4H PRN, 650 mg at 01/14/18 0102 **OR** acetaminophen (TYLENOL) suppository 650 mg, 650 mg, Rectal, Q4H PRN, BRITNEY Schroeder  •  dextrose (D50W) solution 25 g, 25 g, Intravenous, Q15 Min PRN, Wilfred Pedersen MD  •  dextrose (GLUTOSE) oral gel 15 g, 15 g, Oral, Q15 Min PRN, Wilfred Pedersen MD  •  glucagon (human recombinant) (GLUCAGEN DIAGNOSTIC) injection 1 mg, 1 mg, Subcutaneous, Q15 Min PRN, Wilfred Pedersen MD  •  ipratropium-albuterol (DUO-NEB) nebulizer solution 3 mL, 3 mL, Nebulization, Q6H - RT, BRITNEY Schroeder, 3 mL at 01/15/18 0753  •  ondansetron (ZOFRAN) tablet 4 mg, 4 mg, Oral, Q6H PRN **OR** ondansetron ODT (ZOFRAN-ODT) disintegrating tablet 4 mg, 4 mg, Oral, Q6H PRN **OR** ondansetron (ZOFRAN) injection 4 mg, 4 mg, Intravenous, Q6H PRN, BRITNEY Schroeder  •  pantoprazole (PROTONIX) EC tablet 40 mg, 40 mg, Oral, Q AM, Vanessa Britton MD, 40 mg at 01/15/18 0638  •  sodium chloride 0.9 % flush 1-10 mL, 1-10 mL, Intravenous, PRN, BRITNEY Schroeder, 10 mL at 01/05/18 2036  •  sodium chloride 0.9 % infusion, 100 mL/hr, Intravenous, Continuous, Alec Granados MD, Last Rate: 100 mL/hr at 01/14/18 1331, 100 mL/hr at 01/14/18 1331    Review of Systems     Constitution:  negative for chills, and fevers positive for fatigue  Eyes:  negativefor blurriness and change of vision  ENT:   negative for sore throat and voice change  Respiratory: negative for  cough and shortness of air  Cardiovascular:  Negative for chest pain or palpitations  Gastrointestinal:  negative for  See  HPI  Genitourinary:  negativefor  blood in urine and painful urination  Integument: negative for  rash and redness  Hematologic / Lymphatic: negative for  excessive bleeding and easy bruising  Musculoskeletal: Positive for  joint pain and joint stiffness out of the ordinary  Neurological:  negative for  seizures and speech abnormal  Behavioral/Psych:  negative for  anxiety and depression out of the ordinary  Endocrine: negative for  diabetes:and weight loss, unintended  Allergies / Immunologic:  negative for  anaphylaxis and rash        Objective     Vital Signs  Temp:  [98.2 °F (36.8 °C)-100.5 °F (38.1 °C)] 98.3 °F (36.8 °C)  Heart Rate:  [63-91] 63  Resp:  [16-18] 16  BP: ()/(43-78) 105/48  Body mass index is 62.51 kg/(m^2).    Intake/Output Summary (Last 24 hours) at 01/15/18 0929  Last data filed at 01/14/18 2154   Gross per 24 hour   Intake              851 ml   Output              300 ml   Net              551 ml             Physical exam   Physical Exam:   General: patient awake, alert and cooperative   Eyes: Normal lids and lashes, no scleral icterus   Neck: supple, normal ROM   Skin: warm and dry, not jaundiced   Cardiovascular: regular rhythm and rate, no murmurs auscultated   Pulm: clear to auscultation bilaterally, regular and unlabored   Abdomen: soft, nontender, nondistended; normal bowel sounds   Rectal: deferred   Extremities: no rash or edema   Psychiatric: Normal mood and behavior; memory intact      Results Review:    I have reviewed all of the patients current test results    Results from last 7 days  Lab Units 01/15/18  0601 01/15/18  0004 01/14/18  1132 01/14/18  0528 01/13/18  0702   WBC 10*3/mm3 4.82  --   --  7.80 7.71   HEMOGLOBIN g/dL 8.7* 9.4* 8.0* 7.4* 8.2*   HEMATOCRIT % 26.8* 27.9* 25.2* 22.5* 25.3*   PLATELETS 10*3/mm3 266  --   --  284 285         Results from last 7 days  Lab Units 01/15/18  0601 01/14/18  0528 01/13/18  0702 01/12/18  0619   SODIUM mmol/L 138 136 136 135  "  POTASSIUM mmol/L 4.0 4.2 4.0 4.1   CHLORIDE mmol/L 101 99 99 99   CO2 mmol/L 32.0* 34.0* 34.0* 32.0*   BUN mg/dL 13 17 18 19   CREATININE mg/dL 0.95 0.97 0.93 0.95   CALCIUM mg/dL 9.7 9.8 9.7 10.1   BILIRUBIN mg/dL 0.4  --  0.3 0.3   ALK PHOS U/L 64  --  64 75   ALT (SGPT) U/L 32  --  30 35   AST (SGOT) U/L 21  --  22 28   GLUCOSE mg/dL 98 112* 100 107*             No results found for: LIPASE    Radiology:    Imaging Results (last 24 hours)     ** No results found for the last 24 hours. **            Assessment/Plan     Patient Active Problem List   Diagnosis Code   • RUYB (acute kidney injury) N17.9   • DVT (deep venous thrombosis) I82.409   • Anemia D64.9   • GI bleed K92.2       Impression/plan    Bright red blood per rectum   anemia   anticoagulated on Eliquis    Patient is currently nothing by mouth.  Continue to monitor H&H.  Anemia stable at this time.  No plans for colonoscopy at this time is patient likely would be very difficult to clean out.  Anticoagulation is currently being held.      Jose Rubin, APRN 8:49 AM      Imp/Plan    1. BRBPR  2. Anemia  3. DVT/Biltaeral     Discussed with Primary service about the \"difficulty\" of her being able to prep/long term need for anticoagulation  Vs \"filter\" to address her DVT issue.  Will follow her very closely due to the fact she continues to have \"active\" LGI bleeding at the present  Garfield Hernandez, DO  01/15/18  9:29 AM    "

## 2018-01-15 NOTE — PLAN OF CARE
Problem: Patient Care Overview (Adult)  Goal: Plan of Care Review  Outcome: Ongoing (interventions implemented as appropriate)   01/15/18 1100   Coping/Psychosocial Response Interventions   Plan Of Care Reviewed With patient   Patient Care Overview   Progress progress towards functional goals is fair   Outcome Evaluation   Outcome Summary/Follow up Plan Pt declined bed mobility and transfers today. Performed bed exercises with assist. Pt c/o R heel pain. Notified nsg. Will continue to benefit from therapy to increase strength and improve functional mobility.

## 2018-01-15 NOTE — PROGRESS NOTES
LOS: 12 days   Patient Care Team:  Ed Hanson MD as PCP - General (Internal Medicine)    Chief Complaint:  GI bleed with anticoagualtion    Subjective     Pt seen/examined.  She has been having bright red rectal bleeding.  GI not planning on colonoscopy at this time.  Reconsulted for evaluation for filter.      Objective     Vital Signs  Temp:  [98.2 °F (36.8 °C)-100.5 °F (38.1 °C)] 98.3 °F (36.8 °C)  Heart Rate:  [63-91] 63  Resp:  [16-18] 16  BP: ()/(43-78) 105/48    Physical Exam  Constitutional: She is oriented to person, place, and time. She appears well-developed and well-nourished. No distress.   Morbidly obese   HENT:   Head: Normocephalic and atraumatic.   Mouth/Throat: Oropharynx is clear and moist.   Eyes: Pupils are equal, round, and reactive to light. No scleral icterus.   Neck: Normal range of motion. Neck supple. No JVD present. Carotid bruit is not present. No thyromegaly present.   Cardiovascular: Normal rate, regular rhythm, S2 normal, normal heart sounds, intact distal pulses and normal pulses.  Exam reveals no gallop and no friction rub.    No murmur heard. Bilateral lower extremity edema  Pulmonary/Chest: Effort normal and breath sounds normal.   Abdominal: Soft. Normal aorta and bowel sounds are normal. There is no hepatosplenomegaly.   Morbidly obese   Musculoskeletal: Normal range of motion.   Neurological: She is alert and oriented to person, place, and time. No cranial nerve deficit.   Skin: Skin is warm and dry. She is not diaphoretic.   Psychiatric: She has a normal mood and affect. Her behavior is normal. Judgment and thought content normal.   Nursing note and vitals reviewed.  Laboratory Data:     Results from last 7 days  Lab Units 01/15/18  0601 01/15/18  0004 01/14/18  1132 01/14/18  0528 01/13/18  0702   WBC 10*3/mm3 4.82  --   --  7.80 7.71   HEMOGLOBIN g/dL 8.7* 9.4* 8.0* 7.4* 8.2*   HEMATOCRIT % 26.8* 27.9* 25.2* 22.5* 25.3*   PLATELETS 10*3/mm3 266  --   --  284  285         Results from last 7 days  Lab Units 01/15/18  0601 01/14/18  0528 01/13/18  0702 01/12/18  0619   SODIUM mmol/L 138 136 136 135   POTASSIUM mmol/L 4.0 4.2 4.0 4.1   CHLORIDE mmol/L 101 99 99 99   CO2 mmol/L 32.0* 34.0* 34.0* 32.0*   BUN mg/dL 13 17 18 19   CREATININE mg/dL 0.95 0.97 0.93 0.95   CALCIUM mg/dL 9.7 9.8 9.7 10.1   BILIRUBIN mg/dL 0.4  --  0.3 0.3   ALK PHOS U/L 64  --  64 75   ALT (SGPT) U/L 32  --  30 35   AST (SGOT) U/L 21  --  22 28   GLUCOSE mg/dL 98 112* 100 107*             Assessment/Plan     Active Problems:    RUBY (acute kidney injury)    DVT (deep venous thrombosis)    Anemia    GI bleed      Plan:  I believe the best course of action is to remain conservative from a vascular standpoint.  Initially, when we saw the patient she was unable to add history due to acuity on transfer.  There was no history charted regarding previous filter.  Today, in discussion of filter, she discloses that she has a filter in place.  This was placed in 2011, and not removed.  This was discussed in full with the patient.  I will obtain xray to verify presence of IVC filter.        Marycruz Starr, APRN  01/15/18  11:58 AM

## 2018-01-15 NOTE — PLAN OF CARE
Problem: Patient Care Overview (Adult)  Goal: Plan of Care Review  Outcome: Ongoing (interventions implemented as appropriate)   01/15/18 1105   Coping/Psychosocial Response Interventions   Plan Of Care Reviewed With patient   Patient Care Overview   Progress progress toward functional goals as expected   Outcome Evaluation   Outcome Summary/Follow up Plan Pt. progressing as expected, no issues with tx!

## 2018-01-15 NOTE — PROGRESS NOTES
Continued Stay Note   [facility]     Patient Name: Tricia Hernandez  MRN: [id number]  Today's Date: 1/15/2018    Admit Date: 1/3/2018          Discharge Plan       01/15/18 1156    Case Management/Social Work Plan    Plan PLAN IS STILL FOR PT TO GO TO [facility] WHEN STABLE. PT IS NOW NPO. WILL CONTINUE TO FOLLOW.               Discharge Codes     None        Expected Discharge Date and Time     Expected Discharge Date Expected Discharge Time    Jan 11, 2018             MILY Mcleod

## 2018-01-15 NOTE — THERAPY TREATMENT NOTE
Acute Care - Physical Therapy Treatment Note  [facility]     Patient Name: Tricia Hernandez  : 1940  MRN: [id number]  Today's Date: 1/15/2018  Onset of Illness/Injury or Date of Surgery Date: 18  Date of Referral to PT: 18  Referring Physician: Dr. Granados    Admit Date: 1/3/2018    Visit Dx:    ICD-10-CM ICD-9-CM   1. Impaired mobility and ADLs Z74. 799.89   2. Impaired mobility Z74. 799.89     Patient Active Problem List   Diagnosis   • RUBY (acute kidney injury)   • DVT (deep venous thrombosis)   • Anemia   • GI bleed               Adult Rehabilitation Note       01/15/18 1030 01/15/18 0725 18 1417    Rehab Assessment/Intervention    Discipline physical therapy assistant  -CW (P)  occupational therapy assistant  -CJ physical therapy assistant  -KJ    Document Type therapy note (daily note)  -CW (P)  therapy note (daily note)  -CJ therapy note (daily note)  -KJ    Subjective Information agree to therapy;complains of;weakness;fatigue;pain  -CW (P)  agree to therapy;complains of;weakness;fatigue  -CJ     Patient Effort, Rehab Treatment adequate  -CW (P)  adequate  -CJ     Treatment Not Performed   patient/family declined treatment  -KJ    Treatment Not Performed, Comment   refused  -KJ    Precautions/Limitations fall precautions  -CW (P)  fall precautions  -CJ     Recorded by [CW] Rhonda Gutierrez PTA [CJ] MARIJA Cowan/LALY [KJ] Shira Park PTA    Pain Assessment    Pain Assessment Rodriguez-Baker FACES  -CW (P)  No/denies pain  -CJ     Rodriguez-Akhtar FACES Pain Rating 4  -CW      Pain Type Acute pain  -CW      Pain Location Foot   heel  -CW      Pain Orientation Right  -CW      Pain Intervention(s) Repositioned;Elevated  -CW      Recorded by [CW] Rhonda Gutierrez PTA [CJ] MARIJA Cowan/L     Bed Mobility, Assessment/Treatment    Bed Mobility, Assistive Device draw sheet;bed rails  -CW      Bed Mobility, Scoot/Bridge, [place] dependent (less than 25% patient  effort);2 person assist required  -CW      Bed Mobility, Comment Refused today  -CW (P)  supine in bed!  -CJ     Recorded by [CW] Rhonda Gutierrez PTA [CJ] CHAYITO Cowan     Therapy Exercises    Bilateral Lower Extremities AAROM:;20 reps;supine  -CW      Bilateral Upper Extremity  (P)  AROM:;15 reps;supine;elbow flexion/extension;shoulder extension/flexion   3lb.,  -CJ     Recorded by [CW] Rhonda Gutierrez PTA [CJ] MARIJA Cowan/LALY     Positioning and Restraints    Pre-Treatment Position in bed  -CW      Post Treatment Position bed  -CW      In Bed fowlers;call light within reach;encouraged to call for assist;R heel elevated  -CW      Recorded by [CW] Rhonda Gutierrez PTA        01/13/18 1130 01/12/18 1430 01/12/18 1134    Rehab Assessment/Intervention    Discipline physical therapy assistant  -CW occupational therapist  -TR physical therapy assistant  -CW    Document Type therapy note (daily note)  -CW  therapy note (daily note)  -CW    Subjective Information agree to therapy;complains of;pain;weakness  -CW  agree to therapy;complains of;weakness  -CW    Patient Effort, Rehab Treatment adequate  -CW  adequate  -CW    Treatment Not Performed  patient/family declined treatment  -TR     Precautions/Limitations fall precautions  -CW  fall precautions  -CW    Recorded by [CW] Rhonda Gutierrez PTA [TR] Amber Rosales, OTR/L [CW] Rhonda Gutierrez PTA    Pain Assessment    Pain Assessment Rodriguez-Akhtar FACES  -CW  No/denies pain  -CW    Rodriguez-Akhtar FACES Pain Rating 6  -CW      Pain Type Chronic pain  -CW      Pain Location Knee  -CW      Pain Orientation Right  -CW      Pain Frequency Intermittent  -CW      Pain Intervention(s) Repositioned  -CW      Recorded by [CW] Rhonda Gutierrez PTA  [CW] Rhonda Gutierrez PTA    Bed Mobility, Assessment/Treatment    Bed Mobility, Assistive Device bed rails;draw sheet  -CW  bed rails;draw sheet  -CW    Bed Mobility, Roll Left, [place]  maximum assist (25% patient effort);2 person assist required;verbal cues required   rolled x 6 assist with clean linens & gown  -CW      Bed Mobility, Roll Right, [place] moderate assist (50% patient effort);2 person assist required;verbal cues required  -CW      Bed Mobility, Scoot/Bridge, [place] maximum assist (25% patient effort);2 person assist required   bed in trendelenberg  -CW  maximum assist (25% patient effort);2 person assist required   bed in trendelenberg  -CW    Bed Mob, Supine to Sit, [place] minimum assist (75% patient effort);verbal cues required  -CW  minimum assist (75% patient effort);verbal cues required  -CW    Bed Mob, Sit to Supine, [place] moderate assist (50% patient effort);maximum assist (25% patient effort);2 person assist required  -CW  moderate assist (50% patient effort);2 person assist required;verbal cues required  -CW    Bed Mobility, Safety Issues decreased use of arms for pushing/pulling;decreased use of legs for bridging/pushing  -CW  decreased use of arms for pushing/pulling;decreased use of legs for bridging/pushing  -CW    Bed Mobility, Impairments ROM decreased;strength decreased  -CW  ROM decreased;strength decreased;decreased flexibility  -CW    Recorded by [CW] Rhonda Gutierrez PTA  [CW] Rhonda Gutierrez PTA    Transfer Assessment/Treatment    Transfers, Sit-Stand [place] maximum assist (25% patient effort);2 person assist required;verbal cues required  -CW  maximum assist (25% patient effort);2 person assist required;verbal cues required   stood x 2  -CW    Transfers, Stand-Sit [place] minimum assist (75% patient effort);2 person assist required  -CW  moderate assist (50% patient effort);2 person assist required;verbal cues required  -CW    Transfers, Sit-Stand-Sit, Assist Device rolling walker;elevated surface  -CW  rolling walker  -CW    Transfer, Impairments strength decreased  -CW  strength decreased  -CW    Recorded by [CW]  Rhonda Gutierrez PTA  [CW] Rhonda Gutierrez PTA    Gait Assessment/Treatment    Gait, [place] Level minimum assist (75% patient effort);2 person assist required;verbal cues required  -CW  minimum assist (75% patient effort);2 person assist required;verbal cues required  -CW    Gait, Assistive Device rolling walker  -CW  rolling walker  -CW    Gait, Distance (Feet) --   6 small side steps toward HOB  -CW  --   2 side steps at EOB  -CW    Gait, Impairments strength decreased  -CW  strength decreased  -CW    Recorded by [CW] Rhonda Gutierrez PTA  [CW] Rhonda Gutierrez PTA    Balance Skills Training    Sitting-Level of Assistance Distant supervision  -CW      Standing-Level of Assistance Minimum assistance;x2  -CW      Static Standing Balance Support assistive device  -CW      Gait Balance-Level of Assistance Minimum assistance;x2  -CW      Gait Balance Support assistive device  -CW      Gait Balance Activities side-stepping  -CW      Recorded by [CW] Rhonda Gutierrez PTA      Therapy Exercises    Bilateral Lower Extremities   PROM:;AAROM:;sitting   Assisted pt with knee flexion as tolerated  -CW    Recorded by   [CW] Rhonda Gutierrez PTA    Positioning and Restraints    Pre-Treatment Position in bed  -CW  in bed  -CW    Post Treatment Position bed  -CW  bed  -CW    In Bed fowlers;call light within reach;encouraged to call for assist  -CW  fowlers;call light within reach;encouraged to call for assist  -CW    Recorded by [CW] Rhonda Gutierrez PTA  [CW] Rhonda Gutierrez PTA      01/12/18 1106          Rehab Assessment/Intervention    Discipline physical therapy assistant  -      Recorded by [NW] Blanca Stock PTA        User Key  (r) = Recorded By, (t) = Taken By, (c) = Cosigned By    Initials Name Effective Dates    FREDRICK Park, SHANTEL 08/02/16 -     CJ CHAYITO Cowan 08/02/16 -     CW Rhonda Gutierrez PTA 06/22/15 -     NW Blanca Stock PTA 08/02/16 -     BERTHA Clark  MARGO Rosales, OTR/L 06/22/15 -                 IP PT Goals       01/10/18 1004          Bed Mobility PT LTG    Bed Mobility PT LTG, Date Established 01/10/18  -PB (r) JM (t) PB (c)      Bed Mobility PT LTG, Time to Achieve by discharge  -PB (r) JM (t) PB (c)      Bed Mobility PT LTG, Activity Type all bed mobility  -PB (r) JM (t) PB (c)      Bed Mobility PT LTG, [place] Level minimum assist (75% patient effort)  -PB (r) JM (t) PB (c)      Bed Mobility PT Goal  LTG, Assist Device bed rails  -PB (r) JM (t) PB (c)      Transfer Training PT LTG    Transfer Training PT LTG, Date Established 01/10/18  -PB (r) JM (t) PB (c)      Transfer Training PT LTG, Time to Achieve by discharge  -PB (r) JM (t) PB (c)      Transfer Training PT LTG, Activity Type bed to chair /chair to bed;sit to stand/stand to sit  -PB (r) JM (t) PB (c)      Transfer Training PT LTG, [place] Level minimum assist (75% patient effort)  -PB (r) JM (t) PB (c)      Transfer Training PT LTG, Assist Device walker, gigi  -PB (r) JM (t) PB (c)      Strength Goal PT LTG    Strength Goal PT LTG, Date Established 01/10/18  -PB (r) JM (t) PB (c)      Strength Goal PT LTG, Time to Achieve by discharge  -PB (r) JM (t) PB (c)      Strength Goal PT LTG, Functional Goal Perform 15 reps of EOB B LE AROM exercises  -PB (r) JM (t) PB (c)        User Key  (r) = Recorded By, (t) = Taken By, (c) = Cosigned By    Initials Name Provider Type    ALTON Abdi, PT DPT Physical Therapist    SIMONA Crane, PT Student PT Student          Physical Therapy Education     Title: PT OT SLP Therapies (Active)     Topic: Physical Therapy (Active)     Point: Mobility training (Active)    Learning Progress Summary    Learner Readiness Method Response Comment Documented by Status   Patient Acceptance E,D NR Benefits of exercise CW 01/15/18 1059 Active    Acceptance E,D NR Bed mobility, transfers, planof care CW 01/13/18 6870 Active    Acceptance E NR Bed mobility,  transfers, planof care, benefit of activity CW 01/12/18 1307 Active    Acceptance E VU progressin of POC NW 01/11/18 1147 Done    Acceptance E VU Pt was educated on importance of activity and potential discharge plans.  01/10/18 0959 Done                      User Key     Initials Effective Dates Name Provider Type Discipline    CW 06/22/15 -  Rhonda Gutierrez, PTA Physical Therapy Assistant PT    NW 08/02/16 -  Blanca Stock PTA Physical Therapy Assistant PT     01/10/18 -  Jim Crane, PT Student PT Student PT                    PT Recommendation and Plan  Anticipated Equipment Needs At Discharge: front wheeled walker  Anticipated Discharge Disposition: skilled nursing facility  Planned Therapy Interventions: bed mobility training, ROM (Range of Motion), patient/family education, strengthening, transfer training  PT Frequency: daily, 2 times/day, per priority policy  Plan of Care Review  Plan Of Care Reviewed With: patient  Progress: progress towards functional goals is fair  Outcome Summary/Follow up Plan: Pt declined bed mobility and transfers today.  Performed bed exercises with assist.  Pt c/o R heel pain.  Notified nsg.  Will continue to benefit from therapy to increase strength and improve functional mobility.          Outcome Measures       01/15/18 1100 01/13/18 1300 01/12/18 1300    How much help from another person do you currently need...    Turning from your back to your side while in flat bed without using bedrails? 2  -CW 2  -CW 2  -CW    Moving from lying on back to sitting on the side of a flat bed without bedrails? 2  -CW 2  -CW 2  -CW    Moving to and from a bed to a chair (including a wheelchair)? 1  -CW 1  -CW 1  -CW    Standing up from a chair using your arms (e.g., wheelchair, bedside chair)? 2  -CW 2  -CW 2  -CW    Climbing 3-5 steps with a railing? 1  -CW 1  -CW 1  -CW    To walk in hospital room? 1  -CW 1  -CW 1  -CW    AM-PAC 6 Clicks Score 9  -CW 9  -CW 9  -CW     Functional Assessment    Outcome Measure Options AM-PAC 6 Clicks Basic Mobility (PT)  -CW AM-PAC 6 Clicks Basic Mobility (PT)  -CW AM-PAC 6 Clicks Basic Mobility (PT)  -CW      User Key  (r) = Recorded By, (t) = Taken By, (c) = Cosigned By    Initials Name Provider Type    LISANDRO Gutierrez PTA Physical Therapy Assistant           Time Calculation:         PT Charges       01/15/18 1101          Time Calculation    Start Time 1030  -CW      Stop Time 1055  -CW      Time Calculation (min) 25 min  -CW      PT Received On 01/15/18  -CW      PT Goal Re-Cert Due Date 01/20/18  -CW      Time Calculation- PT    Total Timed Code Minutes- PT 25 minute(s)  -CW        User Key  (r) = Recorded By, (t) = Taken By, (c) = Cosigned By    Initials Name Provider Type    LISANDRO Gutierrez PTA Physical Therapy Assistant          Therapy Charges for Today     Code Description Service Date Service Provider Modifiers Qty    [id number] HC PT THER PROC EA 15 MIN 1/15/2018 Rhonda Gutierrez PTA GP 2          PT G-Codes  Outcome Measure Options: AM-PAC 6 Clicks Basic Mobility (PT)  Score: 10  Functional Limitation: Changing and maintaining body position  Changing and Maintaining Body Position Current Status (): At least 60 percent but less than 80 percent impaired, limited or restricted  Changing and Maintaining Body Position Goal Status (): At least 40 percent but less than 60 percent impaired, limited or restricted    Rhonda Gutierrez PTA  1/15/2018

## 2018-01-15 NOTE — PLAN OF CARE
Problem: Patient Care Overview (Adult)  Goal: Plan of Care Review  Outcome: Ongoing (interventions implemented as appropriate)   01/14/18 1608 01/14/18 1941 01/15/18 0254   Coping/Psychosocial Response Interventions   Plan Of Care Reviewed With --  patient --    Patient Care Overview   Progress no change --  --    Outcome Evaluation   Outcome Summary/Follow up Plan --  --  patient denies pain, 1 BM this shift, temp elevated to100.5, will cont to monitor.      Goal: Adult Individualization and Mutuality  Outcome: Ongoing (interventions implemented as appropriate)   01/09/18 1521 01/14/18 1608   Individualization   Patient Specific Preferences none --    Patient Specific Goals --  hemoglobin stabilized   Patient Specific Interventions --  RBC infusion   Mutuality/Individual Preferences   What Anxieties, Fears or Concerns Do You Have About Your Health or Care? none --    What Questions Do You Have About Your Health or Care? none --    What Information Would Help Us Give You More Personalized Care? none --        Problem: Fall Risk (Adult)  Goal: Absence of Falls  Outcome: Ongoing (interventions implemented as appropriate)   01/15/18 0254   Fall Risk (Adult)   Absence of Falls making progress toward outcome       Problem: VTE, DVT and PE (Adult)  Goal: Signs and Symptoms of Listed Potential Problems Will be Absent or Manageable (VTE, DVT and PE)  Outcome: Ongoing (interventions implemented as appropriate)   01/14/18 1608   VTE, DVT and PE   Problems Assessed (VTE, DVT, PE) all   Problems Present (VTE, DVT, PE) deep vein thrombosis       Problem: Skin Integrity Impairment, Risk/Actual (Adult)  Goal: Skin Integrity/Wound Healing  Outcome: Ongoing (interventions implemented as appropriate)   01/15/18 0254   Skin Integrity Impairment, Risk/Actual (Adult)   Skin Integrity/Wound Healing making progress toward outcome       Problem: Pressure Ulcer Risk (Jorge Scale) (Adult,Obstetrics,Pediatric)  Goal: Skin Integrity  Outcome:  Ongoing (interventions implemented as appropriate)   01/14/18 1608   Pressure Ulcer Risk (Jorge Scale) (Adult,Obstetrics,Pediatric)   Skin Integrity making progress toward outcome       Problem: Gastrointestinal Bleeding (Adult)  Goal: Signs and Symptoms of Listed Potential Problems Will be Absent or Manageable (Gastrointestinal Bleeding)  Outcome: Ongoing (interventions implemented as appropriate)   01/14/18 1608 01/15/18 0254   Gastrointestinal Bleeding   Problems Assessed (GI Bleeding) --  all   Problems Present (GI Bleeding) other (see comments)  (active bleeding) --

## 2018-01-15 NOTE — PROGRESS NOTES
[facility] Medicine Services  INPATIENT PROGRESS NOTE    Length of Stay: 12  Date of Admission: 1/3/2018  Primary Care Physician: Ed Hanson MD    Subjective   Chief Complaint: Weakness  HPI   Doing ok.  Having more bloody bowel movements.  Anticoagulation on hold.          Review of Systems   Constitutional: Positive for fatigue. Negative for fever.   HENT: Negative for congestion and ear pain.    Eyes: Negative for pain and visual disturbance.   Respiratory: Negative for cough, shortness of breath and wheezing.    Cardiovascular: Negative for chest pain and palpitations.   Gastrointestinal: Negative for diarrhea, nausea and vomiting.   Endocrine: Negative for heat intolerance.   Genitourinary: Negative for dysuria and frequency.   Musculoskeletal: Negative for arthralgias and back pain.   Skin: Negative for rash and wound.   Neurological: Positive for weakness. Negative for dizziness and light-headedness.   Psychiatric/Behavioral: Negative for confusion. The patient is not nervous/anxious.    All other systems reviewed and are negative.     All pertinent negatives and positives are as above. All other systems have been reviewed and are negative unless otherwise stated.     Objective    Temp:  [98.3 °F (36.8 °C)-100.5 °F (38.1 °C)] 98.7 °F (37.1 °C)  Heart Rate:  [63-75] 67  Resp:  [16-20] 16  BP: (103-141)/(46-78) 103/68  Physical Exam   Constitutional: She is oriented to person, place, and time. She appears well-developed and well-nourished.   HENT:   Head: Normocephalic and atraumatic.   Right Ear: External ear normal.   Left Ear: External ear normal.   Nose: Nose normal.   Mouth/Throat: Oropharynx is clear and moist.   Eyes: Conjunctivae and EOM are normal.   Neck: Normal range of motion. Neck supple.   Cardiovascular: Normal rate, regular rhythm and normal heart sounds.    Pulmonary/Chest: Effort normal. She has decreased breath sounds.   Abdominal: Soft. Bowel sounds  are normal. She exhibits no distension. There is no tenderness.   Musculoskeletal: Normal range of motion.   Neurological: She is alert and oriented to person, place, and time.   Skin: Skin is warm and dry.   Psychiatric: She has a normal mood and affect. Her speech is normal and behavior is normal. Cognition and memory are normal.         Results Review:  I have reviewed the labs, radiology results, and diagnostic studies.  Laboratory Data:     Results from last 7 days  Lab Units 01/15/18  1141 01/15/18  0601 01/15/18  0004  01/14/18  0528 01/13/18  0702   WBC 10*3/mm3  --  4.82  --   --  7.80 7.71   HEMOGLOBIN g/dL 9.0* 8.7* 9.4*  < > 7.4* 8.2*   HEMATOCRIT % 27.6* 26.8* 27.9*  < > 22.5* 25.3*   PLATELETS 10*3/mm3  --  266  --   --  284 285   < > = values in this interval not displayed.       Results from last 7 days  Lab Units 01/15/18  0601 01/14/18  0528 01/13/18  0702 01/12/18  0619   SODIUM mmol/L 138 136 136 135   POTASSIUM mmol/L 4.0 4.2 4.0 4.1   CHLORIDE mmol/L 101 99 99 99   CO2 mmol/L 32.0* 34.0* 34.0* 32.0*   BUN mg/dL 13 17 18 19   CREATININE mg/dL 0.95 0.97 0.93 0.95   CALCIUM mg/dL 9.7 9.8 9.7 10.1   BILIRUBIN mg/dL 0.4  --  0.3 0.3   ALK PHOS U/L 64  --  64 75   ALT (SGPT) U/L 32  --  30 35   AST (SGOT) U/L 21  --  22 28   GLUCOSE mg/dL 98 112* 100 107*       Culture Data:        Radiology Data:   Imaging Results (last 24 hours)     Procedure Component Value Units Date/Time    XR Abdomen KUB [[id number]] Collected:  01/15/18 1716     Updated:  01/15/18 1721    Narrative:       EXAMINATION: XR ABDOMEN KUB- 1/15/2018 5:16 PM CST     HISTORY: assess for IVC filter; Z74.09-Other reduced mobility;  Z74.09-Other reduced mobility.     REPORT: A supine view of the abdomen was obtained. There are no  comparison studies.     The bowel gas pattern is within normal limits. Moderate amount stool is  noted in the right colon. There are surgical staples and suture lines in  the left upper quadrant. Previous right  hip arthroplasty is noted as  well as lumbosacral fusion surgery. An IVC filter is present, the apex  of the filter is identified at the L3-4 level. This should be  satisfactory. Severe degenerative changes are seen throughout the lower  thoracic and lumbar spine.       Impression:       1. No acute intra-abdominal findings.  2. Multiple previous surgeries as described, the IVC filter appears to  be in satisfactory position with the apex at the L3-4 level to the right  of midline.  This report was finalized on 01/15/2018 17:18 by Dr. Blake Burton MD.          I have reviewed the patient current medications.     Assessment/Plan   1.  Occlusive Left-lower extremity DVT  -still having bloody stools, Anticoagulation held  -Appreciate GI input  -Appreciate Vascular input   -Hgb stable  -Has filter in place per Vascular that is old      2:  GI Bleed  -GI following  -Hold anticoagulation  -Monitor H&H      3.  Sepsis  -Resolved  -Off Abx    3.  Acute Renal Failure  -Resolved  -monitor Cr        4.  Lactic Acidosis  -Resolved  -Monitor      5.  Intractible Nausea and vomiting  -Zofran  -IVF      6.  Dehydration  -IVF  -Monitor UOP      7.  GERD  -Protonix drip      8.  HLD  -Statin  -Lipid panel      9.  Bladder Spasms  -Ditropan      10.  HTN  -Hold BP meds given low BP, RUBY  -Monitor BP      11.  Anemia  -Monitor H&H  -Protonix  -GI Consulted      12:  Morbid Obesity  -BMI:  54.4  -Dietician consulted      13.  Hyperglycemia  -A1C negative  -D/C finger sticks/A1C      14.  Hyperkalemia  -monitor      15.  Superficial right sided thrombus  -Vascular following             Wilfred Pedersen MD   01/15/18   5:31 PM

## 2018-01-16 LAB
ABO + RH BLD: NORMAL
ABO + RH BLD: NORMAL
ALBUMIN SERPL-MCNC: 2.6 G/DL (ref 3.5–5)
ALBUMIN/GLOB SERPL: 0.9 G/DL (ref 1.1–2.5)
ALP SERPL-CCNC: 66 U/L (ref 24–120)
ALT SERPL W P-5'-P-CCNC: 29 U/L (ref 0–54)
ANION GAP SERPL CALCULATED.3IONS-SCNC: 6 MMOL/L (ref 4–13)
AST SERPL-CCNC: 18 U/L (ref 7–45)
BASOPHILS # BLD AUTO: 0.02 10*3/MM3 (ref 0–0.2)
BASOPHILS NFR BLD AUTO: 0.5 % (ref 0–2)
BH BB BLOOD EXPIRATION DATE: NORMAL
BH BB BLOOD EXPIRATION DATE: NORMAL
BH BB BLOOD TYPE BARCODE: 6200
BH BB BLOOD TYPE BARCODE: 6200
BH BB DISPENSE STATUS: NORMAL
BH BB DISPENSE STATUS: NORMAL
BH BB PRODUCT CODE: NORMAL
BH BB PRODUCT CODE: NORMAL
BH BB UNIT NUMBER: NORMAL
BH BB UNIT NUMBER: NORMAL
BILIRUB SERPL-MCNC: 0.4 MG/DL (ref 0.1–1)
BUN BLD-MCNC: 12 MG/DL (ref 5–21)
BUN/CREAT SERPL: 12.9 (ref 7–25)
CALCIUM SPEC-SCNC: 9.7 MG/DL (ref 8.4–10.4)
CHLORIDE SERPL-SCNC: 104 MMOL/L (ref 98–110)
CO2 SERPL-SCNC: 29 MMOL/L (ref 24–31)
CREAT BLD-MCNC: 0.93 MG/DL (ref 0.5–1.4)
CROSSMATCH INTERPRETATION: NORMAL
CROSSMATCH INTERPRETATION: NORMAL
DEPRECATED RDW RBC AUTO: 49.1 FL (ref 40–54)
EOSINOPHIL # BLD AUTO: 0.09 10*3/MM3 (ref 0–0.7)
EOSINOPHIL NFR BLD AUTO: 2.2 % (ref 0–4)
ERYTHROCYTE [DISTWIDTH] IN BLOOD BY AUTOMATED COUNT: 15.6 % (ref 12–15)
GFR SERPL CREATININE-BSD FRML MDRD: 59 ML/MIN/1.73
GFR SERPL CREATININE-BSD FRML MDRD: 71 ML/MIN/1.73
GLOBULIN UR ELPH-MCNC: 3 GM/DL
GLUCOSE BLD-MCNC: 89 MG/DL (ref 70–100)
HCT VFR BLD AUTO: 28.3 % (ref 37–47)
HCT VFR BLD AUTO: 28.9 % (ref 37–47)
HCT VFR BLD AUTO: 29.9 % (ref 37–47)
HCT VFR BLD AUTO: 30 % (ref 37–47)
HCT VFR BLD AUTO: 32.2 % (ref 37–47)
HGB BLD-MCNC: 10.4 G/DL (ref 12–16)
HGB BLD-MCNC: 9.2 G/DL (ref 12–16)
HGB BLD-MCNC: 9.3 G/DL (ref 12–16)
HGB BLD-MCNC: 9.5 G/DL (ref 12–16)
HGB BLD-MCNC: 9.6 G/DL (ref 12–16)
IMM GRANULOCYTES # BLD: 0.02 10*3/MM3 (ref 0–0.03)
IMM GRANULOCYTES NFR BLD: 0.5 % (ref 0–5)
LYMPHOCYTES # BLD AUTO: 0.59 10*3/MM3 (ref 0.72–4.86)
LYMPHOCYTES NFR BLD AUTO: 14.4 % (ref 15–45)
MCH RBC QN AUTO: 28.3 PG (ref 28–32)
MCHC RBC AUTO-ENTMCNC: 32.5 G/DL (ref 33–36)
MCV RBC AUTO: 87.1 FL (ref 82–98)
MONOCYTES # BLD AUTO: 0.76 10*3/MM3 (ref 0.19–1.3)
MONOCYTES NFR BLD AUTO: 18.5 % (ref 4–12)
NEUTROPHILS # BLD AUTO: 2.62 10*3/MM3 (ref 1.87–8.4)
NEUTROPHILS NFR BLD AUTO: 63.9 % (ref 39–78)
NRBC BLD MANUAL-RTO: 0 /100 WBC (ref 0–0)
PLATELET # BLD AUTO: 264 10*3/MM3 (ref 130–400)
PMV BLD AUTO: 10.4 FL (ref 6–12)
POTASSIUM BLD-SCNC: 3.8 MMOL/L (ref 3.5–5.3)
PROT SERPL-MCNC: 5.6 G/DL (ref 6.3–8.7)
RBC # BLD AUTO: 3.25 10*6/MM3 (ref 4.2–5.4)
SODIUM BLD-SCNC: 139 MMOL/L (ref 135–145)
UNIT  ABO: NORMAL
UNIT  ABO: NORMAL
UNIT  RH: NORMAL
UNIT  RH: NORMAL
WBC NRBC COR # BLD: 4.1 10*3/MM3 (ref 4.8–10.8)

## 2018-01-16 PROCEDURE — 85018 HEMOGLOBIN: CPT | Performed by: FAMILY MEDICINE

## 2018-01-16 PROCEDURE — 80053 COMPREHEN METABOLIC PANEL: CPT | Performed by: FAMILY MEDICINE

## 2018-01-16 PROCEDURE — 85025 COMPLETE CBC W/AUTO DIFF WBC: CPT | Performed by: FAMILY MEDICINE

## 2018-01-16 PROCEDURE — 99232 SBSQ HOSP IP/OBS MODERATE 35: CPT | Performed by: INTERNAL MEDICINE

## 2018-01-16 PROCEDURE — 85014 HEMATOCRIT: CPT | Performed by: FAMILY MEDICINE

## 2018-01-16 PROCEDURE — 94799 UNLISTED PULMONARY SVC/PX: CPT

## 2018-01-16 PROCEDURE — 97530 THERAPEUTIC ACTIVITIES: CPT

## 2018-01-16 RX ADMIN — SODIUM CHLORIDE 100 ML/HR: 9 INJECTION, SOLUTION INTRAVENOUS at 09:09

## 2018-01-16 RX ADMIN — IPRATROPIUM BROMIDE AND ALBUTEROL SULFATE 3 ML: 2.5; .5 SOLUTION RESPIRATORY (INHALATION) at 20:28

## 2018-01-16 RX ADMIN — IPRATROPIUM BROMIDE AND ALBUTEROL SULFATE 3 ML: 2.5; .5 SOLUTION RESPIRATORY (INHALATION) at 12:24

## 2018-01-16 RX ADMIN — IPRATROPIUM BROMIDE AND ALBUTEROL SULFATE 3 ML: 2.5; .5 SOLUTION RESPIRATORY (INHALATION) at 06:59

## 2018-01-16 RX ADMIN — IPRATROPIUM BROMIDE AND ALBUTEROL SULFATE 3 ML: 2.5; .5 SOLUTION RESPIRATORY (INHALATION) at 01:48

## 2018-01-16 RX ADMIN — ACETAMINOPHEN 650 MG: 325 TABLET, FILM COATED ORAL at 21:39

## 2018-01-16 NOTE — PLAN OF CARE
Problem: Patient Care Overview (Adult)  Goal: Plan of Care Review  Outcome: Ongoing (interventions implemented as appropriate)   01/15/18 1504 01/15/18 2044   Coping/Psychosocial Response Interventions   Plan Of Care Reviewed With --  patient   Patient Care Overview   Progress no change --      Goal: Adult Individualization and Mutuality  Outcome: Ongoing (interventions implemented as appropriate)      Problem: Fall Risk (Adult)  Goal: Absence of Falls  Outcome: Ongoing (interventions implemented as appropriate)      Problem: VTE, DVT and PE (Adult)  Goal: Signs and Symptoms of Listed Potential Problems Will be Absent or Manageable (VTE, DVT and PE)  Outcome: Ongoing (interventions implemented as appropriate)      Problem: Pressure Ulcer Risk (Jorge Scale) (Adult,Obstetrics,Pediatric)  Goal: Skin Integrity  Outcome: Ongoing (interventions implemented as appropriate)      Problem: Gastrointestinal Bleeding (Adult)  Goal: Signs and Symptoms of Listed Potential Problems Will be Absent or Manageable (Gastrointestinal Bleeding)  Outcome: Ongoing (interventions implemented as appropriate)

## 2018-01-16 NOTE — PLAN OF CARE
Problem: Patient Care Overview (Adult)  Goal: Plan of Care Review  Outcome: Ongoing (interventions implemented as appropriate)   01/16/18 1351   Coping/Psychosocial Response Interventions   Plan Of Care Reviewed With patient   Patient Care Overview   Progress no change   Outcome Evaluation   Outcome Summary/Follow up Plan Pt has no c/o of pain. 2 bloody BM's today. Voids per bed pan. IVF cont. Clear liquids tolerated. Will cont. to monitor.     Goal: Adult Individualization and Mutuality  Outcome: Ongoing (interventions implemented as appropriate)      Problem: Fall Risk (Adult)  Goal: Absence of Falls  Outcome: Ongoing (interventions implemented as appropriate)      Problem: VTE, DVT and PE (Adult)  Goal: Signs and Symptoms of Listed Potential Problems Will be Absent or Manageable (VTE, DVT and PE)  Outcome: Ongoing (interventions implemented as appropriate)      Problem: Skin Integrity Impairment, Risk/Actual (Adult)  Goal: Skin Integrity/Wound Healing  Outcome: Ongoing (interventions implemented as appropriate)      Problem: Pressure Ulcer Risk (Jorge Scale) (Adult,Obstetrics,Pediatric)  Goal: Skin Integrity  Outcome: Ongoing (interventions implemented as appropriate)      Problem: Gastrointestinal Bleeding (Adult)  Goal: Signs and Symptoms of Listed Potential Problems Will be Absent or Manageable (Gastrointestinal Bleeding)  Outcome: Ongoing (interventions implemented as appropriate)

## 2018-01-16 NOTE — PROGRESS NOTES
[facility]  Inpatient Progress Note    Reason for Follow Up:  GI Bleed     Subjective     Subjective:   No BM through the night per nursing.  Pt denies abdominal pain, N/V.  Nursing did report wiping small amount of bright red blood when she was washing patient.      Current Facility-Administered Medications:   •  acetaminophen (TYLENOL) tablet 650 mg, 650 mg, Oral, Q4H PRN, 650 mg at 01/14/18 0102 **OR** acetaminophen (TYLENOL) suppository 650 mg, 650 mg, Rectal, Q4H PRN, BRITNEY Schroeder  •  dextrose (D50W) solution 25 g, 25 g, Intravenous, Q15 Min PRN, Wilfred Pedersen MD  •  dextrose (GLUTOSE) oral gel 15 g, 15 g, Oral, Q15 Min PRN, Wilfred Pedersen MD  •  glucagon (human recombinant) (GLUCAGEN DIAGNOSTIC) injection 1 mg, 1 mg, Subcutaneous, Q15 Min PRN, Wilfred Pedersen MD  •  ipratropium-albuterol (DUO-NEB) nebulizer solution 3 mL, 3 mL, Nebulization, Q6H - RT, BRITNEY Schroeder, 3 mL at 01/16/18 0659  •  ondansetron (ZOFRAN) tablet 4 mg, 4 mg, Oral, Q6H PRN **OR** ondansetron ODT (ZOFRAN-ODT) disintegrating tablet 4 mg, 4 mg, Oral, Q6H PRN **OR** ondansetron (ZOFRAN) injection 4 mg, 4 mg, Intravenous, Q6H PRN, BRITNEY Schroeder  •  pantoprazole (PROTONIX) EC tablet 40 mg, 40 mg, Oral, Q AM, Vanessa Britton MD, 40 mg at 01/15/18 0638  •  sodium chloride 0.9 % flush 1-10 mL, 1-10 mL, Intravenous, PRN, BRITNEY Schroeder, 10 mL at 01/05/18 2036  •  sodium chloride 0.9 % infusion, 100 mL/hr, Intravenous, Continuous, Alec Granados MD, Last Rate: 100 mL/hr at 01/16/18 0909, 100 mL/hr at 01/16/18 0909    Review of Systems     Constitution:  negative for chills, fatigue and fevers  Eyes:  negativefor blurriness and change of vision  ENT:   negative for sore throat and voice change  Respiratory: negative for  cough and shortness of air  Cardiovascular:  Negative for chest pain or palpitations  Gastrointestinal:  negative for  See HPI  Genitourinary:   negativefor  blood in urine and painful urination  Integument: negative for  rash and redness  Hematologic / Lymphatic: negative for  excessive bleeding and easy bruising  Musculoskeletal: negative for  joint pain and joint stiffness out of the ordinary  Neurological:  negative for  seizures and speech abnormal  Behavioral/Psych:  negative for  anxiety and depression out of the ordinary  Endocrine: negative for  diabetes:and weight loss, unintended  Allergies / Immunologic:  negative for  anaphylaxis and rash        Objective     Vital Signs  Temp:  [98.7 °F (37.1 °C)-99.6 °F (37.6 °C)] 99 °F (37.2 °C)  Heart Rate:  [66-78] 68  Resp:  [16-20] 16  BP: (103-120)/(50-74) 115/62  Body mass index is 62.51 kg/(m^2).    Intake/Output Summary (Last 24 hours) at 01/16/18 1209  Last data filed at 01/16/18 1200   Gross per 24 hour   Intake             1939 ml   Output              250 ml   Net             1689 ml     I/O this shift:  In: 1000 [I.V.:1000]  Out: 100 [Urine:100]       Physical Exam     Physical Exam:   General: patient awake, alert and cooperative   Eyes: Normal lids and lashes, no scleral icterus   Neck: supple, normal ROM   Skin: warm and dry, not jaundiced   Cardiovascular: regular rhythm and rate, no murmurs auscultated   Pulm: clear to auscultation bilaterally, regular and unlabored   Abdomen: soft, nontender, nondistended; normal bowel sounds   Rectal: deferred   Extremities: no rash or edema   Psychiatric: Normal mood and behavior; memory intact      Results Review:    I have reviewed all of the patients current test results    Results from last 7 days  Lab Units 01/16/18  1112 01/16/18  0701 01/15/18  2339  01/15/18  0601  01/14/18  0528   WBC 10*3/mm3  --  4.10*  --   --  4.82  --  7.80   HEMOGLOBIN g/dL 9.5* 9.2* 9.3*  < > 8.7*  < > 7.4*   HEMATOCRIT % 30.0* 28.3* 28.9*  < > 26.8*  < > 22.5*   PLATELETS 10*3/mm3  --  264  --   --  266  --  284   < > = values in this interval not displayed.      Results  from last 7 days  Lab Units 01/16/18  0701 01/15/18  0601 01/14/18  0528 01/13/18  0702   SODIUM mmol/L 139 138 136 136   POTASSIUM mmol/L 3.8 4.0 4.2 4.0   CHLORIDE mmol/L 104 101 99 99   CO2 mmol/L 29.0 32.0* 34.0* 34.0*   BUN mg/dL 12 13 17 18   CREATININE mg/dL 0.93 0.95 0.97 0.93   CALCIUM mg/dL 9.7 9.7 9.8 9.7   BILIRUBIN mg/dL 0.4 0.4  --  0.3   ALK PHOS U/L 66 64  --  64   ALT (SGPT) U/L 29 32  --  30   AST (SGOT) U/L 18 21  --  22   GLUCOSE mg/dL 89 98 112* 100             No results found for: LIPASE    Radiology:    Imaging Results (last 24 hours)     Procedure Component Value Units Date/Time    XR Abdomen KUB [[id number]] Collected:  01/15/18 1716     Updated:  01/15/18 1721    Narrative:       EXAMINATION: XR ABDOMEN KUB- 1/15/2018 5:16 PM CST     HISTORY: assess for IVC filter; Z74.09-Other reduced mobility;  Z74.09-Other reduced mobility.     REPORT: A supine view of the abdomen was obtained. There are no  comparison studies.     The bowel gas pattern is within normal limits. Moderate amount stool is  noted in the right colon. There are surgical staples and suture lines in  the left upper quadrant. Previous right hip arthroplasty is noted as  well as lumbosacral fusion surgery. An IVC filter is present, the apex  of the filter is identified at the L3-4 level. This should be  satisfactory. Severe degenerative changes are seen throughout the lower  thoracic and lumbar spine.       Impression:       1. No acute intra-abdominal findings.  2. Multiple previous surgeries as described, the IVC filter appears to  be in satisfactory position with the apex at the L3-4 level to the right  of midline.  This report was finalized on 01/15/2018 17:18 by Dr. Blake Burton MD.            Assessment/Plan     Patient Active Problem List   Diagnosis Code   • RUBY (acute kidney injury) N17.9   • DVT (deep venous thrombosis) I82.409   • Anemia D64.9   • GI bleed K92.2       Impression/plan    Blood per  rectum  Anemia  Anticoagulated with Eliquis  DVT    We will advance patient's diet.  Hemoglobin currently stable.  No further signs of bleeding.  Do not plan for colonoscopy evaluation at this time.  Anticoagulation is currently being held.      Jose Rubin, APRN 8:26 AM    Imp/Plan    1. BRBPR/resolving  2. Anemia  3. DVT    Now that we have documented she has a filter she doesn't need to be anti coagulated going forward and the bleeding issue should resolve(as it is).  Please re consult us if further GI issues arise      Garfield Hernandez,   01/16/18  12:09 PM

## 2018-01-16 NOTE — PROGRESS NOTES
Continued Stay Note   [place]     Patient Name: Tricia Hernandez  MRN: [id number]  Today's Date: 1/16/2018    Admit Date: 1/3/2018          Discharge Plan       01/16/18 1050    Case Management/Social Work Plan    Plan UPDATED [facility] [place] [id number]. WILL FOLLOW FOR DC  TO [facility] WHEN PT IS STABLE AND TRANSPORTATION CAN BE ARRANGED.               Discharge Codes     None        Expected Discharge Date and Time     Expected Discharge Date Expected Discharge Time    Jan 11, 2018             MILY Mcleod

## 2018-01-16 NOTE — PROGRESS NOTES
[facility] Medicine Services  INPATIENT PROGRESS NOTE    Length of Stay: 13  Date of Admission: 1/3/2018  Primary Care Physician: Ed Hanson MD    Subjective   Chief Complaint: Weakness  HPI   Doing well.  Hgb stable.  No bowel movements so far today.          Review of Systems   Constitutional: Positive for fatigue. Negative for fever.   HENT: Negative for congestion and ear pain.    Eyes: Negative for pain and visual disturbance.   Respiratory: Negative for cough, shortness of breath and wheezing.    Cardiovascular: Negative for chest pain and palpitations.   Gastrointestinal: Negative for diarrhea, nausea and vomiting.   Endocrine: Negative for heat intolerance.   Genitourinary: Negative for dysuria and frequency.   Musculoskeletal: Negative for arthralgias and back pain.   Skin: Negative for rash and wound.   Neurological: Positive for weakness. Negative for dizziness and light-headedness.   Psychiatric/Behavioral: Negative for confusion. The patient is not nervous/anxious.    All other systems reviewed and are negative.     All pertinent negatives and positives are as above. All other systems have been reviewed and are negative unless otherwise stated.     Objective    Temp:  [98.7 °F (37.1 °C)-99.6 °F (37.6 °C)] 99 °F (37.2 °C)  Heart Rate:  [66-78] 72  Resp:  [16-20] 16  BP: (103-120)/(50-74) 115/62  Physical Exam   Constitutional: She is oriented to person, place, and time. She appears well-developed and well-nourished.   HENT:   Head: Normocephalic and atraumatic.   Right Ear: External ear normal.   Left Ear: External ear normal.   Nose: Nose normal.   Mouth/Throat: Oropharynx is clear and moist.   Eyes: Conjunctivae and EOM are normal.   Neck: Normal range of motion. Neck supple.   Cardiovascular: Normal rate, regular rhythm and normal heart sounds.    Pulmonary/Chest: Effort normal and breath sounds normal.   Abdominal: Soft. Bowel sounds are normal. She exhibits  no distension. There is no tenderness.   Musculoskeletal: Normal range of motion.   Neurological: She is alert and oriented to person, place, and time.   Skin: Skin is warm and dry.   Psychiatric: She has a normal mood and affect. Her speech is normal and behavior is normal. Cognition and memory are normal.         Results Review:  I have reviewed the labs, radiology results, and diagnostic studies.    Laboratory Data:     Results from last 7 days  Lab Units 01/16/18  1112 01/16/18  0701 01/15/18  2339  01/15/18  0601  01/14/18  0528   WBC 10*3/mm3  --  4.10*  --   --  4.82  --  7.80   HEMOGLOBIN g/dL 9.5* 9.2* 9.3*  < > 8.7*  < > 7.4*   HEMATOCRIT % 30.0* 28.3* 28.9*  < > 26.8*  < > 22.5*   PLATELETS 10*3/mm3  --  264  --   --  266  --  284   < > = values in this interval not displayed.       Results from last 7 days  Lab Units 01/16/18  0701 01/15/18  0601 01/14/18  0528 01/13/18  0702   SODIUM mmol/L 139 138 136 136   POTASSIUM mmol/L 3.8 4.0 4.2 4.0   CHLORIDE mmol/L 104 101 99 99   CO2 mmol/L 29.0 32.0* 34.0* 34.0*   BUN mg/dL 12 13 17 18   CREATININE mg/dL 0.93 0.95 0.97 0.93   CALCIUM mg/dL 9.7 9.7 9.8 9.7   BILIRUBIN mg/dL 0.4 0.4  --  0.3   ALK PHOS U/L 66 64  --  64   ALT (SGPT) U/L 29 32  --  30   AST (SGOT) U/L 18 21  --  22   GLUCOSE mg/dL 89 98 112* 100       Culture Data:        Radiology Data:   Imaging Results (last 24 hours)     Procedure Component Value Units Date/Time    XR Abdomen KUB [[id number]] Collected:  01/15/18 1716     Updated:  01/15/18 1721    Narrative:       EXAMINATION: XR ABDOMEN KUB- 1/15/2018 5:16 PM CST     HISTORY: assess for IVC filter; Z74.09-Other reduced mobility;  Z74.09-Other reduced mobility.     REPORT: A supine view of the abdomen was obtained. There are no  comparison studies.     The bowel gas pattern is within normal limits. Moderate amount stool is  noted in the right colon. There are surgical staples and suture lines in  the left upper quadrant. Previous right hip  arthroplasty is noted as  well as lumbosacral fusion surgery. An IVC filter is present, the apex  of the filter is identified at the L3-4 level. This should be  satisfactory. Severe degenerative changes are seen throughout the lower  thoracic and lumbar spine.       Impression:       1. No acute intra-abdominal findings.  2. Multiple previous surgeries as described, the IVC filter appears to  be in satisfactory position with the apex at the L3-4 level to the right  of midline.  This report was finalized on 01/15/2018 17:18 by Dr. Blake Burton MD.          I have reviewed the patient current medications.     Assessment/Plan   1.  Occlusive Left-lower extremity DVT  -still having bloody stools, Anticoagulation held  -Appreciate GI input  -Appreciate Vascular input   -Hgb stable  -Has filter in place per Vascular that is old      2:  GI Bleed  -GI following  -Hold anticoagulation  -Monitor H&H      3.  Sepsis  -Resolved  -Off Abx     3.  Acute Renal Failure  -Resolved  -monitor Cr        4.  Lactic Acidosis  -Resolved  -Monitor      5.  Intractible Nausea and vomiting  -Zofran  -IVF      6.  Dehydration  -Resolved      7.  GERD  -Protonix drip      8.  HLD  -Statin  -Lipid panel      9.  Bladder Spasms  -Ditropan      10.  HTN  -Hold BP meds given low BP, RUBY  -Monitor BP      11.  Anemia  -Monitor H&H  -Protonix  -GI Consulted      12:  Morbid Obesity  -BMI:  54.4  -Dietician consulted      13.  Hyperglycemia  -A1C negative  -D/C finger sticks/A1C negative      14.  Hyperkalemia  -monitor      15.  Superficial right sided thrombus  -Vascular following  -cava filter in place    Restart PT/OT, home in AM                Discharge Planning: I expect the patient to be discharged to rehab in 1-2 days    Wilfred Pedersen MD   01/16/18   1:05 PM

## 2018-01-16 NOTE — PLAN OF CARE
Problem: Patient Care Overview (Adult)  Goal: Plan of Care Review  Outcome: Ongoing (interventions implemented as appropriate)   01/16/18 1156   Coping/Psychosocial Response Interventions   Plan Of Care Reviewed With patient   Patient Care Overview   Progress progress towards functional goals is fair   Outcome Evaluation   Outcome Summary/Follow up Plan Pt reports being frustrated w/ her progress. Bed mobility cont to be mod x2 worked on activities EOB pt wanting to go home but pt isn't safe at this time to go home needs SNF. Unsure of pt WB status in RUE due to recent TSA in November pt was hesitant to try and stand.

## 2018-01-17 ENCOUNTER — APPOINTMENT (OUTPATIENT)
Dept: GENERAL RADIOLOGY | Facility: HOSPITAL | Age: 78
End: 2018-01-17

## 2018-01-17 LAB
ALBUMIN SERPL-MCNC: 2.5 G/DL (ref 3.5–5)
ALBUMIN/GLOB SERPL: 0.9 G/DL (ref 1.1–2.5)
ALP SERPL-CCNC: 63 U/L (ref 24–120)
ALT SERPL W P-5'-P-CCNC: 27 U/L (ref 0–54)
ANION GAP SERPL CALCULATED.3IONS-SCNC: 6 MMOL/L (ref 4–13)
AST SERPL-CCNC: 18 U/L (ref 7–45)
BASOPHILS # BLD AUTO: 0.04 10*3/MM3 (ref 0–0.2)
BASOPHILS NFR BLD AUTO: 0.9 % (ref 0–2)
BILIRUB SERPL-MCNC: 0.6 MG/DL (ref 0.1–1)
BUN BLD-MCNC: 12 MG/DL (ref 5–21)
BUN/CREAT SERPL: 13.5 (ref 7–25)
CALCIUM SPEC-SCNC: 9.8 MG/DL (ref 8.4–10.4)
CHLORIDE SERPL-SCNC: 103 MMOL/L (ref 98–110)
CO2 SERPL-SCNC: 30 MMOL/L (ref 24–31)
CREAT BLD-MCNC: 0.89 MG/DL (ref 0.5–1.4)
DEPRECATED RDW RBC AUTO: 47.9 FL (ref 40–54)
EOSINOPHIL # BLD AUTO: 0.11 10*3/MM3 (ref 0–0.7)
EOSINOPHIL NFR BLD AUTO: 2.6 % (ref 0–4)
ERYTHROCYTE [DISTWIDTH] IN BLOOD BY AUTOMATED COUNT: 15.3 % (ref 12–15)
FLUAV AG NPH QL: NEGATIVE
FLUBV AG NPH QL IA: NEGATIVE
GFR SERPL CREATININE-BSD FRML MDRD: 62 ML/MIN/1.73
GFR SERPL CREATININE-BSD FRML MDRD: 75 ML/MIN/1.73
GLOBULIN UR ELPH-MCNC: 2.8 GM/DL
GLUCOSE BLD-MCNC: 101 MG/DL (ref 70–100)
HCT VFR BLD AUTO: 28.6 % (ref 37–47)
HCT VFR BLD AUTO: 30.8 % (ref 37–47)
HCT VFR BLD AUTO: 32.3 % (ref 37–47)
HGB BLD-MCNC: 10.1 G/DL (ref 12–16)
HGB BLD-MCNC: 10.1 G/DL (ref 12–16)
HGB BLD-MCNC: 9.6 G/DL (ref 12–16)
IMM GRANULOCYTES # BLD: 0.02 10*3/MM3 (ref 0–0.03)
IMM GRANULOCYTES NFR BLD: 0.5 % (ref 0–5)
LYMPHOCYTES # BLD AUTO: 0.69 10*3/MM3 (ref 0.72–4.86)
LYMPHOCYTES NFR BLD AUTO: 16.3 % (ref 15–45)
MCH RBC QN AUTO: 29.1 PG (ref 28–32)
MCHC RBC AUTO-ENTMCNC: 33.6 G/DL (ref 33–36)
MCV RBC AUTO: 86.7 FL (ref 82–98)
MONOCYTES # BLD AUTO: 0.81 10*3/MM3 (ref 0.19–1.3)
MONOCYTES NFR BLD AUTO: 19.1 % (ref 4–12)
NEUTROPHILS # BLD AUTO: 2.56 10*3/MM3 (ref 1.87–8.4)
NEUTROPHILS NFR BLD AUTO: 60.6 % (ref 39–78)
NRBC BLD MANUAL-RTO: 0 /100 WBC (ref 0–0)
PLATELET # BLD AUTO: 251 10*3/MM3 (ref 130–400)
PMV BLD AUTO: 9.7 FL (ref 6–12)
POTASSIUM BLD-SCNC: 3.6 MMOL/L (ref 3.5–5.3)
PROT SERPL-MCNC: 5.3 G/DL (ref 6.3–8.7)
RBC # BLD AUTO: 3.3 10*6/MM3 (ref 4.2–5.4)
SODIUM BLD-SCNC: 139 MMOL/L (ref 135–145)
WBC NRBC COR # BLD: 4.23 10*3/MM3 (ref 4.8–10.8)

## 2018-01-17 PROCEDURE — 71046 X-RAY EXAM CHEST 2 VIEWS: CPT

## 2018-01-17 PROCEDURE — 94760 N-INVAS EAR/PLS OXIMETRY 1: CPT

## 2018-01-17 PROCEDURE — 25010000002 FUROSEMIDE PER 20 MG: Performed by: FAMILY MEDICINE

## 2018-01-17 PROCEDURE — 80053 COMPREHEN METABOLIC PANEL: CPT | Performed by: FAMILY MEDICINE

## 2018-01-17 PROCEDURE — 85014 HEMATOCRIT: CPT | Performed by: FAMILY MEDICINE

## 2018-01-17 PROCEDURE — 97530 THERAPEUTIC ACTIVITIES: CPT

## 2018-01-17 PROCEDURE — 94799 UNLISTED PULMONARY SVC/PX: CPT

## 2018-01-17 PROCEDURE — 87040 BLOOD CULTURE FOR BACTERIA: CPT | Performed by: FAMILY MEDICINE

## 2018-01-17 PROCEDURE — 97535 SELF CARE MNGMENT TRAINING: CPT

## 2018-01-17 PROCEDURE — 97110 THERAPEUTIC EXERCISES: CPT

## 2018-01-17 PROCEDURE — 87804 INFLUENZA ASSAY W/OPTIC: CPT | Performed by: FAMILY MEDICINE

## 2018-01-17 PROCEDURE — 85025 COMPLETE CBC W/AUTO DIFF WBC: CPT | Performed by: FAMILY MEDICINE

## 2018-01-17 PROCEDURE — 85018 HEMOGLOBIN: CPT | Performed by: FAMILY MEDICINE

## 2018-01-17 RX ORDER — FUROSEMIDE 10 MG/ML
20 INJECTION INTRAMUSCULAR; INTRAVENOUS ONCE
Status: COMPLETED | OUTPATIENT
Start: 2018-01-17 | End: 2018-01-17

## 2018-01-17 RX ADMIN — IPRATROPIUM BROMIDE AND ALBUTEROL SULFATE 3 ML: 2.5; .5 SOLUTION RESPIRATORY (INHALATION) at 18:08

## 2018-01-17 RX ADMIN — PANTOPRAZOLE SODIUM 40 MG: 40 TABLET, DELAYED RELEASE ORAL at 05:26

## 2018-01-17 RX ADMIN — IPRATROPIUM BROMIDE AND ALBUTEROL SULFATE 3 ML: 2.5; .5 SOLUTION RESPIRATORY (INHALATION) at 07:03

## 2018-01-17 RX ADMIN — IPRATROPIUM BROMIDE AND ALBUTEROL SULFATE 3 ML: 2.5; .5 SOLUTION RESPIRATORY (INHALATION) at 12:05

## 2018-01-17 RX ADMIN — IPRATROPIUM BROMIDE AND ALBUTEROL SULFATE 3 ML: 2.5; .5 SOLUTION RESPIRATORY (INHALATION) at 23:33

## 2018-01-17 RX ADMIN — FUROSEMIDE 20 MG: 10 INJECTION, SOLUTION INTRAMUSCULAR; INTRAVENOUS at 16:54

## 2018-01-17 NOTE — PLAN OF CARE
Problem: Patient Care Overview (Adult)  Goal: Plan of Care Review  Outcome: Ongoing (interventions implemented as appropriate)   01/17/18 1346   Coping/Psychosocial Response Interventions   Plan Of Care Reviewed With patient   Patient Care Overview   Progress no change

## 2018-01-17 NOTE — PLAN OF CARE
Problem: Patient Care Overview (Adult)  Goal: Plan of Care Review  Outcome: Ongoing (interventions implemented as appropriate)   01/17/18 0245   Coping/Psychosocial Response Interventions   Plan Of Care Reviewed With patient   Patient Care Overview   Progress improving     Goal: Adult Individualization and Mutuality  Outcome: Ongoing (interventions implemented as appropriate)      Problem: VTE, DVT and PE (Adult)  Goal: Signs and Symptoms of Listed Potential Problems Will be Absent or Manageable (VTE, DVT and PE)  Outcome: Ongoing (interventions implemented as appropriate)      Problem: Skin Integrity Impairment, Risk/Actual (Adult)  Goal: Skin Integrity/Wound Healing  Outcome: Ongoing (interventions implemented as appropriate)      Problem: Pressure Ulcer Risk (Jorge Scale) (Adult,Obstetrics,Pediatric)  Goal: Skin Integrity  Outcome: Ongoing (interventions implemented as appropriate)      Problem: Gastrointestinal Bleeding (Adult)  Goal: Signs and Symptoms of Listed Potential Problems Will be Absent or Manageable (Gastrointestinal Bleeding)  Outcome: Ongoing (interventions implemented as appropriate)

## 2018-01-17 NOTE — PROGRESS NOTES
[facility] Medicine Services  INPATIENT PROGRESS NOTE    Length of Stay: 14  Date of Admission: 1/3/2018  Primary Care Physician: Ed Hanson MD    Subjective   Chief Complaint: SOA  HPI   Doing Ok.   Still requiring oxygen via nasal cannula.    Labs stable.    Has a bit more leg swelling the last few days.    I think she may be a touch fluid overloaded.  Will give some Lasix, recheck CXR.        Review of Systems   Constitutional: Positive for fatigue. Negative for fever.   HENT: Negative for congestion and ear pain.    Eyes: Negative for pain and visual disturbance.   Respiratory: Positive for shortness of breath. Negative for cough and wheezing.    Cardiovascular: Positive for leg swelling. Negative for chest pain and palpitations.   Gastrointestinal: Negative for diarrhea, nausea and vomiting.   Endocrine: Negative for heat intolerance.   Genitourinary: Negative for dysuria and frequency.   Musculoskeletal: Negative for arthralgias and back pain.   Skin: Negative for rash and wound.   Neurological: Positive for weakness. Negative for dizziness and light-headedness.   Psychiatric/Behavioral: Negative for confusion. The patient is not nervous/anxious.    All other systems reviewed and are negative.     All pertinent negatives and positives are as above. All other systems have been reviewed and are negative unless otherwise stated.     Objective    Temp:  [98.2 °F (36.8 °C)-101.6 °F (38.7 °C)] 99.7 °F (37.6 °C)  Heart Rate:  [63-91] 71  Resp:  [16-20] 20  BP: ()/(48-96) 97/59  Physical Exam   Constitutional: She is oriented to person, place, and time. She appears well-developed and well-nourished.   HENT:   Head: Normocephalic and atraumatic.   Right Ear: External ear normal.   Left Ear: External ear normal.   Nose: Nose normal.   Mouth/Throat: Oropharynx is clear and moist.   Eyes: Conjunctivae and EOM are normal.   Neck: Normal range of motion. Neck supple.    Cardiovascular: Normal rate, regular rhythm and normal heart sounds.    Trace BLE edema   Pulmonary/Chest: Effort normal. She has decreased breath sounds.   Abdominal: Soft. Bowel sounds are normal. She exhibits no distension. There is no tenderness.   Musculoskeletal: Normal range of motion.   Neurological: She is alert and oriented to person, place, and time.   Skin: Skin is warm and dry.   Psychiatric: She has a normal mood and affect. Her speech is normal and behavior is normal. Cognition and memory are normal.           Results Review:  I have reviewed the labs, radiology results, and diagnostic studies.    Laboratory Data:     Results from last 7 days  Lab Units 01/17/18  1128 01/17/18  0407 01/16/18  2325  01/16/18  0701  01/15/18  0601   WBC 10*3/mm3  --  4.23*  --   --  4.10*  --  4.82   HEMOGLOBIN g/dL 10.1* 9.6* 9.6*  < > 9.2*  < > 8.7*   HEMATOCRIT % 30.8* 28.6* 29.9*  < > 28.3*  < > 26.8*   PLATELETS 10*3/mm3  --  251  --   --  264  --  266   < > = values in this interval not displayed.       Results from last 7 days  Lab Units 01/17/18  0407 01/16/18  0701 01/15/18  0601   SODIUM mmol/L 139 139 138   POTASSIUM mmol/L 3.6 3.8 4.0   CHLORIDE mmol/L 103 104 101   CO2 mmol/L 30.0 29.0 32.0*   BUN mg/dL 12 12 13   CREATININE mg/dL 0.89 0.93 0.95   CALCIUM mg/dL 9.8 9.7 9.7   BILIRUBIN mg/dL 0.6 0.4 0.4   ALK PHOS U/L 63 66 64   ALT (SGPT) U/L 27 29 32   AST (SGOT) U/L 18 18 21   GLUCOSE mg/dL 101* 89 98       Culture Data:        Radiology Data:   Imaging Results (last 24 hours)     ** No results found for the last 24 hours. **          I have reviewed the patient current medications.     Assessment/Plan   1.  Occlusive Left-lower extremity DVT  -still having bloody stools, Anticoagulation held  -Appreciate GI input  -Appreciate Vascular input   -Hgb stable  -Has filter in place per Vascular that is old      2:  GI Bleed  -GI following  -Hold anticoagulation  -Monitor H&H      3.  Sepsis  -Resolved  -Off  Abx     3.  Acute Renal Failure  -Resolved  -monitor Cr        4.  Lactic Acidosis  -Resolved  -Monitor      5.  Intractible Nausea and vomiting  -Zofran  -IVF      6.  Dehydration  -IVF held  -Monitor UOP      7.  GERD  -Protonix drip      8.  HLD  -Statin  -Lipid panel      9.  Bladder Spasms  -Ditropan      10.  HTN  -Hold BP meds given low BP, RUBY  -Monitor BP      11.  Anemia  -Monitor H&H  -Protonix  -GI Consulted      12:  Morbid Obesity  -BMI:  54.4  -Dietician consulted      13.  Hyperglycemia  -A1C negative  -D/C finger sticks/A1C      14.  Hyperkalemia  -monitor      15.  Superficial right sided thrombus  -Vascular following    16.  Possible Acute on chronic diastolic CHF  -Check CXR  -Give Lasix x 1                      Wilfred Pedersen MD   01/17/18   2:25 PM

## 2018-01-17 NOTE — PLAN OF CARE
Problem: Patient Care Overview (Adult)  Goal: Plan of Care Review  Outcome: Ongoing (interventions implemented as appropriate)   01/17/18 1139   Coping/Psychosocial Response Interventions   Plan Of Care Reviewed With patient   Patient Care Overview   Progress progress toward functional goals as expected   Outcome Evaluation   Outcome Summary/Follow up Plan Pt. participated in ot tx today! Mod. assist required with rolling R/L for hygiene, ue exs to increase ue ability with adl tasks!

## 2018-01-18 LAB
ALBUMIN SERPL-MCNC: 2.4 G/DL (ref 3.5–5)
ALBUMIN/GLOB SERPL: 0.9 G/DL (ref 1.1–2.5)
ALP SERPL-CCNC: 59 U/L (ref 24–120)
ALT SERPL W P-5'-P-CCNC: 25 U/L (ref 0–54)
ANION GAP SERPL CALCULATED.3IONS-SCNC: 3 MMOL/L (ref 4–13)
AST SERPL-CCNC: 19 U/L (ref 7–45)
BASOPHILS # BLD AUTO: 0.03 10*3/MM3 (ref 0–0.2)
BASOPHILS NFR BLD AUTO: 0.5 % (ref 0–2)
BILIRUB SERPL-MCNC: 0.4 MG/DL (ref 0.1–1)
BILIRUB UR QL STRIP: NEGATIVE
BUN BLD-MCNC: 11 MG/DL (ref 5–21)
BUN/CREAT SERPL: 11.2 (ref 7–25)
CALCIUM SPEC-SCNC: 9.6 MG/DL (ref 8.4–10.4)
CHLORIDE SERPL-SCNC: 101 MMOL/L (ref 98–110)
CLARITY UR: CLEAR
CO2 SERPL-SCNC: 33 MMOL/L (ref 24–31)
COLOR UR: YELLOW
CREAT BLD-MCNC: 0.98 MG/DL (ref 0.5–1.4)
DEPRECATED RDW RBC AUTO: 48.6 FL (ref 40–54)
EOSINOPHIL # BLD AUTO: 0.15 10*3/MM3 (ref 0–0.7)
EOSINOPHIL NFR BLD AUTO: 2.7 % (ref 0–4)
ERYTHROCYTE [DISTWIDTH] IN BLOOD BY AUTOMATED COUNT: 15.5 % (ref 12–15)
GFR SERPL CREATININE-BSD FRML MDRD: 55 ML/MIN/1.73
GFR SERPL CREATININE-BSD FRML MDRD: 67 ML/MIN/1.73
GLOBULIN UR ELPH-MCNC: 2.7 GM/DL
GLUCOSE BLD-MCNC: 103 MG/DL (ref 70–100)
GLUCOSE UR STRIP-MCNC: NEGATIVE MG/DL
HCT VFR BLD AUTO: 29.1 % (ref 37–47)
HCT VFR BLD AUTO: 29.1 % (ref 37–47)
HCT VFR BLD AUTO: 30.2 % (ref 37–47)
HGB BLD-MCNC: 10.1 G/DL (ref 12–16)
HGB BLD-MCNC: 9.4 G/DL (ref 12–16)
HGB BLD-MCNC: 9.5 G/DL (ref 12–16)
HGB UR QL STRIP.AUTO: NEGATIVE
HYPOCHROMIA BLD QL: NORMAL
IMM GRANULOCYTES # BLD: 0.03 10*3/MM3 (ref 0–0.03)
IMM GRANULOCYTES NFR BLD: 0.5 % (ref 0–5)
KETONES UR QL STRIP: NEGATIVE
LEUKOCYTE ESTERASE UR QL STRIP.AUTO: NEGATIVE
LYMPHOCYTES # BLD AUTO: 0.75 10*3/MM3 (ref 0.72–4.86)
LYMPHOCYTES NFR BLD AUTO: 13.4 % (ref 15–45)
MCH RBC QN AUTO: 28.4 PG (ref 28–32)
MCHC RBC AUTO-ENTMCNC: 32.6 G/DL (ref 33–36)
MCV RBC AUTO: 86.9 FL (ref 82–98)
MONOCYTES # BLD AUTO: 0.94 10*3/MM3 (ref 0.19–1.3)
MONOCYTES NFR BLD AUTO: 16.8 % (ref 4–12)
NEUTROPHILS # BLD AUTO: 3.69 10*3/MM3 (ref 1.87–8.4)
NEUTROPHILS NFR BLD AUTO: 66.1 % (ref 39–78)
NITRITE UR QL STRIP: NEGATIVE
NRBC BLD MANUAL-RTO: 0 /100 WBC (ref 0–0)
PH UR STRIP.AUTO: <=5 [PH] (ref 5–8)
PLAT MORPH BLD: NORMAL
PLATELET # BLD AUTO: 226 10*3/MM3 (ref 130–400)
PMV BLD AUTO: 10.3 FL (ref 6–12)
POTASSIUM BLD-SCNC: 3.6 MMOL/L (ref 3.5–5.3)
PROT SERPL-MCNC: 5.1 G/DL (ref 6.3–8.7)
PROT UR QL STRIP: NEGATIVE
RBC # BLD AUTO: 3.35 10*6/MM3 (ref 4.2–5.4)
SODIUM BLD-SCNC: 137 MMOL/L (ref 135–145)
SP GR UR STRIP: 1.02 (ref 1–1.03)
UROBILINOGEN UR QL STRIP: NORMAL
WBC MORPH BLD: NORMAL
WBC NRBC COR # BLD: 5.59 10*3/MM3 (ref 4.8–10.8)

## 2018-01-18 PROCEDURE — 85018 HEMOGLOBIN: CPT | Performed by: FAMILY MEDICINE

## 2018-01-18 PROCEDURE — 94799 UNLISTED PULMONARY SVC/PX: CPT

## 2018-01-18 PROCEDURE — 81003 URINALYSIS AUTO W/O SCOPE: CPT | Performed by: FAMILY MEDICINE

## 2018-01-18 PROCEDURE — 25010000002 PIPERACILLIN SOD-TAZOBACTAM PER 1 G: Performed by: FAMILY MEDICINE

## 2018-01-18 PROCEDURE — 85007 BL SMEAR W/DIFF WBC COUNT: CPT | Performed by: FAMILY MEDICINE

## 2018-01-18 PROCEDURE — 25010000002 VANCOMYCIN 10 G RECONSTITUTED SOLUTION: Performed by: FAMILY MEDICINE

## 2018-01-18 PROCEDURE — 94760 N-INVAS EAR/PLS OXIMETRY 1: CPT

## 2018-01-18 PROCEDURE — 85025 COMPLETE CBC W/AUTO DIFF WBC: CPT | Performed by: FAMILY MEDICINE

## 2018-01-18 PROCEDURE — 85014 HEMATOCRIT: CPT | Performed by: FAMILY MEDICINE

## 2018-01-18 PROCEDURE — 80053 COMPREHEN METABOLIC PANEL: CPT | Performed by: FAMILY MEDICINE

## 2018-01-18 PROCEDURE — 97530 THERAPEUTIC ACTIVITIES: CPT

## 2018-01-18 PROCEDURE — 97110 THERAPEUTIC EXERCISES: CPT

## 2018-01-18 PROCEDURE — 25010000002 VANCOMYCIN PER 500 MG: Performed by: FAMILY MEDICINE

## 2018-01-18 RX ADMIN — ACETAMINOPHEN 650 MG: 325 TABLET, FILM COATED ORAL at 00:52

## 2018-01-18 RX ADMIN — ACETAMINOPHEN 650 MG: 325 TABLET, FILM COATED ORAL at 22:11

## 2018-01-18 RX ADMIN — PANTOPRAZOLE SODIUM 40 MG: 40 TABLET, DELAYED RELEASE ORAL at 06:18

## 2018-01-18 RX ADMIN — TAZOBACTAM SODIUM AND PIPERACILLIN SODIUM 3.38 G: 375; 3 INJECTION, SOLUTION INTRAVENOUS at 22:30

## 2018-01-18 RX ADMIN — VANCOMYCIN HYDROCHLORIDE 1250 MG: 100 INJECTION, POWDER, LYOPHILIZED, FOR SOLUTION INTRAVENOUS at 22:11

## 2018-01-18 RX ADMIN — VANCOMYCIN HYDROCHLORIDE 2500 MG: 1 INJECTION, POWDER, LYOPHILIZED, FOR SOLUTION INTRAVENOUS at 10:31

## 2018-01-18 RX ADMIN — TAZOBACTAM SODIUM AND PIPERACILLIN SODIUM 3.38 G: 375; 3 INJECTION, SOLUTION INTRAVENOUS at 15:03

## 2018-01-18 RX ADMIN — TAZOBACTAM SODIUM AND PIPERACILLIN SODIUM 3.38 G: 375; 3 INJECTION, SOLUTION INTRAVENOUS at 08:43

## 2018-01-18 RX ADMIN — IPRATROPIUM BROMIDE AND ALBUTEROL SULFATE 3 ML: 2.5; .5 SOLUTION RESPIRATORY (INHALATION) at 07:40

## 2018-01-18 RX ADMIN — IPRATROPIUM BROMIDE AND ALBUTEROL SULFATE 3 ML: 2.5; .5 SOLUTION RESPIRATORY (INHALATION) at 18:59

## 2018-01-18 RX ADMIN — IPRATROPIUM BROMIDE AND ALBUTEROL SULFATE 3 ML: 2.5; .5 SOLUTION RESPIRATORY (INHALATION) at 11:49

## 2018-01-18 NOTE — PROGRESS NOTES
[facility] Medicine Services  INPATIENT PROGRESS NOTE    Length of Stay: 15  Date of Admission: 1/3/2018  Primary Care Physician: Ed Hanson MD    Subjective   Chief Complaint: ROSENDO DUQUE   Had 2 fevers overnight.  CXR shows increasing bibasilar atalectasis.    Flu swab negative.  Will cover empircally for HAP for now, monitor cultures.          Review of Systems   Constitutional: Positive for activity change and fatigue. Negative for fever.   HENT: Negative for congestion and ear pain.    Eyes: Negative for pain and visual disturbance.   Respiratory: Positive for cough and shortness of breath. Negative for wheezing.    Cardiovascular: Negative for chest pain and palpitations.   Gastrointestinal: Negative for diarrhea, nausea and vomiting.   Endocrine: Negative for heat intolerance.   Genitourinary: Negative for dysuria and frequency.   Musculoskeletal: Negative for arthralgias and back pain.   Skin: Negative for rash and wound.   Neurological: Negative for dizziness and light-headedness.   Psychiatric/Behavioral: Negative for confusion. The patient is not nervous/anxious.    All other systems reviewed and are negative.     All pertinent negatives and positives are as above. All other systems have been reviewed and are negative unless otherwise stated.     Objective    Temp:  [98.2 °F (36.8 °C)-102.3 °F (39.1 °C)] 99.1 °F (37.3 °C)  Heart Rate:  [] 80  Resp:  [16-20] 16  BP: (110-142)/(63-73) 110/63  Physical Exam   Constitutional: She is oriented to person, place, and time. She appears well-developed and well-nourished.   HENT:   Head: Normocephalic and atraumatic.   Right Ear: External ear normal.   Left Ear: External ear normal.   Nose: Nose normal.   Mouth/Throat: Oropharynx is clear and moist.   Eyes: Conjunctivae and EOM are normal.   Neck: Normal range of motion. Neck supple.   Cardiovascular: Normal rate, regular rhythm and normal heart sounds.     Pulmonary/Chest: Effort normal. She has decreased breath sounds.   Coarse bilaterally   Abdominal: Soft. Bowel sounds are normal. She exhibits no distension. There is no tenderness.   Musculoskeletal: Normal range of motion.   Neurological: She is alert and oriented to person, place, and time.   Skin: Skin is warm and dry.   Psychiatric: She has a normal mood and affect. Her speech is normal and behavior is normal. Cognition and memory are normal.         Results Review:  I have reviewed the labs, radiology results, and diagnostic studies.    Laboratory Data:     Results from last 7 days  Lab Units 01/18/18  1206 01/18/18  0606 01/17/18  2334  01/17/18  0407  01/16/18  0701   WBC 10*3/mm3  --  5.59  --   --  4.23*  --  4.10*   HEMOGLOBIN g/dL 9.4* 9.5* 10.1*  < > 9.6*  < > 9.2*   HEMATOCRIT % 29.1* 29.1* 30.2*  < > 28.6*  < > 28.3*   PLATELETS 10*3/mm3  --  226  --   --  251  --  264   < > = values in this interval not displayed.       Results from last 7 days  Lab Units 01/18/18  0606 01/17/18  0407 01/16/18  0701   SODIUM mmol/L 137 139 139   POTASSIUM mmol/L 3.6 3.6 3.8   CHLORIDE mmol/L 101 103 104   CO2 mmol/L 33.0* 30.0 29.0   BUN mg/dL 11 12 12   CREATININE mg/dL 0.98 0.89 0.93   CALCIUM mg/dL 9.6 9.8 9.7   BILIRUBIN mg/dL 0.4 0.6 0.4   ALK PHOS U/L 59 63 66   ALT (SGPT) U/L 25 27 29   AST (SGOT) U/L 19 18 18   GLUCOSE mg/dL 103* 101* 89       Culture Data:   Blood Culture   Date Value Ref Range Status   01/17/2018 No growth at less than 24 hours  Preliminary   01/17/2018 No growth at less than 24 hours  Preliminary       Radiology Data:   Imaging Results (last 24 hours)     Procedure Component Value Units Date/Time    XR Chest PA & Lateral [[id number]] Collected:  01/17/18 1644     Updated:  01/17/18 1648    Narrative:       Exam:   XR CHEST PA AND LATERAL-       Date:  01/17/2018      History:  Female, age  77 years;SOA; Z74.09-Other reduced mobility;  Z74.09-Other reduced mobility     COMPARISON:  Chest  x-ray dated 01/09/2018     Findings :     The heart and mediastinum are unchanged in size. Low lung volumes.  Bibasilar atelectasis, worse on the right, increasing.  The bones show  no acute pathology.         Impression:       Impression:     1.  Low lung volumes.  2.  Bibasilar atelectasis, worse on the right, increasing.     This report was finalized on 01/17/2018 16:45 by Dr. Doreen Whitley MD.          I have reviewed the patient current medications.     Assessment/Plan   1.  Occlusive Left-lower extremity DVT  -still having bloody stools, Anticoagulation held  -Appreciate GI input  -Appreciate Vascular input   -Hgb stable  -Has filter in place per Vascular that is old      2:  GI Bleed  -GI following  -Hold anticoagulation  -Monitor H&H      3.  Sepsis  -Resolved  -Off Abx      4.  Acute Renal Failure  -Resolved  -monitor Cr        5.  Lactic Acidosis  -Resolved  -Monitor      6.  Intractible Nausea and vomiting  -Zofran  -IVF      7.  Dehydration  -IVF held  -Monitor UOP      8.  GERD  -Protonix drip      9.  HLD  -Statin  -Lipid panel      10.  Bladder Spasms  -Ditropan      11.  HTN  -Hold BP meds given low BP, RUBY  -Monitor BP      12.  Anemia  -Monitor H&H  -Protonix  -GI Consulted      13:  Morbid Obesity  -BMI:  54.4  -Dietician consulted      14.  Hyperglycemia  -A1C negative  -D/C finger sticks/A1C      15.  Hyperkalemia  -monitor      16.  Superficial right sided thrombus  -Vascular following     17. HAP possible  -Vanc, Zosyn  -Sputum and blood cultures                   Discharge Planning: I expect the patient to be discharged to nursing home in 2-3 days  Wilfred Pedersen MD   01/18/18   2:27 PM

## 2018-01-18 NOTE — PROGRESS NOTES
"Pharmacy Dosing Service  Pharmacokinetics  Vancomycin Initial Evaluation    Assessment/Action/Plan:  Initiated Vancomycin 2500 mg IVPB once, followed by 1250 mg every 12 hours. Vancomycin trough ordered when pharmacokinetically appropriate. Pharmacy will monitor renal function and adjust dose accordingly.     Subjective:  Tricia Hernandez is a 77 y.o. female with a Vancomycin \"Pharmacy to Dose\" consult for the treatment of Pneumonia.    Objective:  Ht: 160 cm (62.99\"); Wt: (!) 160 kg (352 lb 14.4 oz)  Estimated Creatinine Clearance: 82.3 mL/min (by C-G formula based on Cr of 0.89).   Lab Results   Component Value Date    CREATININE 0.89 01/17/2018    CREATININE 0.93 01/16/2018    CREATININE 0.95 01/15/2018      Lab Results   Component Value Date    WBC 4.23 (L) 01/17/2018    WBC 4.10 (L) 01/16/2018    WBC 4.82 01/15/2018      Baseline culture results:  Microbiology Results (last 10 days)     Procedure Component Value - Date/Time    Blood Culture - Blood, [[id number]]  (Normal) Collected:  01/17/18 1819    Lab Status:  Preliminary result Specimen:  Blood from Arm, Right Updated:  01/18/18 0631     Blood Culture No growth at less than 24 hours    Influenza Antigen, Rapid - Swab, Nasopharynx [[id number]]  (Normal) Collected:  01/17/18 1724    Lab Status:  Final result Specimen:  Swab from Nasopharynx Updated:  01/17/18 1745     Influenza A Ag, EIA Negative     Influenza B Ag, EIA Negative    Narrative:         Recommend confirmation of negative results by viral culture or molecular assay.          LALY Cope RPH  01/18/18 7:28 AM    "

## 2018-01-18 NOTE — PLAN OF CARE
Problem: Patient Care Overview (Adult)  Goal: Plan of Care Review  Outcome: Ongoing (interventions implemented as appropriate)   01/18/18 1128   Coping/Psychosocial Response Interventions   Plan Of Care Reviewed With patient   Patient Care Overview   Progress no change   Outcome Evaluation   Outcome Summary/Follow up Plan Pt c/o increased pain in R shoulder. Refuses to try and stand or ex RUE b/c pt is unsure of limitations from recent TSA in [facility]. Pt sat EOB mod x2 using draw sheet. Encourged more acctivity but wants to lay back down quickly after sitting up, reports would rather do ex's in bed, but explained importance of moving around and sitting up. Pt did do BLE AROM ex's x10 and ex LUE however refused to ex LUE.

## 2018-01-18 NOTE — PLAN OF CARE
Problem: Patient Care Overview (Adult)  Goal: Plan of Care Review  Outcome: Ongoing (interventions implemented as appropriate)   01/18/18 0738   Coping/Psychosocial Response Interventions   Plan Of Care Reviewed With patient   Patient Care Overview   Progress no change   Outcome Evaluation   Outcome Summary/Follow up Plan Alert, calm, cooperative. Continues to be incontinent. Temp>102; UA (-). Aided with repositioning! Interdry to folds.       Problem: Fall Risk (Adult)  Goal: Absence of Falls  Outcome: Ongoing (interventions implemented as appropriate)      Problem: VTE, DVT and PE (Adult)  Goal: Signs and Symptoms of Listed Potential Problems Will be Absent or Manageable (VTE, DVT and PE)  Outcome: Ongoing (interventions implemented as appropriate)      Problem: Skin Integrity Impairment, Risk/Actual (Adult)  Goal: Skin Integrity/Wound Healing  Outcome: Ongoing (interventions implemented as appropriate)      Problem: Pressure Ulcer Risk (Jorge Scale) (Adult,Obstetrics,Pediatric)  Goal: Skin Integrity  Outcome: Ongoing (interventions implemented as appropriate)      Problem: Gastrointestinal Bleeding (Adult)  Goal: Signs and Symptoms of Listed Potential Problems Will be Absent or Manageable (Gastrointestinal Bleeding)  Outcome: Ongoing (interventions implemented as appropriate)

## 2018-01-19 LAB
ALBUMIN SERPL-MCNC: 2.2 G/DL (ref 3.5–5)
ALBUMIN/GLOB SERPL: 0.8 G/DL (ref 1.1–2.5)
ALP SERPL-CCNC: 58 U/L (ref 24–120)
ALT SERPL W P-5'-P-CCNC: 27 U/L (ref 0–54)
ANION GAP SERPL CALCULATED.3IONS-SCNC: 7 MMOL/L (ref 4–13)
ANISOCYTOSIS BLD QL: ABNORMAL
AST SERPL-CCNC: 18 U/L (ref 7–45)
BASOPHILS # BLD MANUAL: 0.06 10*3/MM3 (ref 0–0.2)
BASOPHILS NFR BLD AUTO: 1 % (ref 0–2)
BILIRUB SERPL-MCNC: 0.5 MG/DL (ref 0.1–1)
BUN BLD-MCNC: 11 MG/DL (ref 5–21)
BUN/CREAT SERPL: 12.2 (ref 7–25)
CALCIUM SPEC-SCNC: 9.6 MG/DL (ref 8.4–10.4)
CHLORIDE SERPL-SCNC: 107 MMOL/L (ref 98–110)
CO2 SERPL-SCNC: 26 MMOL/L (ref 24–31)
CREAT BLD-MCNC: 0.9 MG/DL (ref 0.5–1.4)
DEPRECATED RDW RBC AUTO: 49 FL (ref 40–54)
EOSINOPHIL # BLD MANUAL: 0.13 10*3/MM3 (ref 0–0.7)
EOSINOPHIL NFR BLD MANUAL: 2 % (ref 0–4)
ERYTHROCYTE [DISTWIDTH] IN BLOOD BY AUTOMATED COUNT: 15.2 % (ref 12–15)
GFR SERPL CREATININE-BSD FRML MDRD: 61 ML/MIN/1.73
GFR SERPL CREATININE-BSD FRML MDRD: 74 ML/MIN/1.73
GLOBULIN UR ELPH-MCNC: 2.7 GM/DL
GLUCOSE BLD-MCNC: 100 MG/DL (ref 70–100)
HCT VFR BLD AUTO: 30.9 % (ref 37–47)
HGB BLD-MCNC: 9.7 G/DL (ref 12–16)
LYMPHOCYTES # BLD MANUAL: 0.51 10*3/MM3 (ref 0.72–4.86)
LYMPHOCYTES NFR BLD MANUAL: 1 % (ref 4–12)
LYMPHOCYTES NFR BLD MANUAL: 8 % (ref 15–45)
MCH RBC QN AUTO: 27.7 PG (ref 28–32)
MCHC RBC AUTO-ENTMCNC: 31.4 G/DL (ref 33–36)
MCV RBC AUTO: 88.3 FL (ref 82–98)
MONOCYTES # BLD AUTO: 0.06 10*3/MM3 (ref 0.19–1.3)
NEUTROPHILS # BLD AUTO: 4.87 10*3/MM3 (ref 1.87–8.4)
NEUTROPHILS NFR BLD MANUAL: 47 % (ref 39–78)
NEUTS BAND NFR BLD MANUAL: 29 % (ref 0–10)
PLAT MORPH BLD: NORMAL
PLATELET # BLD AUTO: 209 10*3/MM3 (ref 130–400)
PMV BLD AUTO: 10.1 FL (ref 6–12)
POTASSIUM BLD-SCNC: 4.2 MMOL/L (ref 3.5–5.3)
PROT SERPL-MCNC: 4.9 G/DL (ref 6.3–8.7)
RBC # BLD AUTO: 3.5 10*6/MM3 (ref 4.2–5.4)
SCAN SLIDE: NORMAL
SODIUM BLD-SCNC: 140 MMOL/L (ref 135–145)
VARIANT LYMPHS NFR BLD MANUAL: 12 % (ref 0–5)
WBC MORPH BLD: NORMAL
WBC NRBC COR # BLD: 6.41 10*3/MM3 (ref 4.8–10.8)

## 2018-01-19 PROCEDURE — G8988 SELF CARE GOAL STATUS: HCPCS | Performed by: OCCUPATIONAL THERAPIST

## 2018-01-19 PROCEDURE — 25010000002 PIPERACILLIN SOD-TAZOBACTAM PER 1 G: Performed by: FAMILY MEDICINE

## 2018-01-19 PROCEDURE — 94799 UNLISTED PULMONARY SVC/PX: CPT

## 2018-01-19 PROCEDURE — G8989 SELF CARE D/C STATUS: HCPCS | Performed by: OCCUPATIONAL THERAPIST

## 2018-01-19 PROCEDURE — 80053 COMPREHEN METABOLIC PANEL: CPT | Performed by: FAMILY MEDICINE

## 2018-01-19 PROCEDURE — 85007 BL SMEAR W/DIFF WBC COUNT: CPT | Performed by: FAMILY MEDICINE

## 2018-01-19 PROCEDURE — 25010000002 VANCOMYCIN 10 G RECONSTITUTED SOLUTION: Performed by: FAMILY MEDICINE

## 2018-01-19 PROCEDURE — 85025 COMPLETE CBC W/AUTO DIFF WBC: CPT | Performed by: FAMILY MEDICINE

## 2018-01-19 RX ADMIN — IPRATROPIUM BROMIDE AND ALBUTEROL SULFATE 3 ML: 2.5; .5 SOLUTION RESPIRATORY (INHALATION) at 20:21

## 2018-01-19 RX ADMIN — PANTOPRAZOLE SODIUM 40 MG: 40 TABLET, DELAYED RELEASE ORAL at 06:20

## 2018-01-19 RX ADMIN — TAZOBACTAM SODIUM AND PIPERACILLIN SODIUM 3.38 G: 375; 3 INJECTION, SOLUTION INTRAVENOUS at 15:34

## 2018-01-19 RX ADMIN — VANCOMYCIN HYDROCHLORIDE 1250 MG: 100 INJECTION, POWDER, LYOPHILIZED, FOR SOLUTION INTRAVENOUS at 10:22

## 2018-01-19 RX ADMIN — TAZOBACTAM SODIUM AND PIPERACILLIN SODIUM 3.38 G: 375; 3 INJECTION, SOLUTION INTRAVENOUS at 21:43

## 2018-01-19 RX ADMIN — IPRATROPIUM BROMIDE AND ALBUTEROL SULFATE 3 ML: 2.5; .5 SOLUTION RESPIRATORY (INHALATION) at 06:22

## 2018-01-19 RX ADMIN — IPRATROPIUM BROMIDE AND ALBUTEROL SULFATE 3 ML: 2.5; .5 SOLUTION RESPIRATORY (INHALATION) at 11:38

## 2018-01-19 RX ADMIN — TAZOBACTAM SODIUM AND PIPERACILLIN SODIUM 3.38 G: 375; 3 INJECTION, SOLUTION INTRAVENOUS at 06:20

## 2018-01-19 RX ADMIN — IPRATROPIUM BROMIDE AND ALBUTEROL SULFATE 3 ML: 2.5; .5 SOLUTION RESPIRATORY (INHALATION) at 00:17

## 2018-01-19 RX ADMIN — VANCOMYCIN HYDROCHLORIDE 1250 MG: 100 INJECTION, POWDER, LYOPHILIZED, FOR SOLUTION INTRAVENOUS at 21:43

## 2018-01-19 NOTE — PLAN OF CARE
Problem: Patient Care Overview (Adult)  Goal: Plan of Care Review  Outcome: Ongoing (interventions implemented as appropriate)   01/19/18 0152   Coping/Psychosocial Response Interventions   Plan Of Care Reviewed With patient     Goal: Adult Individualization and Mutuality  Outcome: Ongoing (interventions implemented as appropriate)      Problem: Fall Risk (Adult)  Goal: Absence of Falls  Outcome: Ongoing (interventions implemented as appropriate)      Problem: VTE, DVT and PE (Adult)  Goal: Signs and Symptoms of Listed Potential Problems Will be Absent or Manageable (VTE, DVT and PE)  Outcome: Ongoing (interventions implemented as appropriate)      Problem: Skin Integrity Impairment, Risk/Actual (Adult)  Goal: Skin Integrity/Wound Healing  Outcome: Ongoing (interventions implemented as appropriate)      Problem: Pressure Ulcer Risk (Jorge Scale) (Adult,Obstetrics,Pediatric)  Goal: Skin Integrity  Outcome: Ongoing (interventions implemented as appropriate)      Problem: Gastrointestinal Bleeding (Adult)  Goal: Signs and Symptoms of Listed Potential Problems Will be Absent or Manageable (Gastrointestinal Bleeding)  Outcome: Ongoing (interventions implemented as appropriate)

## 2018-01-19 NOTE — PROGRESS NOTES
[facility] Medicine Services  INPATIENT PROGRESS NOTE    Length of Stay: 16  Date of Admission: 1/3/2018  Primary Care Physician: Ed Hanson MD    Subjective   Chief Complaint: SOA  HPI   Still a bit SOA  Doing well with IS.  Spiked a temp again overnight.  No new rash, no dysuria.          Review of Systems   Constitutional: Positive for fatigue. Negative for fever.   HENT: Negative for congestion and ear pain.    Eyes: Negative for pain and visual disturbance.   Respiratory: Positive for cough and shortness of breath. Negative for wheezing.    Cardiovascular: Negative for chest pain and palpitations.   Gastrointestinal: Negative for diarrhea, nausea and vomiting.   Endocrine: Negative for heat intolerance.   Genitourinary: Negative for dysuria and frequency.   Musculoskeletal: Negative for arthralgias and back pain.   Skin: Negative for rash and wound.   Neurological: Positive for weakness. Negative for dizziness and light-headedness.   Psychiatric/Behavioral: Negative for confusion. The patient is not nervous/anxious.    All other systems reviewed and are negative.     All pertinent negatives and positives are as above. All other systems have been reviewed and are negative unless otherwise stated.     Objective    Temp:  [98.4 °F (36.9 °C)-102.4 °F (39.1 °C)] 98.7 °F (37.1 °C)  Heart Rate:  [] 66  Resp:  [16-18] 16  BP: ()/(52-63) 100/52  Physical Exam   Constitutional: She is oriented to person, place, and time. She appears well-developed and well-nourished.   HENT:   Head: Normocephalic and atraumatic.   Right Ear: External ear normal.   Left Ear: External ear normal.   Nose: Nose normal.   Mouth/Throat: Oropharynx is clear and moist.   Eyes: Conjunctivae and EOM are normal.   Neck: Normal range of motion. Neck supple.   Cardiovascular: Normal rate, regular rhythm and normal heart sounds.    Pulmonary/Chest: Effort normal. She has decreased breath sounds.    Abdominal: Soft. Bowel sounds are normal. She exhibits no distension. There is no tenderness.   Musculoskeletal: Normal range of motion.   Neurological: She is alert and oriented to person, place, and time.   Skin: Skin is warm and dry. Rash noted. Rash is macular. There is erythema.   Yeast appearing rash under panus with erythema, no induration, warmth, fluctuance.     Psychiatric: She has a normal mood and affect. Her speech is normal and behavior is normal. Cognition and memory are normal.         Results Review:  I have reviewed the labs, radiology results, and diagnostic studies.    Laboratory Data:     Results from last 7 days  Lab Units 01/19/18  0626 01/18/18  1206 01/18/18  0606  01/17/18  0407   WBC 10*3/mm3 6.41  --  5.59  --  4.23*   HEMOGLOBIN g/dL 9.7* 9.4* 9.5*  < > 9.6*   HEMATOCRIT % 30.9* 29.1* 29.1*  < > 28.6*   PLATELETS 10*3/mm3 209  --  226  --  251   < > = values in this interval not displayed.       Results from last 7 days  Lab Units 01/19/18  0501 01/18/18  0606 01/17/18  0407   SODIUM mmol/L 140 137 139   POTASSIUM mmol/L 4.2 3.6 3.6   CHLORIDE mmol/L 107 101 103   CO2 mmol/L 26.0 33.0* 30.0   BUN mg/dL 11 11 12   CREATININE mg/dL 0.90 0.98 0.89   CALCIUM mg/dL 9.6 9.6 9.8   BILIRUBIN mg/dL 0.5 0.4 0.6   ALK PHOS U/L 58 59 63   ALT (SGPT) U/L 27 25 27   AST (SGOT) U/L 18 19 18   GLUCOSE mg/dL 100 103* 101*       Culture Data:   Blood Culture   Date Value Ref Range Status   01/17/2018 No growth at 24 hours  Preliminary   01/17/2018 No growth at 24 hours  Preliminary       Radiology Data:   Imaging Results (last 24 hours)     ** No results found for the last 24 hours. **          I have reviewed the patient current medications.     Assessment/Plan   1.  Occlusive Left-lower extremity DVT  -Appreciate GI input  -Appreciate Vascular input   -Hgb stable  -Has filter in place per Vascular that is old      2:  GI Bleed  -GI following  -Hold anticoagulation  -Monitor H&H      3.   Sepsis  -Vanc/Zosyn      4.  Acute Renal Failure  -Resolved  -monitor Cr        5.  Lactic Acidosis  -Resolved  -Monitor      6.  Intractible Nausea and vomiting  -Zofran  -IVF      7.  Dehydration  -IVF held  -Monitor UOP      8.  GERD  -Protonix drip      9.  HLD  -Statin  -Lipid panel      10.  Bladder Spasms  -Ditropan      11.  HTN  -Monitor BP      12.  Anemia  -Monitor H&H  -Protonix  -GI Consulted      13:  Morbid Obesity  -BMI:  54.4  -Dietician consulted      14.  Hyperglycemia  -A1C negative  -D/C finger sticks/A1C      15.  Hyperkalemia  -monitor      16.  Superficial right sided thrombus  -Vascular following      17. HAP possible  -Vanc, Zosyn  -Sputum and blood cultures    18.  Recurrent fever  atelectasis vs HAP  -On empiric Abx  -continue to push IS  -watch cultures  -Consider ID consult if continues            Discharge Planning: I expect the patient to be discharged to nursing home in 2-3 days    Wilfred Pedersen MD   01/19/18   9:40 AM

## 2018-01-19 NOTE — PROGRESS NOTES
Continued Stay Note   [facility]     Patient Name: Tricia Hernandez  MRN: [id number]  Today's Date: 1/19/2018    Admit Date: 1/3/2018          Discharge Plan       01/19/18 1111    Case Management/Social Work Plan    Plan [facility] SKILLED NS FACILITY    Patient/Family In Agreement With Plan yes    Additional Comments SPOKE TO JAY JAY AT [facility] TO UPDATE PT'S CLINICAL AND SHE REPORTS THAT PT'S BED WILL BE HELD FOR HER.  WILL FOLLOW TO COORDINATE PT'S TRANSFER WHEN SHE IS STABLE FOR D/C.               Discharge Codes     None        Expected Discharge Date and Time     Expected Discharge Date Expected Discharge Time    Jan 11, 2018             SILVANA Hinojosa

## 2018-01-19 NOTE — PLAN OF CARE
Problem: Patient Care Overview (Adult)  Goal: Plan of Care Review  Outcome: Ongoing (interventions implemented as appropriate)   01/19/18 1333   Coping/Psychosocial Response Interventions   Plan Of Care Reviewed With patient   Patient Care Overview   Progress unable to show any progress toward functional goals   Outcome Evaluation   Outcome Summary/Follow up Plan OT attempted a Re-Assessment today. Pt refused to perform any activity with OT. Pt has had multiple refusals and is unable to show any progress toward OT goals. Pt wishes to d/c OT intervention at this time.        Problem: Inpatient Occupational Therapy  Goal: Bed Mobility Goal LTG- OT  Outcome: Unable to achieve outcome(s) by discharge Date Met: 01/19/18 01/08/18 1206 01/19/18 1333   Bed Mobility OT LTG   Bed Mobility OT LTG, Date Established 01/08/18 --    Bed Mobility OT LTG, Time to Achieve by discharge --    Bed Mobility OT LTG, Activity Type all bed mobility --    Bed Mobility OT LTG, [place] Level conditional independence --    Bed Mobility OT LTG, Assist Device bed rails --    Bed Mobility OT LTG, Date Goal Reviewed --  01/19/18   Bed Mobility OT LTG, Outcome --  goal not met   Bed Mobility OT LTG, Reason Goal Not Met --  unable to make needed progress     Goal: Static Sitting Balance Goal LTG- OT  Outcome: Unable to achieve outcome(s) by discharge Date Met: 01/19/18 01/08/18 1206 01/19/18 1333   Static Sitting Balance OT LTG   Static Sitting Balance OT LTG, Date Established 01/08/18 --    Static Sitting Balance OT LTG, Time to Achieve by discharge --    Static Sitting Balance OT LTG, [place] Level conditional independence --    Static Sitting Balance OT LTG, Assist Device UE Support --    Static Sitting Balance OT LTG, Date Goal Reviewed --  01/19/18   Static Sitting Balance OT LTG, Outcome --  goal not met   Static Sitting Balance OT LTG, Reason Goal Not Met --  unable to make needed progress     Goal: Grooming Goal LTG-  OT  Outcome: Unable to achieve outcome(s) by discharge Date Met: 01/19/18 01/08/18 1206 01/19/18 1333   Grooming OT LTG   Grooming Goal OT LTG, Date Established 01/08/18 --    Grooming Goal OT LTG, Time to Achieve by discharge --    Grooming Goal OT LTG, [place] Level conditional independence;set up required --    Grooming Goal OT LTG, Position sitting, edge of bed --    Grooming Goal OT LTG, Date Goal Reviewed --  01/19/18   Grooming Goal OT LTG, Outcome --  goal not met   Grooming Goal OT LTG, Reason Goal Not Met --  unable to make needed progress

## 2018-01-19 NOTE — PLAN OF CARE
Problem: Patient Care Overview (Adult)  Goal: Plan of Care Review  Outcome: Ongoing (interventions implemented as appropriate)   01/18/18 1128 01/18/18 1841   Coping/Psychosocial Response Interventions   Plan Of Care Reviewed With patient --    Patient Care Overview   Progress no change --    Outcome Evaluation   Outcome Summary/Follow up Plan --  no change, afebrile todoay, using iS, bm today, iv abx, refusing pt/ot, denies pain, excoration on buttocks     Goal: Adult Individualization and Mutuality  Outcome: Ongoing (interventions implemented as appropriate)   01/09/18 1521 01/14/18 1608   Individualization   Patient Specific Preferences none --    Patient Specific Goals --  hemoglobin stabilized   Patient Specific Interventions --  RBC infusion   Mutuality/Individual Preferences   What Anxieties, Fears or Concerns Do You Have About Your Health or Care? none --    What Questions Do You Have About Your Health or Care? none --    What Information Would Help Us Give You More Personalized Care? none --        Problem: Fall Risk (Adult)  Goal: Absence of Falls  Outcome: Ongoing (interventions implemented as appropriate)   01/18/18 1841   Fall Risk (Adult)   Absence of Falls making progress toward outcome       Problem: VTE, DVT and PE (Adult)  Goal: Signs and Symptoms of Listed Potential Problems Will be Absent or Manageable (VTE, DVT and PE)  Outcome: Ongoing (interventions implemented as appropriate)   01/18/18 0745   VTE, DVT and PE   Problems Assessed (VTE, DVT, PE) all   Problems Present (VTE, DVT, PE) deep vein thrombosis       Problem: Skin Integrity Impairment, Risk/Actual (Adult)  Goal: Skin Integrity/Wound Healing  Outcome: Ongoing (interventions implemented as appropriate)   01/18/18 1841   Skin Integrity Impairment, Risk/Actual (Adult)   Skin Integrity/Wound Healing making progress toward outcome       Problem: Pressure Ulcer Risk (Jorge Scale) (Adult,Obstetrics,Pediatric)  Goal: Skin Integrity  Outcome:  Ongoing (interventions implemented as appropriate)   01/18/18 1845   Pressure Ulcer Risk (Jorge Scale) (Adult,Obstetrics,Pediatric)   Skin Integrity making progress toward outcome       Problem: Gastrointestinal Bleeding (Adult)  Goal: Signs and Symptoms of Listed Potential Problems Will be Absent or Manageable (Gastrointestinal Bleeding)   01/18/18 0716   Gastrointestinal Bleeding   Problems Assessed (GI Bleeding) all   Problems Present (GI Bleeding) situational response

## 2018-01-19 NOTE — PROGRESS NOTES
"Pharmacy Dosing Service  Pharmacokinetics  Vancomycin Follow-up Evaluation    Assessment/Action/Plan:  Vancomycin (& Zosyn) initiated yesterday for possible HAP. SCr=0.90 (stable). Ordered trough prior to AM dose tomorrow (1-20). Pharmacy will continue to monitor renal function and adjust dose accordingly.     Subjective:  Tricia Hernandez is a 77 y.o. female currently on Vancomycin 1250 mg IV every 12 hours for the treatment of possible HAP, day 2 of therapy.    Objective:  Ht: 160 cm (62.99\"); Wt: (!) 153 kg (336 lb 9.6 oz)  Estimated Creatinine Clearance: 79 mL/min (by C-G formula based on Cr of 0.9).   Lab Results   Component Value Date    CREATININE 0.90 01/19/2018    CREATININE 0.98 01/18/2018    CREATININE 0.89 01/17/2018      Lab Results   Component Value Date    WBC 6.41 01/19/2018    WBC 5.59 01/18/2018    WBC 4.23 (L) 01/17/2018         Culture Results:  Microbiology Results (last 10 days)       Procedure Component Value - Date/Time      Blood Culture - Blood, [[id number]]  (Normal) Collected:  01/17/18 1819    Lab Status:  Preliminary result Specimen:  Blood from Arm, Right Updated:  01/18/18 1831     Blood Culture No growth at 24 hours    Blood Culture - Blood, [[id number]]  (Normal) Collected:  01/17/18 1813    Lab Status:  Preliminary result Specimen:  Blood from Arm, Left Updated:  01/18/18 2116     Blood Culture No growth at 24 hours    Influenza Antigen, Rapid - Swab, Nasopharynx [[id number]]  (Normal) Collected:  01/17/18 1724    Lab Status:  Final result Specimen:  Swab from Nasopharynx Updated:  01/17/18 1745     Influenza A Ag, EIA Negative     Influenza B Ag, EIA Negative    Narrative:         Recommend confirmation of negative results by viral culture or molecular assay.          Sabrina, Pharm D  2:04 PM  1/19/2018    "

## 2018-01-19 NOTE — THERAPY DISCHARGE NOTE
Acute Care - Occupational Therapy Progress Note/Discharge  [facility]     Patient Name: Tricia Hernandez  : 1940  MRN: [id number]  Today's Date: 2018  Onset of Illness/Injury or Date of Surgery Date: 18  Date of Referral to OT: 18  Referring Physician: Dr. Granados      Admit Date: 1/3/2018    Visit Dx:     ICD-10-CM ICD-9-CM   1. Impaired mobility and ADLs Z74.09 799.89   2. Impaired mobility Z74.09 799.89     Patient Active Problem List   Diagnosis   • RUBY (acute kidney injury)   • DVT (deep venous thrombosis)   • Anemia   • GI bleed             Adult Rehabilitation Note       18 1306 18 1500 18 1057    Rehab Assessment/Intervention    Discipline physical therapy assistant  -KJ occupational therapy assistant  -CJ physical therapy assistant  -NW    Document Type  therapy note (daily note)  -CJ therapy note (daily note)  -NW    Subjective Information   agree to therapy;complains of;pain;fatigue;weakness  -NW    Patient Effort, Rehab Treatment   adequate  -NW    Treatment Not Performed patient/family declined treatment  -KJ patient/family declined treatment  -CJ     Treatment Not Performed, Comment patient states this time of day she needs a nap and declines therapy.  -KJ      Precautions/Limitations   fall precautions   Recent TSA in [facility]  -NW    Recorded by [KJ] Shira Park PTA [CJ] CHAYITO Cowan [NW] Blanca Stock PTA    Pain Assessment    Pain Assessment   0-10  -NW    Pain Score   7  -NW    Pain Type   Chronic pain  -NW    Pain Location   Generalized  -NW    Pain Descriptors   Aching  -NW    Pain Frequency   Constant/continuous  -NW    Pain Intervention(s)   Repositioned  -NW    Multiple Pain Sites   Yes  -NW    Recorded by   [NW] Blanca Stock PTA    Pain 2    Pain Score 2   10  -NW    Pain Type 2   Acute pain  -NW    Pain Location 2   Shoulder  -NW    Pain Orientation 2   Right  -NW    Pain Descriptors 2   Aching;Discomfort  -NW    Pain  "Frequency 2   Intermittent  -NW    Pain Intervention(s) 2   Repositioned  -NW    Recorded by   [NW] Blanca Stock PTA    Bed Mobility, Assessment/Treatment    Bed Mobility, Assistive Device   draw sheet;bed rails  -NW    Bed Mobility, Roll Left, [place]   verbal cues required;moderate assist (50% patient effort)  -NW    Bed Mobility, Roll Right, [place]   verbal cues required;moderate assist (50% patient effort)  -NW    Bed Mob, Supine to Sit, [place]   verbal cues required;moderate assist (50% patient effort);2 person assist required  -NW    Bed Mob, Sit to Supine, [place]   verbal cues required;moderate assist (50% patient effort);maximum assist (25% patient effort);2 person assist required  -NW    Bed Mobility, Safety Issues   decreased use of arms for pushing/pulling;decreased use of legs for bridging/pushing  -NW    Bed Mobility, Impairments   pain;strength decreased;ROM decreased  -NW    Recorded by   [NW] Blanca Stock PTA    Transfer Assessment/Treatment    Transfer, Comment   pt refuses to attempt to stand due to \"not knowing what her limitations are w/ her shoulder\"  -NW    Recorded by   [NW] Blanca Stock PTA    Therapy Exercises    Bilateral Lower Extremities   AROM:;10 reps  -NW    Left Upper Extremity   AROM:;5 reps;10 reps  -NW    Recorded by   [NW] Blanca Stock PTA    Positioning and Restraints    Pre-Treatment Position   in bed  -NW    Post Treatment Position   bed  -NW    In Bed   fowlers;call light within reach;encouraged to call for assist;notified nsg  -NW    Recorded by   [NW] Blanca Stock PTA      01/17/18 1040 01/17/18 1014 01/16/18 1414    Rehab Assessment/Intervention    Discipline occupational therapy assistant  -CJ physical therapy assistant  -     Document Type therapy note (daily note)  - therapy note (daily note)  -NW     Subjective Information agree to therapy;complains of;weakness;fatigue;pain  - agree to therapy;complains of  -NW     " Patient Effort, Rehab Treatment adequate  -CJ adequate  -NW     Treatment Not Performed   patient/family declined treatment  -TR    Treatment Not Performed, Comment   Requested to visit with family. Will check on pt later if possible.   -TR    Precautions/Limitations fall precautions  - fall precautions  -NW     Recorded by [CJ] MARIJA Cowan/LALY [NW] Blanca Stock PTA [TR] Amber Rosales, OTR/L    Pain Assessment    Pain Assessment 0-10  -CJ 0-10  -NW     Rodriguez-Akhtar FACES Pain Rating 2  -CJ 4  -NW     Pain Score 2  -CJ      Pain Type Acute pain  -CJ Chronic pain  -NW     Pain Location Generalized  -CJ Generalized  -NW     Pain Intervention(s)  Repositioned  -NW     Recorded by [CJ] MARIJA Cowan/LALY [NW] Blanca Stock PTA     Bed Mobility, Assessment/Treatment    Bed Mobility, Assistive Device  draw sheet;bed rails  -NW     Bed Mobility, Roll Left, [place] verbal cues required;moderate assist (50% patient effort)  - verbal cues required;moderate assist (50% patient effort)   placed pt on bedpan RN cleaned up then got pt to EOB  -NW     Bed Mobility, Roll Right, [place] verbal cues required;moderate assist (50% patient effort)  - moderate assist (50% patient effort)  -NW     Bed Mobility, Scoot/Bridge, [place] --   Rolling for hygiene!  -CJ      Bed Mob, Supine to Sit, [place]  verbal cues required;moderate assist (50% patient effort);2 person assist required  -NW     Bed Mob, Sit to Supine, [place]  verbal cues required;moderate assist (50% patient effort);2 person assist required  -NW     Bed Mobility, Safety Issues  decreased use of arms for pushing/pulling;decreased use of legs for bridging/pushing  -NW     Bed Mobility, Impairments  pain;strength decreased  -NW     Recorded by [CJ] MARIJA Cowan/LALY [NW] Blanca Stock PTA     Motor Skills/Interventions    Additional Documentation  Balance Skills Training (Group)  -NW     Recorded by  [NW]  Blanca Stock PTA     Balance Skills Training    Sitting-Level of Assistance  Close supervision  -NW     Sitting-Balance Support  Feet supported  -NW     Sitting # of Minutes  10  -NW     Recorded by  [NW] Blanca Stock PTA     Therapy Exercises    Bilateral Lower Extremities  AROM:;10 reps;sitting  -NW     Bilateral Upper Extremity AROM:;15 reps;elbow flexion/extension;shoulder extension/flexion   3lb. bar, 2lb. dumb bells! No shd. flex exs!  -CJ      Recorded by [CJ] MARIJA Cowan/LALY [NW] Blanca Stock PTA     Positioning and Restraints    Pre-Treatment Position in bed  -CJ in bed  -NW     Post Treatment Position bed  -CJ bed  -NW     In Bed fowlers;call light within reach;encouraged to call for assist;side rails up x1  -CJ fowlers;call light within reach;encouraged to call for assist;notified nsg  -NW     Recorded by [CJ] MARIJA Cowan/LALY [NW] Blanca Stock PTA       User Key  (r) = Recorded By, (t) = Taken By, (c) = Cosigned By    Initials Name Effective Dates    FREDRICK Park, PTA 08/02/16 -     CJ MARIJA Cowan/L 08/02/16 -     NW Blanca Stock PTA 08/02/16 -     TR Amber Rosales, OTR/L 06/22/15 -                 OT Goals       01/19/18 1333 01/08/18 1206       Bed Mobility OT LTG    Bed Mobility OT LTG, Date Established  01/08/18  -MM     Bed Mobility OT LTG, Time to Achieve  by discharge  -MM     Bed Mobility OT LTG, Activity Type  all bed mobility  -MM     Bed Mobility OT LTG, [place] Level  conditional independence  -MM     Bed Mobility OT LTG, Assist Device  bed rails  -MM     Bed Mobility OT LTG, Date Goal Reviewed 01/19/18  -TR      Bed Mobility OT LTG, Outcome goal not met  -TR      Bed Mobility OT LTG, Reason Goal Not Met unable to make needed progress  -TR      Static Sitting Balance OT LTG    Static Sitting Balance OT LTG, Date Established  01/08/18  -MM     Static Sitting Balance OT LTG, Time to Achieve  by discharge  -MM     Static Sitting  Balance OT LTG, [place] Level  conditional independence  -MM     Static Sitting Balance OT LTG, Assist Device  UE Support  -MM     Static Sitting Balance OT LTG, Date Goal Reviewed 01/19/18  -TR      Static Sitting Balance OT LTG, Outcome goal not met  -TR      Static Sitting Balance OT LTG, Reason Goal Not Met unable to make needed progress  -TR      Grooming OT LTG    Grooming Goal OT LTG, Date Established  01/08/18  -MM     Grooming Goal OT LTG, Time to Achieve  by discharge  -MM     Grooming Goal OT LTG, [place] Level  conditional independence;set up required  -MM     Grooming Goal OT LTG, Position  sitting, edge of bed  -MM     Grooming Goal OT LTG, Date Goal Reviewed 01/19/18  -TR      Grooming Goal OT LTG, Outcome goal not met  -TR      Grooming Goal OT LTG, Reason Goal Not Met unable to make needed progress  -TR        User Key  (r) = Recorded By, (t) = Taken By, (c) = Cosigned By    Initials Name Provider Type    TR Amber Rosales, OTR/L Occupational Therapist    MM Tom Tovar OTR/L Occupational Therapist          Occupational Therapy Education     Title: PT OT SLP Therapies (Done)     Topic: Occupational Therapy (Resolved)     Point: ADL training (Resolved)    Description: Instruct learner(s) on proper safety adaptation and remediation techniques during self care or transfers.   Instruct in proper use of assistive devices.    Learning Progress Summary    Learner Readiness Method Response Comment Documented by Status   Patient Acceptance E,D TEREZA,DU,NR Pt. required mod. assist from this guillen/l to roll R/L, for hygiene, ue exs performed to increase her ability with bed mobility and transfers! CJ 01/17/18 1138 Done    Acceptance E NR OT role, benefits and POC MM 01/08/18 1205 Active               Point: Home exercise program (Resolved)    Description: Instruct learner(s) on appropriate technique for monitoring, assisting and/or progressing therapeutic exercises/activities.    Learning  Progress Summary    Learner Readiness Method Response Comment Documented by Status   Patient Acceptance E,D VU,DU,NR Pt. required mod. assist from this guillen/l to roll R/L, for hygiene, ue exs performed to increase her ability with bed mobility and transfers!  01/17/18 1138 Done    Acceptance E,D VU,DU,NR Pt. performed ue exs to increase her act. valentino and transfer ability, Nsg requested to not get pt. up!  01/15/18 1103 Done               Point: Precautions (Resolved)    Description: Instruct learner(s) on prescribed precautions during self-care and functional transfers.    Learning Progress Summary    Learner Readiness Method Response Comment Documented by Status   Patient Acceptance E,D VU,DU,NR Pt. required mod. assist from this guillen/l to roll R/L, for hygiene, ue exs performed to increase her ability with bed mobility and transfers!  01/17/18 1138 Done    Acceptance E,D TEREZA,DU,NR Pt. performed ue exs to increase her act. valentino and transfer ability, Nsg requested to not get pt. up!  01/15/18 1103 Done    Acceptance E NR OT role, benefits and POC  01/08/18 1205 Active               Point: Body mechanics (Resolved)    Description: Instruct learner(s) on proper positioning and spine alignment during self-care, functional mobility activities and/or exercises.    Learning Progress Summary    Learner Readiness Method Response Comment Documented by Status   Patient Acceptance E,D VU,DU,NR Pt. required mod. assist from this guillen/l to roll R/L, for hygiene, ue exs performed to increase her ability with bed mobility and transfers!  01/17/18 1138 Done    Acceptance E,NEVIN STARKS,DU,NR Pt. performed ue exs to increase her act. valentino and transfer ability, Nsg requested to not get pt. up!  01/15/18 1103 Done                      User Key     Initials Effective Dates Name Provider Type Discipline     08/02/16 -  Kenrick Goff GUILLEN/L Occupational Therapy Assistant OT     10/21/16 -  Tom Tovar OTR/L Occupational  Therapist OT                OT Recommendation and Plan  Anticipated Equipment Needs At Discharge: bathing equipment, dressing equipment  Anticipated Discharge Disposition: skilled nursing facility  Planned Therapy Interventions: activity intolerance, adaptive equipment training, ADL retraining, IADL retraining, balance training, bed mobility training, energy conservation, home exercise program, strengthening, transfer training  Therapy Frequency: 3-5 times/wk  Plan of Care Review  Plan Of Care Reviewed With: patient  Progress: unable to show any progress toward functional goals  Outcome Summary/Follow up Plan: OT attempted a Re-Assessment today. Pt refused to perform any activity with OT. Pt has had multiple refusals and is unable to show any progress toward OT goals. Pt wishes to d/c OT intervention at this time.           Outcome Measures       01/19/18 1300 01/18/18 1100 01/17/18 1300    How much help from another person do you currently need...    Turning from your back to your side while in flat bed without using bedrails?  2  -NW 2  -NW    Moving from lying on back to sitting on the side of a flat bed without bedrails?  2  -NW 2  -NW    Moving to and from a bed to a chair (including a wheelchair)?  1  -NW 1  -NW    Standing up from a chair using your arms (e.g., wheelchair, bedside chair)?  1  -NW 1  -NW    Climbing 3-5 steps with a railing?  1  -NW 1  -NW    To walk in hospital room?  1  -NW 1  -NW    AM-PAC 6 Clicks Score  8  -NW 8  -NW    How much help from another is currently needed...    Putting on and taking off regular lower body clothing? 2  -TR      Bathing (including washing, rinsing, and drying) 2  -TR      Toileting (which includes using toilet bed pan or urinal) 1  -TR      Putting on and taking off regular upper body clothing 2  -TR      Taking care of personal grooming (such as brushing teeth) 3  -TR      Eating meals 3  -TR      Score 13  -TR      Functional Assessment    Outcome Measure  Options AM-PAC 6 Clicks Daily Activity (OT)  -TR AM-PAC 6 Clicks Basic Mobility (PT)  -NW AM-PAC 6 Clicks Basic Mobility (PT)  -NW      01/17/18 1040          How much help from another is currently needed...    Putting on and taking off regular lower body clothing? 2  -CJ      Bathing (including washing, rinsing, and drying) 2  -CJ      Toileting (which includes using toilet bed pan or urinal) 1  -CJ      Putting on and taking off regular upper body clothing 2  -CJ      Taking care of personal grooming (such as brushing teeth) 3  -CJ      Eating meals 3  -CJ      Score 13  -CJ      Functional Assessment    Outcome Measure Options AM-PAC 6 Clicks Daily Activity (OT)  -        User Key  (r) = Recorded By, (t) = Taken By, (c) = Cosigned By    Initials Name Provider Type    CJ MARIJA Cowan/LALY Occupational Therapy Assistant    NW Blanca Stock PTA Physical Therapy Assistant    TR Amber Rosales OTR/L Occupational Therapist           Time Calculation:          Time Calculation- OT       01/19/18 1336          Time Calculation- OT    OT Start Time 1323  -TR      OT Stop Time 1329  -TR      OT Time Calculation (min) 6 min  -TR      OT Non-Billable Time (min) 6 min  -TR      OT Received On 01/19/18  -TR        User Key  (r) = Recorded By, (t) = Taken By, (c) = Cosigned By    Initials Name Provider Type    TR Amber Rosales OTR/L Occupational Therapist          Therapy Charges for Today     Code Description Service Date Service Provider Modifiers Qty    [id number]  OT SELFCARE PROJECTED 1/19/2018 Amber Rosales OTR/L GO, CK 1    [id number]  OT SELFCARE DISCHARGE 1/19/2018 WILLIAM Shannon/L GO, CL 1          OT G-codes  OT Professional Judgement Used?: Yes  OT Functional Scales Options: AM-PAC 6 Clicks Daily Activity (OT)  Score: 13  Functional Limitation: Self care  Self Care Current Status (): At least 60 percent but less than 80 percent impaired, limited or restricted  Self Care  Goal Status (): At least 40 percent but less than 60 percent impaired, limited or restricted  Self Care Discharge Status (): At least 60 percent but less than 80 percent impaired, limited or restricted    OT Discharge Summary  Anticipated Discharge Disposition: skilled nursing facility  Reason for Discharge: Non-compliant, Patient/Caregiver request  Outcomes Achieved: Unable to make functional progress toward goals at this time  Discharge Destination: SNF    Amber Rosales, OTR/L  1/19/2018

## 2018-01-20 ENCOUNTER — APPOINTMENT (OUTPATIENT)
Dept: GENERAL RADIOLOGY | Facility: HOSPITAL | Age: 78
End: 2018-01-20

## 2018-01-20 LAB
ALBUMIN SERPL-MCNC: 2.3 G/DL (ref 3.5–5)
ALBUMIN/GLOB SERPL: 0.8 G/DL (ref 1.1–2.5)
ALP SERPL-CCNC: 64 U/L (ref 24–120)
ALT SERPL W P-5'-P-CCNC: 23 U/L (ref 0–54)
ANION GAP SERPL CALCULATED.3IONS-SCNC: 7 MMOL/L (ref 4–13)
AST SERPL-CCNC: 18 U/L (ref 7–45)
BASOPHILS # BLD MANUAL: 0.07 10*3/MM3 (ref 0–0.2)
BASOPHILS NFR BLD AUTO: 1 % (ref 0–2)
BILIRUB SERPL-MCNC: 0.4 MG/DL (ref 0.1–1)
BUN BLD-MCNC: 10 MG/DL (ref 5–21)
BUN/CREAT SERPL: 8.9 (ref 7–25)
CALCIUM SPEC-SCNC: 9.3 MG/DL (ref 8.4–10.4)
CHLORIDE SERPL-SCNC: 101 MMOL/L (ref 98–110)
CLUMPED PLATELETS: PRESENT
CO2 SERPL-SCNC: 32 MMOL/L (ref 24–31)
CREAT BLD-MCNC: 1.12 MG/DL (ref 0.5–1.4)
DEPRECATED RDW RBC AUTO: 48.8 FL (ref 40–54)
EOSINOPHIL # BLD MANUAL: 0.2 10*3/MM3 (ref 0–0.7)
EOSINOPHIL NFR BLD MANUAL: 3 % (ref 0–4)
ERYTHROCYTE [DISTWIDTH] IN BLOOD BY AUTOMATED COUNT: 15.2 % (ref 12–15)
GFR SERPL CREATININE-BSD FRML MDRD: 47 ML/MIN/1.73
GFR SERPL CREATININE-BSD FRML MDRD: 57 ML/MIN/1.73
GLOBULIN UR ELPH-MCNC: 2.8 GM/DL
GLUCOSE BLD-MCNC: 103 MG/DL (ref 70–100)
HCT VFR BLD AUTO: 29.1 % (ref 37–47)
HGB BLD-MCNC: 9.4 G/DL (ref 12–16)
HYPOCHROMIA BLD QL: ABNORMAL
LYMPHOCYTES # BLD MANUAL: 1.08 10*3/MM3 (ref 0.72–4.86)
LYMPHOCYTES NFR BLD MANUAL: 16 % (ref 15–45)
LYMPHOCYTES NFR BLD MANUAL: 6 % (ref 4–12)
MCH RBC QN AUTO: 28.2 PG (ref 28–32)
MCHC RBC AUTO-ENTMCNC: 32.3 G/DL (ref 33–36)
MCV RBC AUTO: 87.4 FL (ref 82–98)
MONOCYTES # BLD AUTO: 0.41 10*3/MM3 (ref 0.19–1.3)
NEUTROPHILS # BLD AUTO: 4.81 10*3/MM3 (ref 1.87–8.4)
NEUTROPHILS NFR BLD MANUAL: 54 % (ref 39–78)
NEUTS BAND NFR BLD MANUAL: 17 % (ref 0–10)
PLATELET # BLD AUTO: 203 10*3/MM3 (ref 130–400)
PMV BLD AUTO: 10.5 FL (ref 6–12)
POTASSIUM BLD-SCNC: 3.2 MMOL/L (ref 3.5–5.3)
PROT SERPL-MCNC: 5.1 G/DL (ref 6.3–8.7)
RBC # BLD AUTO: 3.33 10*6/MM3 (ref 4.2–5.4)
SCAN SLIDE: NORMAL
SODIUM BLD-SCNC: 140 MMOL/L (ref 135–145)
VANCOMYCIN TROUGH SERPL-MCNC: 21.28 MCG/ML (ref 10–20)
VARIANT LYMPHS NFR BLD MANUAL: 3 % (ref 0–5)
WBC MORPH BLD: NORMAL
WBC NRBC COR # BLD: 6.78 10*3/MM3 (ref 4.8–10.8)

## 2018-01-20 PROCEDURE — 94799 UNLISTED PULMONARY SVC/PX: CPT

## 2018-01-20 PROCEDURE — 25010000002 PIPERACILLIN SOD-TAZOBACTAM PER 1 G: Performed by: FAMILY MEDICINE

## 2018-01-20 PROCEDURE — 85025 COMPLETE CBC W/AUTO DIFF WBC: CPT | Performed by: FAMILY MEDICINE

## 2018-01-20 PROCEDURE — 71045 X-RAY EXAM CHEST 1 VIEW: CPT

## 2018-01-20 PROCEDURE — 85007 BL SMEAR W/DIFF WBC COUNT: CPT | Performed by: FAMILY MEDICINE

## 2018-01-20 PROCEDURE — 80202 ASSAY OF VANCOMYCIN: CPT | Performed by: FAMILY MEDICINE

## 2018-01-20 PROCEDURE — 80053 COMPREHEN METABOLIC PANEL: CPT | Performed by: FAMILY MEDICINE

## 2018-01-20 RX ORDER — SACCHAROMYCES BOULARDII 250 MG
250 CAPSULE ORAL 2 TIMES DAILY
Status: DISCONTINUED | OUTPATIENT
Start: 2018-01-20 | End: 2018-01-27 | Stop reason: HOSPADM

## 2018-01-20 RX ORDER — LOPERAMIDE HYDROCHLORIDE 2 MG/1
2 CAPSULE ORAL 4 TIMES DAILY PRN
Status: DISCONTINUED | OUTPATIENT
Start: 2018-01-20 | End: 2018-01-27 | Stop reason: HOSPADM

## 2018-01-20 RX ORDER — POTASSIUM CHLORIDE 750 MG/1
40 CAPSULE, EXTENDED RELEASE ORAL DAILY
Status: DISCONTINUED | OUTPATIENT
Start: 2018-01-20 | End: 2018-01-22

## 2018-01-20 RX ADMIN — IPRATROPIUM BROMIDE AND ALBUTEROL SULFATE 3 ML: 2.5; .5 SOLUTION RESPIRATORY (INHALATION) at 11:44

## 2018-01-20 RX ADMIN — Medication 250 MG: at 21:44

## 2018-01-20 RX ADMIN — TAZOBACTAM SODIUM AND PIPERACILLIN SODIUM 3.38 G: 375; 3 INJECTION, SOLUTION INTRAVENOUS at 04:55

## 2018-01-20 RX ADMIN — TAZOBACTAM SODIUM AND PIPERACILLIN SODIUM 3.38 G: 375; 3 INJECTION, SOLUTION INTRAVENOUS at 21:44

## 2018-01-20 RX ADMIN — IPRATROPIUM BROMIDE AND ALBUTEROL SULFATE 3 ML: 2.5; .5 SOLUTION RESPIRATORY (INHALATION) at 00:07

## 2018-01-20 RX ADMIN — IPRATROPIUM BROMIDE AND ALBUTEROL SULFATE 3 ML: 2.5; .5 SOLUTION RESPIRATORY (INHALATION) at 19:36

## 2018-01-20 RX ADMIN — Medication 250 MG: at 14:08

## 2018-01-20 RX ADMIN — PANTOPRAZOLE SODIUM 40 MG: 40 TABLET, DELAYED RELEASE ORAL at 05:30

## 2018-01-20 RX ADMIN — IPRATROPIUM BROMIDE AND ALBUTEROL SULFATE 3 ML: 2.5; .5 SOLUTION RESPIRATORY (INHALATION) at 06:34

## 2018-01-20 RX ADMIN — TAZOBACTAM SODIUM AND PIPERACILLIN SODIUM 3.38 G: 375; 3 INJECTION, SOLUTION INTRAVENOUS at 14:05

## 2018-01-20 RX ADMIN — POTASSIUM CHLORIDE 40 MEQ: 750 CAPSULE, EXTENDED RELEASE ORAL at 15:43

## 2018-01-20 NOTE — PLAN OF CARE
Problem: Patient Care Overview (Adult)  Goal: Plan of Care Review   01/20/18 7834   Outcome Evaluation   Outcome Summary/Follow up Plan IV ANTIBIOTICS PER ORDER. NO REQUEST FOR PAIN MED. CONT. TO WEAR O2. NO DISTRESS NOTED.     Goal: Adult Individualization and Mutuality  Outcome: Ongoing (interventions implemented as appropriate)      Problem: Fall Risk (Adult)  Goal: Absence of Falls  Outcome: Ongoing (interventions implemented as appropriate)      Problem: VTE, DVT and PE (Adult)  Goal: Signs and Symptoms of Listed Potential Problems Will be Absent or Manageable (VTE, DVT and PE)  Outcome: Ongoing (interventions implemented as appropriate)      Problem: Skin Integrity Impairment, Risk/Actual (Adult)  Goal: Skin Integrity/Wound Healing  Outcome: Ongoing (interventions implemented as appropriate)      Problem: Pressure Ulcer Risk (Jorge Scale) (Adult,Obstetrics,Pediatric)  Goal: Skin Integrity  Outcome: Ongoing (interventions implemented as appropriate)      Problem: Gastrointestinal Bleeding (Adult)  Goal: Signs and Symptoms of Listed Potential Problems Will be Absent or Manageable (Gastrointestinal Bleeding)  Outcome: Ongoing (interventions implemented as appropriate)

## 2018-01-20 NOTE — PROGRESS NOTES
"    [facility] Medicine Services  INPATIENT PROGRESS NOTE    Length of Stay: 17  Date of Admission: 1/3/2018  Primary Care Physician: Ed Hanson MD    Subjective   Chief Complaint: Diarrhea  HPI   Did ok overnight.   No fever.   Temp did get to 100.3  Still somewhat SOA.  Repeat CXR shows possible developing left lobe pna.  No evidence of staph on cultures.  Will hold Vanc and monitor.  Continue Zosyn.  If pt continues to improve will plan to transition to PO abx in AM.      She says her only complaint is some diarrhea.  It is a bit soft and \"mushy.\"  No liquid or watery stools.          Review of Systems   Constitutional: Positive for fatigue. Negative for fever.   HENT: Negative for congestion and ear pain.    Eyes: Negative for pain and visual disturbance.   Respiratory: Positive for cough and shortness of breath. Negative for wheezing.    Cardiovascular: Negative for chest pain and palpitations.   Gastrointestinal: Positive for diarrhea. Negative for nausea and vomiting.   Endocrine: Negative for heat intolerance.   Genitourinary: Negative for dysuria and frequency.   Musculoskeletal: Negative for arthralgias and back pain.   Skin: Negative for rash and wound.   Neurological: Positive for weakness. Negative for dizziness and light-headedness.   Psychiatric/Behavioral: Negative for confusion. The patient is not nervous/anxious.    All other systems reviewed and are negative.     All pertinent negatives and positives are as above. All other systems have been reviewed and are negative unless otherwise stated.     Objective    Temp:  [97.8 °F (36.6 °C)-100.3 °F (37.9 °C)] 99.5 °F (37.5 °C)  Heart Rate:  [76-87] 76  Resp:  [16-18] 16  BP: ()/(45-54) 114/54  Physical Exam   Constitutional: She is oriented to person, place, and time. She appears well-developed and well-nourished.   HENT:   Head: Normocephalic and atraumatic.   Right Ear: External ear normal.   Left Ear: " External ear normal.   Nose: Nose normal.   Mouth/Throat: Oropharynx is clear and moist.   Eyes: Conjunctivae and EOM are normal.   Neck: Normal range of motion. Neck supple.   Cardiovascular: Normal rate, regular rhythm and normal heart sounds.    Pulmonary/Chest: Effort normal. She has decreased breath sounds. She has rhonchi.   Abdominal: Soft. Bowel sounds are normal. She exhibits no distension. There is no tenderness.   Musculoskeletal: Normal range of motion.   Neurological: She is alert and oriented to person, place, and time.   Skin: Skin is warm and dry.   Psychiatric: She has a normal mood and affect. Her speech is normal and behavior is normal. Cognition and memory are normal.          Results Review:  I have reviewed the labs, radiology results, and diagnostic studies.    Laboratory Data:     Results from last 7 days  Lab Units 01/20/18  0700 01/19/18  0626 01/18/18  1206 01/18/18  0606   WBC 10*3/mm3 6.78 6.41  --  5.59   HEMOGLOBIN g/dL 9.4* 9.7* 9.4* 9.5*   HEMATOCRIT % 29.1* 30.9* 29.1* 29.1*   PLATELETS 10*3/mm3 203 209  --  226          Results from last 7 days  Lab Units 01/20/18  0700 01/19/18  0501 01/18/18  0606   SODIUM mmol/L 140 140 137   POTASSIUM mmol/L 3.2* 4.2 3.6   CHLORIDE mmol/L 101 107 101   CO2 mmol/L 32.0* 26.0 33.0*   BUN mg/dL 10 11 11   CREATININE mg/dL 1.12 0.90 0.98   CALCIUM mg/dL 9.3 9.6 9.6   BILIRUBIN mg/dL 0.4 0.5 0.4   ALK PHOS U/L 64 58 59   ALT (SGPT) U/L 23 27 25   AST (SGOT) U/L 18 18 19   GLUCOSE mg/dL 103* 100 103*       Culture Data:   Blood Culture   Date Value Ref Range Status   01/17/2018 No growth at 2 days  Preliminary   01/17/2018 No growth at 2 days  Preliminary       Radiology Data:   Imaging Results (last 24 hours)     Procedure Component Value Units Date/Time    XR Chest 1 View [[id number]] Collected:  01/20/18 1230     Updated:  01/20/18 1235    Narrative:       XR CHEST 1 VW- 1/20/2018 11:44 AM CST     HISTORY: SOA, recheck Atelectasis, possible  pneumonia; Z74.09-Other  reduced mobility; Z74.09-Other reduced mobility       COMPARISON: Exam dated 01/17/2018.     FINDINGS:   Reidentified elevation of the right hemidiaphragm. There appears to be  mild increased retrocardiac density as compared with the prior exam. The  cardiomediastinal silhouette and pulmonary vascularity are within normal  limits.      The osseous structures and surrounding soft tissues demonstrate no acute  abnormality. Right total shoulder arthroplasty with advanced  degenerative changes at the left shoulder.       Impression:       1. Mild increased retrocardiac density on this exam which may reflect  increasing atelectasis or perhaps a developing infiltrate in the left  lower lobe. Otherwise stable exam.  2. Stable elevation of the right hemidiaphragm with volume loss in the  right lung base.        This report was finalized on 01/20/2018 12:32 by Dr Alfonso Hercules, .      I personally reviewed and interpreted the chest x-ray    I have reviewed the patient current medications.     Assessment/Plan   1.  Occlusive Left-lower extremity DVT  -Appreciate GI input  -Appreciate Vascular input   -Hgb stable  -Has filter in place per Vascular that is old      2:  GI Bleed  -GI following  -Hold anticoagulation  -Monitor H&H      3.  Sepsis  Zosyn      4.  Acute Renal Failure  -Resolved  -monitor Cr        5.  Lactic Acidosis  -Resolved  -Monitor      6.  Intractible Nausea and vomiting  -Zofran  -IVF      7.  Dehydration  -IVF held  -Monitor UOP      8.  GERD  -Protonix drip      9.  HLD  -Statin  -Lipid panel      10.  Bladder Spasms  -Ditropan      11.  HTN  -Monitor BP      12.  Anemia  -Monitor H&H  -Protonix  -GI Consulted      13:  Morbid Obesity  -BMI:  54.4  -Dietician consulted      14.  Hyperglycemia  -A1C negative  -D/C finger sticks/A1C      15.  Hypokalemia  -PO potassium      16.  Superficial right sided thrombus  -Cava filter in place      17. HAP possible  -Vanc Held  -Zosyn     18.   Diarrhea  -Imodium  -Pro-biotics  -check C. Diff              Discharge Planning: I expect the patient to be discharged to home in 1-2 days    Wilfred Pedersen MD   01/20/18   1:40 PM

## 2018-01-20 NOTE — PLAN OF CARE
Problem: Patient Care Overview (Adult)  Goal: Plan of Care Review  Outcome: Ongoing (interventions implemented as appropriate)   01/19/18 9026   Coping/Psychosocial Response Interventions   Plan Of Care Reviewed With patient   Patient Care Overview   Progress no change   Outcome Evaluation   Outcome Summary/Follow up Plan PHYSICAL THERAPY WORKS WITH PT. NO REQUEST FOR PAIN MED. CONT. TO WEAR O2 PER NASAL CANNULA. IV ANTIBIOTICS PER ORDER. NO DISTRESS NOTED.       Problem: Fall Risk (Adult)  Goal: Absence of Falls  Outcome: Ongoing (interventions implemented as appropriate)      Problem: VTE, DVT and PE (Adult)  Goal: Signs and Symptoms of Listed Potential Problems Will be Absent or Manageable (VTE, DVT and PE)  Outcome: Ongoing (interventions implemented as appropriate)      Problem: Skin Integrity Impairment, Risk/Actual (Adult)  Goal: Skin Integrity/Wound Healing  Outcome: Ongoing (interventions implemented as appropriate)      Problem: Pressure Ulcer Risk (Jorge Scale) (Adult,Obstetrics,Pediatric)  Goal: Skin Integrity  Outcome: Ongoing (interventions implemented as appropriate)      Problem: Gastrointestinal Bleeding (Adult)  Goal: Signs and Symptoms of Listed Potential Problems Will be Absent or Manageable (Gastrointestinal Bleeding)  Outcome: Ongoing (interventions implemented as appropriate)

## 2018-01-20 NOTE — PLAN OF CARE
Problem: Patient Care Overview (Adult)  Goal: Plan of Care Review  Outcome: Ongoing (interventions implemented as appropriate)   01/20/18 0227   Coping/Psychosocial Response Interventions   Plan Of Care Reviewed With patient   Patient Care Overview   Progress no change   Outcome Evaluation   Outcome Summary/Follow up Plan no pain reported by pt;o2/2 per nc; iv Abx Vanc and zosyn     Goal: Adult Individualization and Mutuality  Outcome: Ongoing (interventions implemented as appropriate)   01/14/18 1608   Individualization   Patient Specific Goals hemoglobin stabilized       Problem: Fall Risk (Adult)  Goal: Absence of Falls  Outcome: Ongoing (interventions implemented as appropriate)   01/20/18 0227   Fall Risk (Adult)   Absence of Falls making progress toward outcome       Problem: Skin Integrity Impairment, Risk/Actual (Adult)  Goal: Skin Integrity/Wound Healing  Outcome: Ongoing (interventions implemented as appropriate)   01/20/18 0227   Skin Integrity Impairment, Risk/Actual (Adult)   Skin Integrity/Wound Healing making progress toward outcome       Problem: Pressure Ulcer Risk (Jorge Scale) (Adult,Obstetrics,Pediatric)  Goal: Skin Integrity  Outcome: Ongoing (interventions implemented as appropriate)   01/20/18 0227   Pressure Ulcer Risk (Jorge Scale) (Adult,Obstetrics,Pediatric)   Skin Integrity making progress toward outcome       Problem: Gastrointestinal Bleeding (Adult)  Goal: Signs and Symptoms of Listed Potential Problems Will be Absent or Manageable (Gastrointestinal Bleeding)  Outcome: Ongoing (interventions implemented as appropriate)   01/20/18 0227   Gastrointestinal Bleeding   Problems Assessed (GI Bleeding) all   Problems Present (GI Bleeding) situational response

## 2018-01-21 LAB
ADV 40+41 DNA STL QL NAA+NON-PROBE: NOT DETECTED
ALBUMIN SERPL-MCNC: 2.2 G/DL (ref 3.5–5)
ALBUMIN/GLOB SERPL: 0.8 G/DL (ref 1.1–2.5)
ALP SERPL-CCNC: 57 U/L (ref 24–120)
ALT SERPL W P-5'-P-CCNC: 23 U/L (ref 0–54)
ANION GAP SERPL CALCULATED.3IONS-SCNC: 3 MMOL/L (ref 4–13)
AST SERPL-CCNC: 21 U/L (ref 7–45)
ASTRO TYP 1-8 RNA STL QL NAA+NON-PROBE: NOT DETECTED
BILIRUB SERPL-MCNC: 0.3 MG/DL (ref 0.1–1)
BUN BLD-MCNC: 10 MG/DL (ref 5–21)
BUN/CREAT SERPL: 9.3 (ref 7–25)
C CAYETANENSIS DNA STL QL NAA+NON-PROBE: NOT DETECTED
C DIFF TOX GENS STL QL NAA+PROBE: DETECTED
CALCIUM SPEC-SCNC: 9 MG/DL (ref 8.4–10.4)
CAMPY SP DNA.DIARRHEA STL QL NAA+PROBE: NOT DETECTED
CHLORIDE SERPL-SCNC: 102 MMOL/L (ref 98–110)
CO2 SERPL-SCNC: 32 MMOL/L (ref 24–31)
CREAT BLD-MCNC: 1.08 MG/DL (ref 0.5–1.4)
CRYPTOSP STL CULT: NOT DETECTED
DEPRECATED RDW RBC AUTO: 49.1 FL (ref 40–54)
E COLI DNA SPEC QL NAA+PROBE: NOT DETECTED
E HISTOLYT AG STL-ACNC: NOT DETECTED
EAEC PAA PLAS AGGR+AATA ST NAA+NON-PRB: NOT DETECTED
EC STX1+STX2 GENES STL QL NAA+NON-PROBE: NOT DETECTED
EOSINOPHIL # BLD MANUAL: 0.17 10*3/MM3 (ref 0–0.7)
EOSINOPHIL NFR BLD MANUAL: 3 % (ref 0–4)
EPEC EAE GENE STL QL NAA+NON-PROBE: NOT DETECTED
ERYTHROCYTE [DISTWIDTH] IN BLOOD BY AUTOMATED COUNT: 15.2 % (ref 12–15)
ETEC LTA+ST1A+ST1B TOX ST NAA+NON-PROBE: NOT DETECTED
G LAMBLIA DNA SPEC QL NAA+PROBE: NOT DETECTED
GFR SERPL CREATININE-BSD FRML MDRD: 49 ML/MIN/1.73
GFR SERPL CREATININE-BSD FRML MDRD: 60 ML/MIN/1.73
GLOBULIN UR ELPH-MCNC: 2.8 GM/DL
GLUCOSE BLD-MCNC: 106 MG/DL (ref 70–100)
HCT VFR BLD AUTO: 27.9 % (ref 37–47)
HGB BLD-MCNC: 9 G/DL (ref 12–16)
LYMPHOCYTES # BLD MANUAL: 0.7 10*3/MM3 (ref 0.72–4.86)
LYMPHOCYTES NFR BLD MANUAL: 12 % (ref 15–45)
LYMPHOCYTES NFR BLD MANUAL: 9 % (ref 4–12)
MCH RBC QN AUTO: 28.2 PG (ref 28–32)
MCHC RBC AUTO-ENTMCNC: 32.3 G/DL (ref 33–36)
MCV RBC AUTO: 87.5 FL (ref 82–98)
MONOCYTES # BLD AUTO: 0.52 10*3/MM3 (ref 0.19–1.3)
NEUTROPHILS # BLD AUTO: 4.23 10*3/MM3 (ref 1.87–8.4)
NEUTROPHILS NFR BLD MANUAL: 57 % (ref 39–78)
NEUTS BAND NFR BLD MANUAL: 16 % (ref 0–10)
NOROVIRUS GI+II RNA STL QL NAA+NON-PROBE: NOT DETECTED
P SHIGELLOIDES DNA STL QL NAA+NON-PROBE: NOT DETECTED
PLATELET # BLD AUTO: 192 10*3/MM3 (ref 130–400)
PMV BLD AUTO: 10.2 FL (ref 6–12)
POTASSIUM BLD-SCNC: 3.7 MMOL/L (ref 3.5–5.3)
PROT SERPL-MCNC: 5 G/DL (ref 6.3–8.7)
RBC # BLD AUTO: 3.19 10*6/MM3 (ref 4.2–5.4)
RBC MORPH BLD: NORMAL
RV RNA STL NAA+PROBE: NOT DETECTED
SALMONELLA DNA SPEC QL NAA+PROBE: NOT DETECTED
SAPO I+II+IV+V RNA STL QL NAA+NON-PROBE: NOT DETECTED
SCAN SLIDE: NORMAL
SHIGELLA SP+EIEC IPAH ST NAA+NON-PROBE: NOT DETECTED
SMALL PLATELETS BLD QL SMEAR: ADEQUATE
SODIUM BLD-SCNC: 137 MMOL/L (ref 135–145)
V CHOLERAE DNA SPEC QL NAA+PROBE: NOT DETECTED
VARIANT LYMPHS NFR BLD MANUAL: 3 % (ref 0–5)
VIBRIO DNA SPEC NAA+PROBE: NOT DETECTED
WBC MORPH BLD: NORMAL
WBC NRBC COR # BLD: 5.8 10*3/MM3 (ref 4.8–10.8)
YERSINIA STL CULT: NOT DETECTED

## 2018-01-21 PROCEDURE — 25010000002 METHYLPREDNISOLONE PER 125 MG: Performed by: FAMILY MEDICINE

## 2018-01-21 PROCEDURE — 85025 COMPLETE CBC W/AUTO DIFF WBC: CPT | Performed by: FAMILY MEDICINE

## 2018-01-21 PROCEDURE — 80053 COMPREHEN METABOLIC PANEL: CPT | Performed by: FAMILY MEDICINE

## 2018-01-21 PROCEDURE — 25010000002 PIPERACILLIN SOD-TAZOBACTAM PER 1 G: Performed by: FAMILY MEDICINE

## 2018-01-21 PROCEDURE — 85007 BL SMEAR W/DIFF WBC COUNT: CPT | Performed by: FAMILY MEDICINE

## 2018-01-21 PROCEDURE — 94799 UNLISTED PULMONARY SVC/PX: CPT

## 2018-01-21 PROCEDURE — 87999 UNLISTED MICROBIOLOGY PX: CPT | Performed by: FAMILY MEDICINE

## 2018-01-21 RX ORDER — IPRATROPIUM BROMIDE AND ALBUTEROL SULFATE 2.5; .5 MG/3ML; MG/3ML
3 SOLUTION RESPIRATORY (INHALATION)
Status: DISCONTINUED | OUTPATIENT
Start: 2018-01-21 | End: 2018-01-27 | Stop reason: HOSPADM

## 2018-01-21 RX ORDER — NYSTATIN 100000 [USP'U]/G
POWDER TOPICAL EVERY 12 HOURS SCHEDULED
Status: DISCONTINUED | OUTPATIENT
Start: 2018-01-21 | End: 2018-01-27 | Stop reason: HOSPADM

## 2018-01-21 RX ORDER — METHYLPREDNISOLONE SODIUM SUCCINATE 125 MG/2ML
125 INJECTION, POWDER, LYOPHILIZED, FOR SOLUTION INTRAMUSCULAR; INTRAVENOUS ONCE
Status: COMPLETED | OUTPATIENT
Start: 2018-01-21 | End: 2018-01-21

## 2018-01-21 RX ORDER — METRONIDAZOLE 500 MG/1
500 TABLET ORAL EVERY 8 HOURS SCHEDULED
Status: DISCONTINUED | OUTPATIENT
Start: 2018-01-21 | End: 2018-01-27 | Stop reason: HOSPADM

## 2018-01-21 RX ORDER — FLUCONAZOLE 100 MG/1
100 TABLET ORAL EVERY 24 HOURS
Status: COMPLETED | OUTPATIENT
Start: 2018-01-21 | End: 2018-01-23

## 2018-01-21 RX ADMIN — IPRATROPIUM BROMIDE AND ALBUTEROL SULFATE 3 ML: 2.5; .5 SOLUTION RESPIRATORY (INHALATION) at 06:32

## 2018-01-21 RX ADMIN — PANTOPRAZOLE SODIUM 40 MG: 40 TABLET, DELAYED RELEASE ORAL at 05:14

## 2018-01-21 RX ADMIN — IPRATROPIUM BROMIDE AND ALBUTEROL SULFATE 3 ML: 2.5; .5 SOLUTION RESPIRATORY (INHALATION) at 00:02

## 2018-01-21 RX ADMIN — MICONAZOLE NITRATE: 20 CREAM TOPICAL at 21:37

## 2018-01-21 RX ADMIN — TAZOBACTAM SODIUM AND PIPERACILLIN SODIUM 3.38 G: 375; 3 INJECTION, SOLUTION INTRAVENOUS at 05:14

## 2018-01-21 RX ADMIN — FLUCONAZOLE 100 MG: 100 TABLET ORAL at 17:26

## 2018-01-21 RX ADMIN — IPRATROPIUM BROMIDE AND ALBUTEROL SULFATE 3 ML: 2.5; .5 SOLUTION RESPIRATORY (INHALATION) at 16:26

## 2018-01-21 RX ADMIN — IPRATROPIUM BROMIDE AND ALBUTEROL SULFATE 3 ML: 2.5; .5 SOLUTION RESPIRATORY (INHALATION) at 23:34

## 2018-01-21 RX ADMIN — Medication 250 MG: at 21:32

## 2018-01-21 RX ADMIN — IPRATROPIUM BROMIDE AND ALBUTEROL SULFATE 3 ML: 2.5; .5 SOLUTION RESPIRATORY (INHALATION) at 19:07

## 2018-01-21 RX ADMIN — METRONIDAZOLE 500 MG: 500 TABLET ORAL at 21:32

## 2018-01-21 RX ADMIN — Medication 250 MG: at 09:09

## 2018-01-21 RX ADMIN — NYSTATIN: 100000 POWDER TOPICAL at 21:32

## 2018-01-21 RX ADMIN — METHYLPREDNISOLONE SODIUM SUCCINATE 125 MG: 125 INJECTION, POWDER, FOR SOLUTION INTRAMUSCULAR; INTRAVENOUS at 17:27

## 2018-01-21 RX ADMIN — POTASSIUM CHLORIDE 40 MEQ: 750 CAPSULE, EXTENDED RELEASE ORAL at 09:09

## 2018-01-21 RX ADMIN — IPRATROPIUM BROMIDE AND ALBUTEROL SULFATE 3 ML: 2.5; .5 SOLUTION RESPIRATORY (INHALATION) at 11:42

## 2018-01-21 NOTE — CONSULTS
INFECTIOUS DISEASES CONSULT NOTE    Patient:  Tricia Hernandez 77 y.o. female  ROOM # 375/1  YOB: 1940  MRN: [id number]  [facility]:  [id number]  Admit date: 1/3/2018   Admitting Physician: Wilfred Pedersen MD  Primary Care Physician: Ed Hanson MD  REFERRING PROVIDER: Jim Zuñiga DO      REASON FOR CONSULTATION : Persistent fever      HISTORY OF PRESENT ILLNESS:  The patient has a rather complicated hospital course history beginning with her admission on January 3 with worsening renal function.  She had originally been admitted on January 1 to [facility].  Patient really had nausea vomiting and diarrhea which was bloody.  Admission history and physical he reports that she was transferred to the intensive care unit, received IV hydration and was also on norepinephrine drip.    She was found to have a completely occluded DVT in the left common femoral, superficial femoral and popliteal veins.  She was anticoagulated with Lovenox.  She was noted to have a high probability of PE on VQ scan.    She apparently developed GI bleed and was felt to be such high risk she was not taken to endoscopy per GI.  She had a recurrence of her GI bleed and GI was reconsult again.    Currently the patient was nearing discharge but then developed fever.  Reviewing her temperature curve revealed a temperature of 100 on January 10, the patient was afebrile until January 13 when her temperature was 101.3, temperature curve appeared to improve however she had temperature up to 101.6 on the 16th, overweight 102 on the 17th and 18th and 101.4 yesterday.    She was started on Zosyn January 18 as well as vancomycin however the latter has been stopped.  There was concern for possibility of pneumonia.      Past Medical History:   Diagnosis Date   • Anemia    • Arthritis    • Bladder spasms    • GERD (gastroesophageal reflux disease)    • Hypertension    DVT  GI bleed      Past Surgical History:    Procedure Laterality Date   • ANKLE SURGERY     • KNEE SURGERY     • SHOULDER SURGERY       Family History   Problem Relation Age of Onset   • Heart disease Neg Hx    • Cancer Neg Hx      Social History     Social History   • Marital status:      Spouse name: N/A   • Number of children: N/A   • Years of education: N/A     Occupational History   • Not on file.     Social History Main Topics   • Smoking status: Never Smoker   • Smokeless tobacco: Never Used   • Alcohol use No   • Drug use: No   • Sexual activity: Not on file     Other Topics Concern   • Not on file     Social History Narrative    Her daughter is her POA           Current Meds:     Current Facility-Administered Medications:   •  acetaminophen (TYLENOL) tablet 650 mg, 650 mg, Oral, Q4H PRN, 650 mg at 01/18/18 2211 **OR** acetaminophen (TYLENOL) suppository 650 mg, 650 mg, Rectal, Q4H PRN, BRITNEY Schroeder  •  dextrose (D50W) solution 25 g, 25 g, Intravenous, Q15 Min PRN, Wilfred Pedersen MD  •  dextrose (GLUTOSE) oral gel 15 g, 15 g, Oral, Q15 Min PRN, Wilfred Pedersen MD  •  glucagon (human recombinant) (GLUCAGEN DIAGNOSTIC) injection 1 mg, 1 mg, Subcutaneous, Q15 Min PRN, Wilfred Pedersen MD  •  ipratropium-albuterol (DUO-NEB) nebulizer solution 3 mL, 3 mL, Nebulization, Q6H - RT, Loyda Fried APRN, 3 mL at 01/21/18 1142  •  loperamide (IMODIUM) capsule 2 mg, 2 mg, Oral, 4x Daily PRN, Wilfred Pedersen MD  •  ondansetron (ZOFRAN) tablet 4 mg, 4 mg, Oral, Q6H PRN **OR** ondansetron ODT (ZOFRAN-ODT) disintegrating tablet 4 mg, 4 mg, Oral, Q6H PRN **OR** ondansetron (ZOFRAN) injection 4 mg, 4 mg, Intravenous, Q6H PRN, BRITNEY Schroeder  •  pantoprazole (PROTONIX) EC tablet 40 mg, 40 mg, Oral, Q AM, Vanessa Britton MD, 40 mg at 01/21/18 0514  •  piperacillin-tazobactam (ZOSYN) 3.375 g in iso-osmotic dextrose 50 ml (premix), 3.375 g, Intravenous, Q8H, Wilfred Pedersen MD, Last Rate: 0 mL/hr at 01/21/18 0215,  "3.375 g at 18 0514  •  potassium chloride (MICRO-K) CR capsule 40 mEq, 40 mEq, Oral, Daily, Wilfred Pedersen MD, 40 mEq at 18  •  saccharomyces boulardii (FLORASTOR) capsule 250 mg, 250 mg, Oral, BID, Wilfred Pedersen MD, 250 mg at 18  •  sodium chloride 0.9 % flush 1-10 mL, 1-10 mL, Intravenous, PRN, Loyda Fried APRN, 10 mL at 18    ipratropium-albuterol 3 mL Nebulization Q6H - RT   pantoprazole 40 mg Oral Q AM   piperacillin-tazobactam 3.375 g Intravenous Q8H   potassium chloride 40 mEq Oral Daily   saccharomyces boulardii 250 mg Oral BID     •  acetaminophen **OR** acetaminophen  •  dextrose  •  dextrose  •  glucagon (human recombinant)  •  loperamide  •  ondansetron **OR** ondansetron ODT **OR** ondansetron  •  sodium chloride       No Known Allergies    Review of Systems   Constitutional: Positive for fatigue and fever.   HENT: Negative for dental problem.    Eyes: Negative for photophobia.   Respiratory: Negative for cough and shortness of breath.    Cardiovascular: Negative for chest pain.   Gastrointestinal: Positive for diarrhea.   Endocrine: Negative for polyuria.   Genitourinary: Negative for dysuria.   Musculoskeletal: Positive for arthralgias.   Skin: Positive for rash (Abdominal folds and inguinal area).         Vital Signs:  /56 (BP Location: Left arm, Patient Position: Lying)  Pulse 80  Temp 99.6 °F (37.6 °C) (Oral)   Resp 16  Ht 160 cm (62.99\")  Wt (!) 151 kg (331 lb 12.8 oz)  SpO2 95%  BMI 58.79 kg/m2  Temp (24hrs), Av.2 °F (37.9 °C), Min:99.4 °F (37.4 °C), Max:101.4 °F (38.6 °C)      Physical Exam   Gen.: The patient is morbidly obese female lying in bed sleeping  HEENT sclerae anicteric and noninjected or pharynx is without lesions or thrush  Neck: Supple no lymphadenopathy  Heart: S1 and S2 rate was regular  Lungs: Diminished breath sounds throughout could be secondary to patient's body habitus  Abdomen: Morbidly obese soft " bowel sounds are positive  Extremities: Obese multiple folds.  Skin: Patient does have evidence of intertrigo in the abdominal folds and inguinal area.  No maddy cellulitis.  No evidence of effusions increased warmth or erythema previous surgical site of shoulder replacement or bilateral knees (previous knee replacements)  Psych: She is pleasant and cooperative  Neuro: She is alert and oriented, speech is clear      Line/IV site: Peripheral IV antecubital right, condition patent and no redness    Results Review:    I reviewed the patient's new clinical results.    Lab Results:  CBC:   Results from last 7 days  Lab Units 01/21/18  0540 01/20/18  0700 01/19/18  0626   WBC 10*3/mm3 5.80 6.78 6.41   HEMOGLOBIN g/dL 9.0* 9.4* 9.7*   HEMATOCRIT % 27.9* 29.1* 30.9*   PLATELETS 10*3/mm3 192 203 209   LYMPHOCYTE % % 12.0* 16.0 8.0*   MONOCYTES % % 9.0 6.0 1.0*        Ref Range & Units 5:40 AM     Neutrophil % 39.0 - 78.0 % 57.0   Lymphocyte % 15.0 - 45.0 % 12.0 (L)   Monocyte % 4.0 - 12.0 % 9.0   Eosinophil % 0.0 - 4.0 % 3.0   Bands %  0.0 - 10.0 % 16.0 (H)         CMP:   Results from last 7 days  Lab Units 01/21/18  0540 01/20/18  0700 01/19/18  0501   SODIUM mmol/L 137 140 140   POTASSIUM mmol/L 3.7 3.2* 4.2   CHLORIDE mmol/L 102 101 107   CO2 mmol/L 32.0* 32.0* 26.0   BUN mg/dL 10 10 11   CREATININE mg/dL 1.08 1.12 0.90   CALCIUM mg/dL 9.0 9.3 9.6   BILIRUBIN mg/dL 0.3 0.4 0.5   ALK PHOS U/L 57 64 58   ALT (SGPT) U/L 23 23 27   AST (SGOT) U/L 21 18 18   GLUCOSE mg/dL 106* 103* 100     ---  Lab Results (last 72 hours)     Procedure Component Value Units Date/Time    Comprehensive Metabolic Panel [[id number]]  (Abnormal) Collected:  01/19/18 0501    Specimen:  Blood Updated:  01/19/18 0630     Glucose 100 mg/dL      BUN 11 mg/dL       Specimen hemolyzed. Results may be affected.        Creatinine 0.90 mg/dL      Sodium 140 mmol/L      Potassium 4.2 mmol/L       Specimen hemolyzed.  Results may be affected.        Chloride  107 mmol/L      CO2 26.0 mmol/L      Calcium 9.6 mg/dL      Total Protein 4.9 (L) g/dL      Albumin 2.20 (L) g/dL      ALT (SGPT) 27 U/L       Specimen hemolyzed.  Results may be affected.        AST (SGOT) 18 U/L       Specimen hemolyzed.  Results may be affected.        Alkaline Phosphatase 58 U/L       Specimen hemolyzed. Results may be affected.        Total Bilirubin 0.5 mg/dL      eGFR Non African Amer 61 mL/min/1.73      eGFR  African Amer 74 mL/min/1.73      Globulin 2.7 gm/dL      A/G Ratio 0.8 (L) g/dL      BUN/Creatinine Ratio 12.2     Anion Gap 7.0 mmol/L     Narrative:       The MDRD GFR formula is only valid for adults with stable renal function between ages 18 and 70.    CBC & Differential [[id number]] Collected:  01/19/18 0626    Specimen:  Blood Updated:  01/19/18 0718    Narrative:       The following orders were created for panel order CBC & Differential.  Procedure                               Abnormality         Status                     ---------                               -----------         ------                     Manual Differential[[id number]]          Abnormal            Final result               Scan Slide[[id number]]                                       Final result               CBC Auto Differential[[id number]]        Abnormal            Final result                 Please view results for these tests on the individual orders.    CBC Auto Differential [[id number]]  (Abnormal) Collected:  01/19/18 0626    Specimen:  Blood Updated:  01/19/18 0718     WBC 6.41 10*3/mm3      RBC 3.50 (L) 10*6/mm3      Hemoglobin 9.7 (L) g/dL      Hematocrit 30.9 (L) %      MCV 88.3 fL      MCH 27.7 (L) pg      MCHC 31.4 (L) g/dL      RDW 15.2 (H) %      RDW-SD 49.0 fl      MPV 10.1 fL      Platelets 209 10*3/mm3     Scan Slide [[id number]] Collected:  01/19/18 0626    Specimen:  Blood Updated:  01/19/18 0718     Scan Slide --      See Manual Differential Results       Manual Differential [[id number]]   (Abnormal) Collected:  01/19/18 0626    Specimen:  Blood Updated:  01/19/18 0718     Neutrophil % 47.0 %      Lymphocyte % 8.0 (L) %      Monocyte % 1.0 (L) %      Eosinophil % 2.0 %      Basophil % 1.0 %      Bands %  29.0 (H) %      Atypical Lymphocyte % 12.0 (H) %      Neutrophils Absolute 4.87 10*3/mm3      Lymphocytes Absolute 0.51 (L) 10*3/mm3      Monocytes Absolute 0.06 (L) 10*3/mm3      Eosinophils Absolute 0.13 10*3/mm3      Basophils Absolute 0.06 10*3/mm3      Anisocytosis Slight/1+     WBC Morphology Normal     Platelet Morphology Normal    Comprehensive Metabolic Panel [[id number]]  (Abnormal) Collected:  01/20/18 0700    Specimen:  Blood Updated:  01/20/18 0806     Glucose 103 (H) mg/dL      BUN 10 mg/dL      Creatinine 1.12 mg/dL      Sodium 140 mmol/L      Potassium 3.2 (L) mmol/L      Chloride 101 mmol/L      CO2 32.0 (H) mmol/L      Calcium 9.3 mg/dL      Total Protein 5.1 (L) g/dL      Albumin 2.30 (L) g/dL      ALT (SGPT) 23 U/L      AST (SGOT) 18 U/L      Alkaline Phosphatase 64 U/L      Total Bilirubin 0.4 mg/dL      eGFR Non African Amer 47 (L) mL/min/1.73      eGFR  African Amer 57 (L) mL/min/1.73      Globulin 2.8 gm/dL      A/G Ratio 0.8 (L) g/dL      BUN/Creatinine Ratio 8.9     Anion Gap 7.0 mmol/L     Narrative:       The MDRD GFR formula is only valid for adults with stable renal function between ages 18 and 70.    CBC Auto Differential [[id number]]  (Abnormal) Collected:  01/20/18 0700    Specimen:  Blood Updated:  01/20/18 0826     WBC 6.78 10*3/mm3      RBC 3.33 (L) 10*6/mm3      Hemoglobin 9.4 (L) g/dL      Hematocrit 29.1 (L) %      MCV 87.4 fL      MCH 28.2 pg      MCHC 32.3 (L) g/dL      RDW 15.2 (H) %      RDW-SD 48.8 fl      MPV 10.5 fL      Platelets 203 10*3/mm3     Scan Slide [[id number]] Collected:  01/20/18 0700    Specimen:  Blood Updated:  01/20/18 0826     Scan Slide --      See Manual Differential Results       CBC & Differential [[id number]] Collected:  01/20/18 0700     Specimen:  Blood Updated:  01/20/18 0826    Narrative:       The following orders were created for panel order CBC & Differential.  Procedure                               Abnormality         Status                     ---------                               -----------         ------                     Manual Differential[[id number]]          Abnormal            Final result               Scan Slide[[id number]]                                       Final result               CBC Auto Differential[[id number]]        Abnormal            Final result                 Please view results for these tests on the individual orders.    Manual Differential [[id number]]  (Abnormal) Collected:  01/20/18 0700    Specimen:  Blood Updated:  01/20/18 0826     Neutrophil % 54.0 %      Lymphocyte % 16.0 %      Monocyte % 6.0 %      Eosinophil % 3.0 %      Basophil % 1.0 %      Bands %  17.0 (H) %      Atypical Lymphocyte % 3.0 %      Neutrophils Absolute 4.81 10*3/mm3      Lymphocytes Absolute 1.08 10*3/mm3      Monocytes Absolute 0.41 10*3/mm3      Eosinophils Absolute 0.20 10*3/mm3      Basophils Absolute 0.07 10*3/mm3      Hypochromia Slight/1+     WBC Morphology Normal     Clumped Platelets Present    Vancomycin, Trough [[id number]]  (Abnormal) Collected:  01/20/18 0859    Specimen:  Blood Updated:  01/20/18 1004     Vancomycin Trough 21.28 (H) mcg/mL     Blood Culture - Blood, [[id number]]  (Normal) Collected:  01/17/18 1819    Specimen:  Blood from Arm, Right Updated:  01/20/18 1831     Blood Culture No growth at 3 days    Blood Culture - Blood, [[id number]]  (Normal) Collected:  01/17/18 1813    Specimen:  Blood from Arm, Left Updated:  01/20/18 2116     Blood Culture No growth at 3 days    Comprehensive Metabolic Panel [[id number]]  (Abnormal) Collected:  01/21/18 0540    Specimen:  Blood Updated:  01/21/18 0616     Glucose 106 (H) mg/dL      BUN 10 mg/dL      Creatinine 1.08 mg/dL      Sodium 137 mmol/L      Potassium 3.7 mmol/L       Chloride 102 mmol/L      CO2 32.0 (H) mmol/L      Calcium 9.0 mg/dL      Total Protein 5.0 (L) g/dL      Albumin 2.20 (L) g/dL      ALT (SGPT) 23 U/L      AST (SGOT) 21 U/L      Alkaline Phosphatase 57 U/L      Total Bilirubin 0.3 mg/dL      eGFR Non African Amer 49 (L) mL/min/1.73      eGFR  African Amer 60 (L) mL/min/1.73      Globulin 2.8 gm/dL      A/G Ratio 0.8 (L) g/dL      BUN/Creatinine Ratio 9.3     Anion Gap 3.0 (L) mmol/L     Narrative:       The MDRD GFR formula is only valid for adults with stable renal function between ages 18 and 70.    CBC Auto Differential [[id number]]  (Abnormal) Collected:  01/21/18 0540    Specimen:  Blood Updated:  01/21/18 0715     WBC 5.80 10*3/mm3      RBC 3.19 (L) 10*6/mm3      Hemoglobin 9.0 (L) g/dL      Hematocrit 27.9 (L) %      MCV 87.5 fL      MCH 28.2 pg      MCHC 32.3 (L) g/dL      RDW 15.2 (H) %      RDW-SD 49.1 fl      MPV 10.2 fL      Platelets 192 10*3/mm3     Scan Slide [[id number]] Collected:  01/21/18 0540    Specimen:  Blood Updated:  01/21/18 0715     Scan Slide --      See Manual Differential Results       CBC & Differential [[id number]] Collected:  01/21/18 0540    Specimen:  Blood Updated:  01/21/18 0716    Narrative:       The following orders were created for panel order CBC & Differential.  Procedure                               Abnormality         Status                     ---------                               -----------         ------                     Manual Differential[[id number]]          Abnormal            Final result               Scan Slide[[id number]]                                       Final result               CBC Auto Differential[[id number]]        Abnormal            Final result                 Please view results for these tests on the individual orders.    Manual Differential [[id number]]  (Abnormal) Collected:  01/21/18 0540    Specimen:  Blood Updated:  01/21/18 0716     Neutrophil % 57.0 %      Lymphocyte % 12.0 (L) %       Monocyte % 9.0 %      Eosinophil % 3.0 %      Bands %  16.0 (H) %      Atypical Lymphocyte % 3.0 %      Neutrophils Absolute 4.23 10*3/mm3      Lymphocytes Absolute 0.70 (L) 10*3/mm3      Monocytes Absolute 0.52 10*3/mm3      Eosinophils Absolute 0.17 10*3/mm3      RBC Morphology Normal     WBC Morphology Normal     Platelet Estimate Adequate        Culture Results:    Blood Culture   Date Value Ref Range Status   01/17/2018 No growth at 3 days  Preliminary   01/17/2018 No growth at 3 days  Preliminary         Radiology:   Imaging Results (last 72 hours)     Procedure Component Value Units Date/Time    XR Chest 1 View [[id number]] Collected:  01/20/18 1230     Updated:  01/20/18 1235    Narrative:       XR CHEST 1 VW- 1/20/2018 11:44 AM CST     HISTORY: SOA, recheck Atelectasis, possible pneumonia; Z74.09-Other  reduced mobility; Z74.09-Other reduced mobility       COMPARISON: Exam dated 01/17/2018.     FINDINGS:   Reidentified elevation of the right hemidiaphragm. There appears to be  mild increased retrocardiac density as compared with the prior exam. The  cardiomediastinal silhouette and pulmonary vascularity are within normal  limits.      The osseous structures and surrounding soft tissues demonstrate no acute  abnormality. Right total shoulder arthroplasty with advanced  degenerative changes at the left shoulder.       Impression:       1. Mild increased retrocardiac density on this exam which may reflect  increasing atelectasis or perhaps a developing infiltrate in the left  lower lobe. Otherwise stable exam.  2. Stable elevation of the right hemidiaphragm with volume loss in the  right lung base.        This report was finalized on 01/20/2018 12:32 by Dr Alfonso Hercules, .        >>>>>>>>>>>>>Personally reviewed    Assessment/Plan     Active Problems:    RUBY (acute kidney injury)    DVT (deep venous thrombosis)    Anemia    GI bleed      IMPRESSION:  1. Fever-differential diagnosis is large.  She has had fever  off and on for several days.  It does appear she did not have any significant fever after admission for several days.  Certainly extensive DVT can cause fever but expected that to be more of an issue on admission.  Extension of DVT is always a possibility/concern.  The current chest x-ray is very difficult to evaluate given the portable nature of it, significantly elevated right hemidiaphragm and body habitus.  She does not endorse specific cough or shortness of breath or recent emesis to suggest she may have aspirated.  She has not been very mobile and is certainly at high risk for pneumonia.  Is no indwelling line.  Her joints do not appear to be a locus of infection specifically shoulder where she recently had surgery for bilateral knees were she has had replacements.  2. Extensive DVT  3. Acute kidney injury on admission-resolved  4. GI bleed-resolved  5. Bandemia  6. Diarrhea-new problem      RECOMMENDATION:       · Discontinue Zosyn  · Repeat blood cultures if temperature greater than 101 (last blood cultures collected January 17)  · Await results of stool or C. difficile  · And out catheterization for urinalysis and culture if C. difficile is negative  · Agree with getting patient up and out of bed and mobile as soon as possible.        Luz Rhaman MD  01/21/18  1:23 PM

## 2018-01-21 NOTE — PLAN OF CARE
Problem: Patient Care Overview (Adult)  Goal: Plan of Care Review  Outcome: Ongoing (interventions implemented as appropriate)   01/21/18 0233   Coping/Psychosocial Response Interventions   Plan Of Care Reviewed With patient   Patient Care Overview   Progress no change   Outcome Evaluation   Outcome Summary/Follow up Plan No c/o pain or nausea. IV abx continue. O2@2L. excoriation on buttocks. Incontinent. Temp of 101.4 at beginning of shift, it decreased after blankets were removed and air was turned on. Will continue to monitor.        Problem: Fall Risk (Adult)  Goal: Absence of Falls  Outcome: Ongoing (interventions implemented as appropriate)   01/21/18 0233   Fall Risk (Adult)   Absence of Falls making progress toward outcome       Problem: VTE, DVT and PE (Adult)  Goal: Signs and Symptoms of Listed Potential Problems Will be Absent or Manageable (VTE, DVT and PE)  Outcome: Ongoing (interventions implemented as appropriate)   01/21/18 0233   VTE, DVT and PE   Problems Assessed (VTE, DVT, PE) all   Problems Present (VTE, DVT, PE) deep vein thrombosis       Problem: Skin Integrity Impairment, Risk/Actual (Adult)  Goal: Skin Integrity/Wound Healing  Outcome: Ongoing (interventions implemented as appropriate)   01/21/18 0233   Skin Integrity Impairment, Risk/Actual (Adult)   Skin Integrity/Wound Healing making progress toward outcome       Problem: Pressure Ulcer Risk (Jorge Scale) (Adult,Obstetrics,Pediatric)  Goal: Skin Integrity  Outcome: Ongoing (interventions implemented as appropriate)   01/21/18 0233   Pressure Ulcer Risk (Jorge Scale) (Adult,Obstetrics,Pediatric)   Skin Integrity making progress toward outcome       Problem: Gastrointestinal Bleeding (Adult)  Goal: Signs and Symptoms of Listed Potential Problems Will be Absent or Manageable (Gastrointestinal Bleeding)  Outcome: Ongoing (interventions implemented as appropriate)   01/21/18 0233   Gastrointestinal Bleeding   Problems Assessed (GI Bleeding)  all   Problems Present (GI Bleeding) situational response

## 2018-01-21 NOTE — PROGRESS NOTES
[facility] Medicine Services  INPATIENT PROGRESS NOTE    Length of Stay: 18  Date of Admission: 1/3/2018  Primary Care Physician: Ed Hanson MD    Subjective   Chief Complaint: Fever  HPI  Patient resting comfortably in bed this AM.  Not on oxygen.  No SOA.  No chest pain.      Patient had a fever gain over night.  No increase in WBC's, no left shift.  She intermittently requires oxygen.  Very rarely has a cough.  No dysuria.  Urine was clean.  Cultures negative.    She does have a large left sided DVT and a right sided superficial DVT.  No signs of phlebitis.    She has had atalectasis.  She tells me she is doing well with IS.      Repeat CXR yesterday showed questionable developing pneumonia.  Cultures are negative.  She has been on appropriate antibiotics for HAP.      She has also developed some loose stools that have not improved.      She is tolerating a regular diet.      At this point, I will ask for ID input to make sure we aren't missing something or treating too aggressively with antibiotics.              Review of Systems   Constitutional: Positive for fatigue and fever.   HENT: Negative for congestion and ear pain.    Eyes: Negative for pain and visual disturbance.   Respiratory: Negative for cough, shortness of breath and wheezing.    Cardiovascular: Negative for chest pain and palpitations.   Gastrointestinal: Positive for diarrhea. Negative for nausea and vomiting.   Endocrine: Negative for heat intolerance.   Genitourinary: Negative for dysuria and frequency.   Musculoskeletal: Negative for arthralgias and back pain.   Skin: Negative for rash and wound.   Neurological: Positive for weakness. Negative for dizziness and light-headedness.   Psychiatric/Behavioral: Negative for confusion. The patient is not nervous/anxious.    All other systems reviewed and are negative.     All pertinent negatives and positives are as above. All other systems have been  reviewed and are negative unless otherwise stated.     Objective    Temp:  [99.4 °F (37.4 °C)-101.4 °F (38.6 °C)] 99.4 °F (37.4 °C)  Heart Rate:  [76-87] 86  Resp:  [16-20] 20  BP: (102-123)/(54-70) 123/70  Physical Exam   Constitutional: She is oriented to person, place, and time. She appears well-developed and well-nourished.   HENT:   Head: Normocephalic and atraumatic.   Right Ear: External ear normal.   Left Ear: External ear normal.   Nose: Nose normal.   Mouth/Throat: Oropharynx is clear and moist.   Eyes: Conjunctivae and EOM are normal.   Neck: Normal range of motion. Neck supple.   Cardiovascular: Normal rate, regular rhythm and normal heart sounds.    Pulmonary/Chest: Effort normal. She has decreased breath sounds.   Abdominal: Soft. Bowel sounds are normal. She exhibits no distension. There is no tenderness.   Musculoskeletal: Normal range of motion.   Neurological: She is alert and oriented to person, place, and time.   Skin: Skin is warm and dry. Rash noted. There is erythema.   Has a yeast appearing rash underneath panus, no induration, warmth or tenderness   Psychiatric: She has a normal mood and affect. Her speech is normal and behavior is normal. Cognition and memory are normal.         Results Review:  I have reviewed the labs, radiology results, and diagnostic studies.    Laboratory Data:     Results from last 7 days  Lab Units 01/21/18  0540 01/20/18  0700 01/19/18  0626   WBC 10*3/mm3 5.80 6.78 6.41   HEMOGLOBIN g/dL 9.0* 9.4* 9.7*   HEMATOCRIT % 27.9* 29.1* 30.9*   PLATELETS 10*3/mm3 192 203 209          Results from last 7 days  Lab Units 01/21/18  0540 01/20/18  0700 01/19/18  0501   SODIUM mmol/L 137 140 140   POTASSIUM mmol/L 3.7 3.2* 4.2   CHLORIDE mmol/L 102 101 107   CO2 mmol/L 32.0* 32.0* 26.0   BUN mg/dL 10 10 11   CREATININE mg/dL 1.08 1.12 0.90   CALCIUM mg/dL 9.0 9.3 9.6   BILIRUBIN mg/dL 0.3 0.4 0.5   ALK PHOS U/L 57 64 58   ALT (SGPT) U/L 23 23 27   AST (SGOT) U/L 21 18 18    GLUCOSE mg/dL 106* 103* 100       Culture Data:   Blood Culture   Date Value Ref Range Status   01/17/2018 No growth at 3 days  Preliminary   01/17/2018 No growth at 3 days  Preliminary       Radiology Data:   Imaging Results (last 24 hours)     Procedure Component Value Units Date/Time    XR Chest 1 View [[id number]] Collected:  01/20/18 1230     Updated:  01/20/18 1235    Narrative:       XR CHEST 1 VW- 1/20/2018 11:44 AM CST     HISTORY: SOA, recheck Atelectasis, possible pneumonia; Z74.09-Other  reduced mobility; Z74.09-Other reduced mobility       COMPARISON: Exam dated 01/17/2018.     FINDINGS:   Reidentified elevation of the right hemidiaphragm. There appears to be  mild increased retrocardiac density as compared with the prior exam. The  cardiomediastinal silhouette and pulmonary vascularity are within normal  limits.      The osseous structures and surrounding soft tissues demonstrate no acute  abnormality. Right total shoulder arthroplasty with advanced  degenerative changes at the left shoulder.       Impression:       1. Mild increased retrocardiac density on this exam which may reflect  increasing atelectasis or perhaps a developing infiltrate in the left  lower lobe. Otherwise stable exam.  2. Stable elevation of the right hemidiaphragm with volume loss in the  right lung base.        This report was finalized on 01/20/2018 12:32 by Dr Alfonso Hercules, .          I have reviewed the patient current medications.     Assessment/Plan   1.  Occlusive Left-lower extremity DVT  -Appreciate GI input  -Appreciate Vascular input   -Hgb stable, bleeds with anticoagulation  -Has filter in place per Vascular that is old      2:  GI Bleed  -GI following  -Hold anticoagulation  -Monitor H&H      3.  Sepsis  Zosyn      4.  Acute Renal Failure  -Resolved  -monitor Cr        5.  Lactic Acidosis  -Resolved  -Monitor      6.  Intractible Nausea and vomiting  -Zofran  -IVF      7.  Dehydration  -IVF held  -Monitor  UOP      8.  GERD  -Protonix drip      9.  HLD  -Statin  -Lipid panel      10.  Bladder Spasms  -Ditropan      11.  HTN  -Monitor BP      12.  Anemia  -Monitor H&H  -Protonix  -GI Consulted, signed off      13:  Morbid Obesity  -BMI:  54.4  -Dietician consulted      14.  Hyperglycemia  -A1C negative  -D/C finger sticks/A1C      15.  Hypokalemia  -PO potassium      16.  Superficial right sided thrombus  -Cava filter in place      17. HAP possible  -Vanc Held  -Zosyn      18.  Diarrhea  -Imodium  -Pro-biotics  -check C. Diff    19.  Persistent fever  -She has multiple potential causes  -HAP possible, questionable on CXR, but persistently normal WBC's and no Left shift.  Currently on Zosyn.    -Blood cultures negative to date  -Has DVT  -Has atalectasis  -Will ask for ID input                   Discharge Planning: I expect the patient to be discharged to home vs rehab in 2-3 days    Wilfred Pedersen MD   01/21/18   7:42 AM

## 2018-01-22 LAB
ALBUMIN SERPL-MCNC: 2.3 G/DL (ref 3.5–5)
ALBUMIN/GLOB SERPL: 0.8 G/DL (ref 1.1–2.5)
ALP SERPL-CCNC: 60 U/L (ref 24–120)
ALT SERPL W P-5'-P-CCNC: 24 U/L (ref 0–54)
ANION GAP SERPL CALCULATED.3IONS-SCNC: 7 MMOL/L (ref 4–13)
AST SERPL-CCNC: 16 U/L (ref 7–45)
BACTERIA SPEC AEROBE CULT: NORMAL
BACTERIA SPEC AEROBE CULT: NORMAL
BASOPHILS # BLD AUTO: 0.01 10*3/MM3 (ref 0–0.2)
BASOPHILS NFR BLD AUTO: 0.2 % (ref 0–2)
BILIRUB SERPL-MCNC: 0.2 MG/DL (ref 0.1–1)
BUN BLD-MCNC: 11 MG/DL (ref 5–21)
BUN/CREAT SERPL: 11.1 (ref 7–25)
CALCIUM SPEC-SCNC: 9.7 MG/DL (ref 8.4–10.4)
CHLORIDE SERPL-SCNC: 104 MMOL/L (ref 98–110)
CO2 SERPL-SCNC: 29 MMOL/L (ref 24–31)
CREAT BLD-MCNC: 0.99 MG/DL (ref 0.5–1.4)
DEPRECATED RDW RBC AUTO: 46.4 FL (ref 40–54)
EOSINOPHIL # BLD AUTO: 0 10*3/MM3 (ref 0–0.7)
EOSINOPHIL NFR BLD AUTO: 0 % (ref 0–4)
ERYTHROCYTE [DISTWIDTH] IN BLOOD BY AUTOMATED COUNT: 14.7 % (ref 12–15)
GFR SERPL CREATININE-BSD FRML MDRD: 55 ML/MIN/1.73
GFR SERPL CREATININE-BSD FRML MDRD: 66 ML/MIN/1.73
GLOBULIN UR ELPH-MCNC: 2.9 GM/DL
GLUCOSE BLD-MCNC: 150 MG/DL (ref 70–100)
HCT VFR BLD AUTO: 27.8 % (ref 37–47)
HGB BLD-MCNC: 9.2 G/DL (ref 12–16)
HYPOCHROMIA BLD QL: NORMAL
IMM GRANULOCYTES # BLD: 0.06 10*3/MM3 (ref 0–0.03)
IMM GRANULOCYTES NFR BLD: 1.3 % (ref 0–5)
LYMPHOCYTES # BLD AUTO: 0.64 10*3/MM3 (ref 0.72–4.86)
LYMPHOCYTES NFR BLD AUTO: 14.1 % (ref 15–45)
MACROCYTES BLD QL SMEAR: NORMAL
MCH RBC QN AUTO: 28.6 PG (ref 28–32)
MCHC RBC AUTO-ENTMCNC: 33.1 G/DL (ref 33–36)
MCV RBC AUTO: 86.3 FL (ref 82–98)
MONOCYTES # BLD AUTO: 0.18 10*3/MM3 (ref 0.19–1.3)
MONOCYTES NFR BLD AUTO: 4 % (ref 4–12)
NEUTROPHILS # BLD AUTO: 3.65 10*3/MM3 (ref 1.87–8.4)
NEUTROPHILS NFR BLD AUTO: 80.4 % (ref 39–78)
NRBC BLD MANUAL-RTO: 0 /100 WBC (ref 0–0)
PLAT MORPH BLD: NORMAL
PLATELET # BLD AUTO: 181 10*3/MM3 (ref 130–400)
PMV BLD AUTO: 10.8 FL (ref 6–12)
POIKILOCYTOSIS BLD QL SMEAR: NORMAL
POTASSIUM BLD-SCNC: 4.3 MMOL/L (ref 3.5–5.3)
PROT SERPL-MCNC: 5.2 G/DL (ref 6.3–8.7)
RBC # BLD AUTO: 3.22 10*6/MM3 (ref 4.2–5.4)
SODIUM BLD-SCNC: 140 MMOL/L (ref 135–145)
WBC MORPH BLD: NORMAL
WBC NRBC COR # BLD: 4.54 10*3/MM3 (ref 4.8–10.8)

## 2018-01-22 PROCEDURE — G8982 BODY POS GOAL STATUS: HCPCS

## 2018-01-22 PROCEDURE — G8981 BODY POS CURRENT STATUS: HCPCS

## 2018-01-22 PROCEDURE — 94799 UNLISTED PULMONARY SVC/PX: CPT

## 2018-01-22 PROCEDURE — 85007 BL SMEAR W/DIFF WBC COUNT: CPT | Performed by: FAMILY MEDICINE

## 2018-01-22 PROCEDURE — 97530 THERAPEUTIC ACTIVITIES: CPT

## 2018-01-22 PROCEDURE — 85025 COMPLETE CBC W/AUTO DIFF WBC: CPT | Performed by: FAMILY MEDICINE

## 2018-01-22 PROCEDURE — 80053 COMPREHEN METABOLIC PANEL: CPT | Performed by: FAMILY MEDICINE

## 2018-01-22 PROCEDURE — 97164 PT RE-EVAL EST PLAN CARE: CPT

## 2018-01-22 RX ORDER — POTASSIUM CHLORIDE 750 MG/1
20 CAPSULE, EXTENDED RELEASE ORAL DAILY
Status: DISCONTINUED | OUTPATIENT
Start: 2018-01-23 | End: 2018-01-27 | Stop reason: HOSPADM

## 2018-01-22 RX ADMIN — PANTOPRAZOLE SODIUM 40 MG: 40 TABLET, DELAYED RELEASE ORAL at 05:15

## 2018-01-22 RX ADMIN — IPRATROPIUM BROMIDE AND ALBUTEROL SULFATE 3 ML: 2.5; .5 SOLUTION RESPIRATORY (INHALATION) at 21:37

## 2018-01-22 RX ADMIN — METRONIDAZOLE 500 MG: 500 TABLET ORAL at 13:18

## 2018-01-22 RX ADMIN — POTASSIUM CHLORIDE 40 MEQ: 750 CAPSULE, EXTENDED RELEASE ORAL at 09:29

## 2018-01-22 RX ADMIN — Medication 250 MG: at 09:28

## 2018-01-22 RX ADMIN — NYSTATIN: 100000 POWDER TOPICAL at 09:29

## 2018-01-22 RX ADMIN — METRONIDAZOLE 500 MG: 500 TABLET ORAL at 05:15

## 2018-01-22 RX ADMIN — IPRATROPIUM BROMIDE AND ALBUTEROL SULFATE 3 ML: 2.5; .5 SOLUTION RESPIRATORY (INHALATION) at 10:31

## 2018-01-22 RX ADMIN — MICONAZOLE NITRATE: 20 CREAM TOPICAL at 09:29

## 2018-01-22 RX ADMIN — METRONIDAZOLE 500 MG: 500 TABLET ORAL at 21:18

## 2018-01-22 RX ADMIN — IPRATROPIUM BROMIDE AND ALBUTEROL SULFATE 3 ML: 2.5; .5 SOLUTION RESPIRATORY (INHALATION) at 14:45

## 2018-01-22 RX ADMIN — NYSTATIN: 100000 POWDER TOPICAL at 21:18

## 2018-01-22 RX ADMIN — IPRATROPIUM BROMIDE AND ALBUTEROL SULFATE 3 ML: 2.5; .5 SOLUTION RESPIRATORY (INHALATION) at 06:37

## 2018-01-22 RX ADMIN — FLUCONAZOLE 100 MG: 100 TABLET ORAL at 17:39

## 2018-01-22 RX ADMIN — Medication 250 MG: at 21:18

## 2018-01-22 RX ADMIN — MICONAZOLE NITRATE: 20 CREAM TOPICAL at 21:18

## 2018-01-22 NOTE — PLAN OF CARE
Problem: Patient Care Overview (Adult)  Goal: Plan of Care Review  Outcome: Ongoing (interventions implemented as appropriate)   01/21/18 7400   Coping/Psychosocial Response Interventions   Plan Of Care Reviewed With patient   Patient Care Overview   Progress progress toward functional goals as expected   Outcome Evaluation   Outcome Summary/Follow up Plan PT. IS WEAK AND DID SIT ON THE SIDE OF THE BED THIS SHIFT BUT WAS UNABLE TO AMBULATE. PHYSICAL THERAPY SEES PT. BINH AREA CONT. TO BE EXCORIATED. DR. SILAS AMADOR AND DR. LAWSON OBSERVED AREA TODAY. STOOL SPECIMEN POSITIVE FOR C-DIFF. ISOLATION INITIATED. PT. IS WEARING O2 AT 3L/M PER NASAL CANNULA. BREATHING TREATMENTS AVAILABLE AND PT. HAS BEEN STARTED ON SOLU MEDROL. NO DISTRESS NOTED.       Problem: Fall Risk (Adult)  Goal: Absence of Falls  Outcome: Ongoing (interventions implemented as appropriate)      Problem: VTE, DVT and PE (Adult)  Goal: Signs and Symptoms of Listed Potential Problems Will be Absent or Manageable (VTE, DVT and PE)  Outcome: Ongoing (interventions implemented as appropriate)      Problem: Skin Integrity Impairment, Risk/Actual (Adult)  Goal: Skin Integrity/Wound Healing  Outcome: Ongoing (interventions implemented as appropriate)      Problem: Pressure Ulcer Risk (Jorge Scale) (Adult,Obstetrics,Pediatric)  Goal: Skin Integrity  Outcome: Ongoing (interventions implemented as appropriate)      Problem: Gastrointestinal Bleeding (Adult)  Goal: Signs and Symptoms of Listed Potential Problems Will be Absent or Manageable (Gastrointestinal Bleeding)  Outcome: Ongoing (interventions implemented as appropriate)

## 2018-01-22 NOTE — PLAN OF CARE
Problem: Patient Care Overview (Adult)  Goal: Plan of Care Review  Outcome: Ongoing (interventions implemented as appropriate)   01/22/18 1517   Coping/Psychosocial Response Interventions   Plan Of Care Reviewed With patient   Patient Care Overview   Progress no change   Outcome Evaluation   Outcome Summary/Follow up Plan Diarrhea continues, 2 loose stools so far this shift. Contact spore precautions maintained. Extreme excoriation noted to upper thighs, abdominal folds, and perineal area, ordered nystatin powder and antifungal cream applied per orders. No c/o pain. Pitting edema noted to both feet. 2L O2, lungs diminished and wheezy. PO flagyl continues. Will continue to monitor.      Goal: Adult Individualization and Mutuality  Outcome: Ongoing (interventions implemented as appropriate)      Problem: Fall Risk (Adult)  Goal: Absence of Falls  Outcome: Ongoing (interventions implemented as appropriate)   01/22/18 1517   Fall Risk (Adult)   Absence of Falls making progress toward outcome       Problem: VTE, DVT and PE (Adult)  Goal: Signs and Symptoms of Listed Potential Problems Will be Absent or Manageable (VTE, DVT and PE)  Outcome: Ongoing (interventions implemented as appropriate)      Problem: Skin Integrity Impairment, Risk/Actual (Adult)  Goal: Skin Integrity/Wound Healing  Outcome: Ongoing (interventions implemented as appropriate)   01/22/18 1517   Skin Integrity Impairment, Risk/Actual (Adult)   Skin Integrity/Wound Healing making progress toward outcome       Problem: Pressure Ulcer Risk (Jorge Scale) (Adult,Obstetrics,Pediatric)  Goal: Skin Integrity  Outcome: Ongoing (interventions implemented as appropriate)   01/22/18 1517   Pressure Ulcer Risk (Jorge Scale) (Adult,Obstetrics,Pediatric)   Skin Integrity making progress toward outcome       Problem: Gastrointestinal Bleeding (Adult)  Goal: Signs and Symptoms of Listed Potential Problems Will be Absent or Manageable (Gastrointestinal  Bleeding)  Outcome: Ongoing (interventions implemented as appropriate)   01/22/18 1517   Gastrointestinal Bleeding   Problems Assessed (GI Bleeding) all   Problems Present (GI Bleeding) none

## 2018-01-22 NOTE — PROGRESS NOTES
Continued Stay Note  [facility]     Patient Name: Tricia Hernandez  MRN: [id number]  Today's Date: 1/22/2018    Admit Date: 1/3/2018          Discharge Plan       01/22/18 1604    Case Management/Social Work Plan    Plan BENCHMARK; SNF    Additional Comments REFERRAL CALLED TO [facility] AT [facility], HOWEVER PT. DOES NOT MEET MEDICAL CRITERIA FOR ADMISSION. WILL FOLLOW FOR PT'S D/C TO NH.                Discharge Codes     None        Expected Discharge Date and Time     Expected Discharge Date Expected Discharge Time    Jan 11, 2018             ISLVANA Hinojosa

## 2018-01-22 NOTE — PROGRESS NOTES
[facility] Medicine Services  INPATIENT PROGRESS NOTE    Length of Stay: 19  Date of Admission: 1/3/2018  Primary Care Physician: Ed Hanson MD    Subjective   Chief Complaint: Diarrhea.  Weakness.    HPI   Patient denies any short of breath or chest pain.  MAXIMUM TEMPERATURE 100.3.  CURRENT TEMPERATURE 97.6.    Review of Systems   Constitutional: Positive for activity change, appetite change and fatigue. Negative for chills and fever.   HENT: Negative for hearing loss, nosebleeds, tinnitus and trouble swallowing.    Eyes: Negative for visual disturbance.   Respiratory: Positive for shortness of breath (only with ambulating ). Negative for cough, chest tightness and wheezing.    Cardiovascular: Negative for chest pain, palpitations and leg swelling.   Gastrointestinal: Positive for diarrhea. Negative for abdominal distention, abdominal pain, blood in stool, constipation, nausea and vomiting.   Endocrine: Negative for cold intolerance, heat intolerance, polydipsia, polyphagia and polyuria.   Genitourinary: Negative for decreased urine volume, difficulty urinating, dysuria, flank pain, frequency and hematuria.   Musculoskeletal: Positive for back pain, gait problem and myalgias. Negative for arthralgias and joint swelling.   Skin: Negative for rash.   Allergic/Immunologic: Negative for immunocompromised state.   Neurological: Positive for weakness. Negative for dizziness, syncope, light-headedness and headaches.   Hematological: Negative for adenopathy. Does not bruise/bleed easily.   Psychiatric/Behavioral: Negative for confusion and sleep disturbance. The patient is not nervous/anxious.           All pertinent negatives and positives are as above. All other systems have been reviewed and are negative unless otherwise stated.     Objective    Temp:  [96 °F (35.6 °C)-100.3 °F (37.9 °C)] 97.6 °F (36.4 °C)  Heart Rate:  [74-86] 74  Resp:  [16-20] 16  BP: (116-130)/(66-80)  116/66    Intake/Output Summary (Last 24 hours) at 01/22/18 1437  Last data filed at 01/21/18 2115   Gross per 24 hour   Intake                0 ml   Output              102 ml   Net             -102 ml     Physical Exam   Constitutional: She is oriented to person, place, and time. She appears well-developed.   HENT:   Head: Normocephalic and atraumatic.   Eyes: Conjunctivae and EOM are normal. Pupils are equal, round, and reactive to light.   Neck: Neck supple. No JVD present. No thyromegaly present.   Cardiovascular: Normal rate, regular rhythm, normal heart sounds and intact distal pulses.  Exam reveals no gallop and no friction rub.    No murmur heard.  Pulmonary/Chest: Effort normal and breath sounds normal. No respiratory distress. She has no wheezes. She has no rales. She exhibits no tenderness.   Shallow breathing   Abdominal: Soft. Bowel sounds are normal. She exhibits no distension. There is no tenderness. There is no rebound and no guarding.   Gross morbid obesity   Musculoskeletal: Normal range of motion. She exhibits no edema, tenderness or deformity.   Lymphadenopathy:     She has no cervical adenopathy.   Neurological: She is alert and oriented to person, place, and time. She displays normal reflexes. No cranial nerve deficit. She exhibits abnormal muscle tone. Coordination abnormal.   Skin: Skin is warm and dry. No rash noted.   Psychiatric: She has a normal mood and affect. Her behavior is normal. Judgment and thought content normal.       Results Review:  Lab Results (last 24 hours)     Procedure Component Value Units Date/Time    Gastrointestinal Panel, PCR - Stool, Per Rectum [[id number]]  (Abnormal) Collected:  01/21/18 1347    Specimen:  Stool from Per Rectum Updated:  01/21/18 1639     Campylobacter Not Detected     Clostridium difficile (toxin A/B) Detected (A)        Due to the high asymptomatic carriage rates, especially in young children, the clinical relevance of the detection of toxigenic  C. difficile from stool should be considered in the context of other clinical findings, patient age, and risk factors including hospitalization and antibiotic exposure.        Plesiomonas shigelloides Not Detected     Salmonella Not Detected     Vibrio Not Detected     Vibrio cholerae Not Detected     Yersinia enterocolitica Not Detected     Enteroaggregative E. coli (EAEC) Not Detected     Enteropathogenic E. coli (EPEC) Not Detected     Enterotoxigenic E. coli (ETEC) lt/st Not Detected     Shiga-like toxin-producing E. coli (STEC) stx1/stx2 Not Detected     E. coli O157 Not Detected     Shigella/Enteroinvasive E. coli (EIEC) Not Detected     Cryptosporidium Not Detected     Cyclospora cayetanensis Not Detected     Entamoeba histolytica Not Detected     Giardia lamblia Not Detected     Adenovirus F40/41 Not Detected     Astrovirus Not Detected     Norovirus GI/GII Not Detected     Rotavirus A Not Detected     Sapovirus (I, II, IV or V) Not Detected    Blood Culture - Blood, [[id number]]  (Normal) Collected:  01/17/18 1819    Specimen:  Blood from Arm, Right Updated:  01/21/18 1831     Blood Culture No growth at 4 days    Blood Culture - Blood, [[id number]]  (Normal) Collected:  01/17/18 1813    Specimen:  Blood from Arm, Left Updated:  01/21/18 2116     Blood Culture No growth at 4 days    Comprehensive Metabolic Panel [[id number]]  (Abnormal) Collected:  01/22/18 0540    Specimen:  Blood Updated:  01/22/18 0632     Glucose 150 (H) mg/dL      BUN 11 mg/dL      Creatinine 0.99 mg/dL      Sodium 140 mmol/L      Potassium 4.3 mmol/L      Chloride 104 mmol/L      CO2 29.0 mmol/L      Calcium 9.7 mg/dL      Total Protein 5.2 (L) g/dL      Albumin 2.30 (L) g/dL      ALT (SGPT) 24 U/L      AST (SGOT) 16 U/L      Alkaline Phosphatase 60 U/L      Total Bilirubin 0.2 mg/dL      eGFR Non African Amer 55 (L) mL/min/1.73      eGFR  African Amer 66 mL/min/1.73      Globulin 2.9 gm/dL      A/G Ratio 0.8 (L) g/dL      BUN/Creatinine  Ratio 11.1     Anion Gap 7.0 mmol/L     Narrative:       The MDRD GFR formula is only valid for adults with stable renal function between ages 18 and 70.    CBC & Differential [[id number]] Collected:  01/22/18 0540    Specimen:  Blood Updated:  01/22/18 0640    Narrative:       The following orders were created for panel order CBC & Differential.  Procedure                               Abnormality         Status                     ---------                               -----------         ------                     Scan Slide[[id number]]                                       Final result               CBC Auto Differential[[id number]]        Abnormal            Final result                 Please view results for these tests on the individual orders.    CBC Auto Differential [[id number]]  (Abnormal) Collected:  01/22/18 0540    Specimen:  Blood Updated:  01/22/18 0640     WBC 4.54 (L) 10*3/mm3      RBC 3.22 (L) 10*6/mm3      Hemoglobin 9.2 (L) g/dL      Hematocrit 27.8 (L) %      MCV 86.3 fL      MCH 28.6 pg      MCHC 33.1 g/dL      RDW 14.7 %      RDW-SD 46.4 fl      MPV 10.8 fL      Platelets 181 10*3/mm3      Neutrophil % 80.4 (H) %      Lymphocyte % 14.1 (L) %      Monocyte % 4.0 %      Eosinophil % 0.0 %      Basophil % 0.2 %      Immature Grans % 1.3 %      Neutrophils, Absolute 3.65 10*3/mm3      Lymphocytes, Absolute 0.64 (L) 10*3/mm3      Monocytes, Absolute 0.18 (L) 10*3/mm3      Eosinophils, Absolute 0.00 10*3/mm3      Basophils, Absolute 0.01 10*3/mm3      Immature Grans, Absolute 0.06 (H) 10*3/mm3      nRBC 0.0 /100 WBC     Scan Slide [[id number]] Collected:  01/22/18 0540    Specimen:  Blood Updated:  01/22/18 0640     Hypochromia Mod/2+     Macrocytes Mod/2+     Poikilocytes Slight/1+     WBC Morphology Normal     Platelet Morphology Normal           Cultures:  Blood Culture   Date Value Ref Range Status   01/17/2018 No growth at 4 days  Preliminary   01/17/2018 No growth at 4 days  Preliminary        Radiology Data:    Imaging Results (last 24 hours)     ** No results found for the last 24 hours. **          No Known Allergies    Scheduled meds:     fluconazole 100 mg Oral Q24H   ipratropium-albuterol 3 mL Nebulization 4x Daily - RT   metroNIDAZOLE 500 mg Oral Q8H   miconazole  Topical Q12H   nystatin  Topical Q12H   pantoprazole 40 mg Oral Q AM   potassium chloride 40 mEq Oral Daily   saccharomyces boulardii 250 mg Oral BID       PRN meds:  •  acetaminophen **OR** acetaminophen  •  dextrose  •  dextrose  •  glucagon (human recombinant)  •  loperamide  •  ondansetron **OR** ondansetron ODT **OR** ondansetron  •  sodium chloride    Assessment/Plan     Active Problems:    RUBY (acute kidney injury)    DVT (deep venous thrombosis)    Anemia    GI bleed      Plan:  Occlusive left lower extremity DVT.  No anticoagulation due to GI bleed.  Patient had 2 episodes GI bleed due to anticoagulation.     GI bleed-GI to follow.  Status post transfusion 4 units of blood last week.  No anticoagulation second episode of GI bleed.    Hospital acquire pneumonia-Zosyn, then DC by infectious disease.  Continue duo nebs..  Nystatin powder.  Miconazole cream.    Fever/diarrhea-on Imodium, probiotic.  C. difficile positive.  On Flagyl.    Hyperlipidemia    Hypokalemia.  Potassium by mouth daily    Acute renal failure/dehydration-resolved    Fungal/cellulitis-Diflucan    Anemia-stable    Deconditioning-PT and OT consult    Blood culture no growth in 4 days.    Discharge Plannin-3 days    Alec Granados MD   18   2:37 PM

## 2018-01-22 NOTE — PLAN OF CARE
Problem: Patient Care Overview (Adult)  Goal: Plan of Care Review  Outcome: Ongoing (interventions implemented as appropriate)   01/22/18 0141   Coping/Psychosocial Response Interventions   Plan Of Care Reviewed With patient   Patient Care Overview   Progress improving   Outcome Evaluation   Outcome Summary/Follow up Plan Started using Micotin and Nystatin powder on patient's excoriated areas on her legs and abdomen crease. Patient stated that the cream is soothing and for the first time the excoriated areas were not hurting. Patient had 2 moderate bowel movements this shift. No breathing difficulty or distress noted this shift. Will continue to monitor.     Goal: Adult Individualization and Mutuality  Outcome: Ongoing (interventions implemented as appropriate)   01/09/18 1521 01/22/18 0141   Individualization   Patient Specific Preferences none --    Patient Specific Goals --  Patient would like to get well and go home.   Patient Specific Interventions --  Medications administered as prescribed.   Mutuality/Individual Preferences   What Anxieties, Fears or Concerns Do You Have About Your Health or Care? --  None   What Questions Do You Have About Your Health or Care? --  None   What Information Would Help Us Give You More Personalized Care? --  None       Problem: Fall Risk (Adult)  Goal: Absence of Falls  Outcome: Ongoing (interventions implemented as appropriate)   01/22/18 0141   Fall Risk (Adult)   Absence of Falls making progress toward outcome       Problem: VTE, DVT and PE (Adult)  Goal: Signs and Symptoms of Listed Potential Problems Will be Absent or Manageable (VTE, DVT and PE)  Outcome: Ongoing (interventions implemented as appropriate)   01/21/18 1830   VTE, DVT and PE   Problems Assessed (VTE, DVT, PE) all   Problems Present (VTE, DVT, PE) deep vein thrombosis       Problem: Skin Integrity Impairment, Risk/Actual (Adult)  Goal: Skin Integrity/Wound Healing  Outcome: Ongoing (interventions implemented  as appropriate)   01/22/18 0141   Skin Integrity Impairment, Risk/Actual (Adult)   Skin Integrity/Wound Healing making progress toward outcome       Problem: Pressure Ulcer Risk (Jorge Scale) (Adult,Obstetrics,Pediatric)  Goal: Skin Integrity  Outcome: Ongoing (interventions implemented as appropriate)   01/22/18 0141   Pressure Ulcer Risk (Jogre Scale) (Adult,Obstetrics,Pediatric)   Skin Integrity making progress toward outcome       Problem: Gastrointestinal Bleeding (Adult)  Goal: Signs and Symptoms of Listed Potential Problems Will be Absent or Manageable (Gastrointestinal Bleeding)  Outcome: Ongoing (interventions implemented as appropriate)   01/22/18 0141   Gastrointestinal Bleeding   Problems Assessed (GI Bleeding) all   Problems Present (GI Bleeding) none

## 2018-01-22 NOTE — THERAPY RE-EVALUATION
Acute Care - Physical Therapy Re-Evaluation  [facility]     Patient Name: Tricia Hernandez  : 1940  MRN: [id number]  Today's Date: 2018   Onset of Illness/Injury or Date of Surgery Date: 18  Date of Referral to PT: 18  Referring Physician: Dr. Granados      Admit Date: 1/3/2018     Visit Dx:    ICD-10-CM ICD-9-CM   1. Impaired mobility and ADLs Z74. 799.89   2. Impaired mobility Z74.09 799.89     Patient Active Problem List   Diagnosis   • RUBY (acute kidney injury)   • DVT (deep venous thrombosis)   • Anemia   • GI bleed     Past Medical History:   Diagnosis Date   • Anemia    • Arthritis    • Bladder spasms    • GERD (gastroesophageal reflux disease)    • Hypertension      Past Surgical History:   Procedure Laterality Date   • ANKLE SURGERY     • KNEE SURGERY     • SHOULDER SURGERY            PT ASSESSMENT (last 72 hours)      PT Evaluation       18 1111 18 1300    Rehab Evaluation    Document Type re-evaluation   see MAR  -PB     Subjective Information agree to therapy;complains of;fatigue;numbness;swelling   chronic numbness in feet; swelling in B feet L<R  -PB     Evaluation Not Performed  patient/family declined evaluation  -TR    Evaluation Not Performed, Comment  Pt declined re-assessment. Education provided. Pt has had multiple refusals, lack of effort and lack of progress toward OT goals. OT will d/c today.   -TR    Patient Effort, Rehab Treatment adequate  -PB     General Information    Patient Profile Review yes  -PB     Onset of Illness/Injury or Date of Surgery Date 18  -PB     General Observations awake and alert in bed on 2L O2/NC  -PB     Pertinent History Of Current Problem RUBY, DVT, anemia, GI bleed, sepsis, C-diff  -PB     Precautions/Limitations fall precautions   c-diff  -PB     Prior Level of Function independent:;transfer;gait;bed mobility;min assist:;dressing;bathing  -PB     Equipment Currently Used at Home commode;walker, gigi;walker,  rolling;wheelchair;ramp;raised toilet;grab bar;shower chair   using gigi walker since shoulder surgry  -PB     Plans/Goals Discussed With patient;agreed upon  -PB     Risks Reviewed patient:;LOB;nausea/vomiting;dizziness;increased discomfort  -PB     Benefits Reviewed patient:;increase independence;improve function;increase strength;increase balance  -PB     Barriers to Rehab medically complex;previous functional deficit;physical barrier  -PB     Living Environment    Lives With child(juan miguel), adult  -PB     Living Arrangements house  -PB     Home Accessibility ramps present at home  -PB     Number of Stairs to Enter Home --  -PB     Living Environment Comment grand daughter is scheduled for back surgery soon, daughter will be staying with granddaughter; another daughter may come stay with pt  -PB     Clinical Impression    PT Diagnosis impaired mobility, activity tolerance, weakness  -PB     Patient/Family Goals Statement return home; pt states she might be open to SNF for rehab prior to return home  -PB     Criteria for Skilled Therapeutic Interventions Met yes;treatment indicated  -PB     Impairments Found (describe specific impairments) gait, locomotion, and balance;aerobic capacity/endurance  -PB     Rehab Potential fair, will monitor progress closely  -PB     Predicted Duration of Therapy Intervention (days/wks) until D/C  -PB     Pain Assessment    Pain Assessment No/denies pain  -PB     Cognitive Assessment/Intervention    Current Cognitive/Communication Assessment functional  -PB     Orientation Status oriented x 4  -PB     Follows Commands/Answers Questions 100% of the time;able to follow single-step instructions  -PB     Personal Safety decreased awareness, need for assist;decreased awareness, need for safety  -PB     Personal Safety Interventions fall prevention program maintained;gait belt;nonskid shoes/slippers when out of bed;supervised activity  -PB     ROM (Range of Motion)    General ROM Detail BUE  AROM WFL; BLE AAROM impaired 25%  -PB     MMT (Manual Muscle Testing)    General MMT Assessment Detail BUE 3+/5, BLE 2+/5  -PB     Bed Mobility, Assessment/Treatment    Bed Mobility, Assistive Device bed rails;head of bed elevated;draw sheet  -PB     Bed Mobility, Roll Left, [place] minimum assist (75% patient effort);verbal cues required  -PB     Bed Mobility, Roll Right, Early verbal cues required;minimum assist (75% patient effort)  -PB     Bed Mobility, Scoot/Bridge, Early maximum assist (25% patient effort);2 person assist required;verbal cues required  -PB     Bed Mob, Supine to Sit, Early moderate assist (50% patient effort);verbal cues required  -PB     Bed Mob, Sit to Supine, Early maximum assist (25% patient effort);verbal cues required  -PB     Bed Mobility, Safety Issues decreased use of legs for bridging/pushing  -PB     Bed Mobility, Impairments impaired balance;strength decreased  -PB     Transfer Assessment/Treatment    Transfer, Comment attempted to stand with RW 2x unable to clear bottom from bed  -PB     Motor Skills/Interventions    Additional Documentation Balance Skills Training (Group)  -PB     Balance Skills Training    Sitting-Level of Assistance Contact guard  -PB     Sitting-Balance Support Feet supported  -PB     Positioning and Restraints    Pre-Treatment Position in bed  -PB     Post Treatment Position bed  -PB     In Bed fowlers;call light within reach;encouraged to call for assist;side rails up x2;notified nsg  -PB       User Key  (r) = Recorded By, (t) = Taken By, (c) = Cosigned By    Initials Name Provider Type    ALTON Abdi PT DPT Physical Therapist    BERTHA Rosales OTR/L Occupational Therapist          Physical Therapy Education     Title: PT OT SLP Therapies (Active)     Topic: Physical Therapy (Active)     Point: Mobility training (Active)    Learning Progress Summary    Learner Readiness Method Response Comment Documented by  Status   Patient Acceptance E NR benefits of activity, postitioning, bed mobility  01/22/18 1253 Active    Acceptance E VU,NR   01/18/18 1131 Done    Acceptance E VU safety concerns  01/16/18 1159 Done    Acceptance E,D NR Benefits of exercise  01/15/18 1059 Active    Acceptance E,D NR Bed mobility, transfers, planof care  01/13/18 1319 Active    Acceptance E NR Bed mobility, transfers, planof care, benefit of activity  01/12/18 1307 Active    Acceptance E VU progressin of POC  01/11/18 1147 Done    Acceptance E VU Pt was educated on importance of activity and potential discharge plans.  01/10/18 0959 Done                      User Key     Initials Effective Dates Name Provider Type Discipline     06/22/15 -  Rhonda Gutierrez, PTA Physical Therapy Assistant PT     08/02/16 -  Blanca Stock, PTA Physical Therapy Assistant PT     08/02/16 -  Prasanth Abdi, PT DPT Physical Therapist PT     01/10/18 -  Jim Crane, PT Student PT Student PT                PT Recommendation and Plan  Anticipated Equipment Needs At Discharge: front wheeled walker  Anticipated Discharge Disposition: skilled nursing facility  Planned Therapy Interventions: balance training, bed mobility training, home exercise program, patient/family education, strengthening, transfer training  PT Frequency: daily, 2 times/day, per priority policy  Plan of Care Review  Plan Of Care Reviewed With: patient  Progress: progress towards functional goals is fair  Outcome Summary/Follow up Plan: PT re-eval complete. pt modA for supine to sit, maxA to return to supine, pt attempted to stand 2x but unable to clear bottom from the bed. pt would benefit from continued skilled PT to address weakness, decreased ativity tolerance, and impaired functional mobility. Anticipate pt would benefit from D/C to SNF.           IP PT Goals       01/22/18 1247 01/10/18 1004       Bed Mobility PT LTG    Bed Mobility PT LTG, Date Established   01/10/18  -PB (r) JM (t) PB (c)     Bed Mobility PT LTG, Time to Achieve  by discharge  -PB (r) JM (t) PB (c)     Bed Mobility PT LTG, Activity Type  all bed mobility  -PB (r) JM (t) PB (c)     Bed Mobility PT LTG, [place]  minimum assist (75% patient effort)  -PB (r) JM (t) PB (c)     Bed Mobility PT Goal  LTG, Assist Device  bed rails  -PB (r) JM (t) PB (c)     Bed Mobility PT LTG, Date Goal Reviewed 01/22/18  -PB      Bed Mobility PT LTG, Outcome goal ongoing  -PB      Bed Mobility PT LTG, Reason Goal Not Met progress slower than expected  -PB      Transfer Training PT LTG    Transfer Training PT LTG, Date Established  01/10/18  -PB (r) JM (t) PB (c)     Transfer Training PT LTG, Time to Achieve  by discharge  -PB (r) JM (t) PB (c)     Transfer Training PT LTG, Activity Type  bed to chair /chair to bed;sit to stand/stand to sit  -PB (r) JM (t) PB (c)     Transfer Training PT LTG, [place] minimum assist (75% patient effort);2 person assist required  -PB minimum assist (75% patient effort)  -PB (r) JM (t) PB (c)     Transfer Training PT LTG, Assist Device  walker, gigi  -PB (r) JM (t) PB (c)     Transfer Training PT  LTG, Date Goal Reviewed 01/22/18  -PB      Transfer Training PT LTG, Outcome goal revised  -PB      Transfer Training PT LTG, Reason Goal Not Met goals revised this date  -PB      Strength Goal PT LTG    Strength Goal PT LTG, Date Established  01/10/18  -PB (r) JM (t) PB (c)     Strength Goal PT LTG, Time to Achieve  by discharge  -PB (r) JM (t) PB (c)     Strength Goal PT LTG, Functional Goal  Perform 15 reps of EOB B LE AROM exercises  -PB (r) JM (t) PB (c)     Strength Goal PT LTG, Date Goal Reviewed 01/22/18  -PB      Strength Goal PT LTG, Outcome goal ongoing  -PB      Strength Goal PT LTG, Reason Goal Not Met progress slower than expected  -PB        User Key  (r) = Recorded By, (t) = Taken By, (c) = Cosigned By    Initials Name Provider Type    PB Prasanth Abdi, PT BETTYT  Physical Therapist    SIMONA Crane, PT Student PT Student                Outcome Measures       01/22/18 1111 01/19/18 1300       How much help from another person do you currently need...    Turning from your back to your side while in flat bed without using bedrails? 2  -PB      Moving from lying on back to sitting on the side of a flat bed without bedrails? 2  -PB      Moving to and from a bed to a chair (including a wheelchair)? 1  -PB      Standing up from a chair using your arms (e.g., wheelchair, bedside chair)? 1  -PB      Climbing 3-5 steps with a railing? 1  -PB      To walk in hospital room? 1  -PB      AM-PAC 6 Clicks Score 8  -PB      How much help from another is currently needed...    Putting on and taking off regular lower body clothing?  2  -TR     Bathing (including washing, rinsing, and drying)  2  -TR     Toileting (which includes using toilet bed pan or urinal)  1  -TR     Putting on and taking off regular upper body clothing  2  -TR     Taking care of personal grooming (such as brushing teeth)  3  -TR     Eating meals  3  -TR     Score  13  -TR     Functional Assessment    Outcome Measure Options AM-PAC 6 Clicks Basic Mobility (PT)  -PB AM-PAC 6 Clicks Daily Activity (OT)  -TR       User Key  (r) = Recorded By, (t) = Taken By, (c) = Cosigned By    Initials Name Provider Type    ALTON Abdi, PT DPT Physical Therapist    TR Amber Rosales, OTR/L Occupational Therapist           Time Calculation:         PT Charges       01/22/18 1254          Time Calculation    Start Time 1111  -PB      Stop Time 1212  -PB      Time Calculation (min) 61 min  -PB      PT Received On 01/22/18  -PB      PT Goal Re-Cert Due Date 02/01/18  -PB        User Key  (r) = Recorded By, (t) = Taken By, (c) = Cosigned By    Initials Name Provider Type    ALTON Abdi, PT DPT Physical Therapist          Therapy Charges for Today     Code Description Service Date Service Provider Modifiers Qty     07481111556 HC PT [facility] MAIN POS CURRENT 1/22/2018 Prasanth Abdi, PT DPT GP, CM 1    67094904975 HC PT [facility] MAIN POS PROJECTED 1/22/2018 Prasanth Abdi, PT DPT GP, CL 1    10396865057 HC PT RE-EVAL ESTABLISHED PLAN 2 1/22/2018 Prasanth Abdi, PT DPT GP 1    22523608458 HC PT THERAPEUTIC ACT EA 15 MIN 1/22/2018 Prasanth Abdi, PT DPT GP 1          PT G-Codes  Outcome Measure Options: AM-PAC 6 Clicks Basic Mobility (PT)  Score: 8  Functional Limitation: Changing and maintaining body position  Changing and Maintaining Body Position Current Status (): At least 80 percent but less than 100 percent impaired, limited or restricted  Changing and Maintaining Body Position Goal Status (): At least 60 percent but less than 80 percent impaired, limited or restricted      Prasanth Abdi, PT DPT  1/22/2018

## 2018-01-23 LAB
ALBUMIN SERPL-MCNC: 2.3 G/DL (ref 3.5–5)
ALBUMIN/GLOB SERPL: 0.8 G/DL (ref 1.1–2.5)
ALP SERPL-CCNC: 56 U/L (ref 24–120)
ALT SERPL W P-5'-P-CCNC: 27 U/L (ref 0–54)
ANION GAP SERPL CALCULATED.3IONS-SCNC: 5 MMOL/L (ref 4–13)
AST SERPL-CCNC: 19 U/L (ref 7–45)
BASOPHILS # BLD AUTO: 0.02 10*3/MM3 (ref 0–0.2)
BASOPHILS NFR BLD AUTO: 0.3 % (ref 0–2)
BILIRUB SERPL-MCNC: <0.1 MG/DL (ref 0.1–1)
BUN BLD-MCNC: 13 MG/DL (ref 5–21)
BUN/CREAT SERPL: 13.5 (ref 7–25)
CALCIUM SPEC-SCNC: 10 MG/DL (ref 8.4–10.4)
CHLORIDE SERPL-SCNC: 105 MMOL/L (ref 98–110)
CO2 SERPL-SCNC: 32 MMOL/L (ref 24–31)
CREAT BLD-MCNC: 0.96 MG/DL (ref 0.5–1.4)
DEPRECATED RDW RBC AUTO: 47.8 FL (ref 40–54)
EOSINOPHIL # BLD AUTO: 0 10*3/MM3 (ref 0–0.7)
EOSINOPHIL NFR BLD AUTO: 0 % (ref 0–4)
ERYTHROCYTE [DISTWIDTH] IN BLOOD BY AUTOMATED COUNT: 15.1 % (ref 12–15)
GFR SERPL CREATININE-BSD FRML MDRD: 57 ML/MIN/1.73
GFR SERPL CREATININE-BSD FRML MDRD: 69 ML/MIN/1.73
GLOBULIN UR ELPH-MCNC: 2.9 GM/DL
GLUCOSE BLD-MCNC: 131 MG/DL (ref 70–100)
GLUCOSE BLDC GLUCOMTR-MCNC: 140 MG/DL (ref 70–130)
HCT VFR BLD AUTO: 28.9 % (ref 37–47)
HGB BLD-MCNC: 9.4 G/DL (ref 12–16)
IMM GRANULOCYTES # BLD: 0.18 10*3/MM3 (ref 0–0.03)
IMM GRANULOCYTES NFR BLD: 2.5 % (ref 0–5)
LYMPHOCYTES # BLD AUTO: 0.84 10*3/MM3 (ref 0.72–4.86)
LYMPHOCYTES NFR BLD AUTO: 11.8 % (ref 15–45)
MCH RBC QN AUTO: 28.1 PG (ref 28–32)
MCHC RBC AUTO-ENTMCNC: 32.5 G/DL (ref 33–36)
MCV RBC AUTO: 86.5 FL (ref 82–98)
MONOCYTES # BLD AUTO: 0.87 10*3/MM3 (ref 0.19–1.3)
MONOCYTES NFR BLD AUTO: 12.3 % (ref 4–12)
NEUTROPHILS # BLD AUTO: 5.18 10*3/MM3 (ref 1.87–8.4)
NEUTROPHILS NFR BLD AUTO: 73.1 % (ref 39–78)
NRBC BLD MANUAL-RTO: 0 /100 WBC (ref 0–0)
PLATELET # BLD AUTO: 203 10*3/MM3 (ref 130–400)
PMV BLD AUTO: 11 FL (ref 6–12)
POTASSIUM BLD-SCNC: 4.2 MMOL/L (ref 3.5–5.3)
PROT SERPL-MCNC: 5.2 G/DL (ref 6.3–8.7)
RBC # BLD AUTO: 3.34 10*6/MM3 (ref 4.2–5.4)
SODIUM BLD-SCNC: 142 MMOL/L (ref 135–145)
WBC NRBC COR # BLD: 7.09 10*3/MM3 (ref 4.8–10.8)

## 2018-01-23 PROCEDURE — 94760 N-INVAS EAR/PLS OXIMETRY 1: CPT

## 2018-01-23 PROCEDURE — 94799 UNLISTED PULMONARY SVC/PX: CPT

## 2018-01-23 PROCEDURE — 85025 COMPLETE CBC W/AUTO DIFF WBC: CPT | Performed by: FAMILY MEDICINE

## 2018-01-23 PROCEDURE — 97110 THERAPEUTIC EXERCISES: CPT

## 2018-01-23 PROCEDURE — 80053 COMPREHEN METABOLIC PANEL: CPT | Performed by: FAMILY MEDICINE

## 2018-01-23 PROCEDURE — 82962 GLUCOSE BLOOD TEST: CPT

## 2018-01-23 PROCEDURE — 97530 THERAPEUTIC ACTIVITIES: CPT

## 2018-01-23 RX ADMIN — POTASSIUM CHLORIDE 20 MEQ: 750 CAPSULE, EXTENDED RELEASE ORAL at 08:39

## 2018-01-23 RX ADMIN — Medication 250 MG: at 08:39

## 2018-01-23 RX ADMIN — IPRATROPIUM BROMIDE AND ALBUTEROL SULFATE 3 ML: 2.5; .5 SOLUTION RESPIRATORY (INHALATION) at 20:27

## 2018-01-23 RX ADMIN — ACETAMINOPHEN 650 MG: 325 TABLET, FILM COATED ORAL at 16:30

## 2018-01-23 RX ADMIN — IPRATROPIUM BROMIDE AND ALBUTEROL SULFATE 3 ML: 2.5; .5 SOLUTION RESPIRATORY (INHALATION) at 15:33

## 2018-01-23 RX ADMIN — Medication 250 MG: at 22:33

## 2018-01-23 RX ADMIN — METRONIDAZOLE 500 MG: 500 TABLET ORAL at 14:38

## 2018-01-23 RX ADMIN — FLUCONAZOLE 100 MG: 100 TABLET ORAL at 16:30

## 2018-01-23 RX ADMIN — IPRATROPIUM BROMIDE AND ALBUTEROL SULFATE 3 ML: 2.5; .5 SOLUTION RESPIRATORY (INHALATION) at 06:36

## 2018-01-23 RX ADMIN — NYSTATIN: 100000 POWDER TOPICAL at 22:34

## 2018-01-23 RX ADMIN — METRONIDAZOLE 500 MG: 500 TABLET ORAL at 05:38

## 2018-01-23 RX ADMIN — METRONIDAZOLE 500 MG: 500 TABLET ORAL at 22:33

## 2018-01-23 RX ADMIN — MICONAZOLE NITRATE: 20 CREAM TOPICAL at 10:07

## 2018-01-23 RX ADMIN — MICONAZOLE NITRATE: 20 CREAM TOPICAL at 22:34

## 2018-01-23 RX ADMIN — PANTOPRAZOLE SODIUM 40 MG: 40 TABLET, DELAYED RELEASE ORAL at 05:38

## 2018-01-23 RX ADMIN — NYSTATIN: 100000 POWDER TOPICAL at 10:07

## 2018-01-23 NOTE — THERAPY TREATMENT NOTE
Acute Care - Physical Therapy Treatment Note  [facility]     Patient Name: Tricia Hernandez  : 1940  MRN: [id number]  Today's Date: 2018  Onset of Illness/Injury or Date of Surgery Date: 18  Date of Referral to : 18  Referring Physician: Dr. Granados    Admit Date: 1/3/2018    Visit Dx:    ICD-10-CM ICD-9-CM   1. Impaired mobility and ADLs Z74. 799.89   2. Impaired mobility Z74. 799.89     Patient Active Problem List   Diagnosis   • RUBY (acute kidney injury)   • DVT (deep venous thrombosis)   • Anemia   • GI bleed               Adult Rehabilitation Note       18 1500 18 1030       Rehab Assessment/Intervention    Discipline physical therapy assistant  - physical therapy assistant  -     Document Type therapy note (daily note)  - therapy note (daily note)  -     Subjective Information agree to therapy;complains of;weakness;fatigue;dizziness  - agree to therapy;complains of;weakness;swelling  -     Precautions/Limitations fall precautions   R total shld 2017  - fall precautions   R total shld  -AH     Recorded by [] Josephine Miller PTA [] Josephine Miller PTA     Pain Assessment    Pain Assessment No/denies pain  - No/denies pain  -     Recorded by [] Josephine Miller PTA [] Josephine Miller PTA     Bed Mobility, Assessment/Treatment    Bed Mobility, Assistive Device bed rails;head of bed elevated;draw sheet  - bed rails;head of bed elevated;draw sheet  -     Bed Mobility, Roll Left, [place] minimum assist (75% patient effort);verbal cues required  - minimum assist (75% patient effort);verbal cues required  -     Bed Mobility, Roll Right, [place] minimum assist (75% patient effort);verbal cues required  - minimum assist (75% patient effort);verbal cues required  -     Bed Mobility, Scoot/Bridge, [place] maximum assist (25% patient effort);dependent (less than 25% patient effort);2 person assist required  - maximum assist  (25% patient effort);dependent (less than 25% patient effort);2 person assist required  -     Bed Mob, Supine to Sit, [place] moderate assist (50% patient effort);2 person assist required;verbal cues required  - moderate assist (50% patient effort);2 person assist required;verbal cues required  -     Bed Mob, Sit to Supine, [place] maximum assist (25% patient effort);2 person assist required;verbal cues required  - maximum assist (25% patient effort);2 person assist required;verbal cues required  -     Bed Mobility, Safety Issues decreased use of arms for pushing/pulling;decreased use of legs for bridging/pushing  - decreased use of arms for pushing/pulling;decreased use of legs for bridging/pushing  -     Bed Mobility, Impairments strength decreased  - strength decreased  -     Recorded by [] Josephine Miller PTA [] Josephine Miller PTA     Transfer Assessment/Treatment    Transfers, Sit-Stand [place] moderate assist (50% patient effort);maximum assist (25% patient effort);2 person assist required;verbal cues required  - moderate assist (50% patient effort);maximum assist (25% patient effort);2 person assist required;verbal cues required  -     Transfers, Stand-Sit [place] moderate assist (50% patient effort);2 person assist required;verbal cues required  - moderate assist (50% patient effort);2 person assist required;verbal cues required  -     Transfers, Sit-Stand-Sit, Assist Device gigi walker  - gigi walker  -     Transfer, Comment stood x 1  - pt stood at BS x 3 with gigi wx mod- max of 2  -     Recorded by [] Josephine Miller PTA [] Josephine Miller PTA     Motor Skills/Interventions    Additional Documentation  Balance Skills Training (Group)  -     Recorded by  [] Josephine Miller PTA     Balance Skills Training    Sitting-Level of Assistance Close supervision  - Close supervision  -     Sitting-Balance Support Feet supported  - Feet  supported  -AH     Sitting # of Minutes  30  -AH     Standing-Level of Assistance Minimum assistance;x2  -AH Minimum assistance;x2  -AH     Static Standing Balance Support assistive device  -AH assistive device  -AH     Recorded by [] Josephine Miller PTA [] Josephine Miller PTA     Therapy Exercises    Bilateral Lower Extremities AROM:;10 reps;sitting   constant cue to keep pt on task  -AH AROM:;10 reps;sitting   constant cue to keep pt on task  -AH     Recorded by [] Josephine Miller PTA [] Josephine Miller PTA     Positioning and Restraints    Pre-Treatment Position in bed  -AH in bed  -AH     Post Treatment Position bed  -AH bed  -AH     In Bed fowlers;call light within reach;encouraged to call for assist;with family/caregiver  - fowlers;call light within reach;encouraged to call for assist  -AH     Recorded by [] Josephine Miller PTA [] Josephine Miller PTA       User Key  (r) = Recorded By, (t) = Taken By, (c) = Cosigned By    Initials Name Effective Dates     Josephine Miller PTA 08/02/16 -                 IP PT Goals       01/22/18 1247 01/10/18 1004       Bed Mobility PT LTG    Bed Mobility PT LTG, Date Established  01/10/18  -PB (r) JM (t) PB (c)     Bed Mobility PT LTG, Time to Achieve  by discharge  -PB (r) JM (t) PB (c)     Bed Mobility PT LTG, Activity Type  all bed mobility  -PB (r) JM (t) PB (c)     Bed Mobility PT LTG, [place] Level  minimum assist (75% patient effort)  -PB (r) JM (t) PB (c)     Bed Mobility PT Goal  LTG, Assist Device  bed rails  -PB (r) JM (t) PB (c)     Bed Mobility PT LTG, Date Goal Reviewed 01/22/18  -PB      Bed Mobility PT LTG, Outcome goal ongoing  -PB      Bed Mobility PT LTG, Reason Goal Not Met progress slower than expected  -PB      Transfer Training PT LTG    Transfer Training PT LTG, Date Established  01/10/18  -PB (r) JM (t) PB (c)     Transfer Training PT LTG, Time to Achieve  by discharge  -PB (r) JM (t) PB (c)     Transfer Training PT LTG, Activity  Type  bed to chair /chair to bed;sit to stand/stand to sit  -PB (r) JM (t) PB (c)     Transfer Training PT LTG, [place] minimum assist (75% patient effort);2 person assist required  -PB minimum assist (75% patient effort)  -PB (r) JM (t) PB (c)     Transfer Training PT LTG, Assist Device  walker, gigi  -PB (r) JM (t) PB (c)     Transfer Training PT  LTG, Date Goal Reviewed 01/22/18  -PB      Transfer Training PT LTG, Outcome goal revised  -PB      Transfer Training PT LTG, Reason Goal Not Met goals revised this date  -PB      Strength Goal PT LTG    Strength Goal PT LTG, Date Established  01/10/18  -PB (r) JM (t) PB (c)     Strength Goal PT LTG, Time to Achieve  by discharge  -PB (r) JM (t) PB (c)     Strength Goal PT LTG, Functional Goal  Perform 15 reps of EOB B LE AROM exercises  -PB (r) JM (t) PB (c)     Strength Goal PT LTG, Date Goal Reviewed 01/22/18  -PB      Strength Goal PT LTG, Outcome goal ongoing  -PB      Strength Goal PT LTG, Reason Goal Not Met progress slower than expected  -PB        User Key  (r) = Recorded By, (t) = Taken By, (c) = Cosigned By    Initials Name Provider Type    ALTON Abdi, PT DPT Physical Therapist    SIMONA Crane, PT Student PT Student          Physical Therapy Education     Title: PT OT SLP Therapies (Done)     Topic: Physical Therapy (Done)     Point: Mobility training (Done)    Learning Progress Summary    Learner Readiness Method Response Comment Documented by Status   Patient Acceptance E VU trans AH 01/23/18 1116 Done    Acceptance E NR benefits of activity, postitioning, bed mobility PB 01/22/18 1253 Active    Acceptance E VU,NR  NW 01/18/18 1131 Done    Acceptance E VU safety concerns NW 01/16/18 1159 Done    Acceptance E,D NR Benefits of exercise CW 01/15/18 1059 Active    Acceptance E,D NR Bed mobility, transfers, planof care CW 01/13/18 1319 Active    Acceptance E NR Bed mobility, transfers, planof care, benefit of activity CW 01/12/18 1307  Active    Acceptance E VU progressin of POC NW 01/11/18 1147 Done    Acceptance E VU Pt was educated on importance of activity and potential discharge plans.  01/10/18 0959 Done                      User Key     Initials Effective Dates Name Provider Type Discipline     08/02/16 -  Josephine Miller, PTA Physical Therapy Assistant PT    CW 06/22/15 -  Rhonda Gutierrez, PTA Physical Therapy Assistant PT    NW 08/02/16 -  Blanca Stock, PTA Physical Therapy Assistant PT    PB 08/02/16 -  Prasanth Abdi, PT DPT Physical Therapist PT     01/10/18 -  Jim Crane, PT Student PT Student PT                    PT Recommendation and Plan  Anticipated Equipment Needs At Discharge: front wheeled walker  Anticipated Discharge Disposition: skilled nursing facility  Planned Therapy Interventions: balance training, bed mobility training, home exercise program, patient/family education, strengthening, transfer training  PT Frequency: daily, 2 times/day, per priority policy  Plan of Care Review  Plan Of Care Reviewed With: patient  Progress: progress toward functional goals is gradual  Outcome Summary/Follow up Plan: pt trans to EOB mod of 2, pt sat EOB performed BLE exercises, pt stood x 3 at BS with gigi wx mod-max for sit-stand, once standing pt min of 2 to maintain standing balance. Pt trans BTB max of 2. Pt will need SNF at d/c          Outcome Measures       01/23/18 1100 01/22/18 1111       How much help from another person do you currently need...    Turning from your back to your side while in flat bed without using bedrails? 2  -AH 2  -PB     Moving from lying on back to sitting on the side of a flat bed without bedrails? 2  -AH 2  -PB     Moving to and from a bed to a chair (including a wheelchair)? 1  -AH 1  -PB     Standing up from a chair using your arms (e.g., wheelchair, bedside chair)? 1  - 1  -PB     Climbing 3-5 steps with a railing? 1  -AH 1  -PB     To walk in hospital room? 1  -AH 1  -PB      AM-PAC 6 Clicks Score 8  -AH 8  -PB     Functional Assessment    Outcome Measure Options AM-PAC 6 Clicks Basic Mobility (PT)  -AH AM-PAC 6 Clicks Basic Mobility (PT)  -PB       User Key  (r) = Recorded By, (t) = Taken By, (c) = Cosigned By    Initials Name Provider Type     Josephine Miller PTA Physical Therapy Assistant    PB Prasanth Abdi, PT DPT Physical Therapist           Time Calculation:         PT Charges       01/23/18 1535 01/23/18 1120       Time Calculation    Start Time 1500  -AH 1030  -     Stop Time 1530  -AH 1108  -     Time Calculation (min) 30 min  - 38 min  -     PT Received On  01/23/18  -     PT Goal Re-Cert Due Date  02/01/18  -     Time Calculation- PT    Total Timed Code Minutes- PT 30 minute(s)  -AH 38 minute(s)  -       User Key  (r) = Recorded By, (t) = Taken By, (c) = Cosigned By    Initials Name Provider Type     Josephine Miller PTA Physical Therapy Assistant          Therapy Charges for Today     Code Description Service Date Service Provider Modifiers Qty    29202818838 HC PT THERAPEUTIC ACT EA 15 MIN 1/23/2018 Josephine Miller PTA GP 2    98338490724 HC PT THER PROC EA 15 MIN 1/23/2018 Josephine Miller PTA GP 1    42770599585 HC PT THERAPEUTIC ACT EA 15 MIN 1/23/2018 Josephine Miller PTA GP 2          PT G-Codes  Outcome Measure Options: AM-PAC 6 Clicks Basic Mobility (PT)  Score: 8  Functional Limitation: Changing and maintaining body position  Changing and Maintaining Body Position Current Status (): At least 80 percent but less than 100 percent impaired, limited or restricted  Changing and Maintaining Body Position Goal Status (): At least 60 percent but less than 80 percent impaired, limited or restricted    Josephine Miller PTA  1/23/2018

## 2018-01-23 NOTE — PLAN OF CARE
Problem: Patient Care Overview (Adult)  Goal: Plan of Care Review  Outcome: Ongoing (interventions implemented as appropriate)   01/23/18 1117   Coping/Psychosocial Response Interventions   Plan Of Care Reviewed With patient   Patient Care Overview   Progress progress toward functional goals is gradual   Outcome Evaluation   Outcome Summary/Follow up Plan pt trans to EOB mod of 2, pt sat EOB performed BLE exercises, pt stood x 3 at BS with gigi wx mod-max for sit-stand, once standing pt min of 2 to maintain standing balance. Pt trans BTB max of 2. Pt will need SNF at d/c

## 2018-01-23 NOTE — PLAN OF CARE
Problem: Patient Care Overview (Adult)  Goal: Plan of Care Review  Outcome: Ongoing (interventions implemented as appropriate)   01/23/18 1459   Coping/Psychosocial Response Interventions   Plan Of Care Reviewed With patient   Patient Care Overview   Progress improving   Outcome Evaluation   Outcome Summary/Follow up Plan 1 large liquid stool this shift, light brown, no s/sx of bleeding. PT reports the pt stood up at bedside x3. PO flagyl continues. Antifungal and nystatin powder applied to abdominal folds, upper thighs, and perineal area per orders. No c/o pain. Will continue to monitor.      Goal: Adult Individualization and Mutuality  Outcome: Ongoing (interventions implemented as appropriate)      Problem: Fall Risk (Adult)  Goal: Absence of Falls  Outcome: Ongoing (interventions implemented as appropriate)   01/23/18 1459   Fall Risk (Adult)   Absence of Falls making progress toward outcome       Problem: VTE, DVT and PE (Adult)  Goal: Signs and Symptoms of Listed Potential Problems Will be Absent or Manageable (VTE, DVT and PE)  Outcome: Ongoing (interventions implemented as appropriate)   01/23/18 1459   VTE, DVT and PE   Problems Assessed (VTE, DVT, PE) all   Problems Present (VTE, DVT, PE) deep vein thrombosis       Problem: Skin Integrity Impairment, Risk/Actual (Adult)  Goal: Skin Integrity/Wound Healing  Outcome: Ongoing (interventions implemented as appropriate)   01/23/18 1459   Skin Integrity Impairment, Risk/Actual (Adult)   Skin Integrity/Wound Healing making progress toward outcome       Problem: Pressure Ulcer Risk (Jorge Scale) (Adult,Obstetrics,Pediatric)  Goal: Skin Integrity  Outcome: Ongoing (interventions implemented as appropriate)   01/23/18 1459   Pressure Ulcer Risk (Jorge Scale) (Adult,Obstetrics,Pediatric)   Skin Integrity making progress toward outcome       Problem: Gastrointestinal Bleeding (Adult)  Goal: Signs and Symptoms of Listed Potential Problems Will be Absent or Manageable  (Gastrointestinal Bleeding)  Outcome: Outcome(s) achieved Date Met: 01/23/18

## 2018-01-23 NOTE — PROGRESS NOTES
Continued Stay Note  [facility]     Patient Name: Tricia Hernandez  MRN: [id number]  Today's Date: 1/23/2018    Admit Date: 1/3/2018          Discharge Plan       01/23/18 1414    Case Management/Social Work Plan    Additional Comments CONTACTED DIR [facility] OF NURSING AT [facility] AND ADVISED OF PT'S ANTICIPATED D/C IN AM.  WILL FOLLOW TO COORDINATE TRANSFER.                Discharge Codes     None        Expected Discharge Date and Time     Expected Discharge Date Expected Discharge Time    Jan 11, 2018             SILVANA Hinojosa

## 2018-01-23 NOTE — PROGRESS NOTES
INFECTIOUS DISEASES PROGRESS NOTE    Patient:  Tricia Hernandez  YOB: 1940  MRN: [id number]   Admit date: 1/3/2018   Admitting Physician: Alec Granados MD  Primary Care Physician: Ed Hanson MD    Chief Complaint: Fever of unknown origin        Interval History:  Patient was seen in consultation January 21.  At that time she had been on right antibiotics for possible pneumonia.  She persisted in having fever.  Her cultures were otherwise negative.  Zosyn was stopped January 21 and vancomycin had already been stopped per Dr. Pedersen.    Patient had stool for C. difficile was positive.  She was started on metronidazole.  She has not had any further fevers.  She denies abdominal pain at this time.    She still has tenderness in the leonora-area and inguinal area from excoriation and possible fungal infection.    Allergies: No Known Allergies    Current Meds:     Current Facility-Administered Medications:   •  acetaminophen (TYLENOL) tablet 650 mg, 650 mg, Oral, Q4H PRN, 650 mg at 01/18/18 2211 **OR** acetaminophen (TYLENOL) suppository 650 mg, 650 mg, Rectal, Q4H PRN, BRITNEY Schroeder  •  dextrose (D50W) solution 25 g, 25 g, Intravenous, Q15 Min PRN, Wilfred Pedersen MD  •  dextrose (GLUTOSE) oral gel 15 g, 15 g, Oral, Q15 Min PRN, Wilfred Pedersen MD  •  fluconazole (DIFLUCAN) tablet 100 mg, 100 mg, Oral, Q24H, Luz Rahman MD, 100 mg at 01/22/18 1739  •  glucagon (human recombinant) (GLUCAGEN DIAGNOSTIC) injection 1 mg, 1 mg, Subcutaneous, Q15 Min PRN, Wilfred Pedersen MD  •  ipratropium-albuterol (DUO-NEB) nebulizer solution 3 mL, 3 mL, Nebulization, 4x Daily - RT, Wilfred Pedersen MD, 3 mL at 01/22/18 1445  •  loperamide (IMODIUM) capsule 2 mg, 2 mg, Oral, 4x Daily PRN, Wilfred Pedersen MD  •  metroNIDAZOLE (FLAGYL) tablet 500 mg, 500 mg, Oral, Q8H, Wilfred Pedersen MD, 500 mg at 01/22/18 1318  •  miconazole (MICOTIN) 2 % cream, , Topical, Q12H, Luz CRUZ  "MD Lacey  •  nystatin (MYCOSTATIN) powder, , Topical, Q12H, Luz Rahman MD  •  ondansetron (ZOFRAN) tablet 4 mg, 4 mg, Oral, Q6H PRN **OR** ondansetron ODT (ZOFRAN-ODT) disintegrating tablet 4 mg, 4 mg, Oral, Q6H PRN **OR** ondansetron (ZOFRAN) injection 4 mg, 4 mg, Intravenous, Q6H PRN, BRITNEY Schroeder  •  pantoprazole (PROTONIX) EC tablet 40 mg, 40 mg, Oral, Q AM, Vanessa Britton MD, 40 mg at 18 0515  •  [START ON 2018] potassium chloride (MICRO-K) CR capsule 20 mEq, 20 mEq, Oral, Daily, Alec Granados MD  •  saccharomyces boulardii (FLORASTOR) capsule 250 mg, 250 mg, Oral, BID, Wilfred Pedersen MD, 250 mg at 18 0928  •  sodium chloride 0.9 % flush 1-10 mL, 1-10 mL, Intravenous, PRN, BRITNEY Schroeder, 10 mL at 18      Review of Systems    Gen.: Low-grade fever overall improved  GI: Diarrhea-3 loose stools reported today  Cutaneous: Excoriated skin in the perirectal area.  Nail area and inguinal area as well as abdominal for    Objective     Vital Signs:  Temp (24hrs), Av.5 °F (36.4 °C), Min:96 °F (35.6 °C), Max:100.3 °F (37.9 °C)      /68 (BP Location: Right arm, Patient Position: Lying)  Pulse 74  Temp 96.3 °F (35.7 °C) (Oral)   Resp 18  Ht 160 cm (62.99\")  Wt (!) 151 kg (331 lb 12.8 oz)  SpO2 95%  BMI 58.79 kg/m2        Physical Exam  Results Review:    I reviewed the patient's new clinical results.    Lab Results:  CBC:   Results from last 7 days  Lab Units 18  0540 18  0540 18  0700 18  0626 18  1206 18  0606 18  2334  18  0407  18  0701   WBC 10*3/mm3 4.54* 5.80 6.78 6.41  --  5.59  --   --  4.23*  --  4.10*   HEMOGLOBIN g/dL 9.2* 9.0* 9.4* 9.7* 9.4* 9.5* 10.1*  < > 9.6*  < > 9.2*   HEMATOCRIT % 27.8* 27.9* 29.1* 30.9* 29.1* 29.1* 30.2*  < > 28.6*  < > 28.3*   PLATELETS 10*3/mm3 181 192 203 209  --  226  --   --  251  --  264   LYMPHOCYTE % %  --  12.0* 16.0 8.0*  --   --   --  "  --   --   --   --    MONOCYTES % %  --  9.0 6.0 1.0*  --   --   --   --   --   --   --    < > = values in this interval not displayed.      CMP:   Results from last 7 days  Lab Units 01/22/18  0540 01/21/18  0540 01/20/18  0700   SODIUM mmol/L 140 137 140   POTASSIUM mmol/L 4.3 3.7 3.2*   CHLORIDE mmol/L 104 102 101   CO2 mmol/L 29.0 32.0* 32.0*   BUN mg/dL 11 10 10   CREATININE mg/dL 0.99 1.08 1.12   CALCIUM mg/dL 9.7 9.0 9.3   BILIRUBIN mg/dL 0.2 0.3 0.4   ALK PHOS U/L 60 57 64   ALT (SGPT) U/L 24 23 23   AST (SGOT) U/L 16 21 18   GLUCOSE mg/dL 150* 106* 103*         Culture Results:    Blood Culture   Date Value Ref Range Status   01/17/2018 No growth at 5 days  Final   01/17/2018 No growth at 4 days  Preliminary       Microbiology Results Abnormal     Procedure Component Value - Date/Time    Blood Culture - Blood, [[id number]]  (Normal) Collected:  01/17/18 1819    Lab Status:  Final result Specimen:  Blood from Arm, Right Updated:  01/22/18 1831     Blood Culture No growth at 5 days    Blood Culture - Blood, [[id number]]  (Normal) Collected:  01/17/18 1813    Lab Status:  Preliminary result Specimen:  Blood from Arm, Left Updated:  01/21/18 2116     Blood Culture No growth at 4 days    Gastrointestinal Panel, PCR - Stool, Per Rectum [[id number]]  (Abnormal) Collected:  01/21/18 1347    Lab Status:  Final result Specimen:  Stool from Per Rectum Updated:  01/21/18 1639     Campylobacter Not Detected     Clostridium difficile (toxin A/B) Detected (A)        Due to the high asymptomatic carriage rates, especially in young children, the clinical relevance of the detection of toxigenic C. difficile from stool should be considered in the context of other clinical findings, patient age, and risk factors including hospitalization and antibiotic exposure.        Plesiomonas shigelloides Not Detected     Salmonella Not Detected     Vibrio Not Detected     Vibrio cholerae Not Detected     Yersinia enterocolitica Not Detected      Enteroaggregative E. coli (EAEC) Not Detected     Enteropathogenic E. coli (EPEC) Not Detected     Enterotoxigenic E. coli (ETEC) lt/st Not Detected     Shiga-like toxin-producing E. coli (STEC) stx1/stx2 Not Detected     E. coli O157 Not Detected     Shigella/Enteroinvasive E. coli (EIEC) Not Detected     Cryptosporidium Not Detected     Cyclospora cayetanensis Not Detected     Entamoeba histolytica Not Detected     Giardia lamblia Not Detected     Adenovirus F40/41 Not Detected     Astrovirus Not Detected     Norovirus GI/GII Not Detected     Rotavirus A Not Detected     Sapovirus (I, II, IV or V) Not Detected    Influenza Antigen, Rapid - Swab, Nasopharynx [[id number]]  (Normal) Collected:  01/17/18 1724    Lab Status:  Final result Specimen:  Swab from Nasopharynx Updated:  01/17/18 1745     Influenza A Ag, EIA Negative     Influenza B Ag, EIA Negative    Narrative:         Recommend confirmation of negative results by viral culture or molecular assay.    Blood Culture With HAILE - Blood, [[id number]]  (Abnormal) Collected:  01/03/18 1859    Lab Status:  Final result Specimen:  Blood from Arm, Right Updated:  01/09/18 0759     Blood Culture Abnormal Stain (A)      Staphylococcus, coagulase negative (A)      Probable contaminant          Isolated from Anaerobic Bottle     Gram Stain Result Gram positive cocci    Narrative:       No growth aerobic bottle.    Blood Culture With HAILE - Blood, [[id number]]  (Normal) Collected:  01/03/18 1900    Lab Status:  Final result Specimen:  Blood from Arm, Left Updated:  01/08/18 1946     Blood Culture No growth at 5 days    Blood Culture ID, PCR - Blood, [[id number]]  (Abnormal) Collected:  01/03/18 1859    Lab Status:  Edited Result - FINAL Specimen:  Blood from Arm, Right Updated:  01/06/18 0838     BCID, PCR Staphylococcus spp, not aureus. Identification by BCID PCR. (C)      Corrected result. Previous result was Staphylococcus species, not aureus. mecA (methicillin  resistance gene) detected. Identification by BCID PCR. on 1/5/2018 at 0348 CST       Urine Culture - Urine, Urine, Clean Catch [[id number]]  (Normal) Collected:  01/04/18 0715    Lab Status:  Final result Specimen:  Urine from Urine, Clean Catch Updated:  01/06/18 0618     Urine Culture No growth at 2 days                Radiology:   Imaging Results (last 72 hours)     Procedure Component Value Units Date/Time    XR Chest 1 View [[id number]] Collected:  01/20/18 1230     Updated:  01/20/18 1235    Narrative:       XR CHEST 1 VW- 1/20/2018 11:44 AM CST     HISTORY: SOA, recheck Atelectasis, possible pneumonia; Z74.09-Other  reduced mobility; Z74.09-Other reduced mobility       COMPARISON: Exam dated 01/17/2018.     FINDINGS:   Reidentified elevation of the right hemidiaphragm. There appears to be  mild increased retrocardiac density as compared with the prior exam. The  cardiomediastinal silhouette and pulmonary vascularity are within normal  limits.      The osseous structures and surrounding soft tissues demonstrate no acute  abnormality. Right total shoulder arthroplasty with advanced  degenerative changes at the left shoulder.       Impression:       1. Mild increased retrocardiac density on this exam which may reflect  increasing atelectasis or perhaps a developing infiltrate in the left  lower lobe. Otherwise stable exam.  2. Stable elevation of the right hemidiaphragm with volume loss in the  right lung base.        This report was finalized on 01/20/2018 12:32 by Dr Alfonso Hercules, .          Assessment/Plan     Hospital Problem List     RUBY (acute kidney injury)    DVT (deep venous thrombosis)    Anemia    GI bleed          IMPRESSION:  Fever-overall curve improving.  Hopefully fever has resolved  C. difficile-antibiotics have been discontinued other than Flagyl and fluconazole  Intertrigo      RECOMMENDATION:   ·  Continue metronidazole  · Continue fluconazole  · Continue topical care for intertrigo and leonora  Rectal and perineal skin changes        Luz Rahman MD  01/22/18  7:10 PM

## 2018-01-23 NOTE — THERAPY TREATMENT NOTE
Acute Care - Physical Therapy Treatment Note  [facility]     Patient Name: Tricia Hernandez  : 1940  MRN: [id number]  Today's Date: 2018  Onset of Illness/Injury or Date of Surgery Date: 18  Date of Referral to : 18  Referring Physician: Dr. Granados    Admit Date: 1/3/2018    Visit Dx:    ICD-10-CM ICD-9-CM   1. Impaired mobility and ADLs Z74. 799.89   2. Impaired mobility Z74. 799.89     Patient Active Problem List   Diagnosis   • RUBY (acute kidney injury)   • DVT (deep venous thrombosis)   • Anemia   • GI bleed               Adult Rehabilitation Note       18 1030          Rehab Assessment/Intervention    Discipline physical therapy assistant  -      Document Type therapy note (daily note)  -      Subjective Information agree to therapy;complains of;weakness;swelling  -      Precautions/Limitations fall precautions   R total shld  -      Recorded by [] Josephine Miller PTA      Pain Assessment    Pain Assessment No/denies pain  -      Recorded by [] Josephine Miller PTA      Bed Mobility, Assessment/Treatment    Bed Mobility, Assistive Device bed rails;head of bed elevated;draw sheet  -      Bed Mobility, Roll Left, [place] minimum assist (75% patient effort);verbal cues required  -      Bed Mobility, Roll Right, [place] minimum assist (75% patient effort);verbal cues required  -      Bed Mobility, Scoot/Bridge, [place] maximum assist (25% patient effort);dependent (less than 25% patient effort);2 person assist required  -      Bed Mob, Supine to Sit, [place] moderate assist (50% patient effort);2 person assist required;verbal cues required  -      Bed Mob, Sit to Supine, [place] maximum assist (25% patient effort);2 person assist required;verbal cues required  -      Bed Mobility, Safety Issues decreased use of arms for pushing/pulling;decreased use of legs for bridging/pushing  -      Bed Mobility, Impairments strength  decreased  -      Recorded by [] Josephine Miller PTA      Transfer Assessment/Treatment    Transfers, Sit-Stand [place] moderate assist (50% patient effort);maximum assist (25% patient effort);2 person assist required;verbal cues required  -      Transfers, Stand-Sit [place] moderate assist (50% patient effort);2 person assist required;verbal cues required  -      Transfers, Sit-Stand-Sit, Assist Device gigi walker  -      Transfer, Comment pt stood at BS x 3 with gigi wx mod- max of 2  -      Recorded by [] Josephine Miller PTA      Motor Skills/Interventions    Additional Documentation Balance Skills Training (Group)  -      Recorded by [] Josephine Miller PTA      Balance Skills Training    Sitting-Level of Assistance Close supervision  -      Sitting-Balance Support Feet supported  -      Sitting # of Minutes 30  -      Standing-Level of Assistance Minimum assistance;x2  -      Static Standing Balance Support assistive device  -      Recorded by [] Josephine Miller PTA      Therapy Exercises    Bilateral Lower Extremities AROM:;10 reps;sitting   constant cue to keep pt on task  -      Recorded by [] Josephine Miller PTA      Positioning and Restraints    Pre-Treatment Position in bed  -      Post Treatment Position bed  -      In Bed fowlers;call light within reach;encouraged to call for assist  -      Recorded by [] Josephine Miller PTA        User Key  (r) = Recorded By, (t) = Taken By, (c) = Cosigned By    Initials Name Effective Dates     Josephine Miller PTA 08/02/16 -                 IP PT Goals       01/22/18 1247 01/10/18 1004       Bed Mobility PT LTG    Bed Mobility PT LTG, Date Established  01/10/18  -PB (r) JM (t) PB (c)     Bed Mobility PT LTG, Time to Achieve  by discharge  -PB (r) JM (t) PB (c)     Bed Mobility PT LTG, Activity Type  all bed mobility  -PB (r) JM (t) PB (c)     Bed Mobility PT LTG, [place] Level  minimum assist (75% patient  effort)  -PB (r) JM (t) PB (c)     Bed Mobility PT Goal  LTG, Assist Device  bed rails  -PB (r) JM (t) PB (c)     Bed Mobility PT LTG, Date Goal Reviewed 01/22/18  -PB      Bed Mobility PT LTG, Outcome goal ongoing  -PB      Bed Mobility PT LTG, Reason Goal Not Met progress slower than expected  -PB      Transfer Training PT LTG    Transfer Training PT LTG, Date Established  01/10/18  -PB (r) JM (t) PB (c)     Transfer Training PT LTG, Time to Achieve  by discharge  -PB (r) JM (t) PB (c)     Transfer Training PT LTG, Activity Type  bed to chair /chair to bed;sit to stand/stand to sit  -PB (r) JM (t) PB (c)     Transfer Training PT LTG, [place] Level minimum assist (75% patient effort);2 person assist required  -PB minimum assist (75% patient effort)  -PB (r) JM (t) PB (c)     Transfer Training PT LTG, Assist Device  walker, gigi  -PB (r) JM (t) PB (c)     Transfer Training PT  LTG, Date Goal Reviewed 01/22/18  -PB      Transfer Training PT LTG, Outcome goal revised  -PB      Transfer Training PT LTG, Reason Goal Not Met goals revised this date  -PB      Strength Goal PT LTG    Strength Goal PT LTG, Date Established  01/10/18  -PB (r) JM (t) PB (c)     Strength Goal PT LTG, Time to Achieve  by discharge  -PB (r) JM (t) PB (c)     Strength Goal PT LTG, Functional Goal  Perform 15 reps of EOB B LE AROM exercises  -PB (r) JM (t) PB (c)     Strength Goal PT LTG, Date Goal Reviewed 01/22/18  -PB      Strength Goal PT LTG, Outcome goal ongoing  -PB      Strength Goal PT LTG, Reason Goal Not Met progress slower than expected  -PB        User Key  (r) = Recorded By, (t) = Taken By, (c) = Cosigned By    Initials Name Provider Type    ALTON Abdi, PT DPT Physical Therapist    SIMONA Crane, PT Student PT Student          Physical Therapy Education     Title: PT OT SLP Therapies (Done)     Topic: Physical Therapy (Done)     Point: Mobility training (Done)    Learning Progress Summary    Learner Readiness  Method Response Comment Documented by Status   Patient Acceptance E VU trans  01/23/18 1116 Done    Acceptance E NR benefits of activity, postitioning, bed mobility  01/22/18 1253 Active    Acceptance E VU,NR   01/18/18 1131 Done    Acceptance E VU safety concerns  01/16/18 1159 Done    Acceptance E,D NR Benefits of exercise  01/15/18 1059 Active    Acceptance E,D NR Bed mobility, transfers, planof care  01/13/18 1319 Active    Acceptance E NR Bed mobility, transfers, planof care, benefit of activity  01/12/18 1307 Active    Acceptance E VU progressin of POC  01/11/18 1147 Done    Acceptance E VU Pt was educated on importance of activity and potential discharge plans.  01/10/18 0959 Done                      User Key     Initials Effective Dates Name Provider Type Discipline     08/02/16 -  Josephine Miller, PTA Physical Therapy Assistant PT     06/22/15 -  Rhonda Gutierrez, PTA Physical Therapy Assistant PT     08/02/16 -  Blanca Stock, PTA Physical Therapy Assistant PT     08/02/16 -  Prasanth Abdi, PT DPT Physical Therapist PT     01/10/18 -  Jim Crane, PT Student PT Student PT                    PT Recommendation and Plan  Anticipated Equipment Needs At Discharge: front wheeled walker  Anticipated Discharge Disposition: skilled nursing facility  Planned Therapy Interventions: balance training, bed mobility training, home exercise program, patient/family education, strengthening, transfer training  PT Frequency: daily, 2 times/day, per priority policy  Plan of Care Review  Plan Of Care Reviewed With: patient  Progress: progress toward functional goals is gradual  Outcome Summary/Follow up Plan: pt trans to EOB mod of 2, pt sat EOB performed BLE exercises, pt stood x 3 at BS with gigi wx mod-max for sit-stand, once standing pt min of 2 to maintain standing balance. Pt trans BTB max of 2. Pt will need SNF at d/c          Outcome Measures       01/23/18 1100 01/22/18 1111        How much help from another person do you currently need...    Turning from your back to your side while in flat bed without using bedrails? 2  - 2  -PB     Moving from lying on back to sitting on the side of a flat bed without bedrails? 2  - 2  -PB     Moving to and from a bed to a chair (including a wheelchair)? 1  - 1  -PB     Standing up from a chair using your arms (e.g., wheelchair, bedside chair)? 1  - 1  -PB     Climbing 3-5 steps with a railing? 1  - 1  -PB     To walk in hospital room? 1  - 1  -PB     AM-PAC 6 Clicks Score 8  - 8  -PB     Functional Assessment    Outcome Measure Options AM-PAC 6 Clicks Basic Mobility (PT)  -AH AM-PAC 6 Clicks Basic Mobility (PT)  -PB       User Key  (r) = Recorded By, (t) = Taken By, (c) = Cosigned By    Initials Name Provider Type     Josephine Miller PTA Physical Therapy Assistant    PB Prasanth Abdi, PT DPT Physical Therapist           Time Calculation:         PT Charges       01/23/18 1120          Time Calculation    Start Time 1030  -      Stop Time 1108  -      Time Calculation (min) 38 min  -      PT Received On 01/23/18  -      PT Goal Re-Cert Due Date 02/01/18  -      Time Calculation- PT    Total Timed Code Minutes- PT 38 minute(s)  -        User Key  (r) = Recorded By, (t) = Taken By, (c) = Cosigned By    Initials Name Provider Type     Josephine Miller PTA Physical Therapy Assistant          Therapy Charges for Today     Code Description Service Date Service Provider Modifiers Qty    15014557251 HC PT THERAPEUTIC ACT EA 15 MIN 1/23/2018 Josephine Miller PTA GP 2    71113232443 HC PT THER PROC EA 15 MIN 1/23/2018 Josephine Miller PTA GP 1          PT G-Codes  Outcome Measure Options: AM-PAC 6 Clicks Basic Mobility (PT)  Score: 8  Functional Limitation: Changing and maintaining body position  Changing and Maintaining Body Position Current Status (): At least 80 percent but less than 100 percent impaired, limited or  restricted  Changing and Maintaining Body Position Goal Status (): At least 60 percent but less than 80 percent impaired, limited or restricted    Josephine Miller, PTA  1/23/2018

## 2018-01-23 NOTE — PLAN OF CARE
Problem: Patient Care Overview (Adult)  Goal: Plan of Care Review  Outcome: Ongoing (interventions implemented as appropriate)   01/23/18 0106   Coping/Psychosocial Response Interventions   Plan Of Care Reviewed With patient   Outcome Evaluation   Outcome Summary/Follow up Plan Patient denies pain except to excoriated areas when wiping. Antifungal cream and nystatin powder applied to excoriated thighs, perianal area, and abdominal folds per order. No loose stools to this point this shift. Tolerating diet. Voiding without difficulty.     Goal: Adult Individualization and Mutuality  Outcome: Ongoing (interventions implemented as appropriate)   01/09/18 1521 01/22/18 0141   Individualization   Patient Specific Preferences none --    Patient Specific Goals --  Patient would like to get well and go home.   Patient Specific Interventions --  Medications administered as prescribed.   Mutuality/Individual Preferences   What Anxieties, Fears or Concerns Do You Have About Your Health or Care? --  None   What Questions Do You Have About Your Health or Care? --  None   What Information Would Help Us Give You More Personalized Care? --  None       Problem: Fall Risk (Adult)  Goal: Absence of Falls  Outcome: Ongoing (interventions implemented as appropriate)   01/23/18 0106   Fall Risk (Adult)   Absence of Falls making progress toward outcome       Problem: Skin Integrity Impairment, Risk/Actual (Adult)  Goal: Skin Integrity/Wound Healing  Outcome: Ongoing (interventions implemented as appropriate)   01/23/18 0106   Skin Integrity Impairment, Risk/Actual (Adult)   Skin Integrity/Wound Healing making progress toward outcome       Problem: Pressure Ulcer Risk (Jorge Scale) (Adult,Obstetrics,Pediatric)  Goal: Skin Integrity  Outcome: Ongoing (interventions implemented as appropriate)   01/23/18 0106   Pressure Ulcer Risk (Jorge Scale) (Adult,Obstetrics,Pediatric)   Skin Integrity making progress toward outcome       Problem:  Gastrointestinal Bleeding (Adult)  Goal: Signs and Symptoms of Listed Potential Problems Will be Absent or Manageable (Gastrointestinal Bleeding)  Outcome: Ongoing (interventions implemented as appropriate)   01/23/18 0106   Gastrointestinal Bleeding   Problems Assessed (GI Bleeding) all   Problems Present (GI Bleeding) none

## 2018-01-23 NOTE — PLAN OF CARE
Problem: Patient Care Overview (Adult)  Goal: Plan of Care Review  Outcome: Ongoing (interventions implemented as appropriate)   01/23/18 7990   Coping/Psychosocial Response Interventions   Plan Of Care Reviewed With patient   Patient Care Overview   Progress improving   Outcome Evaluation   Outcome Summary/Follow up Plan Pt is now on a regular diet. Has consumed 67% of the last 3 meals. Will cont to follow and encourage intake.

## 2018-01-23 NOTE — PROGRESS NOTES
INFECTIOUS DISEASES PROGRESS NOTE    Patient:  Tricia Hernandez  YOB: 1940  MRN: [id number]   Admit date: 1/3/2018   Admitting Physician: Alec Granados MD  Primary Care Physician: Ed Hanson MD    Chief Complaint: Fever of unknown origin        Interval History:  Patient was seen in consultation January 21.  At that time she had been on broad antibiotics for possible pneumonia.      Patient had stool for C. difficile was positive.  She was started on metronidazole.  She has not had any further fevers.  She denies abdominal pain at this time.    She still has no further tenderness in the leonora-area and inguinal area from excoriation and possible fungal infection.    Allergies: No Known Allergies    Current Meds:     Current Facility-Administered Medications:   •  acetaminophen (TYLENOL) tablet 650 mg, 650 mg, Oral, Q4H PRN, 650 mg at 01/18/18 2211 **OR** acetaminophen (TYLENOL) suppository 650 mg, 650 mg, Rectal, Q4H PRN, BRITNEY Schroeder  •  dextrose (D50W) solution 25 g, 25 g, Intravenous, Q15 Min PRN, Wilfred Pedersen MD  •  dextrose (GLUTOSE) oral gel 15 g, 15 g, Oral, Q15 Min PRN, Wilfred Pedersen MD  •  fluconazole (DIFLUCAN) tablet 100 mg, 100 mg, Oral, Q24H, Luz Rahman MD, 100 mg at 01/22/18 1739  •  glucagon (human recombinant) (GLUCAGEN DIAGNOSTIC) injection 1 mg, 1 mg, Subcutaneous, Q15 Min PRN, Wilfred Pedersen MD  •  ipratropium-albuterol (DUO-NEB) nebulizer solution 3 mL, 3 mL, Nebulization, 4x Daily - RT, Wilfred Pedersen MD, 3 mL at 01/23/18 0636  •  loperamide (IMODIUM) capsule 2 mg, 2 mg, Oral, 4x Daily PRN, Wilfred Pedersen MD  •  metroNIDAZOLE (FLAGYL) tablet 500 mg, 500 mg, Oral, Q8H, Wilfred Pedersen MD, 500 mg at 01/23/18 0538  •  miconazole (MICOTIN) 2 % cream, , Topical, Q12H, Luz Rahman MD  •  nystatin (MYCOSTATIN) powder, , Topical, Q12H, Luz Rahman MD  •  ondansetron (ZOFRAN) tablet 4 mg, 4 mg, Oral,  "Q6H PRN **OR** ondansetron ODT (ZOFRAN-ODT) disintegrating tablet 4 mg, 4 mg, Oral, Q6H PRN **OR** ondansetron (ZOFRAN) injection 4 mg, 4 mg, Intravenous, Q6H PRN, BRITNEY Schroeder  •  pantoprazole (PROTONIX) EC tablet 40 mg, 40 mg, Oral, Q AM, Vanessa Britton MD, 40 mg at 18 0538  •  potassium chloride (MICRO-K) CR capsule 20 mEq, 20 mEq, Oral, Daily, Alec Granados MD, 20 mEq at 1839  •  saccharomyces boulardii (FLORASTOR) capsule 250 mg, 250 mg, Oral, BID, Wilfred Pedersen MD, 250 mg at 1839  •  sodium chloride 0.9 % flush 1-10 mL, 1-10 mL, Intravenous, PRN, BRITNEY Schroeder, 10 mL at 18      Review of Systems    Gen.:  Fever resolved  GI: Diarrhea-none reported overnight but 1 large watery stool this morning per nursing.  Cutaneous: Excoriated skin in the perirectal area.      Objective     Vital Signs:  Temp (24hrs), Av.2 °F (36.2 °C), Min:96.3 °F (35.7 °C), Max:97.7 °F (36.5 °C)      /43 (BP Location: Left arm, Patient Position: Lying)  Pulse 67  Temp 97.5 °F (36.4 °C) (Oral)   Resp 16  Ht 160 cm (62.99\")  Wt (!) 151 kg (331 lb 12.8 oz)  SpO2 99%  BMI 58.79 kg/m2        Physical Exam  Results Review:      Gen.: Morbidly obese female lying in bed working with physical therapy in no acute distress  HEENT: Sclerae anicteric and noninjected  Abdomen: Morbidly obese, soft, no focal tenderness, bowel sounds are positive  Extremities: Obese with multiple folds.  Cutaneous: Abdominal folds and inguinal folds have improved from the erythema standpoint.  Upper thighs and her new areas still quite erythematous.  Psych: She is pleasant and cooperative  Neuro: Alert and oriented, speech is clear,    I reviewed the patient's new clinical results.    Lab Results:  CBC:   Results from last 7 days  Lab Units 18  0532 18  0540 18  0540 18  0700 18  0626 18  1206 18  0606  18  0407   WBC 10*3/mm3 7.09 4.54* 5.80 " 6.78 6.41  --  5.59  --  4.23*   HEMOGLOBIN g/dL 9.4* 9.2* 9.0* 9.4* 9.7* 9.4* 9.5*  < > 9.6*   HEMATOCRIT % 28.9* 27.8* 27.9* 29.1* 30.9* 29.1* 29.1*  < > 28.6*   PLATELETS 10*3/mm3 203 181 192 203 209  --  226  --  251   LYMPHOCYTE % %  --   --  12.0* 16.0 8.0*  --   --   --   --    MONOCYTES % %  --   --  9.0 6.0 1.0*  --   --   --   --    < > = values in this interval not displayed.      CMP:     Results from last 7 days  Lab Units 01/23/18  0532 01/22/18  0540 01/21/18  0540   SODIUM mmol/L 142 140 137   POTASSIUM mmol/L 4.2 4.3 3.7   CHLORIDE mmol/L 105 104 102   CO2 mmol/L 32.0* 29.0 32.0*   BUN mg/dL 13 11 10   CREATININE mg/dL 0.96 0.99 1.08   CALCIUM mg/dL 10.0 9.7 9.0   BILIRUBIN mg/dL <0.1* 0.2 0.3   ALK PHOS U/L 56 60 57   ALT (SGPT) U/L 27 24 23   AST (SGOT) U/L 19 16 21   GLUCOSE mg/dL 131* 150* 106*         Culture Results:    Blood Culture   Date Value Ref Range Status   01/17/2018 No growth at 5 days  Final   01/17/2018 No growth at 4 days  Preliminary       Microbiology Results Abnormal     Procedure Component Value - Date/Time    Blood Culture - Blood, [[id number]]  (Normal) Collected:  01/17/18 1813    Lab Status:  Final result Specimen:  Blood from Arm, Left Updated:  01/22/18 2116     Blood Culture No growth at 5 days    Blood Culture - Blood, [[id number]]  (Normal) Collected:  01/17/18 1819    Lab Status:  Final result Specimen:  Blood from Arm, Right Updated:  01/22/18 1831     Blood Culture No growth at 5 days    Gastrointestinal Panel, PCR - Stool, Per Rectum [[id number]]  (Abnormal) Collected:  01/21/18 1347    Lab Status:  Final result Specimen:  Stool from Per Rectum Updated:  01/21/18 1639     Campylobacter Not Detected     Clostridium difficile (toxin A/B) Detected (A)        Due to the high asymptomatic carriage rates, especially in young children, the clinical relevance of the detection of toxigenic C. difficile from stool should be considered in the context of other clinical findings,  patient age, and risk factors including hospitalization and antibiotic exposure.        Plesiomonas shigelloides Not Detected     Salmonella Not Detected     Vibrio Not Detected     Vibrio cholerae Not Detected     Yersinia enterocolitica Not Detected     Enteroaggregative E. coli (EAEC) Not Detected     Enteropathogenic E. coli (EPEC) Not Detected     Enterotoxigenic E. coli (ETEC) lt/st Not Detected     Shiga-like toxin-producing E. coli (STEC) stx1/stx2 Not Detected     E. coli O157 Not Detected     Shigella/Enteroinvasive E. coli (EIEC) Not Detected     Cryptosporidium Not Detected     Cyclospora cayetanensis Not Detected     Entamoeba histolytica Not Detected     Giardia lamblia Not Detected     Adenovirus F40/41 Not Detected     Astrovirus Not Detected     Norovirus GI/GII Not Detected     Rotavirus A Not Detected     Sapovirus (I, II, IV or V) Not Detected    Influenza Antigen, Rapid - Swab, Nasopharynx [[id number]]  (Normal) Collected:  01/17/18 1724    Lab Status:  Final result Specimen:  Swab from Nasopharynx Updated:  01/17/18 1745     Influenza A Ag, EIA Negative     Influenza B Ag, EIA Negative    Narrative:         Recommend confirmation of negative results by viral culture or molecular assay.    Blood Culture With HAILE - Blood, [[id number]]  (Abnormal) Collected:  01/03/18 1859    Lab Status:  Final result Specimen:  Blood from Arm, Right Updated:  01/09/18 0759     Blood Culture Abnormal Stain (A)      Staphylococcus, coagulase negative (A)      Probable contaminant          Isolated from Anaerobic Bottle     Gram Stain Result Gram positive cocci    Narrative:       No growth aerobic bottle.    Blood Culture With HAILE - Blood, [[id number]]  (Normal) Collected:  01/03/18 1900    Lab Status:  Final result Specimen:  Blood from Arm, Left Updated:  01/08/18 1946     Blood Culture No growth at 5 days    Blood Culture ID, PCR - Blood, [[id number]]  (Abnormal) Collected:  01/03/18 1859    Lab Status:  Edited  Result - FINAL Specimen:  Blood from Arm, Right Updated:  01/06/18 0838     BCID, PCR Staphylococcus spp, not aureus. Identification by BCID PCR. (C)      Corrected result. Previous result was Staphylococcus species, not aureus. mecA (methicillin resistance gene) detected. Identification by BCID PCR. on 1/5/2018 at 0348 CST       Urine Culture - Urine, Urine, Clean Catch [[id number]]  (Normal) Collected:  01/04/18 0715    Lab Status:  Final result Specimen:  Urine from Urine, Clean Catch Updated:  01/06/18 0618     Urine Culture No growth at 2 days                Radiology:   Imaging Results (last 72 hours)     Procedure Component Value Units Date/Time    XR Chest 1 View [[id number]] Collected:  01/20/18 1230     Updated:  01/20/18 1235    Narrative:       XR CHEST 1 VW- 1/20/2018 11:44 AM CST     HISTORY: SOA, recheck Atelectasis, possible pneumonia; Z74.09-Other  reduced mobility; Z74.09-Other reduced mobility       COMPARISON: Exam dated 01/17/2018.     FINDINGS:   Reidentified elevation of the right hemidiaphragm. There appears to be  mild increased retrocardiac density as compared with the prior exam. The  cardiomediastinal silhouette and pulmonary vascularity are within normal  limits.      The osseous structures and surrounding soft tissues demonstrate no acute  abnormality. Right total shoulder arthroplasty with advanced  degenerative changes at the left shoulder.       Impression:       1. Mild increased retrocardiac density on this exam which may reflect  increasing atelectasis or perhaps a developing infiltrate in the left  lower lobe. Otherwise stable exam.  2. Stable elevation of the right hemidiaphragm with volume loss in the  right lung base.        This report was finalized on 01/20/2018 12:32 by Dr Alfonso Hercules, .          Assessment/Plan     Hospital Problem List     RUBY (acute kidney injury)    DVT (deep venous thrombosis)    Anemia    GI bleed          IMPRESSION:  Fever-Resolved  C.  difficile-antibiotics have been discontinued other than Flagyl and fluconazole  Intertrigo-overall improved with topical therapy and fluconazole      RECOMMENDATION:   ·  Continue metronidazole-would plan an additional 7-10 days.  Would recommend that nursing home doctor continue Flagyl until stools are back to baseline or if she does not continue to improve, she would need to be switched to oral vancomycin.    · Discontinue fluconazole after dosing today    · Continue aggressive topical care for intertrigo and leonora Rectal and perineal skin changes.  Discussed with patient that she will need to allow air to get to the paranasal area and upper thigh area in order for this to heal.        Luz Rahman MD  01/23/18  10:49 AM

## 2018-01-24 ENCOUNTER — APPOINTMENT (OUTPATIENT)
Dept: GENERAL RADIOLOGY | Facility: HOSPITAL | Age: 78
End: 2018-01-24

## 2018-01-24 LAB
ALBUMIN SERPL-MCNC: 2.8 G/DL (ref 3.5–5)
ALBUMIN/GLOB SERPL: 1 G/DL (ref 1.1–2.5)
ALP SERPL-CCNC: 68 U/L (ref 24–120)
ALT SERPL W P-5'-P-CCNC: 33 U/L (ref 0–54)
ANION GAP SERPL CALCULATED.3IONS-SCNC: 6 MMOL/L (ref 4–13)
AST SERPL-CCNC: 21 U/L (ref 7–45)
BILIRUB SERPL-MCNC: 0.2 MG/DL (ref 0.1–1)
BUN BLD-MCNC: 13 MG/DL (ref 5–21)
BUN/CREAT SERPL: 12.4 (ref 7–25)
CALCIUM SPEC-SCNC: 10.4 MG/DL (ref 8.4–10.4)
CHLORIDE SERPL-SCNC: 105 MMOL/L (ref 98–110)
CO2 SERPL-SCNC: 34 MMOL/L (ref 24–31)
CREAT BLD-MCNC: 1.05 MG/DL (ref 0.5–1.4)
DEPRECATED RDW RBC AUTO: 50.2 FL (ref 40–54)
EOSINOPHIL # BLD MANUAL: 0.48 10*3/MM3 (ref 0–0.7)
EOSINOPHIL NFR BLD MANUAL: 7 % (ref 0–4)
ERYTHROCYTE [DISTWIDTH] IN BLOOD BY AUTOMATED COUNT: 15.5 % (ref 12–15)
GFR SERPL CREATININE-BSD FRML MDRD: 51 ML/MIN/1.73
GFR SERPL CREATININE-BSD FRML MDRD: 62 ML/MIN/1.73
GLOBULIN UR ELPH-MCNC: 2.9 GM/DL
GLUCOSE BLD-MCNC: 103 MG/DL (ref 70–100)
HCT VFR BLD AUTO: 33.3 % (ref 37–47)
HGB BLD-MCNC: 10.6 G/DL (ref 12–16)
LYMPHOCYTES # BLD MANUAL: 0.76 10*3/MM3 (ref 0.72–4.86)
LYMPHOCYTES NFR BLD MANUAL: 11 % (ref 15–45)
LYMPHOCYTES NFR BLD MANUAL: 6 % (ref 4–12)
MCH RBC QN AUTO: 28.2 PG (ref 28–32)
MCHC RBC AUTO-ENTMCNC: 31.8 G/DL (ref 33–36)
MCV RBC AUTO: 88.6 FL (ref 82–98)
MONOCYTES # BLD AUTO: 0.41 10*3/MM3 (ref 0.19–1.3)
NEUTROPHILS # BLD AUTO: 5.15 10*3/MM3 (ref 1.87–8.4)
NEUTROPHILS NFR BLD MANUAL: 73 % (ref 39–78)
NEUTS BAND NFR BLD MANUAL: 2 % (ref 0–10)
PLAT MORPH BLD: NORMAL
PLATELET # BLD AUTO: 258 10*3/MM3 (ref 130–400)
PMV BLD AUTO: 10.4 FL (ref 6–12)
POTASSIUM BLD-SCNC: 4.1 MMOL/L (ref 3.5–5.3)
PROT SERPL-MCNC: 5.7 G/DL (ref 6.3–8.7)
RBC # BLD AUTO: 3.76 10*6/MM3 (ref 4.2–5.4)
RBC MORPH BLD: NORMAL
SCAN SLIDE: NORMAL
SODIUM BLD-SCNC: 145 MMOL/L (ref 135–145)
VARIANT LYMPHS NFR BLD MANUAL: 1 % (ref 0–5)
WBC MORPH BLD: NORMAL
WBC NRBC COR # BLD: 6.87 10*3/MM3 (ref 4.8–10.8)

## 2018-01-24 PROCEDURE — 73130 X-RAY EXAM OF HAND: CPT

## 2018-01-24 PROCEDURE — 80053 COMPREHEN METABOLIC PANEL: CPT | Performed by: FAMILY MEDICINE

## 2018-01-24 PROCEDURE — 94799 UNLISTED PULMONARY SVC/PX: CPT

## 2018-01-24 PROCEDURE — 85025 COMPLETE CBC W/AUTO DIFF WBC: CPT | Performed by: FAMILY MEDICINE

## 2018-01-24 PROCEDURE — 94760 N-INVAS EAR/PLS OXIMETRY 1: CPT

## 2018-01-24 PROCEDURE — 97110 THERAPEUTIC EXERCISES: CPT

## 2018-01-24 PROCEDURE — 85007 BL SMEAR W/DIFF WBC COUNT: CPT | Performed by: FAMILY MEDICINE

## 2018-01-24 RX ORDER — SACCHAROMYCES BOULARDII 250 MG
250 CAPSULE ORAL DAILY
Start: 2018-01-24

## 2018-01-24 RX ORDER — NYSTATIN 100000 [USP'U]/G
POWDER TOPICAL EVERY 12 HOURS SCHEDULED
Start: 2018-01-24

## 2018-01-24 RX ORDER — METRONIDAZOLE 500 MG/1
500 TABLET ORAL EVERY 8 HOURS SCHEDULED
Qty: 27 TABLET | Refills: 0 | Status: SHIPPED | OUTPATIENT
Start: 2018-01-24 | End: 2018-02-02

## 2018-01-24 RX ADMIN — Medication 250 MG: at 09:12

## 2018-01-24 RX ADMIN — METRONIDAZOLE 500 MG: 500 TABLET ORAL at 05:11

## 2018-01-24 RX ADMIN — IPRATROPIUM BROMIDE AND ALBUTEROL SULFATE 3 ML: 2.5; .5 SOLUTION RESPIRATORY (INHALATION) at 10:36

## 2018-01-24 RX ADMIN — IPRATROPIUM BROMIDE AND ALBUTEROL SULFATE 3 ML: 2.5; .5 SOLUTION RESPIRATORY (INHALATION) at 06:28

## 2018-01-24 RX ADMIN — PANTOPRAZOLE SODIUM 40 MG: 40 TABLET, DELAYED RELEASE ORAL at 05:11

## 2018-01-24 RX ADMIN — MICONAZOLE NITRATE: 20 CREAM TOPICAL at 23:42

## 2018-01-24 RX ADMIN — POTASSIUM CHLORIDE 20 MEQ: 750 CAPSULE, EXTENDED RELEASE ORAL at 09:12

## 2018-01-24 RX ADMIN — MICONAZOLE NITRATE: 20 CREAM TOPICAL at 09:12

## 2018-01-24 RX ADMIN — METRONIDAZOLE 500 MG: 500 TABLET ORAL at 14:11

## 2018-01-24 RX ADMIN — Medication 250 MG: at 21:05

## 2018-01-24 RX ADMIN — METRONIDAZOLE 500 MG: 500 TABLET ORAL at 21:05

## 2018-01-24 RX ADMIN — IPRATROPIUM BROMIDE AND ALBUTEROL SULFATE 3 ML: 2.5; .5 SOLUTION RESPIRATORY (INHALATION) at 14:32

## 2018-01-24 RX ADMIN — NYSTATIN: 100000 POWDER TOPICAL at 23:42

## 2018-01-24 RX ADMIN — IPRATROPIUM BROMIDE AND ALBUTEROL SULFATE 3 ML: 2.5; .5 SOLUTION RESPIRATORY (INHALATION) at 18:58

## 2018-01-24 NOTE — PROGRESS NOTES
Continued Stay Note  Psychiatric     Patient Name: Tricia Hernandez  MRN: [id number]  Today's Date: 1/24/2018    Admit Date: 1/3/2018          Discharge Plan       01/24/18 1146    Case Management/Social Work Plan    Plan SPOKE TO JAY JAY AT [facility] AND THEY WILL NOT ACCEPT PT DUE TO PT BEING POSITIVE FOR CDIFF. JAY JAY STATES THEY DO NOT HAVE A PRIVATE ROOM TO ACCOMDATE PT. [facility]XING OUT TO [facility] AND [facility]. AWAIT ANSWERS.               Discharge Codes     None        Expected Discharge Date and Time     Expected Discharge Date Expected Discharge Time    Jan 24, 2018             MILY Mcleod

## 2018-01-24 NOTE — DISCHARGE PLACEMENT REQUEST
"  PLEASE CALL CATHY [phone] AFTER REFERRAL REVIEWED. PT IS READY FOR DC WHEN BED AVAILABLE. PT DOES HAVE CDIFF BUT IS BEING TREATED WITH PO FLAGYL    Tricia Solitario (77 y.o. Female)     Date of Birth Social Security Number Address Home Phone MRN    1940  [address] [facility] [phone] [id number]    Mandaeism Marital Status          Seventh Day Scientologist        Admission Date Admission Type Admitting Provider Attending Provider Department, Room/Bed    1/3/18 Emergency Alec Granados MD Truong, Khai C, MD [facility] 3C, 375/1    Discharge Date Discharge Disposition Discharge Destination         Skilled Nursing Facility (DC - External)             Attending Provider: Alec Granados MD     Allergies:  No Known Allergies    Isolation:  Spore   Infection:  C.difficile (01/21/18)   Code Status:  Conditional    Ht:  160 cm (62.99\")   Wt:  151 kg (331 lb 12.8 oz)    Admission Cmt:  None   Principal Problem:  None                Active Insurance as of 1/3/2018     Primary Coverage     Payor Plan Insurance Group Employer/Plan Group    MEDICARE MEDICARE A & B      Payor Plan Address Payor Plan Phone Number Effective From Effective To    [address] [phone] 8/1/2007     [place] [address]       Subscriber Name Subscriber Birth Date Member ID       TRICIA SOLITARIO 1940 [id number]                 Emergency Contacts      (Rel.) Home Phone Work Phone Mobile Phone    Chiquis Ordonez (Daughter) [phone] -- [phone]    Austyn Solitario -- -- [phone]               History & Physical      H&P filed by Michele Tom MD at 1/4/2018 12:08 AM      Scan on 1/3/2018 : [facility] - 01/03/2018 (below)              Electronically signed by Interface, Scans Incoming at 1/4/2018 12:08 AM      Wilfred Pedersen MD at 1/3/2018  5:58 PM              [facility] Medicine Services  HISTORY AND " PHYSICAL    Date of Admission: 1/3/2018  Primary Care Physician: Ed Hanson MD    Subjective     Chief Complaint: Worsening renal function    History of Present Illness   The patient is a pleasant 77 year old lady with a history of hypertension, morbid obesity and recent right shoulder surgery.  She had surgery on her Right shoulder at the end of November.  She presented to [facility] on January 1st complaining of nausea, vomiting and diarrhea.  At that time she had mentioned blood in her stool.  She was admitted with IV Hydration.  She eventually became hypotensive and was sent to their ICU and put on Levophed.  She states she was told that she was Septic.  On the records, she appears to have been on IV Levaquin there.      She had decreased pulses in her toes and purple color, so duplex scans were obtained.  A completely occluding DVT was seen within the left common femoral, superficial femoral and popliteal veins.  She has since been anticoagulated with Lovenox.      A V/Q scan was also obtained and was read as high probability for PE.      Her most recent WBC count on 12/31 was 19.5        Review of Systems   Constitutional: Positive for fatigue. Negative for fever.   HENT: Negative for congestion and ear pain.    Eyes: Negative for pain and visual disturbance.   Respiratory: Negative for cough, shortness of breath and wheezing.    Cardiovascular: Negative for chest pain and palpitations.   Gastrointestinal: Negative for diarrhea, nausea and vomiting.   Endocrine: Negative for heat intolerance.   Genitourinary: Positive for decreased urine volume and difficulty urinating. Negative for dysuria and frequency.   Musculoskeletal: Positive for arthralgias. Negative for back pain.   Skin: Negative for rash and wound.   Neurological: Positive for weakness. Negative for dizziness and light-headedness.   Psychiatric/Behavioral: Negative for confusion. The patient is not nervous/anxious.    All  "other systems reviewed and are negative.       Otherwise complete ROS reviewed and negative except as mentioned in the HPI.    Past Medical History:   Past Medical History:   Diagnosis Date   • Anemia    • Arthritis    • Bladder spasms    • GERD (gastroesophageal reflux disease)    • Hypertension      Past Surgical History:  Past Surgical History:   Procedure Laterality Date   • ANKLE SURGERY     • KNEE SURGERY     • SHOULDER SURGERY       Social History:  reports that she has never smoked. She has never used smokeless tobacco. She reports that she does not drink alcohol or use illicit drugs.    Family History: family history is negative for Heart disease and Cancer.       Allergies:  No Known Allergies  Medications:  Prior to Admission medications    Not on File     Objective     Vital Signs: /68  Pulse 78  Temp 97 °F (36.1 °C) (Temporal Artery )   Resp 25  Ht 160 cm (63\")  Wt (!) 140 kg (307 lb 9.6 oz)  SpO2 95%  BMI 54.49 kg/m2  Physical Exam   Constitutional: She is oriented to person, place, and time. She appears well-developed and well-nourished. Nasal cannula in place.   HENT:   Head: Normocephalic and atraumatic.   Right Ear: External ear normal.   Left Ear: External ear normal.   Nose: Nose normal.   Mouth/Throat: Oropharynx is clear and moist.   Eyes: Conjunctivae and EOM are normal.   Neck: Normal range of motion. Neck supple.   Cardiovascular: Normal rate, regular rhythm and normal heart sounds.    Pulmonary/Chest: Effort normal and breath sounds normal. Tachypnea noted.   Abdominal: Soft. Bowel sounds are normal. She exhibits no distension. There is no tenderness.   Genitourinary: Rectal exam shows guaiac positive stool. Rectal exam shows no mass.   Genitourinary Comments: Dried blood around anus    Dark red stools   Musculoskeletal: Normal range of motion.   Neurological: She is alert and oriented to person, place, and time.   Skin: Skin is warm and dry.   Psychiatric: She has a normal " mood and affect. Her speech is normal and behavior is normal. Cognition and memory are normal.         Results Reviewed:  A completely occluding DVT was seen within the left common femoral, superficial femoral and popliteal veins.  She has since been anticoagulated with Lovenox.      A V/Q scan was also obtained and was read as high probability for PE.      Her most recent WBC count on 12/31     Assessment / Plan     1.  PE--Intermediate Risk with PESI Score 105  -She has an unfortunate combination of renal failure and thrombocytopenia as well as reported bloody stools  -Will Anticoagulate with Lovenox, renally dosed per pharmacy  -Will seek GI consult to do all that we can to prevent her from bleeding  -Will seek Vascular input for occlusive DVT and PE   -Stat Echo  -Recheck V/Q  -Consider Cardiology consult in AM if indicated  -EKG  -Cardiac Monitoring    2.  Occlusive Left-lower extremity DVT  -She has an unfortunate combination of renal failure and thrombocytopenia as well as reported bloody stools  -Will Anticoagulate with Lovenox, renally dosed per pharmacy  -Will seek GI consult to do all that we can to prevent her from bleeding  -Will seek Vascular input for occlusive DVT and PE   -Recheck Duplex scans    3:  GI Bleed  -Stools were dark red with a positive hemoccult  -Monitor H&H  -Protonix drip  -GI consult given anticoagulation  -Telemetry  -Transfuse as indicated    4.  ?Sepsis  -Most recent WBC count from [facility] is 19.5   -Patient is tachypneic to 24 here on the cardiac monitor.    -Has been hypotensive and on Levophed  -Hypotension could be related to PE  -Unfortunately, in the manner in which I am obtaining her care, I feel that the safest route is to restart a full Sepsis workup and cover with broad spectrum antibiotics for now.    -Cardiac Monitoring  -Pan cultures    5.  Acute Renal Failure  -Nephrology Consult  -Renal U/S  -Renal Labs  -Boggs placed    6.  Lactic Acidosis  -2.5 on most recent  "labs from [facility]  -Recheck    7.  Intractible Nausea and vomiting  -Zofran  -IVF    8.  Dehydration  -IVF  -Monitor UOP  -Boggs placed    9.  GERD  -Protonix drip    10.  HLD  -Statin  -Lipid panel    11.  Bladder Spasms  -Ditropan    12.  HTN  -Hold BP meds given low BP, RUBY  -Monitor BP    13.  Anemia  -Monitor H&H  -Protonix  -GI Consulted    14:  Morbid Obesity  -BMI:  54.4  -Dietician consult    15.  Hyperglycemia  -A1C  -Monitor FSBS  -SSI      Code Status: DNR     I discussed the patients findings and my recommendations with the patient, patient's daughter, RN    Estimated length of stay critically ill    Wilfred Pedersen MD   01/03/18   7:05 PM               Electronically signed by Wilfred Pedersen MD at 1/3/2018  7:22 PM        Hospital Medications (active)       Dose Frequency Start End    acetaminophen (TYLENOL) suppository 650 mg 650 mg Every 4 Hours PRN 1/3/2018     Sig - Route: Insert 1 suppository into the rectum Every 4 (Four) Hours As Needed for Fever (temperature greater than 101.5 F or headache). - Rectal    Linked Group 1:  \"Or\" Linked Group Details        acetaminophen (TYLENOL) tablet 650 mg 650 mg Every 4 Hours PRN 1/3/2018     Sig - Route: Take 2 tablets by mouth Every 4 (Four) Hours As Needed for Headache or Fever (fever greater than 101.5 F). - Oral    Linked Group 1:  \"Or\" Linked Group Details        dextrose (D50W) solution 25 g 25 g Every 15 Minutes PRN 1/3/2018     Sig - Route: Infuse 50 mL into a venous catheter Every 15 (Fifteen) Minutes As Needed for Low Blood Sugar (Blood Sugar Less Than 70, Patient Has IV Access - Unresponsive, NPO or Unable To Safely Swallow). - Intravenous    dextrose (GLUTOSE) oral gel 15 g 15 g Every 15 Minutes PRN 1/3/2018     Sig - Route: Take 15 g by mouth Every 15 (Fifteen) Minutes As Needed for Low Blood Sugar (Blood Sugar Less Than 70, Patient Alert, Is Not NPO & Can Safely Swallow). - Oral    fluconazole (DIFLUCAN) tablet 100 mg 100 mg Every 24 Hours " "1/21/2018 1/23/2018    Sig - Route: Take 1 tablet by mouth Daily. - Oral    glucagon (human recombinant) (GLUCAGEN DIAGNOSTIC) injection 1 mg 1 mg Every 15 Minutes PRN 1/3/2018     Sig - Route: Inject 1 mg under the skin Every 15 (Fifteen) Minutes As Needed (Blood Glucose Less Than 70 - Patient Without IV Access - Unresponsive, NPO or Unable To Safely Swallow). - Subcutaneous    ipratropium-albuterol (DUO-NEB) nebulizer solution 3 mL 3 mL 4 Times Daily - RT 1/21/2018     Sig - Route: Take 3 mL by nebulization 4 (Four) Times a Day. - Nebulization    loperamide (IMODIUM) capsule 2 mg 2 mg 4 Times Daily PRN 1/20/2018     Sig - Route: Take 1 capsule by mouth 4 (Four) Times a Day As Needed for Diarrhea. - Oral    metroNIDAZOLE (FLAGYL) tablet 500 mg 500 mg Every 8 Hours Scheduled 1/21/2018 1/28/2018    Sig - Route: Take 1 tablet by mouth Every 8 (Eight) Hours. - Oral    miconazole (MICOTIN) 2 % cream  Every 12 Hours Scheduled 1/21/2018     Sig - Route: Apply  topically Every 12 (Twelve) Hours. - Topical    nystatin (MYCOSTATIN) powder  Every 12 Hours Scheduled 1/21/2018     Sig - Route: Apply  topically Every 12 (Twelve) Hours. - Topical    ondansetron (ZOFRAN) injection 4 mg 4 mg Every 6 Hours PRN 1/3/2018     Sig - Route: Infuse 2 mL into a venous catheter Every 6 (Six) Hours As Needed for Nausea or Vomiting. - Intravenous    Linked Group 2:  \"Or\" Linked Group Details        ondansetron (ZOFRAN) tablet 4 mg 4 mg Every 6 Hours PRN 1/3/2018     Sig - Route: Take 1 tablet by mouth Every 6 (Six) Hours As Needed for Nausea or Vomiting. - Oral    Linked Group 2:  \"Or\" Linked Group Details        ondansetron ODT (ZOFRAN-ODT) disintegrating tablet 4 mg 4 mg Every 6 Hours PRN 1/3/2018     Sig - Route: Take 1 tablet by mouth Every 6 (Six) Hours As Needed for Nausea or Vomiting. - Oral    Linked Group 2:  \"Or\" Linked Group Details        pantoprazole (PROTONIX) EC tablet 40 mg 40 mg Every Early Morning 1/7/2018     Sig - Route: " Take 1 tablet by mouth Every Morning. - Oral    potassium chloride (MICRO-K) CR capsule 20 mEq 20 mEq Daily 1/23/2018     Sig - Route: Take 2 capsules by mouth Daily. - Oral    saccharomyces boulardii (FLORASTOR) capsule 250 mg 250 mg 2 Times Daily (BID) 1/20/2018     Sig - Route: Take 1 capsule by mouth 2 (Two) Times a Day. - Oral    sodium chloride 0.9 % flush 1-10 mL 1-10 mL As Needed 1/3/2018     Sig - Route: Infuse 1-10 mL into a venous catheter As Needed for Line Care. - Intravenous          Operative/Procedure Notes (last 7 days) (Notes from 1/17/2018 11:40 AM through 1/24/2018 11:40 AM)     No notes of this type exist for this encounter.           Physician Progress Notes (last 24 hours) (Notes from 1/23/2018 11:40 AM through 1/24/2018 11:40 AM)      Alec Granados MD at 1/23/2018  6:47 PM  Version 1 of 1             [facility] Medicine Services  INPATIENT PROGRESS NOTE    Length of Stay: 20  Date of Admission: 1/3/2018  Primary Care Physician: Ed Hanson MD    Subjective   Chief Complaint:Weakness    HPI   Patient condition main unchanged.  Patient is very weak.  Patient denies any chest pain or shortness of breath.  Shortness breath many upon ambulating.    Review of Systems   Constitutional: Positive for activity change, appetite change and fatigue. Negative for chills and fever.   HENT: Negative for hearing loss, nosebleeds, tinnitus and trouble swallowing.    Eyes: Negative for visual disturbance.   Respiratory: Positive for shortness of breath (only with ambulating ). Negative for cough, chest tightness and wheezing.    Cardiovascular: Negative for chest pain, palpitations and leg swelling.   Gastrointestinal: Positive for diarrhea. Negative for abdominal distention, abdominal pain, blood in stool, constipation, nausea and vomiting.   Endocrine: Negative for cold intolerance, heat intolerance, polydipsia, polyphagia and polyuria.   Genitourinary: Negative for  decreased urine volume, difficulty urinating, dysuria, flank pain, frequency and hematuria.   Musculoskeletal: Positive for back pain, gait problem and myalgias. Negative for arthralgias and joint swelling.   Skin: Negative for rash.   Allergic/Immunologic: Negative for immunocompromised state.   Neurological: Positive for weakness. Negative for dizziness, syncope, light-headedness and headaches.   Hematological: Negative for adenopathy. Does not bruise/bleed easily.   Psychiatric/Behavioral: Negative for confusion and sleep disturbance. The patient is not nervous/anxious.           All pertinent negatives and positives are as above. All other systems have been reviewed and are negative unless otherwise stated.     Objective    Temp:  [96.4 °F (35.8 °C)-97.7 °F (36.5 °C)] 96.4 °F (35.8 °C)  Heart Rate:  [59-92] 92  Resp:  [16-20] 18  BP: (115-128)/(43-87) 118/87  No intake or output data in the 24 hours ending 01/23/18 1847  Physical Exam  Constitutional: She is oriented to person, place, and time. She appears well-developed.   HENT:   Head: Normocephalic and atraumatic.   Eyes: Conjunctivae and EOM are normal. Pupils are equal, round, and reactive to light.   Neck: Neck supple. No JVD present. No thyromegaly present.   Cardiovascular: Normal rate, regular rhythm, normal heart sounds and intact distal pulses.  Exam reveals no gallop and no friction rub.    No murmur heard.  Pulmonary/Chest: Effort normal and breath sounds normal. No respiratory distress. She has no wheezes. She has no rales. She exhibits no tenderness.   Shallow breathing   Abdominal: Soft. Bowel sounds are normal. She exhibits no distension. There is no tenderness. There is no rebound and no guarding.   Gross morbid obesity   Musculoskeletal: Normal range of motion. She exhibits no edema, tenderness or deformity.   Lymphadenopathy:     She has no cervical adenopathy.   Neurological: She is alert and oriented to person, place, and time. She displays  normal reflexes. No cranial nerve deficit. She exhibits abnormal muscle tone. Coordination abnormal.   Skin: Skin is warm and dry. No rash noted.   Psychiatric: She has a normal mood and affect. Her behavior is normal. Judgment and thought content normal.    Results Review:  Lab Results (last 24 hours)     Procedure Component Value Units Date/Time    Blood Culture - Blood, [[id number]]  (Normal) Collected:  01/17/18 1813    Specimen:  Blood from Arm, Left Updated:  01/22/18 2116     Blood Culture No growth at 5 days    CBC & Differential [[id number]] Collected:  01/23/18 0532    Specimen:  Blood Updated:  01/23/18 0636    Narrative:       The following orders were created for panel order CBC & Differential.  Procedure                               Abnormality         Status                     ---------                               -----------         ------                     CBC Auto Differential[[id number]]        Abnormal            Final result                 Please view results for these tests on the individual orders.    CBC Auto Differential [[id number]]  (Abnormal) Collected:  01/23/18 0532    Specimen:  Blood Updated:  01/23/18 0636     WBC 7.09 10*3/mm3      RBC 3.34 (L) 10*6/mm3      Hemoglobin 9.4 (L) g/dL      Hematocrit 28.9 (L) %      MCV 86.5 fL      MCH 28.1 pg      MCHC 32.5 (L) g/dL      RDW 15.1 (H) %      RDW-SD 47.8 fl      MPV 11.0 fL      Platelets 203 10*3/mm3      Neutrophil % 73.1 %      Lymphocyte % 11.8 (L) %      Monocyte % 12.3 (H) %      Eosinophil % 0.0 %      Basophil % 0.3 %      Immature Grans % 2.5 %      Neutrophils, Absolute 5.18 10*3/mm3      Lymphocytes, Absolute 0.84 10*3/mm3      Monocytes, Absolute 0.87 10*3/mm3      Eosinophils, Absolute 0.00 10*3/mm3      Basophils, Absolute 0.02 10*3/mm3      Immature Grans, Absolute 0.18 (H) 10*3/mm3      nRBC 0.0 /100 WBC     Comprehensive Metabolic Panel [[id number]]  (Abnormal) Collected:  01/23/18 0532    Specimen:  Blood Updated:   01/23/18 0654     Glucose 131 (H) mg/dL      BUN 13 mg/dL      Creatinine 0.96 mg/dL      Sodium 142 mmol/L      Potassium 4.2 mmol/L      Chloride 105 mmol/L      CO2 32.0 (H) mmol/L      Calcium 10.0 mg/dL      Total Protein 5.2 (L) g/dL      Albumin 2.30 (L) g/dL      ALT (SGPT) 27 U/L      AST (SGOT) 19 U/L      Alkaline Phosphatase 56 U/L      Total Bilirubin <0.1 (L) mg/dL      eGFR Non African Amer 57 (L) mL/min/1.73      eGFR  African Amer 69 mL/min/1.73      Globulin 2.9 gm/dL      A/G Ratio 0.8 (L) g/dL      BUN/Creatinine Ratio 13.5     Anion Gap 5.0 mmol/L     Narrative:       The MDRD GFR formula is only valid for adults with stable renal function between ages 18 and 70.           Cultures:  Blood Culture   Date Value Ref Range Status   01/17/2018 No growth at 5 days  Final   01/17/2018 No growth at 5 days  Final       Radiology Data:    Imaging Results (last 24 hours)     ** No results found for the last 24 hours. **          No Known Allergies    Scheduled meds:     ipratropium-albuterol 3 mL Nebulization 4x Daily - RT   metroNIDAZOLE 500 mg Oral Q8H   miconazole  Topical Q12H   nystatin  Topical Q12H   pantoprazole 40 mg Oral Q AM   potassium chloride 20 mEq Oral Daily   saccharomyces boulardii 250 mg Oral BID       PRN meds:  •  acetaminophen **OR** acetaminophen  •  dextrose  •  dextrose  •  glucagon (human recombinant)  •  loperamide  •  ondansetron **OR** ondansetron ODT **OR** ondansetron  •  sodium chloride    Assessment/Plan     Active Problems:    RUBY (acute kidney injury)    DVT (deep venous thrombosis)    Anemia    GI bleed      Plan:  Occlusive left lower extremity DVT.  No anticoagulation due to GI bleed.  Patient had 2 episodes GI bleed due to anticoagulation.  Patient has an IVC filter in place.     GI bleed-GI to follow.  Status post transfusion 4 units of blood last week.  No anticoagulation second episode of GI bleed.     Hospital acquire pneumonia-Zosyn, then DC by infectious  disease.  Continue duo nebs..  Nystatin powder.  Miconazole cream.     Fever/diarrhea-on Imodium, probiotic.  C. difficile positive.  On Flagyl.     Hyperlipidemia     Hypokalemia.  Potassium by mouth daily     Acute renal failure/dehydration-resolved     Fungal/cellulitis-Diflucan     Anemia-stable     Deconditioning-PT and OT consult     Blood culture no growth in 5 days.     Discharge Plannin- 2 days    Alec Granados MD   18   6:47 PM                     Electronically signed by Alec Granados MD at 2018  6:50 PM        Consult Notes (last 24 hours) (Notes from 2018 11:40 AM through 2018 11:40 AM)     No notes of this type exist for this encounter.           Physical Therapy Notes (last 24 hours) (Notes from 2018 11:40 AM through 2018 11:40 AM)      Josephine Miller PTA at 2018  3:36 PM  Version 1 of 1         Acute Care - Physical Therapy Treatment Note  [facility]     Patient Name: Tricia Hernandez  : 1940  MRN: [id number]  Today's Date: 2018  Onset of Illness/Injury or Date of Surgery Date: 18  Date of Referral to PT: 18  Referring Physician: Dr. Granados    Admit Date: 1/3/2018    Visit Dx:    ICD-10-CM ICD-9-CM   1. Impaired mobility and ADLs Z74.09 799.89   2. Impaired mobility Z74.09 799.89     Patient Active Problem List   Diagnosis   • RUBY (acute kidney injury)   • DVT (deep venous thrombosis)   • Anemia   • GI bleed               Adult Rehabilitation Note       18 1500 18 1030       Rehab Assessment/Intervention    Discipline physical therapy assistant  - physical therapy assistant  -     Document Type therapy note (daily note)  - therapy note (daily note)  -     Subjective Information agree to therapy;complains of;weakness;fatigue;dizziness  - agree to therapy;complains of;weakness;swelling  -     Precautions/Limitations fall precautions   R total shld 2017  - fall precautions   R total shld  -     Recorded  by [] Josephine Miller PTA [] Josephine Miller PTA     Pain Assessment    Pain Assessment No/denies pain  - No/denies pain  -     Recorded by [] Josephine Miller PTA [] Josephine Miller PTA     Bed Mobility, Assessment/Treatment    Bed Mobility, Assistive Device bed rails;head of bed elevated;draw sheet  - bed rails;head of bed elevated;draw sheet  -     Bed Mobility, Roll Left, [place] minimum assist (75% patient effort);verbal cues required  - minimum assist (75% patient effort);verbal cues required  -     Bed Mobility, Roll Right, [place] minimum assist (75% patient effort);verbal cues required  -AH minimum assist (75% patient effort);verbal cues required  -     Bed Mobility, Scoot/Bridge, [place] maximum assist (25% patient effort);dependent (less than 25% patient effort);2 person assist required  - maximum assist (25% patient effort);dependent (less than 25% patient effort);2 person assist required  -     Bed Mob, Supine to Sit, [place] moderate assist (50% patient effort);2 person assist required;verbal cues required  - moderate assist (50% patient effort);2 person assist required;verbal cues required  -     Bed Mob, Sit to Supine, [place] maximum assist (25% patient effort);2 person assist required;verbal cues required  - maximum assist (25% patient effort);2 person assist required;verbal cues required  -     Bed Mobility, Safety Issues decreased use of arms for pushing/pulling;decreased use of legs for bridging/pushing  - decreased use of arms for pushing/pulling;decreased use of legs for bridging/pushing  -     Bed Mobility, Impairments strength decreased  - strength decreased  -     Recorded by [] Josephine Miller PTA [] Josephine Miller PTA     Transfer Assessment/Treatment    Transfers, Sit-Stand [place] moderate assist (50% patient effort);maximum assist (25% patient effort);2 person assist required;verbal cues required  - moderate  assist (50% patient effort);maximum assist (25% patient effort);2 person assist required;verbal cues required  -     Transfers, Stand-Sit [place] moderate assist (50% patient effort);2 person assist required;verbal cues required  - moderate assist (50% patient effort);2 person assist required;verbal cues required  -     Transfers, Sit-Stand-Sit, Assist Device gigi walker  - gigi walker  -     Transfer, Comment stood x 1  -AH pt stood at BS x 3 with gigi wx mod- max of 2  -     Recorded by [] Josephine Miller PTA [] Josephine Miller PTA     Motor Skills/Interventions    Additional Documentation  Balance Skills Training (Group)  -     Recorded by  [] Josephine Miller PTA     Balance Skills Training    Sitting-Level of Assistance Close supervision  - Close supervision  -     Sitting-Balance Support Feet supported  - Feet supported  -     Sitting # of Minutes  30  -     Standing-Level of Assistance Minimum assistance;x2  - Minimum assistance;x2  -AH     Static Standing Balance Support assistive device  - assistive device  -     Recorded by [] Josephine Miller PTA [] Josephine Miller PTA     Therapy Exercises    Bilateral Lower Extremities AROM:;10 reps;sitting   constant cue to keep pt on task  - AROM:;10 reps;sitting   constant cue to keep pt on task  -     Recorded by [] Josephine Miller PTA [] Josephine Miller PTA     Positioning and Restraints    Pre-Treatment Position in bed  - in bed  -     Post Treatment Position bed  - bed  -     In Bed fowlers;call light within reach;encouraged to call for assist;with family/caregiver  - fowlers;call light within reach;encouraged to call for assist  -     Recorded by [] Josephine Miller PTA [] Josephine Miller PTA       User Key  (r) = Recorded By, (t) = Taken By, (c) = Cosigned By    Initials Name Effective Dates     Josephine Miller PTA 08/02/16 -                 IP PT Goals       01/22/18 1247 01/10/18 1004        Bed Mobility PT LTG    Bed Mobility PT LTG, Date Established  01/10/18  -PB (r) JM (t) PB (c)     Bed Mobility PT LTG, Time to Achieve  by discharge  -PB (r) JM (t) PB (c)     Bed Mobility PT LTG, Activity Type  all bed mobility  -PB (r) JM (t) PB (c)     Bed Mobility PT LTG, [place] Level  minimum assist (75% patient effort)  -PB (r) JM (t) PB (c)     Bed Mobility PT Goal  LTG, Assist Device  bed rails  -PB (r) JM (t) PB (c)     Bed Mobility PT LTG, Date Goal Reviewed 01/22/18  -PB      Bed Mobility PT LTG, Outcome goal ongoing  -PB      Bed Mobility PT LTG, Reason Goal Not Met progress slower than expected  -PB      Transfer Training PT LTG    Transfer Training PT LTG, Date Established  01/10/18  -PB (r) JM (t) PB (c)     Transfer Training PT LTG, Time to Achieve  by discharge  -PB (r) JM (t) PB (c)     Transfer Training PT LTG, Activity Type  bed to chair /chair to bed;sit to stand/stand to sit  -PB (r) JM (t) PB (c)     Transfer Training PT LTG, [place] Level minimum assist (75% patient effort);2 person assist required  -PB minimum assist (75% patient effort)  -PB (r) JM (t) PB (c)     Transfer Training PT LTG, Assist Device  walker, gigi  -PB (r) JM (t) PB (c)     Transfer Training PT  LTG, Date Goal Reviewed 01/22/18  -PB      Transfer Training PT LTG, Outcome goal revised  -PB      Transfer Training PT LTG, Reason Goal Not Met goals revised this date  -PB      Strength Goal PT LTG    Strength Goal PT LTG, Date Established  01/10/18  -PB (r) JM (t) PB (c)     Strength Goal PT LTG, Time to Achieve  by discharge  -PB (r) JM (t) PB (c)     Strength Goal PT LTG, Functional Goal  Perform 15 reps of EOB B LE AROM exercises  -PB (r) JM (t) PB (c)     Strength Goal PT LTG, Date Goal Reviewed 01/22/18  -PB      Strength Goal PT LTG, Outcome goal ongoing  -PB      Strength Goal PT LTG, Reason Goal Not Met progress slower than expected  -PB        User Key  (r) = Recorded By, (t) = Taken By, (c) = Cosigned By     Initials Name Provider Type     Prasanth Abdi, PT DPT Physical Therapist    SIMONA Crane, PT Student PT Student          Physical Therapy Education     Title: PT OT SLP Therapies (Done)     Topic: Physical Therapy (Done)     Point: Mobility training (Done)    Learning Progress Summary    Learner Readiness Method Response Comment Documented by Status   Patient Acceptance E VU trans  01/23/18 1116 Done    Acceptance E NR benefits of activity, postitioning, bed mobility  01/22/18 1253 Active    Acceptance E VU,NR   01/18/18 1131 Done    Acceptance E VU safety concerns  01/16/18 1159 Done    Acceptance E,D NR Benefits of exercise  01/15/18 1059 Active    Acceptance E,D NR Bed mobility, transfers, planof care  01/13/18 1319 Active    Acceptance E NR Bed mobility, transfers, planof care, benefit of activity  01/12/18 1307 Active    Acceptance E VU progressin of POC  01/11/18 1147 Done    Acceptance E VU Pt was educated on importance of activity and potential discharge plans.  01/10/18 0959 Done                      User Key     Initials Effective Dates Name Provider Type Discipline     08/02/16 -  Josephine Miller, PTA Physical Therapy Assistant PT     06/22/15 -  Rhonda Gutierrez, PTA Physical Therapy Assistant PT     08/02/16 -  Blanca Stock, PTA Physical Therapy Assistant PT     08/02/16 -  Prasanth Abdi, PT DPT Physical Therapist PT     01/10/18 -  Jim Crane, PT Student PT Student PT                    PT Recommendation and Plan  Anticipated Equipment Needs At Discharge: front wheeled walker  Anticipated Discharge Disposition: skilled nursing facility  Planned Therapy Interventions: balance training, bed mobility training, home exercise program, patient/family education, strengthening, transfer training  PT Frequency: daily, 2 times/day, per priority policy  Plan of Care Review  Plan Of Care Reviewed With: patient  Progress: progress toward functional goals is  gradual  Outcome Summary/Follow up Plan: pt trans to EOB mod of 2, pt sat EOB performed BLE exercises, pt stood x 3 at BS with gigi wx mod-max for sit-stand, once standing pt min of 2 to maintain standing balance. Pt trans BTB max of 2. Pt will need SNF at d/c          Outcome Measures       01/23/18 1100 01/22/18 1111       How much help from another person do you currently need...    Turning from your back to your side while in flat bed without using bedrails? 2  -AH 2  -PB     Moving from lying on back to sitting on the side of a flat bed without bedrails? 2  -AH 2  -PB     Moving to and from a bed to a chair (including a wheelchair)? 1  - 1  -PB     Standing up from a chair using your arms (e.g., wheelchair, bedside chair)? 1  - 1  -PB     Climbing 3-5 steps with a railing? 1  - 1  -PB     To walk in hospital room? 1  - 1  -PB     AM-PAC 6 Clicks Score 8  - 8  -PB     Functional Assessment    Outcome Measure Options AM-PAC 6 Clicks Basic Mobility (PT)  -AH AM-PAC 6 Clicks Basic Mobility (PT)  -PB       User Key  (r) = Recorded By, (t) = Taken By, (c) = Cosigned By    Initials Name Provider Type     Josephine Miller PTA Physical Therapy Assistant    PB Prasanth Abdi, PT DPT Physical Therapist           Time Calculation:         PT Charges       01/23/18 1535 01/23/18 1120       Time Calculation    Start Time 1500  - 1030  -     Stop Time 1530  - 1108  -     Time Calculation (min) 30 min  - 38 min  -     PT Received On  01/23/18  -     PT Goal Re-Cert Due Date  02/01/18  -     Time Calculation- PT    Total Timed Code Minutes- PT 30 minute(s)  - 38 minute(s)  -       User Key  (r) = Recorded By, (t) = Taken By, (c) = Cosigned By    Initials Name Provider Type     Josephine Miller PTA Physical Therapy Assistant          Therapy Charges for Today     Code Description Service Date Service Provider Modifiers Qty    85857733027  PT THERAPEUTIC ACT EA 15 MIN 1/23/2018 Josephine Miller  PTA GP 2    16981900440 HC PT THER PROC EA 15 MIN 1/23/2018 Josephine Miller, PTA GP 1    94921893177 HC PT THERAPEUTIC ACT EA 15 MIN 1/23/2018 Josephine Miller, SHANTEL GP 2          PT G-Codes  Outcome Measure Options: AM-PAC 6 Clicks Basic Mobility (PT)  Score: 8  Functional Limitation: Changing and maintaining body position  Changing and Maintaining Body Position Current Status (): At least 80 percent but less than 100 percent impaired, limited or restricted  Changing and Maintaining Body Position Goal Status (): At least 60 percent but less than 80 percent impaired, limited or restricted    Josephine Miller PTA  1/23/2018          Electronically signed by Josephine Miller PTA at 1/23/2018  3:37 PM

## 2018-01-24 NOTE — PROGRESS NOTES
[facility] Medicine Services  INPATIENT PROGRESS NOTE    Length of Stay: 20  Date of Admission: 1/3/2018  Primary Care Physician: Ed Hanson MD    Subjective   Chief Complaint:Weakness    HPI   Patient condition main unchanged.  Patient is very weak.  Patient denies any chest pain or shortness of breath.  Shortness breath many upon ambulating.    Review of Systems   Constitutional: Positive for activity change, appetite change and fatigue. Negative for chills and fever.   HENT: Negative for hearing loss, nosebleeds, tinnitus and trouble swallowing.    Eyes: Negative for visual disturbance.   Respiratory: Positive for shortness of breath (only with ambulating ). Negative for cough, chest tightness and wheezing.    Cardiovascular: Negative for chest pain, palpitations and leg swelling.   Gastrointestinal: Positive for diarrhea. Negative for abdominal distention, abdominal pain, blood in stool, constipation, nausea and vomiting.   Endocrine: Negative for cold intolerance, heat intolerance, polydipsia, polyphagia and polyuria.   Genitourinary: Negative for decreased urine volume, difficulty urinating, dysuria, flank pain, frequency and hematuria.   Musculoskeletal: Positive for back pain, gait problem and myalgias. Negative for arthralgias and joint swelling.   Skin: Negative for rash.   Allergic/Immunologic: Negative for immunocompromised state.   Neurological: Positive for weakness. Negative for dizziness, syncope, light-headedness and headaches.   Hematological: Negative for adenopathy. Does not bruise/bleed easily.   Psychiatric/Behavioral: Negative for confusion and sleep disturbance. The patient is not nervous/anxious.           All pertinent negatives and positives are as above. All other systems have been reviewed and are negative unless otherwise stated.     Objective    Temp:  [96.4 °F (35.8 °C)-97.7 °F (36.5 °C)] 96.4 °F (35.8 °C)  Heart Rate:  [59-92] 92  Resp:   [16-20] 18  BP: (115-128)/(43-87) 118/87  No intake or output data in the 24 hours ending 01/23/18 1847  Physical Exam  Constitutional: She is oriented to person, place, and time. She appears well-developed.   HENT:   Head: Normocephalic and atraumatic.   Eyes: Conjunctivae and EOM are normal. Pupils are equal, round, and reactive to light.   Neck: Neck supple. No JVD present. No thyromegaly present.   Cardiovascular: Normal rate, regular rhythm, normal heart sounds and intact distal pulses.  Exam reveals no gallop and no friction rub.    No murmur heard.  Pulmonary/Chest: Effort normal and breath sounds normal. No respiratory distress. She has no wheezes. She has no rales. She exhibits no tenderness.   Shallow breathing   Abdominal: Soft. Bowel sounds are normal. She exhibits no distension. There is no tenderness. There is no rebound and no guarding.   Gross morbid obesity   Musculoskeletal: Normal range of motion. She exhibits no edema, tenderness or deformity.   Lymphadenopathy:     She has no cervical adenopathy.   Neurological: She is alert and oriented to person, place, and time. She displays normal reflexes. No cranial nerve deficit. She exhibits abnormal muscle tone. Coordination abnormal.   Skin: Skin is warm and dry. No rash noted.   Psychiatric: She has a normal mood and affect. Her behavior is normal. Judgment and thought content normal.   Results Review:  Lab Results (last 24 hours)     Procedure Component Value Units Date/Time    Blood Culture - Blood, [[id number]]  (Normal) Collected:  01/17/18 1813    Specimen:  Blood from Arm, Left Updated:  01/22/18 2116     Blood Culture No growth at 5 days    CBC & Differential [[id number]] Collected:  01/23/18 0532    Specimen:  Blood Updated:  01/23/18 0636    Narrative:       The following orders were created for panel order CBC & Differential.  Procedure                               Abnormality         Status                     ---------                                -----------         ------                     CBC Auto Differential[[id number]]        Abnormal            Final result                 Please view results for these tests on the individual orders.    CBC Auto Differential [[id number]]  (Abnormal) Collected:  01/23/18 0532    Specimen:  Blood Updated:  01/23/18 0636     WBC 7.09 10*3/mm3      RBC 3.34 (L) 10*6/mm3      Hemoglobin 9.4 (L) g/dL      Hematocrit 28.9 (L) %      MCV 86.5 fL      MCH 28.1 pg      MCHC 32.5 (L) g/dL      RDW 15.1 (H) %      RDW-SD 47.8 fl      MPV 11.0 fL      Platelets 203 10*3/mm3      Neutrophil % 73.1 %      Lymphocyte % 11.8 (L) %      Monocyte % 12.3 (H) %      Eosinophil % 0.0 %      Basophil % 0.3 %      Immature Grans % 2.5 %      Neutrophils, Absolute 5.18 10*3/mm3      Lymphocytes, Absolute 0.84 10*3/mm3      Monocytes, Absolute 0.87 10*3/mm3      Eosinophils, Absolute 0.00 10*3/mm3      Basophils, Absolute 0.02 10*3/mm3      Immature Grans, Absolute 0.18 (H) 10*3/mm3      nRBC 0.0 /100 WBC     Comprehensive Metabolic Panel [[id number]]  (Abnormal) Collected:  01/23/18 0532    Specimen:  Blood Updated:  01/23/18 0654     Glucose 131 (H) mg/dL      BUN 13 mg/dL      Creatinine 0.96 mg/dL      Sodium 142 mmol/L      Potassium 4.2 mmol/L      Chloride 105 mmol/L      CO2 32.0 (H) mmol/L      Calcium 10.0 mg/dL      Total Protein 5.2 (L) g/dL      Albumin 2.30 (L) g/dL      ALT (SGPT) 27 U/L      AST (SGOT) 19 U/L      Alkaline Phosphatase 56 U/L      Total Bilirubin <0.1 (L) mg/dL      eGFR Non African Amer 57 (L) mL/min/1.73      eGFR  African Amer 69 mL/min/1.73      Globulin 2.9 gm/dL      A/G Ratio 0.8 (L) g/dL      BUN/Creatinine Ratio 13.5     Anion Gap 5.0 mmol/L     Narrative:       The MDRD GFR formula is only valid for adults with stable renal function between ages 18 and 70.           Cultures:  Blood Culture   Date Value Ref Range Status   01/17/2018 No growth at 5 days  Final   01/17/2018 No growth at 5 days   Final       Radiology Data:    Imaging Results (last 24 hours)     ** No results found for the last 24 hours. **          No Known Allergies    Scheduled meds:     ipratropium-albuterol 3 mL Nebulization 4x Daily - RT   metroNIDAZOLE 500 mg Oral Q8H   miconazole  Topical Q12H   nystatin  Topical Q12H   pantoprazole 40 mg Oral Q AM   potassium chloride 20 mEq Oral Daily   saccharomyces boulardii 250 mg Oral BID       PRN meds:  •  acetaminophen **OR** acetaminophen  •  dextrose  •  dextrose  •  glucagon (human recombinant)  •  loperamide  •  ondansetron **OR** ondansetron ODT **OR** ondansetron  •  sodium chloride    Assessment/Plan     Active Problems:    RUBY (acute kidney injury)    DVT (deep venous thrombosis)    Anemia    GI bleed      Plan:  Occlusive left lower extremity DVT.  No anticoagulation due to GI bleed.  Patient had 2 episodes GI bleed due to anticoagulation.  Patient has an IVC filter in place.     GI bleed-GI to follow.  Status post transfusion 4 units of blood last week.  No anticoagulation second episode of GI bleed.     Hospital acquire pneumonia-Zosyn, then DC by infectious disease.  Continue duo nebs..  Nystatin powder.  Miconazole cream.     Fever/diarrhea-on Imodium, probiotic.  C. difficile positive.  On Flagyl.     Hyperlipidemia     Hypokalemia.  Potassium by mouth daily     Acute renal failure/dehydration-resolved     Fungal/cellulitis-Diflucan     Anemia-stable     Deconditioning-PT and OT consult     Blood culture no growth in 5 days.     Discharge Plannin- 2 days    Alec Granados MD   18   6:47 PM

## 2018-01-24 NOTE — PROGRESS NOTES
[facility] Medicine Services  INPATIENT PROGRESS NOTE    Length of Stay: 21  Date of Admission: 1/3/2018  Primary Care Physician: Ed Hanson MD    Subjective   Chief Complaint: Weakness    HPI   Current condition unchanged.  Patient still very weak.  Patient denies any chest pain shortness breath. Patient has episode of fall yesterday, no head trauma.  Complaining of right hand pain, x-ray right hand.  Nursing home refuses acceptance and due to positive C. difficile.    Review of Systems   Constitutional: Positive for activity change, appetite change and fatigue. Negative for chills and fever.   HENT: Negative for hearing loss, nosebleeds, tinnitus and trouble swallowing.    Eyes: Negative for visual disturbance.   Respiratory: Positive for shortness of breath (only with ambulating ). Negative for cough, chest tightness and wheezing.    Cardiovascular: Negative for chest pain, palpitations and leg swelling.   Gastrointestinal: Positive for diarrhea. Negative for abdominal distention, abdominal pain, blood in stool, constipation, nausea and vomiting.   Endocrine: Negative for cold intolerance, heat intolerance, polydipsia, polyphagia and polyuria.   Genitourinary: Negative for decreased urine volume, difficulty urinating, dysuria, flank pain, frequency and hematuria.   Musculoskeletal: Positive for back pain, gait problem and myalgias. Negative for arthralgias and joint swelling.   Skin: Negative for rash.   Allergic/Immunologic: Negative for immunocompromised state.   Neurological: Positive for weakness. Negative for dizziness, syncope, light-headedness and headaches.   Hematological: Negative for adenopathy. Does not bruise/bleed easily.   Psychiatric/Behavioral: Negative for confusion and sleep disturbance. The patient is not nervous/anxious.         All pertinent negatives and positives are as above. All other systems have been reviewed and are negative unless otherwise  stated.     Objective    Temp:  [96.4 °F (35.8 °C)-97.8 °F (36.6 °C)] 97.8 °F (36.6 °C)  Heart Rate:  [72-92] 82  Resp:  [16-26] 26  BP: (115-143)/(61-87) 143/84  No intake or output data in the 24 hours ending 01/24/18 1234  Physical Exam  Constitutional: She is oriented to person, place, and time. She appears well-developed.   HENT:   Head: Normocephalic and atraumatic.   Eyes: Conjunctivae and EOM are normal. Pupils are equal, round, and reactive to light.   Neck: Neck supple. No JVD present. No thyromegaly present.   Cardiovascular: Normal rate, regular rhythm, normal heart sounds and intact distal pulses.  Exam reveals no gallop and no friction rub.    No murmur heard.  Pulmonary/Chest: Effort normal and breath sounds normal. No respiratory distress. She has no wheezes. She has no rales. She exhibits no tenderness.   Shallow breathing   Abdominal: Soft. Bowel sounds are normal. She exhibits no distension. There is no tenderness. There is no rebound and no guarding.   Gross morbid obesity   Musculoskeletal: Normal range of motion. She exhibits no edema, tenderness or deformity.   Lymphadenopathy:     She has no cervical adenopathy.   Neurological: She is alert and oriented to person, place, and time. She displays normal reflexes. No cranial nerve deficit. She exhibits abnormal muscle tone. Coordination abnormal.   Skin: Skin is warm and dry. No rash noted.   Psychiatric: She has a normal mood and affect. Her behavior is normal. Judgment and thought content normal  Results Review:  Lab Results (last 24 hours)     Procedure Component Value Units Date/Time    POC Glucose Once [[id number]]  (Abnormal) Collected:  01/23/18 2233    Specimen:  Blood Updated:  01/23/18 2255     Glucose 140 (H) mg/dL       : [id number] Miguel AllisonMeter ID: [id number]       Comprehensive Metabolic Panel [[id number]]  (Abnormal) Collected:  01/24/18 0556    Specimen:  Blood Updated:  01/24/18 0654     Glucose 103 (H) mg/dL      BUN 13  mg/dL      Creatinine 1.05 mg/dL      Sodium 145 mmol/L      Potassium 4.1 mmol/L      Chloride 105 mmol/L      CO2 34.0 (H) mmol/L      Calcium 10.4 mg/dL      Total Protein 5.7 (L) g/dL      Albumin 2.80 (L) g/dL      ALT (SGPT) 33 U/L      AST (SGOT) 21 U/L      Alkaline Phosphatase 68 U/L      Total Bilirubin 0.2 mg/dL      eGFR Non African Amer 51 (L) mL/min/1.73      eGFR  African Amer 62 mL/min/1.73      Globulin 2.9 gm/dL      A/G Ratio 1.0 (L) g/dL      BUN/Creatinine Ratio 12.4     Anion Gap 6.0 mmol/L     Narrative:       The MDRD GFR formula is only valid for adults with stable renal function between ages 18 and 70.    CBC & Differential [[id number]] Collected:  01/24/18 0556    Specimen:  Blood Updated:  01/24/18 0716    Narrative:       The following orders were created for panel order CBC & Differential.  Procedure                               Abnormality         Status                     ---------                               -----------         ------                     Manual Differential[[id number]]          Abnormal            Final result               Scan Slide[[id number]]                                       Final result               CBC Auto Differential[[id number]]        Abnormal            Final result                 Please view results for these tests on the individual orders.    CBC Auto Differential [[id number]]  (Abnormal) Collected:  01/24/18 0556    Specimen:  Blood Updated:  01/24/18 0716     WBC 6.87 10*3/mm3      RBC 3.76 (L) 10*6/mm3      Hemoglobin 10.6 (L) g/dL      Hematocrit 33.3 (L) %      MCV 88.6 fL      MCH 28.2 pg      MCHC 31.8 (L) g/dL      RDW 15.5 (H) %      RDW-SD 50.2 fl      MPV 10.4 fL      Platelets 258 10*3/mm3     Scan Slide [[id number]] Collected:  01/24/18 0556    Specimen:  Blood Updated:  01/24/18 0716     Scan Slide --      See Manual Differential Results       Manual Differential [[id number]]  (Abnormal) Collected:  01/24/18 0556    Specimen:  Blood  Updated:  01/24/18 0716     Neutrophil % 73.0 %      Lymphocyte % 11.0 (L) %      Monocyte % 6.0 %      Eosinophil % 7.0 (H) %      Bands %  2.0 %      Atypical Lymphocyte % 1.0 %      Neutrophils Absolute 5.15 10*3/mm3      Lymphocytes Absolute 0.76 10*3/mm3      Monocytes Absolute 0.41 10*3/mm3      Eosinophils Absolute 0.48 10*3/mm3      RBC Morphology Normal     WBC Morphology Normal     Platelet Morphology Normal           Cultures:  Blood Culture   Date Value Ref Range Status   01/17/2018 No growth at 5 days  Final   01/17/2018 No growth at 5 days  Final       Radiology Data:    Imaging Results (last 24 hours)     Procedure Component Value Units Date/Time    XR Hand 3+ View Right [[id number]] Collected:  01/24/18 1004     Updated:  01/24/18 1009    Narrative:       HISTORY: Right hand pain     Right hand: 3 views of the right hand are obtained.     There is osteopenia. There is PIP and DIP joint space narrowing. There  is moderate first MCP and mild second through fifth MCP joint space  narrowing. There is advanced joint space narrowing and osteophytosis of  the first carpometacarpal joint. There is prominent narrowing of the  scaphoid. Using joint. There is radiocarpal joint space narrowing. There  is radial positive deviance. No acute appearing fracture is identified.       Impression:       1. Osteopenia with arthritic changes of the right hand, greatest  involving the first carpometacarpal joint. No acute bony pathology.  This report was finalized on 01/24/2018 10:06 by Dr. Carly Collier MD.          No Known Allergies    Scheduled meds:     ipratropium-albuterol 3 mL Nebulization 4x Daily - RT   metroNIDAZOLE 500 mg Oral Q8H   miconazole  Topical Q12H   nystatin  Topical Q12H   pantoprazole 40 mg Oral Q AM   potassium chloride 20 mEq Oral Daily   saccharomyces boulardii 250 mg Oral BID       PRN meds:  •  acetaminophen **OR** acetaminophen  •  dextrose  •  dextrose  •  glucagon (human recombinant)  •   loperamide  •  ondansetron **OR** ondansetron ODT **OR** ondansetron  •  sodium chloride    Assessment/Plan     Active Problems:    RUBY (acute kidney injury)    DVT (deep venous thrombosis)    Anemia    GI bleed      Plan:  Occlusive left lower extremity DVT.  No anticoagulation due to GI bleed.  Patient had 2 episodes GI bleed due to anticoagulation.  Patient has an IVC filter in place.      GI bleed-GI to follow.  Status post multiple transfusion.   No anticoagulation second episode of GI bleed.      Hospital acquire pneumonia-Zosyn, then DC by infectious disease.  Continue duo nebs..  Nystatin powder.  Miconazole cream.    Right hand pain- xray of right hand.       Fever/diarrhea-on Imodium, probiotic.  C. difficile positive.  On Flagyl.      Hypokalemia.  Potassium by mouth daily      Acute renal failure/dehydration-resolved      Fungal/cellulitis-DC Diflucan by infectious disease.       Anemia-stable      Deconditioning-PT and OT consult      Blood culture no growth in 5 days.      Discharge Plannin- 2 days.  Nursing home refused acceptance due to possible C. difficile.  Consult  for placement.    Alec Granados MD   18   12:34 PM

## 2018-01-24 NOTE — PLAN OF CARE
Problem: Patient Care Overview (Adult)  Goal: Plan of Care Review  Outcome: Ongoing (interventions implemented as appropriate)   01/24/18 6956   Coping/Psychosocial Response Interventions   Plan Of Care Reviewed With patient   Patient Care Overview   Progress no change   Outcome Evaluation   Outcome Summary/Follow up Plan Pt agrees to ex only. Pt was lathargic and unmotivated

## 2018-01-24 NOTE — PLAN OF CARE
Problem: Patient Care Overview (Adult)  Goal: Plan of Care Review  Outcome: Ongoing (interventions implemented as appropriate)   01/24/18 0421   Coping/Psychosocial Response Interventions   Plan Of Care Reviewed With patient   Patient Care Overview   Progress no change   Outcome Evaluation   Outcome Summary/Follow up Plan Patient tolerating diet. Patient denies pain and nausea. Patient excoriated on inner thighs, buttocks, and abdominal fold. Antifungal cream and nystatin powder applied to excoriated areas. Patient has rested comfortably throughout the night. Will continue to monitor.      Goal: Adult Individualization and Mutuality  Outcome: Ongoing (interventions implemented as appropriate)      Problem: VTE, DVT and PE (Adult)  Goal: Signs and Symptoms of Listed Potential Problems Will be Absent or Manageable (VTE, DVT and PE)  Outcome: Ongoing (interventions implemented as appropriate)   01/23/18 1459   VTE, DVT and PE   Problems Assessed (VTE, DVT, PE) all   Problems Present (VTE, DVT, PE) deep vein thrombosis       Problem: Skin Integrity Impairment, Risk/Actual (Adult)  Goal: Skin Integrity/Wound Healing  Outcome: Ongoing (interventions implemented as appropriate)   01/24/18 0421   Skin Integrity Impairment, Risk/Actual (Adult)   Skin Integrity/Wound Healing making progress toward outcome       Problem: Pressure Ulcer Risk (Jorge Scale) (Adult,Obstetrics,Pediatric)  Goal: Skin Integrity  Outcome: Ongoing (interventions implemented as appropriate)   01/24/18 0421   Pressure Ulcer Risk (Jorge Scale) (Adult,Obstetrics,Pediatric)   Skin Integrity making progress toward outcome
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
## 2018-01-24 NOTE — THERAPY TREATMENT NOTE
Acute Care - Physical Therapy Treatment Note  [facility]     Patient Name: Tricia Hernandez  : 1940  MRN: [id number]  Today's Date: 2018  Onset of Illness/Injury or Date of Surgery Date: 18  Date of Referral to PT: 18  Referring Physician: Dr. Granados    Admit Date: 1/3/2018    Visit Dx:    ICD-10-CM ICD-9-CM   1. Impaired mobility and ADLs Z74. 799.89   2. Impaired mobility Z74. 799.89     Patient Active Problem List   Diagnosis   • RUBY (acute kidney injury)   • DVT (deep venous thrombosis)   • Anemia   • GI bleed               Adult Rehabilitation Note       18 1536 18 1500 18 1030    Rehab Assessment/Intervention    Discipline physical therapy assistant  -AE physical therapy assistant  - physical therapy assistant  -    Document Type therapy note (daily note)  -AE therapy note (daily note)  - therapy note (daily note)  -    Subjective Information agree to therapy  -AE agree to therapy;complains of;weakness;fatigue;dizziness  - agree to therapy;complains of;weakness;swelling  -    Precautions/Limitations fall precautions   R total shlder   -AE fall precautions   R total shld 2017  - fall precautions   R total shld  -AH    Recorded by [AE] Irina España PTA [] Josephine Miller PTA [] Josephine Miller PTA    Pain Assessment    Pain Assessment No/denies pain  -AE No/denies pain  - No/denies pain  -    Recorded by [AE] Irina España PTA [] Josephine Miller PTA [] Josephine Miller PTA    Bed Mobility, Assessment/Treatment    Bed Mobility, Assistive Device  bed rails;head of bed elevated;draw sheet  - bed rails;head of bed elevated;draw sheet  -AH    Bed Mobility, Roll Left, [place]  minimum assist (75% patient effort);verbal cues required  - minimum assist (75% patient effort);verbal cues required  -AH    Bed Mobility, Roll Right, [place]  minimum assist (75% patient effort);verbal cues required  -AH minimum assist (75%  patient effort);verbal cues required  -    Bed Mobility, Scoot/Bridge, [place]  maximum assist (25% patient effort);dependent (less than 25% patient effort);2 person assist required  -AH maximum assist (25% patient effort);dependent (less than 25% patient effort);2 person assist required  -AH    Bed Mob, Supine to Sit, [place]  moderate assist (50% patient effort);2 person assist required;verbal cues required  -AH moderate assist (50% patient effort);2 person assist required;verbal cues required  -AH    Bed Mob, Sit to Supine, [place]  maximum assist (25% patient effort);2 person assist required;verbal cues required  -AH maximum assist (25% patient effort);2 person assist required;verbal cues required  -    Bed Mobility, Safety Issues  decreased use of arms for pushing/pulling;decreased use of legs for bridging/pushing  - decreased use of arms for pushing/pulling;decreased use of legs for bridging/pushing  -    Bed Mobility, Impairments  strength decreased  - strength decreased  -    Recorded by  [] Josephine Miller PTA [] Josephine Miller PTA    Transfer Assessment/Treatment    Transfers, Sit-Stand [place]  moderate assist (50% patient effort);maximum assist (25% patient effort);2 person assist required;verbal cues required  - moderate assist (50% patient effort);maximum assist (25% patient effort);2 person assist required;verbal cues required  -    Transfers, Stand-Sit [place]  moderate assist (50% patient effort);2 person assist required;verbal cues required  - moderate assist (50% patient effort);2 person assist required;verbal cues required  -    Transfers, Sit-Stand-Sit, Assist Device  gigi walker  -AH gigi walker  -AH    Transfer, Comment  stood x 1  -AH pt stood at BS x 3 with gigi wx mod- max of 2  -AH    Recorded by  [] Josephine Miller PTA [] Josephine Miller PTA    Motor Skills/Interventions    Additional Documentation   Balance Skills Training (Group)   -    Recorded by   [] Josephine Miller PTA    Balance Skills Training    Sitting-Level of Assistance  Close supervision  - Close supervision  -    Sitting-Balance Support  Feet supported  - Feet supported  -    Sitting # of Minutes   30  -AH    Standing-Level of Assistance  Minimum assistance;x2  -AH Minimum assistance;x2  -AH    Static Standing Balance Support  assistive device  - assistive device  -    Recorded by  [] Josephine Miller PTA [] Josephine Miller PTA    Therapy Exercises    Bilateral Lower Extremities AAROM:;10 reps  -AE AROM:;10 reps;sitting   constant cue to keep pt on task  -AH AROM:;10 reps;sitting   constant cue to keep pt on task  -AH    Recorded by [AE] Irina España PTA [] Josephine Miller PTA [] Josephine Miller PTA    Positioning and Restraints    Pre-Treatment Position in bed  -AE in bed  -AH in bed  -AH    Post Treatment Position bed  -AE bed  -AH bed  -AH    In Bed fowlers;call light within reach  - fowlers;call light within reach;encouraged to call for assist;with family/caregiver  - fowlers;call light within reach;encouraged to call for assist  -    Recorded by [AE] Irina España PTA [] Josephine Miller PTA [] Josephine Miller PTA      User Key  (r) = Recorded By, (t) = Taken By, (c) = Cosigned By    Initials Name Effective Dates    AE Irina España, SHANTEL 06/22/15 -     AH Josephine Miller PTA 08/02/16 -                 IP PT Goals       01/22/18 1247          Bed Mobility PT LTG    Bed Mobility PT LTG, Date Goal Reviewed 01/22/18  -PB      Bed Mobility PT LTG, Outcome goal ongoing  -PB      Bed Mobility PT LTG, Reason Goal Not Met progress slower than expected  -PB      Transfer Training PT LTG    Transfer Training PT LTG, [place] Level minimum assist (75% patient effort);2 person assist required  -PB      Transfer Training PT  LTG, Date Goal Reviewed 01/22/18  -PB      Transfer Training PT LTG, Outcome goal revised  -PB      Transfer Training PT LTG,  Reason Goal Not Met goals revised this date  -PB      Strength Goal PT LTG    Strength Goal PT LTG, Date Goal Reviewed 01/22/18  -PB      Strength Goal PT LTG, Outcome goal ongoing  -PB      Strength Goal PT LTG, Reason Goal Not Met progress slower than expected  -PB        User Key  (r) = Recorded By, (t) = Taken By, (c) = Cosigned By    Initials Name Provider Type    PB Prasanth Abdi, PT DPT Physical Therapist          Physical Therapy Education     Title: PT OT SLP Therapies (Done)     Topic: Physical Therapy (Done)     Point: Mobility training (Done)    Learning Progress Summary    Learner Readiness Method Response Comment Documented by Status   Patient Acceptance E VU trans  01/23/18 1116 Done    Acceptance E NR benefits of activity, postitioning, bed mobility  01/22/18 1253 Active    Acceptance E VU,NR   01/18/18 1131 Done    Acceptance E VU safety concerns  01/16/18 1159 Done    Acceptance E,D NR Benefits of exercise  01/15/18 1059 Active    Acceptance E,D NR Bed mobility, transfers, planof care  01/13/18 1319 Active    Acceptance E NR Bed mobility, transfers, planof care, benefit of activity  01/12/18 1307 Active    Acceptance E VU progressin of POC  01/11/18 1147 Done    Acceptance E VU Pt was educated on importance of activity and potential discharge plans.  01/10/18 0959 Done                      User Key     Initials Effective Dates Name Provider Type [facility] 08/02/16 -  Josephine Miller, PTA Physical Therapy Assistant PT     06/22/15 -  Rhonda Gutierrez, PTA Physical Therapy Assistant PT     08/02/16 -  Blanca Stock, PTA Physical Therapy Assistant PT     08/02/16 -  Prasanth Abdi, PT DPT Physical Therapist PT     01/10/18 -  Jim Crane, PT Student PT Student PT                    PT Recommendation and Plan  Anticipated Equipment Needs At Discharge: front wheeled walker  Anticipated Discharge Disposition: skilled nursing facility  Planned Therapy  Interventions: balance training, bed mobility training, home exercise program, patient/family education, strengthening, transfer training  PT Frequency: daily, 2 times/day, per priority policy  Plan of Care Review  Plan Of Care Reviewed With: patient  Progress: no change  Outcome Summary/Follow up Plan: Pt agrees to ex only. Pt was lathargiic and unmotivated          Outcome Measures       01/23/18 1100 01/22/18 1111       How much help from another person do you currently need...    Turning from your back to your side while in flat bed without using bedrails? 2  -AH 2  -PB     Moving from lying on back to sitting on the side of a flat bed without bedrails? 2  - 2  -PB     Moving to and from a bed to a chair (including a wheelchair)? 1  - 1  -PB     Standing up from a chair using your arms (e.g., wheelchair, bedside chair)? 1  - 1  -PB     Climbing 3-5 steps with a railing? 1  - 1  -PB     To walk in hospital room? 1  - 1  -PB     AM-PAC 6 Clicks Score 8  - 8  -PB     Functional Assessment    Outcome Measure Options AM-PAC 6 Clicks Basic Mobility (PT)  -AH AM-PAC 6 Clicks Basic Mobility (PT)  -PB       User Key  (r) = Recorded By, (t) = Taken By, (c) = Cosigned By    Initials Name Provider Type     Josephine Miller PTA Physical Therapy Assistant    ALTON Abdi, ALEX DPT Physical Therapist           Time Calculation:         PT Charges       01/24/18 1557          Time Calculation    Start Time 1536  -AE      Stop Time 1546  -AE      Time Calculation (min) 10 min  -AE      PT Received On 01/24/18  -AE      PT Goal Re-Cert Due Date 02/01/18  -AE      Time Calculation- PT    Total Timed Code Minutes- PT 10 minute(s)  -AE        User Key  (r) = Recorded By, (t) = Taken By, (c) = Cosigned By    Initials Name Provider Type    KAMRON España PTA Physical Therapy Assistant          Therapy Charges for Today     Code Description Service Date Service Provider Modifiers Qty    86780658438  PT THER PROC  EA 15 MIN 1/24/2018 Irina España, PTA GP 1    82115020676 HC PT THER PROC EA 15 MIN 1/24/2018 Irina España, PTA GP 1          PT G-Codes  Outcome Measure Options: AM-PAC 6 Clicks Basic Mobility (PT)  Score: 8  Functional Limitation: Changing and maintaining body position  Changing and Maintaining Body Position Current Status (): At least 80 percent but less than 100 percent impaired, limited or restricted  Changing and Maintaining Body Position Goal Status (): At least 60 percent but less than 80 percent impaired, limited or restricted    Irina España, PTA  1/24/2018

## 2018-01-25 ENCOUNTER — APPOINTMENT (OUTPATIENT)
Dept: GENERAL RADIOLOGY | Facility: HOSPITAL | Age: 78
End: 2018-01-25

## 2018-01-25 LAB
ALBUMIN SERPL-MCNC: 2.3 G/DL (ref 3.5–5)
ALBUMIN/GLOB SERPL: 0.8 G/DL (ref 1.1–2.5)
ALP SERPL-CCNC: 53 U/L (ref 24–120)
ALT SERPL W P-5'-P-CCNC: 32 U/L (ref 0–54)
ANION GAP SERPL CALCULATED.3IONS-SCNC: 3 MMOL/L (ref 4–13)
AST SERPL-CCNC: 29 U/L (ref 7–45)
BASOPHILS # BLD AUTO: 0.03 10*3/MM3 (ref 0–0.2)
BASOPHILS NFR BLD AUTO: 0.6 % (ref 0–2)
BILIRUB SERPL-MCNC: 0.1 MG/DL (ref 0.1–1)
BUN BLD-MCNC: 11 MG/DL (ref 5–21)
BUN/CREAT SERPL: 10.8 (ref 7–25)
CALCIUM SPEC-SCNC: 9.9 MG/DL (ref 8.4–10.4)
CHLORIDE SERPL-SCNC: 104 MMOL/L (ref 98–110)
CO2 SERPL-SCNC: 33 MMOL/L (ref 24–31)
CREAT BLD-MCNC: 1.02 MG/DL (ref 0.5–1.4)
DEPRECATED RDW RBC AUTO: 50.8 FL (ref 40–54)
EOSINOPHIL # BLD AUTO: 0.1 10*3/MM3 (ref 0–0.7)
EOSINOPHIL NFR BLD AUTO: 1.9 % (ref 0–4)
ERYTHROCYTE [DISTWIDTH] IN BLOOD BY AUTOMATED COUNT: 15.6 % (ref 12–15)
GFR SERPL CREATININE-BSD FRML MDRD: 53 ML/MIN/1.73
GFR SERPL CREATININE-BSD FRML MDRD: 64 ML/MIN/1.73
GLOBULIN UR ELPH-MCNC: 2.8 GM/DL
GLUCOSE BLD-MCNC: 98 MG/DL (ref 70–100)
HCT VFR BLD AUTO: 30.1 % (ref 37–47)
HGB BLD-MCNC: 9.3 G/DL (ref 12–16)
IMM GRANULOCYTES # BLD: 0.25 10*3/MM3 (ref 0–0.03)
IMM GRANULOCYTES NFR BLD: 4.8 % (ref 0–5)
LYMPHOCYTES # BLD AUTO: 0.92 10*3/MM3 (ref 0.72–4.86)
LYMPHOCYTES NFR BLD AUTO: 17.6 % (ref 15–45)
MCH RBC QN AUTO: 27.6 PG (ref 28–32)
MCHC RBC AUTO-ENTMCNC: 30.9 G/DL (ref 33–36)
MCV RBC AUTO: 89.3 FL (ref 82–98)
MONOCYTES # BLD AUTO: 0.61 10*3/MM3 (ref 0.19–1.3)
MONOCYTES NFR BLD AUTO: 11.7 % (ref 4–12)
NEUTROPHILS # BLD AUTO: 3.31 10*3/MM3 (ref 1.87–8.4)
NEUTROPHILS NFR BLD AUTO: 63.4 % (ref 39–78)
NRBC BLD MANUAL-RTO: 0 /100 WBC (ref 0–0)
PLATELET # BLD AUTO: 207 10*3/MM3 (ref 130–400)
PMV BLD AUTO: 10.3 FL (ref 6–12)
POTASSIUM BLD-SCNC: 4.1 MMOL/L (ref 3.5–5.3)
PROT SERPL-MCNC: 5.1 G/DL (ref 6.3–8.7)
RBC # BLD AUTO: 3.37 10*6/MM3 (ref 4.2–5.4)
SODIUM BLD-SCNC: 140 MMOL/L (ref 135–145)
WBC NRBC COR # BLD: 5.22 10*3/MM3 (ref 4.8–10.8)

## 2018-01-25 PROCEDURE — 94799 UNLISTED PULMONARY SVC/PX: CPT

## 2018-01-25 PROCEDURE — 94760 N-INVAS EAR/PLS OXIMETRY 1: CPT

## 2018-01-25 PROCEDURE — 97110 THERAPEUTIC EXERCISES: CPT

## 2018-01-25 PROCEDURE — 85025 COMPLETE CBC W/AUTO DIFF WBC: CPT | Performed by: FAMILY MEDICINE

## 2018-01-25 PROCEDURE — 73030 X-RAY EXAM OF SHOULDER: CPT

## 2018-01-25 PROCEDURE — 80053 COMPREHEN METABOLIC PANEL: CPT | Performed by: FAMILY MEDICINE

## 2018-01-25 PROCEDURE — 97530 THERAPEUTIC ACTIVITIES: CPT

## 2018-01-25 RX ADMIN — METRONIDAZOLE 500 MG: 500 TABLET ORAL at 06:19

## 2018-01-25 RX ADMIN — IPRATROPIUM BROMIDE AND ALBUTEROL SULFATE 3 ML: 2.5; .5 SOLUTION RESPIRATORY (INHALATION) at 06:25

## 2018-01-25 RX ADMIN — IPRATROPIUM BROMIDE AND ALBUTEROL SULFATE 3 ML: 2.5; .5 SOLUTION RESPIRATORY (INHALATION) at 11:00

## 2018-01-25 RX ADMIN — NYSTATIN: 100000 POWDER TOPICAL at 21:53

## 2018-01-25 RX ADMIN — MICONAZOLE NITRATE: 20 CREAM TOPICAL at 21:53

## 2018-01-25 RX ADMIN — MICONAZOLE NITRATE: 20 CREAM TOPICAL at 09:15

## 2018-01-25 RX ADMIN — Medication 250 MG: at 08:19

## 2018-01-25 RX ADMIN — POTASSIUM CHLORIDE 20 MEQ: 750 CAPSULE, EXTENDED RELEASE ORAL at 08:19

## 2018-01-25 RX ADMIN — PANTOPRAZOLE SODIUM 40 MG: 40 TABLET, DELAYED RELEASE ORAL at 06:19

## 2018-01-25 RX ADMIN — ACETAMINOPHEN 650 MG: 325 TABLET, FILM COATED ORAL at 08:25

## 2018-01-25 RX ADMIN — Medication 250 MG: at 21:53

## 2018-01-25 RX ADMIN — NYSTATIN: 100000 POWDER TOPICAL at 08:19

## 2018-01-25 RX ADMIN — METRONIDAZOLE 500 MG: 500 TABLET ORAL at 14:54

## 2018-01-25 RX ADMIN — METRONIDAZOLE 500 MG: 500 TABLET ORAL at 21:53

## 2018-01-25 RX ADMIN — IPRATROPIUM BROMIDE AND ALBUTEROL SULFATE 3 ML: 2.5; .5 SOLUTION RESPIRATORY (INHALATION) at 14:30

## 2018-01-25 RX ADMIN — IPRATROPIUM BROMIDE AND ALBUTEROL SULFATE 3 ML: 2.5; .5 SOLUTION RESPIRATORY (INHALATION) at 18:32
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
## 2018-01-25 NOTE — PLAN OF CARE
Problem: Patient Care Overview (Adult)  Goal: Plan of Care Review  Outcome: Ongoing (interventions implemented as appropriate)   01/25/18 1131   Coping/Psychosocial Response Interventions   Plan Of Care Reviewed With patient   Patient Care Overview   Progress progress toward functional goals is gradual   Outcome Evaluation   Outcome Summary/Follow up Plan pt c/o pain in R shld 8/10, pt wants xray of R shld. Pt trans to EOB with HOB elevated mod assist of 1, pt sat EOB 30 minutes perform BLE ROM.. trans BTB mod-max of 2. Pt will need SNF at d/c

## 2018-01-25 NOTE — PROGRESS NOTES
Continued Stay Note   [facility]     Patient Name: Tricia Hernandez  MRN: [id number]  Today's Date: 1/25/2018    Admit Date: 1/3/2018          Discharge Plan       01/25/18 1115    Case Management/Social Work Plan    Plan FAXED UPDATED INFO TO [facility] AND [facility]. PER MERLYN AT [facility], THEY COULD OFFER A BED IF PHYSICIAN DOCUMENTS THAT PT DOES NOT NEED ISOLATION AND CDIFF IS RESOLVED.               Discharge Codes     None        Expected Discharge Date and Time     Expected Discharge Date Expected Discharge Time    Jan 24, 2018             MILY Mcleod

## 2018-01-25 NOTE — THERAPY TREATMENT NOTE
Acute Care - Physical Therapy Treatment Note  [facility]     Patient Name: Tricia Hernandez  : 1940  MRN: [id number]  Today's Date: 2018  Onset of Illness/Injury or Date of Surgery Date: 18  Date of Referral to : 18  Referring Physician: Dr. Granados    Admit Date: 1/3/2018    Visit Dx:    ICD-10-CM ICD-9-CM   1. Impaired mobility and ADLs Z74.09 799.89   2. Impaired mobility Z74.09 799.89     Patient Active Problem List   Diagnosis   • RUBY (acute kidney injury)   • DVT (deep venous thrombosis)   • Anemia   • GI bleed               Adult Rehabilitation Note       18 1038 18 1000 18 1536    Rehab Assessment/Intervention    Discipline physical therapy assistant  - physical therapy assistant  - physical therapy assistant  -AE    Document Type therapy note (daily note)  -  therapy note (daily note)  -AE    Subjective Information agree to therapy;complains of;pain  -  agree to therapy  -AE    Treatment Not Performed, Comment  bathing  -     Precautions/Limitations fall precautions;shoulder precautions   R total shld 2017  -  fall precautions   R total shlder   -AE    Recorded by [] Josephine Miller PTA [] Josephine Miller PTA [AE] Irina España PTA    Pain Assessment    Pain Assessment 0-10  -  No/denies pain  -AE    Pain Score 8  -      Pain Type Acute pain  -      Pain Location Shoulder  -      Pain Orientation Right  -      Pain Descriptors Aching  -      Pain Frequency Constant/continuous  -      Pain Intervention(s) Medication (See MAR);Repositioned   RN aware  -      Response to Interventions tolerated  -      Recorded by [] Josephine Miller PTA  [AE] Irina España PTA    Bed Mobility, Assessment/Treatment    Bed Mobility, Assistive Device bed rails;head of bed elevated  -      Bed Mobility, Scoot/Bridge, [place] dependent (less than 25% patient effort);2 person assist required   bed in trendelenburg  -      Bed Mob,  Supine to Sit, [place] moderate assist (50% patient effort);verbal cues required  -      Bed Mob, Sit to Supine, [place] moderate assist (50% patient effort);maximum assist (25% patient effort);2 person assist required;verbal cues required   RN assisted with trans [facility]  -      Bed Mobility, Safety Issues decreased use of arms for pushing/pulling;decreased use of legs for bridging/pushing  -      Bed Mobility, Impairments strength decreased;pain  -      Recorded by [] Josephine Miller PTA      Motor Skills/Interventions    Additional Documentation Balance Skills Training (Group)  -      Recorded by [] Josephine Miller PTA      Balance Skills Training    Sitting-Level of Assistance Close supervision  -      Sitting-Balance Support Feet supported  -      Sitting-Balance Activities Trunk control activities  -      Sitting # of Minutes 30  -      Recorded by [] Josephine Miller PTA      Therapy Exercises    Bilateral Lower Extremities AROM:;10 reps;sitting   within available ROM  -  AAROM:;10 reps  -AE    Recorded by [] Josephine Miller PTA  [AE] Irina España PTA    Positioning and Restraints    Pre-Treatment Position in bed  -  in bed  -AE    Post Treatment Position bed  -  bed  -AE    In Bed fowlers;call light within reach;encouraged to call for assist;RUE elevated  -  fowlers;call light within reach  -AE    Recorded by [] Josephine Miller PTA  [AE] Irina España PTA      01/23/18 1500 01/23/18 1030       Rehab Assessment/Intervention    Discipline physical therapy assistant  - physical therapy assistant  -     Document Type therapy note (daily note)  - therapy note (daily note)  -     Subjective Information agree to therapy;complains of;weakness;fatigue;dizziness  - agree to therapy;complains of;weakness;swelling  -     Precautions/Limitations fall precautions   R total shld Nov 2017  - fall precautions   R total shld  -     Recorded by [] Josephine Miller  PTA [] Josephine Miller PTA     Pain Assessment    Pain Assessment No/denies pain  - No/denies pain  -AH     Recorded by [] Josephine Miller PTA [] Josephine Miller PTA     Bed Mobility, Assessment/Treatment    Bed Mobility, Assistive Device bed rails;head of bed elevated;draw sheet  - bed rails;head of bed elevated;draw sheet  -     Bed Mobility, Roll Left, [place] minimum assist (75% patient effort);verbal cues required  - minimum assist (75% patient effort);verbal cues required  -     Bed Mobility, Roll Right, [place] minimum assist (75% patient effort);verbal cues required  - minimum assist (75% patient effort);verbal cues required  -     Bed Mobility, Scoot/Bridge, [place] maximum assist (25% patient effort);dependent (less than 25% patient effort);2 person assist required  - maximum assist (25% patient effort);dependent (less than 25% patient effort);2 person assist required  -     Bed Mob, Supine to Sit, [place] moderate assist (50% patient effort);2 person assist required;verbal cues required  - moderate assist (50% patient effort);2 person assist required;verbal cues required  -     Bed Mob, Sit to Supine, [place] maximum assist (25% patient effort);2 person assist required;verbal cues required  - maximum assist (25% patient effort);2 person assist required;verbal cues required  -     Bed Mobility, Safety Issues decreased use of arms for pushing/pulling;decreased use of legs for bridging/pushing  - decreased use of arms for pushing/pulling;decreased use of legs for bridging/pushing  -     Bed Mobility, Impairments strength decreased  - strength decreased  -AH     Recorded by [] Josephine Miller PTA [] Josephine Miller PTA     Transfer Assessment/Treatment    Transfers, Sit-Stand [place] moderate assist (50% patient effort);maximum assist (25% patient effort);2 person assist required;verbal cues required  - moderate assist (50% patient  effort);maximum assist (25% patient effort);2 person assist required;verbal cues required  -     Transfers, Stand-Sit [place] moderate assist (50% patient effort);2 person assist required;verbal cues required  - moderate assist (50% patient effort);2 person assist required;verbal cues required  -     Transfers, Sit-Stand-Sit, Assist Device gigi walker  - gigi walker  -     Transfer, Comment stood x 1  -AH pt stood at BS x 3 with gigi wx mod- max of 2  -     Recorded by [] Josephine Miller PTA [] Josephine Miller PTA     Motor Skills/Interventions    Additional Documentation  Balance Skills Training (Group)  -     Recorded by  [] Josephine Miller PTA     Balance Skills Training    Sitting-Level of Assistance Close supervision  - Close supervision  -     Sitting-Balance Support Feet supported  - Feet supported  -     Sitting # of Minutes  30  -     Standing-Level of Assistance Minimum assistance;x2  - Minimum assistance;x2  -AH     Static Standing Balance Support assistive device  - assistive device  -     Recorded by [] Josephine Miller PTA [] Josephine Miller PTA     Therapy Exercises    Bilateral Lower Extremities AROM:;10 reps;sitting   constant cue to keep pt on task  - AROM:;10 reps;sitting   constant cue to keep pt on task  -     Recorded by [] Josephine Miller PTA [] Josephine Miller PTA     Positioning and Restraints    Pre-Treatment Position in bed  - in bed  -     Post Treatment Position bed  - bed  -     In Bed fowlers;call light within reach;encouraged to call for assist;with family/caregiver  - fowlers;call light within reach;encouraged to call for assist  -     Recorded by [] Josephine Miller PTA [] Josephine Miller PTA       User Key  (r) = Recorded By, (t) = Taken By, (c) = Cosigned By    Initials Name Effective Dates    KAMRON España, SHANTEL 06/22/15 -      Josephine Miller PTA 08/02/16 -                  PT Goals       01/22/18 1245           Bed Mobility PT LTG    Bed Mobility PT LTG, Date Goal Reviewed 01/22/18  -PB      Bed Mobility PT LTG, Outcome goal ongoing  -PB      Bed Mobility PT LTG, Reason Goal Not Met progress slower than expected  -PB      Transfer Training PT LTG    Transfer Training PT LTG, [place] Level minimum assist (75% patient effort);2 person assist required  -PB      Transfer Training PT  LTG, Date Goal Reviewed 01/22/18  -PB      Transfer Training PT LTG, Outcome goal revised  -PB      Transfer Training PT LTG, Reason Goal Not Met goals revised this date  -PB      Strength Goal PT LTG    Strength Goal PT LTG, Date Goal Reviewed 01/22/18  -PB      Strength Goal PT LTG, Outcome goal ongoing  -PB      Strength Goal PT LTG, Reason Goal Not Met progress slower than expected  -PB        User Key  (r) = Recorded By, (t) = Taken By, (c) = Cosigned By    Initials Name Provider Type    ALTON Abdi, PT DPT Physical Therapist          Physical Therapy Education     Title: PT OT SLP Therapies (Done)     Topic: Physical Therapy (Done)     Point: Mobility training (Done)    Learning Progress Summary    Learner Readiness Method Response Comment Documented by Status   Patient Acceptance E VU bed mobility  01/25/18 1131 Done    Acceptance E VU trans  01/23/18 1116 Done    Acceptance E NR benefits of activity, postitioning, bed mobility PB 01/22/18 1253 Active    Acceptance E VU,NR   01/18/18 1131 Done    Acceptance E VU safety concerns  01/16/18 1159 Done    Acceptance E,D NR Benefits of exercise CW 01/15/18 1059 Active    Acceptance E,D NR Bed mobility, transfers, planof care CW 01/13/18 1319 Active    Acceptance E NR Bed mobility, transfers, planof care, benefit of activity  01/12/18 1307 Active    Acceptance E VU progressin of POC NW 01/11/18 1147 Done    Acceptance E VU Pt was educated on importance of activity and potential discharge plans. SIMONA 01/10/18 0959 Done                      User Key     Initials Effective  Dates Name Provider Type Discipline     08/02/16 -  Josephine Miller, PTA Physical Therapy Assistant PT    CW 06/22/15 -  Rhonda Gutierrez, PTA Physical Therapy Assistant PT    NW 08/02/16 -  Blanca Stock, PTA Physical Therapy Assistant PT    PB 08/02/16 -  Prasanth Abdi, PT DPT Physical Therapist PT    JM 01/10/18 -  Jim Crane, PT Student PT Student PT                    PT Recommendation and Plan  Anticipated Equipment Needs At Discharge: front wheeled walker  Anticipated Discharge Disposition: skilled nursing facility  Planned Therapy Interventions: balance training, bed mobility training, home exercise program, patient/family education, strengthening, transfer training  PT Frequency: daily, 2 times/day, per priority policy  Plan of Care Review  Plan Of Care Reviewed With: patient  Progress: progress toward functional goals is gradual  Outcome Summary/Follow up Plan: pt c/o pain in R shld 8/10, pt wants xray of R shld. Pt trans to EOB with HOB elevated mod assist of 1, pt sat  EOB 30 minutes perform BLE ROM.. trans BTB mod-max of 2. Pt will need SNF at d/c          Outcome Measures       01/25/18 1100 01/23/18 1100       How much help from another person do you currently need...    Turning from your back to your side while in flat bed without using bedrails? 2  -AH 2  -AH     Moving from lying on back to sitting on the side of a flat bed without bedrails? 2  -AH 2  -AH     Moving to and from a bed to a chair (including a wheelchair)? 1  -AH 1  -AH     Standing up from a chair using your arms (e.g., wheelchair, bedside chair)? 1  - 1  -AH     Climbing 3-5 steps with a railing? 1  -AH 1  -AH     To walk in hospital room? 1  -AH 1  -AH     AM-PAC 6 Clicks Score 8  - 8  -     Functional Assessment    Outcome Measure Options AM-PAC 6 Clicks Basic Mobility (PT)  -AH AM-PAC 6 Clicks Basic Mobility (PT)  -       User Key  (r) = Recorded By, (t) = Taken By, (c) = Cosigned By    Initials Name  Provider Type     Josephine Miller PTA Physical Therapy Assistant           Time Calculation:         PT Charges       01/25/18 1133          Time Calculation    Start Time 1038  -      Stop Time 1120  -      Time Calculation (min) 42 min  -      PT Received On 01/25/18  -      PT Goal Re-Cert Due Date 02/01/18  -      Time Calculation- PT    Total Timed Code Minutes- PT 42 minute(s)  -        User Key  (r) = Recorded By, (t) = Taken By, (c) = Cosigned By    Initials Name Provider Type     Josephine Miller PTA Physical Therapy Assistant          Therapy Charges for Today     Code Description Service Date Service Provider Modifiers Qty    67966358178 HC PT THERAPEUTIC ACT EA 15 MIN 1/25/2018 Josephine Miller PTA GP 2    06704126986 HC PT THER PROC EA 15 MIN 1/25/2018 Josephine Miller PTA GP 1          PT G-Codes  Outcome Measure Options: AM-PAC 6 Clicks Basic Mobility (PT)  Score: 8  Functional Limitation: Changing and maintaining body position  Changing and Maintaining Body Position Current Status (): At least 80 percent but less than 100 percent impaired, limited or restricted  Changing and Maintaining Body Position Goal Status (): At least 60 percent but less than 80 percent impaired, limited or restricted    Josephine Miller PTA  1/25/2018

## 2018-01-25 NOTE — PROGRESS NOTES
[facility]  INPATIENT PROGRESS NOTE    Length of Stay: 22  Date of Admission: 1/3/2018  Primary Care Physician: Ed Hanson MD    Subjective   Chief Complaint: Weakness    HPI   Condition unchanged.  Patient remained very weak.  Patient denies any diarrhea symptoms.  Patient denies any chest pain or shortness of breath.  Patient complain of right shoulder pain from fall, plan for his right shoulder x-ray.    Review of Systems   Constitutional: Positive for activity change, appetite change and fatigue. Negative for chills and fever.   HENT: Negative for hearing loss, nosebleeds, tinnitus and trouble swallowing.    Eyes: Negative for visual disturbance.   Respiratory: Positive for shortness of breath (only with ambulating ). Negative for cough, chest tightness and wheezing.    Cardiovascular: Negative for chest pain, palpitations and leg swelling.   Gastrointestinal: Positive for diarrhea. Negative for abdominal distention, abdominal pain, blood in stool, constipation, nausea and vomiting.   Endocrine: Negative for cold intolerance, heat intolerance, polydipsia, polyphagia and polyuria.   Genitourinary: Negative for decreased urine volume, difficulty urinating, dysuria, flank pain, frequency and hematuria.   Musculoskeletal: Positive for back pain, gait problem and myalgias.  Right shoulder pain.  Skin: Negative for rash.   Allergic/Immunologic: Negative for immunocompromised state.   Neurological: Positive for weakness. Negative for dizziness, syncope, light-headedness and headaches.   Hematological: Negative for adenopathy. Does not bruise/bleed easily.   Psychiatric/Behavioral: Negative for confusion and sleep disturbance. The patient is not nervous/anxious.         All pertinent negatives and positives are as above. All other systems have been reviewed and are negative unless otherwise stated.     Objective    Temp:  [97.6 °F (36.4 °C)-99.8 °F (37.7 °C)]  97.6 °F (36.4 °C)  Heart Rate:  [76-84] 76  Resp:  [16-24] 18  BP: (106-140)/(56-79) 106/57    Intake/Output Summary (Last 24 hours) at 01/25/18 1554  Last data filed at 01/25/18 1024   Gross per 24 hour   Intake              480 ml   Output                0 ml   Net              480 ml     Physical Exam  , increase of palpation.  Decreased range of motion secondary to pain.    Constitutional: She is oriented to person, place, and time. She appears well-developed.   HENT:   Head: Normocephalic and atraumatic.   Eyes: Conjunctivae and EOM are normal. Pupils are equal, round, and reactive to light.   Neck: Neck supple. No JVD present. No thyromegaly present.   Cardiovascular: Normal rate, regular rhythm, normal heart sounds and intact distal pulses.  Exam reveals no gallop and no friction rub.    No murmur heard.  Pulmonary/Chest: Effort normal and breath sounds normal. No respiratory distress. She has no wheezes. She has no rales. She exhibits no tenderness.   Shallow breathing   Abdominal: Soft. Bowel sounds are normal. She exhibits no distension. There is no tenderness. There is no rebound and no guarding.   Gross morbid obesity   Musculoskeletal: Right shoulder pain, increase of palpation.  Decreased range of motion secondary to pain.. She exhibits no edema, tenderness or deformity.  Palpable Radial pulse.  Sensory intact   Lymphadenopathy:     She has no cervical adenopathy.   Neurological: She is alert and oriented to person, place, and time. She displays normal reflexes. No cranial nerve deficit. She exhibits abnormal muscle tone. Coordination abnormal.   Skin: Skin is warm and dry. No rash noted.   Psychiatric: She has a normal mood and affect. Her behavior is normal. Judgment and thought content normal  Results Review:  Lab Results (last 24 hours)     Procedure Component Value Units Date/Time    CBC & Differential [[id number]] Collected:  01/25/18 0442    Specimen:  Blood Updated:  01/25/18 0543    Narrative:        The following orders were created for panel order CBC & Differential.  Procedure                               Abnormality         Status                     ---------                               -----------         ------                     CBC Auto Differential[[id number]]        Abnormal            Final result                 Please view results for these tests on the individual orders.    CBC Auto Differential [[id number]]  (Abnormal) Collected:  01/25/18 0442    Specimen:  Blood Updated:  01/25/18 0543     WBC 5.22 10*3/mm3      RBC 3.37 (L) 10*6/mm3      Hemoglobin 9.3 (L) g/dL      Hematocrit 30.1 (L) %      MCV 89.3 fL      MCH 27.6 (L) pg      MCHC 30.9 (L) g/dL      RDW 15.6 (H) %      RDW-SD 50.8 fl      MPV 10.3 fL      Platelets 207 10*3/mm3      Neutrophil % 63.4 %      Lymphocyte % 17.6 %      Monocyte % 11.7 %      Eosinophil % 1.9 %      Basophil % 0.6 %      Immature Grans % 4.8 %      Neutrophils, Absolute 3.31 10*3/mm3      Lymphocytes, Absolute 0.92 10*3/mm3      Monocytes, Absolute 0.61 10*3/mm3      Eosinophils, Absolute 0.10 10*3/mm3      Basophils, Absolute 0.03 10*3/mm3      Immature Grans, Absolute 0.25 (H) 10*3/mm3      nRBC 0.0 /100 WBC     Comprehensive Metabolic Panel [[id number]]  (Abnormal) Collected:  01/25/18 0442    Specimen:  Blood Updated:  01/25/18 0614     Glucose 98 mg/dL      BUN 11 mg/dL      Creatinine 1.02 mg/dL      Sodium 140 mmol/L      Potassium 4.1 mmol/L      Chloride 104 mmol/L      CO2 33.0 (H) mmol/L      Calcium 9.9 mg/dL      Total Protein 5.1 (L) g/dL      Albumin 2.30 (L) g/dL      ALT (SGPT) 32 U/L      AST (SGOT) 29 U/L      Alkaline Phosphatase 53 U/L      Total Bilirubin 0.1 mg/dL      eGFR Non African Amer 53 (L) mL/min/1.73      eGFR  African Amer 64 mL/min/1.73      Globulin 2.8 gm/dL      A/G Ratio 0.8 (L) g/dL      BUN/Creatinine Ratio 10.8     Anion Gap 3.0 (L) mmol/L     Narrative:       The MDRD GFR formula is only valid for adults with  stable renal function between ages 18 and 70.           Cultures:       Radiology Data:    Imaging Results (last 24 hours)     ** No results found for the last 24 hours. **          No Known Allergies    Scheduled meds:     ipratropium-albuterol 3 mL Nebulization 4x Daily - RT   metroNIDAZOLE 500 mg Oral Q8H   miconazole  Topical Q12H   nystatin  Topical Q12H   pantoprazole 40 mg Oral Q AM   potassium chloride 20 mEq Oral Daily   saccharomyces boulardii 250 mg Oral BID       PRN meds:  •  acetaminophen **OR** acetaminophen  •  dextrose  •  dextrose  •  glucagon (human recombinant)  •  loperamide  •  ondansetron **OR** ondansetron ODT **OR** ondansetron  •  sodium chloride    Assessment/Plan     Active Problems:    RUBY (acute kidney injury)    DVT (deep venous thrombosis)    Anemia    GI bleed      Plan:  Occlusive left lower extremity DVT.  No anticoagulation due to GI bleed.  Patient had 2 episodes GI bleed due to anticoagulation.  Patient has an IVC filter in place.      GI bleed-GI to follow.  Status post multiple transfusion.   No anticoagulation second episode of GI bleed.      Hospital acquire pneumonia-Zosyn, then DC by infectious disease.  Continue duo nebs..  Nystatin powder.  Miconazole cream.     Right hand pain/right shoulder pain- xray of right hand, no fracture.  X-ray right shows still pending.      Fever/diarrhea-on Imodium, probiotic.  C. difficile positive.  On Flagyl.  C. difficile resolved, patient would need to  finish up the course of Flagyl.  Patient is noncontagious due to treatment.      Hypokalemia.  Potassium by mouth daily      Acute renal failure/dehydration-resolved      Fungal/cellulitis-DC Diflucan by infectious disease.       Anemia-stable      Deconditioning-PT and OT consult      Blood culture no growth in 5 days.      Discharge Plannin- 2 days.   Nursing home placement pending.     Alec Granados MD   18   3:54 PM

## 2018-01-25 NOTE — PROGRESS NOTES
INFECTIOUS DISEASES PROGRESS NOTE    Patient:  Tricia Hernandez  YOB: 1940  MRN: [id number]   Admit date: 1/3/2018   Admitting Physician: Alec Granados MD  Primary Care Physician: Ed Hanson MD    Chief Complaint: Fever of unknown origin        Interval History:  Patient was seen in consultation January 21.  At that time she had been on broad antibiotics for possible pneumonia.      Patient had stool for C. difficile was positive.  She was started on metronidazole.  She has not had any further fevers.  She denies abdominal pain at this time.    Allergies: No Known Allergies    Current Meds:     Current Facility-Administered Medications:   •  acetaminophen (TYLENOL) tablet 650 mg, 650 mg, Oral, Q4H PRN, 650 mg at 01/23/18 1630 **OR** acetaminophen (TYLENOL) suppository 650 mg, 650 mg, Rectal, Q4H PRN, BRITNEY Schroeder  •  dextrose (D50W) solution 25 g, 25 g, Intravenous, Q15 Min PRN, Wilfred Pedersen MD  •  dextrose (GLUTOSE) oral gel 15 g, 15 g, Oral, Q15 Min PRN, Wilfred Pedersen MD  •  glucagon (human recombinant) (GLUCAGEN DIAGNOSTIC) injection 1 mg, 1 mg, Subcutaneous, Q15 Min PRN, Wilfred Pedersen MD  •  ipratropium-albuterol (DUO-NEB) nebulizer solution 3 mL, 3 mL, Nebulization, 4x Daily - RT, Wilfred Pedersen MD, 3 mL at 01/24/18 1432  •  loperamide (IMODIUM) capsule 2 mg, 2 mg, Oral, 4x Daily PRN, Wilfred Pedersen MD  •  metroNIDAZOLE (FLAGYL) tablet 500 mg, 500 mg, Oral, Q8H, Wilfred Pedersen MD, 500 mg at 01/24/18 1411  •  miconazole (MICOTIN) 2 % cream, , Topical, Q12H, Luz Rahman MD  •  nystatin (MYCOSTATIN) powder, , Topical, Q12H, Luz Rahman MD  •  ondansetron (ZOFRAN) tablet 4 mg, 4 mg, Oral, Q6H PRN **OR** ondansetron ODT (ZOFRAN-ODT) disintegrating tablet 4 mg, 4 mg, Oral, Q6H PRN **OR** ondansetron (ZOFRAN) injection 4 mg, 4 mg, Intravenous, Q6H PRN, BRITNEY Schroeder  •  pantoprazole (PROTONIX) EC tablet 40 mg, 40  "mg, Oral, Q AM, Vanessa Britton MD, 40 mg at 18 0511  •  potassium chloride (MICRO-K) CR capsule 20 mEq, 20 mEq, Oral, Daily, Alec Granados MD, 20 mEq at 18  •  saccharomyces boulardii (FLORASTOR) capsule 250 mg, 250 mg, Oral, BID, Wilfred Pedersen MD, 250 mg at 18  •  sodium chloride 0.9 % flush 1-10 mL, 1-10 mL, Intravenous, PRN, Loyda Fried, APRN, 10 mL at 18      Review of Systems    Gen.:  Fever resolved  GI: Diarrhea-  Cutaneous: Excoriated skin in the perirectal area.      Objective     Vital Signs:  Temp (24hrs), Av.5 °F (36.4 °C), Min:96.5 °F (35.8 °C), Max:98.1 °F (36.7 °C)      /85 (BP Location: Left arm, Patient Position: Lying)  Pulse 105  Temp 98.1 °F (36.7 °C) (Oral)   Resp 26  Ht 160 cm (62.99\")  Wt (!) 151 kg (331 lb 12.8 oz)  SpO2 96%  BMI 58.79 kg/m2        Physical Exam      Gen.: Morbidly obese female sleeping  HEENT: Sclerae anicteric and noninjected  Abdomen: Morbidly obese, soft, no focal tenderness, bowel sounds are positive  Extremities: Obese with multiple folds.  Neuro: Arousable from sleep  I reviewed the patient's new clinical results.    Lab Results:  CBC:     Results from last 7 days  Lab Units 18  0556 18  0532 18  0540 18  0540 18  0700 18  0626 18  1206 18  0606   WBC 10*3/mm3 6.87 7.09 4.54* 5.80 6.78 6.41  --  5.59   HEMOGLOBIN g/dL 10.6* 9.4* 9.2* 9.0* 9.4* 9.7* 9.4* 9.5*   HEMATOCRIT % 33.3* 28.9* 27.8* 27.9* 29.1* 30.9* 29.1* 29.1*   PLATELETS 10*3/mm3 258 203 181 192 203 209  --  226   LYMPHOCYTE % % 11.0*  --   --  12.0* 16.0 8.0*  --   --    MONOCYTES % % 6.0  --   --  9.0 6.0 1.0*  --   --          CMP:     Results from last 7 days  Lab Units 18  0556 18  0532 18  0540   SODIUM mmol/L 145 142 140   POTASSIUM mmol/L 4.1 4.2 4.3   CHLORIDE mmol/L 105 105 104   CO2 mmol/L 34.0* 32.0* 29.0   BUN mg/dL 13 13 11   CREATININE mg/dL 1.05 0.96 0.99 "   CALCIUM mg/dL 10.4 10.0 9.7   BILIRUBIN mg/dL 0.2 <0.1* 0.2   ALK PHOS U/L 68 56 60   ALT (SGPT) U/L 33 27 24   AST (SGOT) U/L 21 19 16   GLUCOSE mg/dL 103* 131* 150*         Culture Results:        Microbiology Results Abnormal     Procedure Component Value - Date/Time    Blood Culture - Blood, [[id number]]  (Normal) Collected:  01/17/18 1813    Lab Status:  Final result Specimen:  Blood from Arm, Left Updated:  01/22/18 2116     Blood Culture No growth at 5 days    Blood Culture - Blood, [[id number]]  (Normal) Collected:  01/17/18 1819    Lab Status:  Final result Specimen:  Blood from Arm, Right Updated:  01/22/18 1831     Blood Culture No growth at 5 days    Gastrointestinal Panel, PCR - Stool, Per Rectum [[id number]]  (Abnormal) Collected:  01/21/18 1347    Lab Status:  Final result Specimen:  Stool from Per Rectum Updated:  01/21/18 1639     Campylobacter Not Detected     Clostridium difficile (toxin A/B) Detected (A)        Due to the high asymptomatic carriage rates, especially in young children, the clinical relevance of the detection of toxigenic C. difficile from stool should be considered in the context of other clinical findings, patient age, and risk factors including hospitalization and antibiotic exposure.        Plesiomonas shigelloides Not Detected     Salmonella Not Detected     Vibrio Not Detected     Vibrio cholerae Not Detected     Yersinia enterocolitica Not Detected     Enteroaggregative E. coli (EAEC) Not Detected     Enteropathogenic E. coli (EPEC) Not Detected     Enterotoxigenic E. coli (ETEC) lt/st Not Detected     Shiga-like toxin-producing E. coli (STEC) stx1/stx2 Not Detected     E. coli O157 Not Detected     Shigella/Enteroinvasive E. coli (EIEC) Not Detected     Cryptosporidium Not Detected     Cyclospora cayetanensis Not Detected     Entamoeba histolytica Not Detected     Giardia lamblia Not Detected     Adenovirus F40/41 Not Detected     Astrovirus Not Detected     Norovirus GI/GII  Not Detected     Rotavirus A Not Detected     Sapovirus (I, II, IV or V) Not Detected    Influenza Antigen, Rapid - Swab, Nasopharynx [[id number]]  (Normal) Collected:  01/17/18 1724    Lab Status:  Final result Specimen:  Swab from Nasopharynx Updated:  01/17/18 1745     Influenza A Ag, EIA Negative     Influenza B Ag, EIA Negative    Narrative:         Recommend confirmation of negative results by viral culture or molecular assay.    Blood Culture With HAILE - Blood, [[id number]]  (Abnormal) Collected:  01/03/18 1859    Lab Status:  Final result Specimen:  Blood from Arm, Right Updated:  01/09/18 0759     Blood Culture Abnormal Stain (A)      Staphylococcus, coagulase negative (A)      Probable contaminant          Isolated from Anaerobic Bottle     Gram Stain Result Gram positive cocci    Narrative:       No growth aerobic bottle.    Blood Culture With HAILE - Blood, [[id number]]  (Normal) Collected:  01/03/18 1900    Lab Status:  Final result Specimen:  Blood from Arm, Left Updated:  01/08/18 1946     Blood Culture No growth at 5 days    Blood Culture ID, PCR - Blood, [[id number]]  (Abnormal) Collected:  01/03/18 1859    Lab Status:  Edited Result - FINAL Specimen:  Blood from Arm, Right Updated:  01/06/18 0838     BCID, PCR Staphylococcus spp, not aureus. Identification by BCID PCR. (C)      Corrected result. Previous result was Staphylococcus species, not aureus. mecA (methicillin resistance gene) detected. Identification by BCID PCR. on 1/5/2018 at 0348 CST       Urine Culture - Urine, Urine, Clean Catch [[id number]]  (Normal) Collected:  01/04/18 0715    Lab Status:  Final result Specimen:  Urine from Urine, Clean Catch Updated:  01/06/18 0618     Urine Culture No growth at 2 days                Radiology:   Imaging Results (last 72 hours)     Procedure Component Value Units Date/Time    XR Chest 1 View [[id number]] Collected:  01/20/18 1230     Updated:  01/20/18 1235    Narrative:       XR CHEST 1 VW- 1/20/2018  11:44 AM CST     HISTORY: SOA, recheck Atelectasis, possible pneumonia; Z74.09-Other  reduced mobility; Z74.09-Other reduced mobility       COMPARISON: Exam dated 01/17/2018.     FINDINGS:   Reidentified elevation of the right hemidiaphragm. There appears to be  mild increased retrocardiac density as compared with the prior exam. The  cardiomediastinal silhouette and pulmonary vascularity are within normal  limits.      The osseous structures and surrounding soft tissues demonstrate no acute  abnormality. Right total shoulder arthroplasty with advanced  degenerative changes at the left shoulder.       Impression:       1. Mild increased retrocardiac density on this exam which may reflect  increasing atelectasis or perhaps a developing infiltrate in the left  lower lobe. Otherwise stable exam.  2. Stable elevation of the right hemidiaphragm with volume loss in the  right lung base.        This report was finalized on 01/20/2018 12:32 by Dr Alfonso Hercules, .          Assessment/Plan     Hospital Problem List     RUBY (acute kidney injury)    DVT (deep venous thrombosis)    Anemia    GI bleed          IMPRESSION:  Fever-Resolved  C. difficile-antibiotics have been discontinued other than Flagyl and fluconazole  Intertrigo-overall improved with topical therapy and fluconazole      RECOMMENDATION:   ·  Continue metronidazole-would plan an additional 7-10 days.  Would recommend that nursing home doctor continue Flagyl until stools are back to baseline or if she does not continue to improve, she would need to be switched to oral vancomycin.      Continue aggressive topical care for intertrigo and leonora Rectal and perineal skin changes.  Discussed with patient that she will need to allow air to get to the paranasal area and upper thigh area in order for this to heal.    Will sign off, please reconsult if needed      Luz Rahman MD  01/24/18  6:22 PM

## 2018-01-25 NOTE — PLAN OF CARE
Problem: Patient Care Overview (Adult)  Goal: Plan of Care Review  Outcome: Ongoing (interventions implemented as appropriate)  Likely d/c tomorrow to [facility], safety maintained, VSS   01/25/18 1528   Coping/Psychosocial Response Interventions   Plan Of Care Reviewed With patient   Patient Care Overview   Progress improving     Goal: Adult Individualization and Mutuality  Outcome: Ongoing (interventions implemented as appropriate)      Problem: Fall Risk (Adult)  Goal: Absence of Falls  Outcome: Ongoing (interventions implemented as appropriate)      Problem: VTE, DVT and PE (Adult)  Goal: Signs and Symptoms of Listed Potential Problems Will be Absent or Manageable (VTE, DVT and PE)  Outcome: Ongoing (interventions implemented as appropriate)      Problem: Skin Integrity Impairment, Risk/Actual (Adult)  Goal: Skin Integrity/Wound Healing  Outcome: Ongoing (interventions implemented as appropriate)      Problem: Pressure Ulcer Risk (Jorge Scale) (Adult,Obstetrics,Pediatric)  Goal: Skin Integrity  Outcome: Ongoing (interventions implemented as appropriate)

## 2018-01-25 NOTE — PLAN OF CARE
Problem: Patient Care Overview (Adult)  Goal: Plan of Care Review  Outcome: Ongoing (interventions implemented as appropriate)   01/25/18 0400   Coping/Psychosocial Response Interventions   Plan Of Care Reviewed With patient   Patient Care Overview   Progress no change   Outcome Evaluation   Outcome Summary/Follow up Plan Pt. denies pain; no bm so far this shift; IV will be d/c'd per order; turning q 2; pt. incontinent; awaiting NH placement; PO abx and probiotics continue.     Goal: Adult Individualization and Mutuality  Outcome: Ongoing (interventions implemented as appropriate)      Problem: Fall Risk (Adult)  Goal: Absence of Falls  Outcome: Ongoing (interventions implemented as appropriate)      Problem: VTE, DVT and PE (Adult)  Goal: Signs and Symptoms of Listed Potential Problems Will be Absent or Manageable (VTE, DVT and PE)  Outcome: Ongoing (interventions implemented as appropriate)      Problem: Skin Integrity Impairment, Risk/Actual (Adult)  Goal: Skin Integrity/Wound Healing  Outcome: Ongoing (interventions implemented as appropriate)      Problem: Pressure Ulcer Risk (Jorge Scale) (Adult,Obstetrics,Pediatric)  Goal: Skin Integrity  Outcome: Ongoing (interventions implemented as appropriate)

## 2018-01-26 LAB
ALBUMIN SERPL-MCNC: 2.3 G/DL (ref 3.5–5)
ALBUMIN/GLOB SERPL: 0.9 G/DL (ref 1.1–2.5)
ALP SERPL-CCNC: 50 U/L (ref 24–120)
ALT SERPL W P-5'-P-CCNC: 35 U/L (ref 0–54)
ANION GAP SERPL CALCULATED.3IONS-SCNC: 3 MMOL/L (ref 4–13)
AST SERPL-CCNC: 25 U/L (ref 7–45)
BASOPHILS # BLD AUTO: 0.04 10*3/MM3 (ref 0–0.2)
BASOPHILS NFR BLD AUTO: 0.8 % (ref 0–2)
BILIRUB SERPL-MCNC: 0.1 MG/DL (ref 0.1–1)
BUN BLD-MCNC: 10 MG/DL (ref 5–21)
BUN/CREAT SERPL: 9.2 (ref 7–25)
CALCIUM SPEC-SCNC: 9.9 MG/DL (ref 8.4–10.4)
CHLORIDE SERPL-SCNC: 102 MMOL/L (ref 98–110)
CO2 SERPL-SCNC: 33 MMOL/L (ref 24–31)
CREAT BLD-MCNC: 1.09 MG/DL (ref 0.5–1.4)
DEPRECATED RDW RBC AUTO: 50.6 FL (ref 40–54)
EOSINOPHIL # BLD AUTO: 0.14 10*3/MM3 (ref 0–0.7)
EOSINOPHIL NFR BLD AUTO: 2.7 % (ref 0–4)
ERYTHROCYTE [DISTWIDTH] IN BLOOD BY AUTOMATED COUNT: 15.8 % (ref 12–15)
GFR SERPL CREATININE-BSD FRML MDRD: 49 ML/MIN/1.73
GFR SERPL CREATININE-BSD FRML MDRD: 59 ML/MIN/1.73
GLOBULIN UR ELPH-MCNC: 2.7 GM/DL
GLUCOSE BLD-MCNC: 99 MG/DL (ref 70–100)
HCT VFR BLD AUTO: 28.7 % (ref 37–47)
HGB BLD-MCNC: 9.1 G/DL (ref 12–16)
IMM GRANULOCYTES # BLD: 0.19 10*3/MM3 (ref 0–0.03)
IMM GRANULOCYTES NFR BLD: 3.6 % (ref 0–5)
LYMPHOCYTES # BLD AUTO: 0.95 10*3/MM3 (ref 0.72–4.86)
LYMPHOCYTES NFR BLD AUTO: 18.1 % (ref 15–45)
MCH RBC QN AUTO: 27.9 PG (ref 28–32)
MCHC RBC AUTO-ENTMCNC: 31.7 G/DL (ref 33–36)
MCV RBC AUTO: 88 FL (ref 82–98)
MONOCYTES # BLD AUTO: 0.56 10*3/MM3 (ref 0.19–1.3)
MONOCYTES NFR BLD AUTO: 10.7 % (ref 4–12)
NEUTROPHILS # BLD AUTO: 3.37 10*3/MM3 (ref 1.87–8.4)
NEUTROPHILS NFR BLD AUTO: 64.1 % (ref 39–78)
NRBC BLD MANUAL-RTO: 0 /100 WBC (ref 0–0)
PLATELET # BLD AUTO: 180 10*3/MM3 (ref 130–400)
PMV BLD AUTO: 10.1 FL (ref 6–12)
POTASSIUM BLD-SCNC: 4 MMOL/L (ref 3.5–5.3)
PROT SERPL-MCNC: 5 G/DL (ref 6.3–8.7)
RBC # BLD AUTO: 3.26 10*6/MM3 (ref 4.2–5.4)
SODIUM BLD-SCNC: 138 MMOL/L (ref 135–145)
WBC NRBC COR # BLD: 5.25 10*3/MM3 (ref 4.8–10.8)

## 2018-01-26 PROCEDURE — 97110 THERAPEUTIC EXERCISES: CPT

## 2018-01-26 PROCEDURE — 80053 COMPREHEN METABOLIC PANEL: CPT | Performed by: FAMILY MEDICINE

## 2018-01-26 PROCEDURE — 85025 COMPLETE CBC W/AUTO DIFF WBC: CPT | Performed by: FAMILY MEDICINE

## 2018-01-26 PROCEDURE — 94799 UNLISTED PULMONARY SVC/PX: CPT

## 2018-01-26 PROCEDURE — 94760 N-INVAS EAR/PLS OXIMETRY 1: CPT

## 2018-01-26 RX ORDER — HYDROCODONE BITARTRATE AND ACETAMINOPHEN 7.5; 325 MG/1; MG/1
1 TABLET ORAL EVERY 6 HOURS PRN
Status: DISCONTINUED | OUTPATIENT
Start: 2018-01-26 | End: 2018-01-27 | Stop reason: HOSPADM

## 2018-01-26 RX ADMIN — NYSTATIN: 100000 POWDER TOPICAL at 22:22

## 2018-01-26 RX ADMIN — IPRATROPIUM BROMIDE AND ALBUTEROL SULFATE 3 ML: 2.5; .5 SOLUTION RESPIRATORY (INHALATION) at 19:22

## 2018-01-26 RX ADMIN — HYDROCODONE BITARTRATE AND ACETAMINOPHEN 1 TABLET: 7.5; 325 TABLET ORAL at 15:54

## 2018-01-26 RX ADMIN — IPRATROPIUM BROMIDE AND ALBUTEROL SULFATE 3 ML: 2.5; .5 SOLUTION RESPIRATORY (INHALATION) at 06:52

## 2018-01-26 RX ADMIN — Medication 250 MG: at 22:21

## 2018-01-26 RX ADMIN — METRONIDAZOLE 500 MG: 500 TABLET ORAL at 22:21

## 2018-01-26 RX ADMIN — MICONAZOLE NITRATE: 20 CREAM TOPICAL at 22:21

## 2018-01-26 RX ADMIN — POTASSIUM CHLORIDE 20 MEQ: 750 CAPSULE, EXTENDED RELEASE ORAL at 09:07

## 2018-01-26 RX ADMIN — HYDROCODONE BITARTRATE AND ACETAMINOPHEN 1 TABLET: 7.5; 325 TABLET ORAL at 09:46

## 2018-01-26 RX ADMIN — IPRATROPIUM BROMIDE AND ALBUTEROL SULFATE 3 ML: 2.5; .5 SOLUTION RESPIRATORY (INHALATION) at 10:47

## 2018-01-26 RX ADMIN — MICONAZOLE NITRATE: 20 CREAM TOPICAL at 09:07

## 2018-01-26 RX ADMIN — METRONIDAZOLE 500 MG: 500 TABLET ORAL at 06:34

## 2018-01-26 RX ADMIN — METRONIDAZOLE 500 MG: 500 TABLET ORAL at 13:34

## 2018-01-26 RX ADMIN — ACETAMINOPHEN 650 MG: 325 TABLET, FILM COATED ORAL at 22:31

## 2018-01-26 RX ADMIN — IPRATROPIUM BROMIDE AND ALBUTEROL SULFATE 3 ML: 2.5; .5 SOLUTION RESPIRATORY (INHALATION) at 14:25

## 2018-01-26 RX ADMIN — Medication 250 MG: at 09:07

## 2018-01-26 RX ADMIN — PANTOPRAZOLE SODIUM 40 MG: 40 TABLET, DELAYED RELEASE ORAL at 06:34

## 2018-01-26 RX ADMIN — NYSTATIN: 100000 POWDER TOPICAL at 09:07

## 2018-01-26 NOTE — THERAPY TREATMENT NOTE
Acute Care - Physical Therapy Treatment Note  [facility]     Patient Name: Tricia Hernandez  : 1940  MRN: [id number]  Today's Date: 2018  Onset of Illness/Injury or Date of Surgery Date: 18  Date of Referral to PT: 18  Referring Physician: Dr. Granados    Admit Date: 1/3/2018    Visit Dx:    ICD-10-CM ICD-9-CM   1. Impaired mobility and ADLs Z74.09 799.89   2. Impaired mobility Z74.09 799.89     Patient Active Problem List   Diagnosis   • RUBY (acute kidney injury)   • DVT (deep venous thrombosis)   • Anemia   • GI bleed               Adult Rehabilitation Note       18 1330 18 1038 18 1000    Rehab Assessment/Intervention    Discipline physical therapy assistant  - physical therapy assistant  - physical therapy assistant  -    Document Type therapy note (daily note)  - therapy note (daily note)  -     Subjective Information agree to therapy;complains of;pain;weakness;fatigue  - agree to therapy;complains of;pain  -     Treatment Not Performed, Comment   bathing  -    Precautions/Limitations fall precautions;shoulder precautions   NWB RUE,sling  - fall precautions;shoulder precautions   R total shld 2017  -     Specific Treatment Considerations pt with R shld dislocation,NWB RUE,sling per   -      Recorded by [] Josephine Miller PTA [] Josephine Miller PTA [] Josephine Miller PTA    Pain Assessment    Pain Assessment 0-10  - 0-10  -     Pain Score 8  - 8  -     Pain Type Acute pain  - Acute pain  -     Pain Location Shoulder  - Shoulder  -     Pain Orientation Right  - Right  -AH     Pain Descriptors Aching  - Aching  -     Pain Frequency Constant/continuous  - Constant/continuous  -     Pain Intervention(s) Repositioned  - Medication (See MAR);Repositioned   RN aware  -     Response to Interventions tolerated  - tolerated  -     Recorded by [] Josephine Miller PTA [] Josephine Miller PTA     Mobility  Assessment/Training    Extremity Weight-Bearing Status right upper extremity  -      Right Upper Extremity Weight-Bearing non weight-bearing  -      Recorded by [] Josephine Miller PTA      Bed Mobility, Assessment/Treatment    Bed Mobility, Assistive Device  bed rails;head of bed elevated  -     Bed Mobility, Scoot/Bridge, [place]  dependent (less than 25% patient effort);2 person assist required   bed in trendelenburg  -     Bed Mob, Supine to Sit, [place]  moderate assist (50% patient effort);verbal cues required  -     Bed Mob, Sit to Supine, [place]  moderate assist (50% patient effort);maximum assist (25% patient effort);2 person assist required;verbal cues required   RN assisted with trans BTB  -     Bed Mobility, Safety Issues  decreased use of arms for pushing/pulling;decreased use of legs for bridging/pushing  -     Bed Mobility, Impairments  strength decreased;pain  -     Recorded by  [] Josephine Miller PTA     Motor Skills/Interventions    Additional Documentation  Balance Skills Training (Group)  -     Recorded by  [] Josephine Miller PTA     Balance Skills Training    Sitting-Level of Assistance  Close supervision  -     Sitting-Balance Support  Feet supported  -     Sitting-Balance Activities  Trunk control activities  -     Sitting # of Minutes  30  -     Recorded by  [] Josephine Miller PTA     Therapy Exercises    Bilateral Lower Extremities AAROM:;AROM:;10 reps;supine   within available range  - AROM:;10 reps;sitting   within available ROM  -     Recorded by [] Josephine Miller PTA [] Josephine Miller PTA     Orthotics Prosthetics    Additional Documentation Orthosis Location (Group);Orthosis Management/Training (Group)  -      Recorded by [] Josephine Miller PTA      Orthosis Location    Orthosis Location/Type upper extremity  -      Orthosis, Upper Extremity Right:   sling  -      Recorded by [] Josephine Miller PTA      Positioning and  Restraints    Pre-Treatment Position in bed  -AH in bed  -AH     Post Treatment Position bed  -AH bed  -AH     In Bed fowlers;call light within reach;encouraged to call for assist;with nsg  - fowlers;call light within reach;encouraged to call for assist;RUE elevated  -     Recorded by [] Josephine Miller PTA [] Josephine Miller PTA       01/24/18 1536 01/23/18 1500       Rehab Assessment/Intervention    Discipline physical therapy assistant  -AE physical therapy assistant  -     Document Type therapy note (daily note)  -AE therapy note (daily note)  -     Subjective Information agree to therapy  -AE agree to therapy;complains of;weakness;fatigue;dizziness  -     Precautions/Limitations fall precautions   R total shlder Nov 17th  -AE fall precautions   R total shld Nov 2017  -     Recorded by [AE] Irina España PTA [] Josephine Miller PTA     Pain Assessment    Pain Assessment No/denies pain  -AE No/denies pain  -     Recorded by [AE] Irina España PTA [] Josephine Miller PTA     Bed Mobility, Assessment/Treatment    Bed Mobility, Assistive Device  bed rails;head of bed elevated;draw sheet  -     Bed Mobility, Roll Left, [place]  minimum assist (75% patient effort);verbal cues required  -     Bed Mobility, Roll Right, [place]  minimum assist (75% patient effort);verbal cues required  -     Bed Mobility, Scoot/Bridge, [place]  maximum assist (25% patient effort);dependent (less than 25% patient effort);2 person assist required  -     Bed Mob, Supine to Sit, [place]  moderate assist (50% patient effort);2 person assist required;verbal cues required  -     Bed Mob, Sit to Supine, [place]  maximum assist (25% patient effort);2 person assist required;verbal cues required  -     Bed Mobility, Safety Issues  decreased use of arms for pushing/pulling;decreased use of legs for bridging/pushing  -     Bed Mobility, Impairments  strength decreased  -     Recorded by   [] Josephine Miller PTA     Transfer Assessment/Treatment    Transfers, Sit-Stand [place]  moderate assist (50% patient effort);maximum assist (25% patient effort);2 person assist required;verbal cues required  -     Transfers, Stand-Sit [place]  moderate assist (50% patient effort);2 person assist required;verbal cues required  -     Transfers, Sit-Stand-Sit, Assist Device  gigi walker  -     Transfer, Comment  stood x 1  -     Recorded by  [] Josephine Miller PTA     Balance Skills Training    Sitting-Level of Assistance  Close supervision  -     Sitting-Balance Support  Feet supported  -     Standing-Level of Assistance  Minimum assistance;x2  -AH     Static Standing Balance Support  assistive device  -     Recorded by  [] Josephine Miller PTA     Therapy Exercises    Bilateral Lower Extremities AAROM:;10 reps  -AE AROM:;10 reps;sitting   constant cue to keep pt on task  -     Recorded by [AE] Irina España PTA [] Josephine Miller PTA     Positioning and Restraints    Pre-Treatment Position in bed  -AE in bed  -AH     Post Treatment Position bed  -AE bed  -AH     In Bed fowlers;call light within reach  - fowlers;call light within reach;encouraged to call for assist;with family/caregiver  -     Recorded by [AE] Irina España PTA [] Josephine Miller PTA       User Key  (r) = Recorded By, (t) = Taken By, (c) = Cosigned By    Initials Name Effective Dates    AE Irina España PTA 06/22/15 -     AH Josephine Miller PTA 08/02/16 -                 IP PT Goals       01/22/18 1247          Bed Mobility PT LTG    Bed Mobility PT LTG, Date Goal Reviewed 01/22/18  -PB      Bed Mobility PT LTG, Outcome goal ongoing  -PB      Bed Mobility PT LTG, Reason Goal Not Met progress slower than expected  -PB      Transfer Training PT LTG    Transfer Training PT LTG, [place] Level minimum assist (75% patient effort);2 person assist required  -PB      Transfer Training PT  LTG, Date Goal  Reviewed 01/22/18  -PB      Transfer Training PT LTG, Outcome goal revised  -PB      Transfer Training PT LTG, Reason Goal Not Met goals revised this date  -PB      Strength Goal PT LTG    Strength Goal PT LTG, Date Goal Reviewed 01/22/18  -PB      Strength Goal PT LTG, Outcome goal ongoing  -PB      Strength Goal PT LTG, Reason Goal Not Met progress slower than expected  -PB        User Key  (r) = Recorded By, (t) = Taken By, (c) = Cosigned By    Initials Name Provider Type    PB Prasanth Abdi, PT DPT Physical Therapist          Physical Therapy Education     Title: PT OT SLP Therapies (Active)     Topic: Physical Therapy (Active)     Point: Mobility training (Done)    Learning Progress Summary    Learner Readiness Method Response Comment Documented by Status   Patient Acceptance E VU bed mobility  01/25/18 1131 Done    Acceptance E VU trans  01/23/18 1116 Done    Acceptance E NR benefits of activity, postitioning, bed mobility  01/22/18 1253 Active    Acceptance E VU,NR   01/18/18 1131 Done    Acceptance E VU safety concerns  01/16/18 1159 Done    Acceptance E,D NR Benefits of exercise  01/15/18 1059 Active    Acceptance E,D NR Bed mobility, transfers, planof care  01/13/18 1319 Active    Acceptance E NR Bed mobility, transfers, planof care, benefit of activity  01/12/18 1307 Active    Acceptance E VU progressin of POC  01/11/18 1147 Done    Acceptance E VU Pt was educated on importance of activity and potential discharge plans.  01/10/18 0959 Done               Point: Precautions (Done)    Learning Progress Summary    Learner Readiness Method Response Comment Documented by Status   Patient Acceptance E VU NWB RUE  01/26/18 1421 Done                      User Key     Initials Effective Dates Name Provider Type Discipline     08/02/16 -  Josephine Miller PTA Physical Therapy Assistant PT     06/22/15 -  Rhonda Gutierrez PTA Physical Therapy Assistant PT     08/02/16 -  Blanca LEMUS  SHANTEL Stock Physical Therapy Assistant PT    PB 08/02/16 -  Prasanth Abdi, PT DPT Physical Therapist PT    JM 01/10/18 -  Jim Crane, PT Student PT Student PT                    PT Recommendation and Plan  Anticipated Equipment Needs At Discharge: front wheeled walker  Anticipated Discharge Disposition: skilled nursing facility  Planned Therapy Interventions: balance training, bed mobility training, home exercise program, patient/family education, strengthening, transfer training  PT Frequency: daily, 2 times/day, per priority policy  Plan of Care Review  Plan Of Care Reviewed With: patient  Progress: progress toward functional goals is gradual  Outcome Summary/Follow up Plan: pt is now [place] [facility],pt declined to trans to EOB, did agree to LE exercises, assist pt with AAROM BLE within available ROM. Pt  to trans to [facility] for shld sx .          Outcome Measures       01/26/18 1400 01/25/18 1100       How much help from another person do you currently need...    Turning from your back to your side while in flat bed without using bedrails? 2  -AH 2  -AH     Moving from lying on back to sitting on the side of a flat bed without bedrails? 2  -AH 2  -AH     Moving to and from a bed to a chair (including a wheelchair)? 1  -AH 1  -AH     Standing up from a chair using your arms (e.g., wheelchair, bedside chair)? 1  -AH 1  -AH     Climbing 3-5 steps with a railing? 1  -AH 1  -AH     To walk in hospital room? 1  -AH 1  -AH     AM-PAC 6 Clicks Score 8  - 8  -     Functional Assessment    Outcome Measure Options AM-PAC 6 Clicks Basic Mobility (PT)  - AM-PAC 6 Clicks Basic Mobility (PT)  -       User Key  (r) = Recorded By, (t) = Taken By, (c) = Cosigned By    Initials Name Provider Type     Josephine Miller PTA Physical Therapy Assistant           Time Calculation:         PT Charges       01/26/18 1426          Time Calculation    Start Time 1330  -      Stop Time 1345  -      Time Calculation  (min) 15 min  -      PT Received On 01/26/18  -      PT Goal Re-Cert Due Date 02/01/18  -      Time Calculation- PT    Total Timed Code Minutes- PT 15 minute(s)  -        User Key  (r) = Recorded By, (t) = Taken By, (c) = Cosigned By    Initials Name Provider Type     Josephine Miller PTA Physical Therapy Assistant          Therapy Charges for Today     Code Description Service Date Service Provider Modifiers Qty    37248374093 HC PT THERAPEUTIC ACT EA 15 MIN 1/25/2018 Josephine Miller, SHANTEL GP 2    89575199338 HC PT THER PROC EA 15 MIN 1/25/2018 Josephine Miller, SHANTEL GP 1    79710618110 HC PT THER PROC EA 15 MIN 1/26/2018 Josephine Miller, SHANTEL GP 1          PT G-Codes  Outcome Measure Options: AM-PAC 6 Clicks Basic Mobility (PT)  Score: 8  Functional Limitation: Changing and maintaining body position  Changing and Maintaining Body Position Current Status (): At least 80 percent but less than 100 percent impaired, limited or restricted  Changing and Maintaining Body Position Goal Status (): At least 60 percent but less than 80 percent impaired, limited or restricted    Josephine Miller PTA  1/26/2018

## 2018-01-26 NOTE — PLAN OF CARE
Problem: Patient Care Overview (Adult)  Goal: Plan of Care Review  Outcome: Ongoing (interventions implemented as appropriate)   01/26/18 0200   Coping/Psychosocial Response Interventions   Plan Of Care Reviewed With patient   Patient Care Overview   Progress no change   Outcome Evaluation   Outcome Summary/Follow up Plan Pt. denies pain; no IV access, but no IV meds; right shoulder xray obtained; I was told that a nursing home in [place] has accepted her, awaiting further information; will continue to monitor.     Goal: Adult Individualization and Mutuality  Outcome: Ongoing (interventions implemented as appropriate)      Problem: Fall Risk (Adult)  Goal: Absence of Falls  Outcome: Ongoing (interventions implemented as appropriate)      Problem: VTE, DVT and PE (Adult)  Goal: Signs and Symptoms of Listed Potential Problems Will be Absent or Manageable (VTE, DVT and PE)  Outcome: Ongoing (interventions implemented as appropriate)      Problem: Skin Integrity Impairment, Risk/Actual (Adult)  Goal: Skin Integrity/Wound Healing  Outcome: Ongoing (interventions implemented as appropriate)      Problem: Pressure Ulcer Risk (Jorge Scale) (Adult,Obstetrics,Pediatric)  Goal: Skin Integrity  Outcome: Ongoing (interventions implemented as appropriate)

## 2018-01-26 NOTE — PROGRESS NOTES
Continued Stay Note  [facility]     Patient Name: Tricia Hernandez  MRN: [id number]  Today's Date: 1/26/2018    Admit Date: 1/3/2018          Discharge Plan       01/26/18 1559    Case Management/Social Work Plan    Plan PHYSICIAN IS WORKING ON ACUTE CARE TRANSFER TO [facility] UNDER THE CARE OF DR FLOWERS. WHEN ALL IS ARRANGED FOR PT TO GO, [facility] FOR TRANSPORT.               Discharge Codes     None        Expected Discharge Date and Time     Expected Discharge Date Expected Discharge Time    Jan 24, 2018             MILY Mcleod

## 2018-01-26 NOTE — PROGRESS NOTES
Continued Stay Note  [facility]     Patient Name: Tricia Hernandez  MRN: [id number]  Today's Date: 1/26/2018    Admit Date: 1/3/2018          Discharge Plan       01/26/18 1706    Case Management/Social Work Plan    Plan [facility]    Additional Comments Dr. Granados said that after talking to the MD at [facility], it has been decided that pt can go ahead and go [facility] [place] [facility] and go to an appointment at the [facility] on Tuesday. Spoke with Tiffanie at [facility], and she said they can take pt tomorrow ONLY if they get orders before 1130. Those orders need to be faxed to [phone]. Dr. Granados aware that orders will need to be written prior to 1130. He is updating pt's daughter.       01/26/18 4621    Case Management/Social Work Plan    Plan PHYSICIAN IS WORKING ON ACUTE CARE TRANSFER TO [facility] UNDER THE CARE OF DR FLOWERS. WHEN ALL IS ARRANGED FOR PT TO GO, [facility] FOR TRANSPORT.               Discharge Codes     None        Expected Discharge Date and Time     Expected Discharge Date Expected Discharge Time    Jan 24, 2018             SILVANA Griffith

## 2018-01-26 NOTE — PLAN OF CARE
Problem: Patient Care Overview (Adult)  Goal: Plan of Care Review  Outcome: Ongoing (interventions implemented as appropriate)  Pt [place] RUE, seeking transfer to [facility] for revision of right shoulder surgery, VSS   01/26/18 7458   Coping/Psychosocial Response Interventions   Plan Of Care Reviewed With patient   Patient Care Overview   Progress no change     Goal: Adult Individualization and Mutuality  Outcome: Ongoing (interventions implemented as appropriate)      Problem: Fall Risk (Adult)  Goal: Absence of Falls  Outcome: Ongoing (interventions implemented as appropriate)      Problem: VTE, DVT and PE (Adult)  Goal: Signs and Symptoms of Listed Potential Problems Will be Absent or Manageable (VTE, DVT and PE)  Outcome: Ongoing (interventions implemented as appropriate)      Problem: Skin Integrity Impairment, Risk/Actual (Adult)  Goal: Skin Integrity/Wound Healing  Outcome: Ongoing (interventions implemented as appropriate)      Problem: Pressure Ulcer Risk (Jorge Scale) (Adult,Obstetrics,Pediatric)  Goal: Skin Integrity  Outcome: Ongoing (interventions implemented as appropriate)      Problem: Pain, Acute (Adult)  Goal: Identify Related Risk Factors and Signs and Symptoms  Outcome: Ongoing (interventions implemented as appropriate)    Goal: Acceptable Pain Control/Comfort Level  Outcome: Ongoing (interventions implemented as appropriate)

## 2018-01-26 NOTE — CONSULTS
Orthopaedic Inpatient Consultation    NAME:  Tricia Hernandez   : 1940  MRN: [id number]    1/3/2018  5:30 PM    Requesting Physician: Jim Zuñiga DO    CHIEF COMPLAINT:  right shoulder pain      HISTORY OF PRESENT ILLNESS:   The patient is a 77 y.o. female who underwent a right shoulder reverse total arthroplasty on 2017 by Dr. Santos and Joanne Ocasio.  Since 2018, the patient is being admitted to [facility] in [facility] and approximately 3 days ago, the patient sustained a fall resulting in the acute onset of right upper extremity pain.  X-rays obtained today demonstrate disassociation of the reverse total shoulder Jerri fear from the glenoid baseplate.  Pain is located in the right shoulder, rated a 1/5, dull and constant, worse with movement, better with rest and medication.  There are no associated symptoms.      Past Medical History:    Past Medical History:   Diagnosis Date   • Anemia    • Arthritis    • Bladder spasms    • GERD (gastroesophageal reflux disease)    • Hypertension        Past Surgical History:    Past Surgical History:   Procedure Laterality Date   • ANKLE SURGERY     • KNEE SURGERY     • SHOULDER SURGERY         Current Medications:   Prior to Admission medications    Medication Sig Start Date End Date Taking? Authorizing Provider   acetaminophen (TYLENOL) 325 MG tablet Take 2 tablets by mouth Every 4 (Four) Hours As Needed for Headache or Fever (fever greater than 101.5 F). 18   BRITNEY Schroeder   apixaban (ELIQUIS) 5 MG tablet tablet Take 1 tablet by mouth Every 12 (Twelve) Hours. 18   BRITNEY Schroeder   citalopram (CeleXA) 20 MG tablet Take 20 mg by mouth Daily.   Yes Historical Provider, MD   ipratropium-albuterol (DUO-NEB) 0.5-2.5 mg/mL nebulizer Take 3 mL by nebulization Every 6 (Six) Hours As Needed for Wheezing. 18   BRITNEY Schroeder   lisinopril-hydrochlorothiazide (PRINZIDE,ZESTORETIC) 20-25 MG per  tablet Take 1 tablet by mouth Daily.   Yes Historical Provider, MD   metroNIDAZOLE (FLAGYL) 500 MG tablet Take 1 tablet by mouth Every 8 (Eight) Hours for 9 days. Indications: Clostridium Difficile Infection 1/24/18 2/2/18  Alec Granados MD   miconazole (MICOTIN) 2 % cream Apply  topically Every 12 (Twelve) Hours. 1/24/18   Alec Garnados MD   nystatin (MYCOSTATIN) 567302 UNIT/GM powder Apply  topically Every 12 (Twelve) Hours. 1/24/18   Alec Granados MD   ondansetron (ZOFRAN) 4 MG tablet Take 1 tablet by mouth Every 6 (Six) Hours As Needed for Nausea or Vomiting. 1/11/18   BRITNEY Schroeder   oxybutynin (DITROPAN) 5 MG tablet Take 5 mg by mouth 2 (Two) Times a Day.   Yes Historical Provider, MD   pantoprazole (PROTONIX) 40 MG EC tablet Take 40 mg by mouth Daily.   Yes Historical Provider, MD   saccharomyces boulardii (FLORASTOR) 250 MG capsule Take 1 capsule by mouth Daily. 1/24/18   Alec Granados MD   simvastatin (ZOCOR) 40 MG tablet Take 1 tablet by mouth Every Night. 1/11/18   BRITNEY Schroeder       Allergies:  Review of patient's allergies indicates no known allergies.    Social History:   Social History     Social History   • Marital status:      Spouse name: N/A   • Number of children: N/A   • Years of education: N/A     Occupational History   • Not on file.     Social History Main Topics   • Smoking status: Never Smoker   • Smokeless tobacco: Never Used   • Alcohol use No   • Drug use: No   • Sexual activity: Not on file     Other Topics Concern   • Not on file     Social History Narrative    Her daughter is her POA       Family History:   Family History   Problem Relation Age of Onset   • Heart disease Neg Hx    • Cancer Neg Hx        REVIEW OF SYSTEMS:  14 point review of systems has been reviewed from the patient's emergency room visit, reviewed with the patient on today's date with no new changes.    PHYSICAL EXAM:      Physical Examination:  Vitals:   Vitals:    01/26/18 0652  01/26/18 0816 01/26/18 1047 01/26/18 1210   BP:  138/80  125/64   BP Location:  Right arm  Left arm   Patient Position:  Sitting  Lying   Pulse: 70 75 76 80   Resp: 18 18 18 18   Temp:  99.5 °F (37.5 °C)  99.7 °F (37.6 °C)   TempSrc:  Oral  Oral   SpO2: 96% 95%  90%   Weight:       Height:         General:  Appears stated age, no distress.  Orientation:  Alert and oriented to time, place, and person.  Mood and Affect:  Cooperative and pleasant.  Gait:  Resting comfortably in bed.  Cardiovascular:  Symmetric 1-2 plus pulses in upper and lower extremities.  Lymph:  No cervical or inguinal lymphadenopathy noted.  Sensation:  Grossly intact to light touch.  DTR:  Normal, no pathologic reflexes.  Coordination/balance:  Normal    Musculoskeletal:  Left upper extremity exam:  There is no tenderness to palpation about the shoulder, elbow, wrist or hand.  Unrestricted full function motion is present.  Stability is normal with provocative tests, 5/5 strength, and skin is normal.      Right upper extremity exam:  There is minimal tenderness to palpation about the shoulder, but no obvious tenderness to palpation about the elbow, wrist or hand.  Unrestricted full function motion is present of the elbow, wrist and hand, but any attempted motion about the right shoulder results in discomfort.  Sensation is intact to light touch in the axillary, median, radial and ulnar distributions.  Axillary, AIN, PIN and ulnar motor are intact and skin is normal.     Right lower extremity exam:  There is no tenderness to palpation about the hip, knee, ankle or foot.  Unrestricted full function motion is present.  Stability is normal with provocative tests, 5/5 strength, and skin is normal.     Left lower extremity exam:  There is no tenderness to palpation about the hip, knee, ankle or foot.  Unrestricted full function motion is present.  Stability is normal with provocative tests, 5/5 strength, and skin is normal.      DATA:    CBC with  Differential:    Lab Results   Component Value Date    WBC 5.25 01/26/2018    RBC 3.26 (L) 01/26/2018    HGB 9.1 (L) 01/26/2018    HCT 28.7 (L) 01/26/2018     01/26/2018    MCV 88.0 01/26/2018    MCH 27.9 (L) 01/26/2018    MCHC 31.7 (L) 01/26/2018    RDW 15.8 (H) 01/26/2018    NRBC 0.0 01/26/2018    LYMPHOPCT 11.0 (L) 01/24/2018    MONOPCT 6.0 01/24/2018    EOSPCT 7.0 (H) 01/24/2018    MONOSABS 0.56 01/26/2018    LYMPHSABS 0.95 01/26/2018    EOSABS 0.14 01/26/2018    BASOSABS 0.04 01/26/2018     CMP:    Lab Results   Component Value Date     01/26/2018    K 4.0 01/26/2018     01/26/2018    CO2 33.0 (H) 01/26/2018    BUN 10 01/26/2018    CREATININE 1.09 01/26/2018    GLUCOSE 99 01/26/2018    CALCIUM 9.9 01/26/2018    BILITOT 0.1 01/26/2018    ALKPHOS 50 01/26/2018    AST 25 01/26/2018    ALT 35 01/26/2018     BMP:    Lab Results   Component Value Date     01/26/2018    K 4.0 01/26/2018     01/26/2018    CO2 33.0 (H) 01/26/2018    BUN 10 01/26/2018    CREATININE 1.09 01/26/2018    CALCIUM 9.9 01/26/2018    GLUCOSE 99 01/26/2018       ----------------------------------------------------------------------------------------------------------------------  I have reviewed the radiology images Below and feel that the Christine sphere of the reverse total shoulder construct has become disassociated from the glenoid baseplate and not simply a dislocation of the reverse total shoulder arthroplasty components    Radiology:   Imaging Results (last 24 hours)     Procedure Component Value Units Date/Time    XR Shoulder 2+ View Right [[id number]] Collected:  01/25/18 2026     Updated:  01/25/18 2032    Narrative:       EXAMINATION:  XR SHOULDER 2+ VW RIGHT-  1/25/2018 8:17 PM CST     HISTORY: right shoulder pain; Z74.09-Other reduced mobility;  Z74.09-Other reduced mobility      COMPARISON: Chest x-ray on 01/20/2018.     TECHNIQUE: 2 views were obtained of the right shoulder.     FINDINGS: There is  dislocation of the humeral portion of the reverse  shoulder arthroplasty. The glenoid sphere portion of the glenoid  component is dislocated with the humeral component.       Impression:       Arthroplasty component dislocation, as described.  This report was finalized on 01/25/2018 20:29 by Dr. Louie Parr MD.          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 Santos in [facility] for revision right shoulder surgery  2) promote elbow, forearm, wrist and hand range of motion, PT/OT  3) otherwise, nonweightbearing of the right upper extremity and sling for comfort to the right upper extremity    Electronically signed by Behzad Castañeda MD on 1/26/2018 at 12:56 PM

## 2018-01-27 VITALS
WEIGHT: 293 LBS | BODY MASS INDEX: 51.91 KG/M2 | OXYGEN SATURATION: 93 % | RESPIRATION RATE: 18 BRPM | HEIGHT: 63 IN | HEART RATE: 100 BPM | DIASTOLIC BLOOD PRESSURE: 83 MMHG | TEMPERATURE: 100.2 F | SYSTOLIC BLOOD PRESSURE: 144 MMHG

## 2018-01-27 LAB
ALBUMIN SERPL-MCNC: 2.4 G/DL (ref 3.5–5)
ALBUMIN/GLOB SERPL: 0.9 G/DL (ref 1.1–2.5)
ALP SERPL-CCNC: 52 U/L (ref 24–120)
ALT SERPL W P-5'-P-CCNC: 21 U/L (ref 0–54)
ANION GAP SERPL CALCULATED.3IONS-SCNC: 3 MMOL/L (ref 4–13)
AST SERPL-CCNC: 25 U/L (ref 7–45)
BASOPHILS # BLD AUTO: 0.04 10*3/MM3 (ref 0–0.2)
BASOPHILS NFR BLD AUTO: 0.6 % (ref 0–2)
BILIRUB SERPL-MCNC: 0.2 MG/DL (ref 0.1–1)
BUN BLD-MCNC: 11 MG/DL (ref 5–21)
BUN/CREAT SERPL: 9.3 (ref 7–25)
CALCIUM SPEC-SCNC: 9.6 MG/DL (ref 8.4–10.4)
CHLORIDE SERPL-SCNC: 100 MMOL/L (ref 98–110)
CO2 SERPL-SCNC: 34 MMOL/L (ref 24–31)
CREAT BLD-MCNC: 1.18 MG/DL (ref 0.5–1.4)
DEPRECATED RDW RBC AUTO: 50.6 FL (ref 40–54)
EOSINOPHIL # BLD AUTO: 0.15 10*3/MM3 (ref 0–0.7)
EOSINOPHIL NFR BLD AUTO: 2.4 % (ref 0–4)
ERYTHROCYTE [DISTWIDTH] IN BLOOD BY AUTOMATED COUNT: 15.9 % (ref 12–15)
GFR SERPL CREATININE-BSD FRML MDRD: 45 ML/MIN/1.73
GFR SERPL CREATININE-BSD FRML MDRD: 54 ML/MIN/1.73
GLOBULIN UR ELPH-MCNC: 2.8 GM/DL
GLUCOSE BLD-MCNC: 108 MG/DL (ref 70–100)
HCT VFR BLD AUTO: 28.3 % (ref 37–47)
HGB BLD-MCNC: 9.1 G/DL (ref 12–16)
IMM GRANULOCYTES # BLD: 0.15 10*3/MM3 (ref 0–0.03)
IMM GRANULOCYTES NFR BLD: 2.4 % (ref 0–5)
LYMPHOCYTES # BLD AUTO: 1.23 10*3/MM3 (ref 0.72–4.86)
LYMPHOCYTES NFR BLD AUTO: 19.9 % (ref 15–45)
MCH RBC QN AUTO: 28.3 PG (ref 28–32)
MCHC RBC AUTO-ENTMCNC: 32.2 G/DL (ref 33–36)
MCV RBC AUTO: 87.9 FL (ref 82–98)
MONOCYTES # BLD AUTO: 0.65 10*3/MM3 (ref 0.19–1.3)
MONOCYTES NFR BLD AUTO: 10.5 % (ref 4–12)
NEUTROPHILS # BLD AUTO: 3.96 10*3/MM3 (ref 1.87–8.4)
NEUTROPHILS NFR BLD AUTO: 64.2 % (ref 39–78)
NRBC BLD MANUAL-RTO: 0 /100 WBC (ref 0–0)
PLATELET # BLD AUTO: 169 10*3/MM3 (ref 130–400)
PMV BLD AUTO: 10.3 FL (ref 6–12)
POTASSIUM BLD-SCNC: 4.4 MMOL/L (ref 3.5–5.3)
PROT SERPL-MCNC: 5.2 G/DL (ref 6.3–8.7)
RBC # BLD AUTO: 3.22 10*6/MM3 (ref 4.2–5.4)
SODIUM BLD-SCNC: 137 MMOL/L (ref 135–145)
WBC NRBC COR # BLD: 6.18 10*3/MM3 (ref 4.8–10.8)

## 2018-01-27 PROCEDURE — 80053 COMPREHEN METABOLIC PANEL: CPT | Performed by: FAMILY MEDICINE

## 2018-01-27 PROCEDURE — 94799 UNLISTED PULMONARY SVC/PX: CPT

## 2018-01-27 PROCEDURE — 85025 COMPLETE CBC W/AUTO DIFF WBC: CPT | Performed by: FAMILY MEDICINE

## 2018-01-27 RX ORDER — HYDROCODONE BITARTRATE AND ACETAMINOPHEN 7.5; 325 MG/1; MG/1
1 TABLET ORAL EVERY 6 HOURS PRN
Qty: 24 TABLET | Refills: 0 | Status: SHIPPED | OUTPATIENT
Start: 2018-01-27 | End: 2018-02-05

## 2018-01-27 RX ADMIN — MICONAZOLE NITRATE: 20 CREAM TOPICAL at 09:23

## 2018-01-27 RX ADMIN — METRONIDAZOLE 500 MG: 500 TABLET ORAL at 05:32

## 2018-01-27 RX ADMIN — NYSTATIN: 100000 POWDER TOPICAL at 09:23

## 2018-01-27 RX ADMIN — POTASSIUM CHLORIDE 20 MEQ: 750 CAPSULE, EXTENDED RELEASE ORAL at 09:23

## 2018-01-27 RX ADMIN — IPRATROPIUM BROMIDE AND ALBUTEROL SULFATE 3 ML: 2.5; .5 SOLUTION RESPIRATORY (INHALATION) at 10:22

## 2018-01-27 RX ADMIN — HYDROCODONE BITARTRATE AND ACETAMINOPHEN 1 TABLET: 7.5; 325 TABLET ORAL at 00:56

## 2018-01-27 RX ADMIN — Medication 250 MG: at 09:23

## 2018-01-27 RX ADMIN — IPRATROPIUM BROMIDE AND ALBUTEROL SULFATE 3 ML: 2.5; .5 SOLUTION RESPIRATORY (INHALATION) at 06:48

## 2018-01-27 RX ADMIN — PANTOPRAZOLE SODIUM 40 MG: 40 TABLET, DELAYED RELEASE ORAL at 05:32

## 2018-01-27 NOTE — PROGRESS NOTES
[facility]  INPATIENT PROGRESS NOTE    Length of Stay: 23  Date of Admission: 1/3/2018  Primary Care Physician: Ed Hanson MD    Subjective   Chief Complaint: Shoulder pain    HPI   Discussed with daughter and patient about the plan.  Discussed with Dr. Santos and Dr. Castañeda about left shoulder displacement.  Patient denies any diarrhea.  No sign acute bleed.  Patient denies any chest pain, shortness of breath.    Review of Systems   Constitutional: Positive for activity change, appetite change and fatigue. Negative for chills and fever.   HENT: Negative for hearing loss, nosebleeds, tinnitus and trouble swallowing.    Eyes: Negative for visual disturbance.   Respiratory: Positive for shortness of breath (only with ambulating ). Negative for cough, chest tightness and wheezing.    Cardiovascular: Negative for chest pain, palpitations and leg swelling.   Gastrointestinal: Positive for diarrhea. Negative for abdominal distention, abdominal pain, blood in stool, constipation, nausea and vomiting.   Endocrine: Negative for cold intolerance, heat intolerance, polydipsia, polyphagia and polyuria.   Genitourinary: Negative for decreased urine volume, difficulty urinating, dysuria, flank pain, frequency and hematuria.   Musculoskeletal: Positive for back pain, gait problem and myalgias.  Right shoulder pain.  Skin: Negative for rash.   Allergic/Immunologic: Negative for immunocompromised state.   Neurological: Positive for weakness. Negative for dizziness, syncope, light-headedness and headaches.   Hematological: Negative for adenopathy. Does not bruise/bleed easily.   Psychiatric/Behavioral: Negative for confusion and sleep disturbance. The patient is not nervous/anxious.         All pertinent negatives and positives are as above. All other systems have been reviewed and are negative unless otherwise stated.     Objective    Temp:  [99.3 °F (37.4 °C)-100.3 °F (37.9  °C)] 99.7 °F (37.6 °C)  Heart Rate:  [70-95] 80  Resp:  [16-18] 18  BP: (118-146)/(64-80) 125/64  No intake or output data in the 24 hours ending 01/26/18 1932  Physical Exam  Constitutional: She is oriented to person, place, and time. She appears well-developed.   HENT:   Head: Normocephalic and atraumatic.   Eyes: Conjunctivae and EOM are normal. Pupils are equal, round, and reactive to light.   Neck: Neck supple. No JVD present. No thyromegaly present.   Cardiovascular: Normal rate, regular rhythm, normal heart sounds and intact distal pulses.  Exam reveals no gallop and no friction rub.    No murmur heard.  Pulmonary/Chest: Effort normal and breath sounds normal. No respiratory distress. She has no wheezes. She has no rales. She exhibits no tenderness.   Shallow breathing   Abdominal: Soft. Bowel sounds are normal. She exhibits no distension. There is no tenderness. There is no rebound and no guarding.   Gross morbid obesity   Musculoskeletal: Right shoulder pain.  Limited range of motion due to pain.. She exhibits no edema, tenderness or deformity.   Lymphadenopathy:     She has no cervical adenopathy.   Neurological: She is alert and oriented to person, place, and time. She displays normal reflexes. No cranial nerve deficit. She exhibits abnormal muscle tone. Coordination abnormal.   Skin: Skin is warm and dry. No rash noted.   Psychiatric: She has a normal mood and affect. Her behavior is normal. Judgment and thought content normal  Results Review:  Lab Results (last 24 hours)     Procedure Component Value Units Date/Time    CBC & Differential [[id number]] Collected:  01/26/18 0509    Specimen:  Blood Updated:  01/26/18 0624    Narrative:       The following orders were created for panel order CBC & Differential.  Procedure                               Abnormality         Status                     ---------                               -----------         ------                     CBC Auto  Differential[[id number]]        Abnormal            Final result                 Please view results for these tests on the individual orders.    CBC Auto Differential [[id number]]  (Abnormal) Collected:  01/26/18 0509    Specimen:  Blood Updated:  01/26/18 0624     WBC 5.25 10*3/mm3      RBC 3.26 (L) 10*6/mm3      Hemoglobin 9.1 (L) g/dL      Hematocrit 28.7 (L) %      MCV 88.0 fL      MCH 27.9 (L) pg      MCHC 31.7 (L) g/dL      RDW 15.8 (H) %      RDW-SD 50.6 fl      MPV 10.1 fL      Platelets 180 10*3/mm3      Neutrophil % 64.1 %      Lymphocyte % 18.1 %      Monocyte % 10.7 %      Eosinophil % 2.7 %      Basophil % 0.8 %      Immature Grans % 3.6 %      Neutrophils, Absolute 3.37 10*3/mm3      Lymphocytes, Absolute 0.95 10*3/mm3      Monocytes, Absolute 0.56 10*3/mm3      Eosinophils, Absolute 0.14 10*3/mm3      Basophils, Absolute 0.04 10*3/mm3      Immature Grans, Absolute 0.19 (H) 10*3/mm3      nRBC 0.0 /100 WBC     Comprehensive Metabolic Panel [[id number]]  (Abnormal) Collected:  01/26/18 0509    Specimen:  Blood Updated:  01/26/18 0636     Glucose 99 mg/dL      BUN 10 mg/dL      Creatinine 1.09 mg/dL      Sodium 138 mmol/L      Potassium 4.0 mmol/L      Chloride 102 mmol/L      CO2 33.0 (H) mmol/L      Calcium 9.9 mg/dL      Total Protein 5.0 (L) g/dL      Albumin 2.30 (L) g/dL      ALT (SGPT) 35 U/L      AST (SGOT) 25 U/L      Alkaline Phosphatase 50 U/L      Total Bilirubin 0.1 mg/dL      eGFR Non African Amer 49 (L) mL/min/1.73      eGFR  African Amer 59 (L) mL/min/1.73      Globulin 2.7 gm/dL      A/G Ratio 0.9 (L) g/dL      BUN/Creatinine Ratio 9.2     Anion Gap 3.0 (L) mmol/L     Narrative:       The MDRD GFR formula is only valid for adults with stable renal function between ages 18 and 70.           Cultures:       Radiology Data:    Imaging Results (last 24 hours)     Procedure Component Value Units Date/Time    XR Shoulder 2+ View Right [[id number]] Collected:  01/25/18 2026     Updated:  01/25/18  2032    Narrative:       EXAMINATION:  XR SHOULDER 2+ VW RIGHT-  1/25/2018 8:17 PM CST     HISTORY: right shoulder pain; Z74.09-Other reduced mobility;  Z74.09-Other reduced mobility      COMPARISON: Chest x-ray on 01/20/2018.     TECHNIQUE: 2 views were obtained of the right shoulder.     FINDINGS: There is dislocation of the humeral portion of the reverse  shoulder arthroplasty. The glenoid sphere portion of the glenoid  component is dislocated with the humeral component.       Impression:       Arthroplasty component dislocation, as described.  This report was finalized on 01/25/2018 20:29 by Dr. Louie Parr MD.          No Known Allergies    Scheduled meds:     ipratropium-albuterol 3 mL Nebulization 4x Daily - RT   metroNIDAZOLE 500 mg Oral Q8H   miconazole  Topical Q12H   nystatin  Topical Q12H   pantoprazole 40 mg Oral Q AM   potassium chloride 20 mEq Oral Daily   saccharomyces boulardii 250 mg Oral BID       PRN meds:  •  acetaminophen **OR** acetaminophen  •  dextrose  •  dextrose  •  glucagon (human recombinant)  •  HYDROcodone-acetaminophen  •  loperamide  •  ondansetron **OR** ondansetron ODT **OR** ondansetron  •  sodium chloride    Assessment/Plan     Active Problems:    RUBY (acute kidney injury)    DVT (deep venous thrombosis)    Anemia    GI bleed      Plan:  Occlusive left lower extremity DVT.  No anticoagulation due to GI bleed.  Patient had 2 episodes GI bleed due to anticoagulation.  Patient has an IVC filter in place.      GI bleed-GI to follow.  Status post multiple transfusion.   No anticoagulation second episode of GI bleed.    Displaced right shoulder hardware.  Discussed with Dr. Santos and Dr. Castañeda.  Plan for outpatient evaluation on Tuesday at Dr. Santos's clinic.  Patient will put in a sling for now and norco prn.      Hospital acquire pneumonia-Zosyn, then DC by infectious disease.  Continue duo nebs..  Nystatin powder.  Miconazole cream.      Right hand pain/right shoulder pain-  xray of right hand, no fracture.  X-ray right shows still pending.      Fever/diarrhea-on Imodium, probiotic.  C. difficile positive.  On Flagyl.  C. difficile resolved, patient would need to  finish up the course of Flagyl.  Patient is noncontagious due to treatment.      Hypokalemia.  Potassium by mouth daily      Acute renal failure/dehydration-resolved      Fungal/cellulitis-DC Diflucan by infectious disease.       Anemia-stable      Deconditioning-PT and OT consult      Blood culture no growth in 5 days.      Discharge Plannin- 2 days.   Nursing home placement pending.      Alec Granados MD   18   7:32 PM

## 2018-01-27 NOTE — PLAN OF CARE
Problem: Inpatient Physical Therapy  Goal: Bed Mobility Goal LTG- PT  Outcome: Unable to achieve outcome(s) by discharge Date Met: 01/27/18 01/27/18 1612   Bed Mobility PT LTG   Bed Mobility PT LTG, Date Goal Reviewed 01/27/18   Bed Mobility PT LTG, Outcome goal not met   Bed Mobility PT LTG, Reason Goal Not Met discharged from facility     Goal: Transfer Training Goal 1 LTG- PT  Outcome: Unable to achieve outcome(s) by discharge Date Met: 01/27/18 01/27/18 1612   Transfer Training PT LTG   Transfer Training PT LTG, Date Goal Reviewed 01/27/18   Transfer Training PT LTG, Outcome goal not met   Transfer Training PT LTG, Reason Goal Not Met discharged from facility     Goal: Strength Goal LTG- PT  Outcome: Unable to achieve outcome(s) by discharge Date Met: 01/27/18 01/27/18 1612   Strength Goal PT LTG   Strength Goal PT LTG, Date Goal Reviewed 01/27/18   Strength Goal PT LTG, Outcome goal not met   Strength Goal PT LTG, Reason Goal Not Met discharged from facility

## 2018-01-27 NOTE — THERAPY DISCHARGE NOTE
Acute Care - Physical Therapy Discharge Summary  [facility]       Patient Name: Tricia Hernandez  : 1940  MRN: [id number]    Today's Date: 2018  Onset of Illness/Injury or Date of Surgery Date: 18    Date of Referral to PT: 18  Referring Physician: Dr. Granados      Admit Date: 1/3/2018      PT Recommendation and Plan    Visit Dx:    ICD-10-CM ICD-9-CM   1. Impaired mobility and ADLs Z74.09 799.89   2. Impaired mobility Z74.09 799.89             Outcome Measures       18 1400 18 1100       How much help from another person do you currently need...    Turning from your back to your side while in flat bed without using bedrails? 2  -AH 2  -AH     Moving from lying on back to sitting on the side of a flat bed without bedrails? 2  -AH 2  -AH     Moving to and from a bed to a chair (including a wheelchair)? 1  -AH 1  -AH     Standing up from a chair using your arms (e.g., wheelchair, bedside chair)? 1  -AH 1  -AH     Climbing 3-5 steps with a railing? 1  -AH 1  -AH     To walk in hospital room? 1  -AH 1  -AH     AM-PAC 6 Clicks Score 8  -AH 8  -AH     Functional Assessment    Outcome Measure Options AM-PAC 6 Clicks Basic Mobility (PT)  -AH AM-PAC 6 Clicks Basic Mobility (PT)  -       User Key  (r) = Recorded By, (t) = Taken By, (c) = Cosigned By    Initials Name Provider Type     Josephine Miller, PTA Physical Therapy Assistant                      IP PT Goals       18 1612 18 1247       Bed Mobility PT LTG    Bed Mobility PT LTG, Date Goal Reviewed 18  -CW 18  -PB     Bed Mobility PT LTG, Outcome goal not met  -CW goal ongoing  -PB     Bed Mobility PT LTG, Reason Goal Not Met discharged from facility  -CW progress slower than expected  -PB     Transfer Training PT LTG    Transfer Training PT LTG, [place]alier Level  minimum assist (75% patient effort);2 person assist required  -PB     Transfer Training PT  LTG, Date Goal Reviewed 18  -CW 18  -PB      Transfer Training PT LTG, Outcome goal not met  -CW goal revised  -PB     Transfer Training PT LTG, Reason Goal Not Met discharged from facility  -CW goals revised this date  -PB     Strength Goal PT LTG    Strength Goal PT LTG, Date Goal Reviewed 01/27/18  -CW 01/22/18  -PB     Strength Goal PT LTG, Outcome goal not met  -CW goal ongoing  -PB     Strength Goal PT LTG, Reason Goal Not Met discharged from facility  -CW progress slower than expected  -PB       User Key  (r) = Recorded By, (t) = Taken By, (c) = Cosigned By    Initials Name Provider Type    LISANDRO Gutierrez PTA Physical Therapy Assistant    PB Prasanth Abdi, PT DPT Physical Therapist              PT Discharge Summary  Reason for Discharge: Discharge from facility  Outcomes Achieved: Refer to plan of care for updates on goals achieved  Discharge Destination: [facility]      Rhonda Gutierrez PTA   1/27/2018

## 2018-01-27 NOTE — PLAN OF CARE
Problem: Patient Care Overview (Adult)  Goal: Plan of Care Review  Outcome: Ongoing (interventions implemented as appropriate)   01/27/18 0313   Coping/Psychosocial Response Interventions   Plan Of Care Reviewed With patient   Patient Care Overview   Progress no change   Outcome Evaluation   Outcome Summary/Follow up Plan Pt. c/o pain x2 so far this shift; right arm in sling; groin and abdominal folds are much improved, applying antifungal cream and powder as ordered; pt incontinent, check and change q2 PRN; pt. is anticipating transfer to NH while awaiting shoulder surgery, however we are unsure at this time if that will be feasible- to address further; will continue to monitor.     Goal: Adult Individualization and Mutuality  Outcome: Ongoing (interventions implemented as appropriate)      Problem: Fall Risk (Adult)  Goal: Absence of Falls  Outcome: Ongoing (interventions implemented as appropriate)      Problem: VTE, DVT and PE (Adult)  Goal: Signs and Symptoms of Listed Potential Problems Will be Absent or Manageable (VTE, DVT and PE)  Outcome: Ongoing (interventions implemented as appropriate)      Problem: Skin Integrity Impairment, Risk/Actual (Adult)  Goal: Skin Integrity/Wound Healing  Outcome: Ongoing (interventions implemented as appropriate)      Problem: Pressure Ulcer Risk (Jorge Scale) (Adult,Obstetrics,Pediatric)  Goal: Skin Integrity  Outcome: Ongoing (interventions implemented as appropriate)      Problem: Pain, Acute (Adult)  Goal: Identify Related Risk Factors and Signs and Symptoms  Outcome: Outcome(s) achieved Date Met: 01/27/18    Goal: Acceptable Pain Control/Comfort Level  Outcome: Ongoing (interventions implemented as appropriate)

## 2019-11-06 ENCOUNTER — OUTSIDE FACILITY SERVICE (OUTPATIENT)
Dept: CARDIOLOGY | Facility: CLINIC | Age: 79
End: 2019-11-06

## 2019-11-07 PROCEDURE — 93306 TTE W/DOPPLER COMPLETE: CPT | Performed by: INTERNAL MEDICINE
